# Patient Record
Sex: FEMALE | Race: WHITE | NOT HISPANIC OR LATINO | Employment: UNEMPLOYED | URBAN - METROPOLITAN AREA
[De-identification: names, ages, dates, MRNs, and addresses within clinical notes are randomized per-mention and may not be internally consistent; named-entity substitution may affect disease eponyms.]

---

## 2022-01-05 ENCOUNTER — HOSPITAL ENCOUNTER (EMERGENCY)
Facility: HOSPITAL | Age: 27
Discharge: HOME | End: 2022-01-05
Attending: EMERGENCY MEDICINE
Payer: MEDICAID

## 2022-01-05 ENCOUNTER — TELEPHONE (OUTPATIENT)
Dept: OBSTETRICS AND GYNECOLOGY | Facility: CLINIC | Age: 27
End: 2022-01-05
Payer: MEDICAID

## 2022-01-05 VITALS
TEMPERATURE: 99 F | OXYGEN SATURATION: 100 % | RESPIRATION RATE: 19 BRPM | HEART RATE: 111 BPM | SYSTOLIC BLOOD PRESSURE: 186 MMHG | DIASTOLIC BLOOD PRESSURE: 106 MMHG

## 2022-01-05 DIAGNOSIS — O20.0 THREATENED ABORTION: Primary | ICD-10-CM

## 2022-01-05 DIAGNOSIS — R10.9 ABDOMINAL CRAMPING: ICD-10-CM

## 2022-01-05 DIAGNOSIS — R03.0 ELEVATED BLOOD PRESSURE READING: ICD-10-CM

## 2022-01-05 LAB
ABO + RH BLD: NORMAL
ALBUMIN SERPL BCP-MCNC: 4.4 G/DL (ref 3.5–5.2)
ALP SERPL-CCNC: 101 U/L (ref 55–135)
ALT SERPL W/O P-5'-P-CCNC: 42 U/L (ref 10–44)
ANION GAP SERPL CALC-SCNC: 10 MMOL/L (ref 8–16)
AST SERPL-CCNC: 24 U/L (ref 10–40)
BACTERIA #/AREA URNS HPF: NORMAL /HPF
BASOPHILS # BLD AUTO: 0.04 K/UL (ref 0–0.2)
BASOPHILS NFR BLD: 0.4 % (ref 0–1.9)
BILIRUB SERPL-MCNC: 0.3 MG/DL (ref 0.1–1)
BILIRUB UR QL STRIP: NEGATIVE
BUN SERPL-MCNC: 10 MG/DL (ref 6–20)
CALCIUM SERPL-MCNC: 9.6 MG/DL (ref 8.7–10.5)
CHLORIDE SERPL-SCNC: 107 MMOL/L (ref 95–110)
CLARITY UR: CLEAR
CO2 SERPL-SCNC: 21 MMOL/L (ref 23–29)
COLOR UR: YELLOW
CREAT SERPL-MCNC: 0.7 MG/DL (ref 0.5–1.4)
DIFFERENTIAL METHOD: ABNORMAL
EOSINOPHIL # BLD AUTO: 0.1 K/UL (ref 0–0.5)
EOSINOPHIL NFR BLD: 0.6 % (ref 0–8)
ERYTHROCYTE [DISTWIDTH] IN BLOOD BY AUTOMATED COUNT: 13.7 % (ref 11.5–14.5)
EST. GFR  (AFRICAN AMERICAN): >60 ML/MIN/1.73 M^2
EST. GFR  (NON AFRICAN AMERICAN): >60 ML/MIN/1.73 M^2
GLUCOSE SERPL-MCNC: 93 MG/DL (ref 70–110)
GLUCOSE UR QL STRIP: NEGATIVE
HCG INTACT+B SERPL-ACNC: 3618 MIU/ML
HCT VFR BLD AUTO: 47.6 % (ref 37–48.5)
HCV AB SERPL QL IA: NEGATIVE
HEP C VIRUS HOLD SPECIMEN: NORMAL
HGB BLD-MCNC: 16.1 G/DL (ref 12–16)
HGB UR QL STRIP: ABNORMAL
HIV 1+2 AB+HIV1 P24 AG SERPL QL IA: NEGATIVE
IMM GRANULOCYTES # BLD AUTO: 0.06 K/UL (ref 0–0.04)
IMM GRANULOCYTES NFR BLD AUTO: 0.6 % (ref 0–0.5)
KETONES UR QL STRIP: ABNORMAL
LEUKOCYTE ESTERASE UR QL STRIP: ABNORMAL
LYMPHOCYTES # BLD AUTO: 2.2 K/UL (ref 1–4.8)
LYMPHOCYTES NFR BLD: 22 % (ref 18–48)
MCH RBC QN AUTO: 29.9 PG (ref 27–31)
MCHC RBC AUTO-ENTMCNC: 33.8 G/DL (ref 32–36)
MCV RBC AUTO: 89 FL (ref 82–98)
MICROSCOPIC COMMENT: NORMAL
MONOCYTES # BLD AUTO: 0.5 K/UL (ref 0.3–1)
MONOCYTES NFR BLD: 5.1 % (ref 4–15)
NEUTROPHILS # BLD AUTO: 7 K/UL (ref 1.8–7.7)
NEUTROPHILS NFR BLD: 71.3 % (ref 38–73)
NITRITE UR QL STRIP: NEGATIVE
NRBC BLD-RTO: 0 /100 WBC
PH UR STRIP: 6 [PH] (ref 5–8)
PLATELET # BLD AUTO: 325 K/UL (ref 150–450)
PMV BLD AUTO: 9.3 FL (ref 9.2–12.9)
POTASSIUM SERPL-SCNC: 3.7 MMOL/L (ref 3.5–5.1)
PROT SERPL-MCNC: 7.7 G/DL (ref 6–8.4)
PROT UR QL STRIP: NEGATIVE
RBC # BLD AUTO: 5.38 M/UL (ref 4–5.4)
RBC #/AREA URNS HPF: 2 /HPF (ref 0–4)
SODIUM SERPL-SCNC: 138 MMOL/L (ref 136–145)
SP GR UR STRIP: >=1.03 (ref 1–1.03)
SQUAMOUS #/AREA URNS HPF: 5 /HPF
URN SPEC COLLECT METH UR: ABNORMAL
UROBILINOGEN UR STRIP-ACNC: NEGATIVE EU/DL
WBC # BLD AUTO: 9.82 K/UL (ref 3.9–12.7)
WBC #/AREA URNS HPF: 5 /HPF (ref 0–5)

## 2022-01-05 PROCEDURE — 86901 BLOOD TYPING SEROLOGIC RH(D): CPT | Performed by: NURSE PRACTITIONER

## 2022-01-05 PROCEDURE — 81000 URINALYSIS NONAUTO W/SCOPE: CPT | Performed by: NURSE PRACTITIONER

## 2022-01-05 PROCEDURE — 86803 HEPATITIS C AB TEST: CPT | Performed by: EMERGENCY MEDICINE

## 2022-01-05 PROCEDURE — 99284 EMERGENCY DEPT VISIT MOD MDM: CPT | Mod: 25

## 2022-01-05 PROCEDURE — 84702 CHORIONIC GONADOTROPIN TEST: CPT | Performed by: NURSE PRACTITIONER

## 2022-01-05 PROCEDURE — 86900 BLOOD TYPING SEROLOGIC ABO: CPT | Performed by: NURSE PRACTITIONER

## 2022-01-05 PROCEDURE — 85025 COMPLETE CBC W/AUTO DIFF WBC: CPT | Performed by: NURSE PRACTITIONER

## 2022-01-05 PROCEDURE — 87389 HIV-1 AG W/HIV-1&-2 AB AG IA: CPT | Performed by: EMERGENCY MEDICINE

## 2022-01-05 PROCEDURE — 80053 COMPREHEN METABOLIC PANEL: CPT | Performed by: NURSE PRACTITIONER

## 2022-01-05 NOTE — FIRST PROVIDER EVALUATION
Medical screening exam completed.  I have conducted a focused provider triage encounter, findings are as follows:    Brief history of present illness:  5-6 weeks pregnant. . Reports vaginal bleeding. Hx of miscarriage     There were no vitals filed for this visit.      Preliminary workup initiated; this workup will be continued and followed by the physician or advanced practice provider that is assigned to the patient when roomed.

## 2022-01-05 NOTE — TELEPHONE ENCOUNTER
----- Message from Arelis Melchor sent at 1/5/2022 11:18 AM CST -----  .Type:  Needs Medical Advice    Who Called:  LAURA CHANEY [[id number]]  Symptoms (please be specific)  How long has patient had these symptoms:   Pharmacy name and phone #:   Would the patient rather a call back or a response via [facility]?  Best Call Back Number:  [phone]  Additional Information:   Pt is requesting a call from office regarding scheduling new preg appt. Please call.

## 2022-01-05 NOTE — TELEPHONE ENCOUNTER
Called patient and she is at the ED now with cramping and bleeding and stated she has history of miscarriages.  Patient will call back if appointment needed.

## 2022-01-06 ENCOUNTER — TELEPHONE (OUTPATIENT)
Dept: OBSTETRICS AND GYNECOLOGY | Facility: CLINIC | Age: 27
End: 2022-01-06
Payer: MEDICAID

## 2022-01-06 DIAGNOSIS — Z32.00 POSSIBLE PREGNANCY: Primary | ICD-10-CM

## 2022-01-06 NOTE — TELEPHONE ENCOUNTER
"Called patient and she went to ED yesterday stating "my anxiety got the best of me".  Scheduled pregnancy conf. Visit.  Patient stated ED told her she needs a follow up beta hcg tomorrow to make sure levels are still rising.  Will send message to [facility] to see if she would like to order lab.  "

## 2022-01-06 NOTE — ED PROVIDER NOTES
SCRIBE #1 NOTE: I, Mirella Mack, am scribing for, and in the presence of, Alejandro Light MD. I have scribed the entire note.       History     Chief Complaint   Patient presents with    Vaginal Bleeding     Pt states she is 5-6 wks pregnant and is having vaginal bleeding.     Review of patient's allergies indicates:  No Known Allergies      History of Present Illness     HPI    2022, 10:01 PM  History obtained from the patient      History of Present Illness: Kathy Mishra is a 26 y.o. female patient at [place] 5-6 weeks () who presents to the Emergency Department for evaluation of vaginal bleeding which onset a few hours pta. Pt states she noticed blood on toilet paper twice after going to the bathroom. She does have hx of miscarriage, so she was concerned and came to the ED. Symptoms are constant and moderate in severity. No mitigating or exacerbating factors reported. No associated sxs reported. Patient denies any abdominal pain, vaginal pain, vaginal discharge, dizziness, weakness, light headedness, n/v, and all other sxs at this time. No prior tx reported. LNMP was in November. No further complaints or concerns at this time.       Arrival mode: Personal vehicle    PCP: Primary Doctor No        Past Medical History:  No past medical history on file.    Past Surgical History:  No past surgical history on file.      Family History:  No family history on file.    Social History:  Social History     Tobacco Use    Smoking status: Not on file    Smokeless tobacco: Not on file   Substance and Sexual Activity    Alcohol use: Not on file    Drug use: Not on file    Sexual activity: Not on file        Review of Systems     Review of Systems   Constitutional: Negative for fever.   HENT: Negative for sore throat.    Respiratory: Negative for shortness of breath.    Cardiovascular: Negative for chest pain.   Gastrointestinal: Negative for abdominal pain, nausea and vomiting.   Genitourinary: Positive for  vaginal bleeding. Negative for dysuria, vaginal discharge and vaginal pain.   Musculoskeletal: Negative for back pain.   Skin: Negative for rash.   Neurological: Negative for dizziness, weakness and light-headedness.   Hematological: Does not bruise/bleed easily.   All other systems reviewed and are negative.     Physical Exam     Initial Vitals [01/05/22 1451]   BP Pulse Resp Temp SpO2   (!) 212/91 (!) 118 20 98.2 °F (36.8 °C) 100 %      MAP       --          Physical Exam  Nursing Notes and Vital Signs Reviewed.  Constitutional: Patient is in no acute distress. Well-developed and well-nourished.  Head: Atraumatic. Normocephalic.  Eyes: PERRL. EOM intact. Conjunctivae are not pale. No scleral icterus.  ENT: Mucous membranes are moist. Oropharynx is clear and symmetric.    Neck: Supple. Full ROM. No lymphadenopathy.  Cardiovascular: Regular rate. Regular rhythm. No murmurs, rubs, or gallops. Distal pulses are 2+ and symmetric.  Pulmonary/Chest: No respiratory distress. Clear to auscultation bilaterally. No wheezing or rales.  Abdominal: Soft and non-distended.  There is no tenderness.  No rebound, guarding, or rigidity. Good bowel sounds.  Genitourinary: No CVA tenderness  Musculoskeletal: Moves all extremities. No obvious deformities. No edema. No calf tenderness.  Skin: Warm and dry.  Neurological:  Alert, awake, and appropriate.  Normal speech.  No acute focal neurological deficits are appreciated.  Psychiatric: Normal affect. Good eye contact. Appropriate in content.     ED Course   Procedures  ED Vital Signs:  Vitals:    01/05/22 1451 01/05/22 2139   BP: (!) 212/91 (!) 186/106   Pulse: (!) 118 (!) 111   Resp: 20 19   Temp: 98.2 °F (36.8 °C) 98.5 °F (36.9 °C)   TempSrc: Oral Oral   SpO2: 100% 100%       Abnormal Lab Results:  Labs Reviewed   CBC W/ AUTO DIFFERENTIAL - Abnormal; Notable for the following components:       Result Value    Hemoglobin 16.1 (*)     Immature Granulocytes 0.6 (*)     Immature Grans  (Abs) 0.06 (*)     All other components within normal limits    Narrative:     Release to patient->Immediate   COMPREHENSIVE METABOLIC PANEL - Abnormal; Notable for the following components:    CO2 21 (*)     All other components within normal limits    Narrative:     Release to patient->Immediate   URINALYSIS, REFLEX TO URINE CULTURE - Abnormal; Notable for the following components:    Specific Gravity, UA >=1.030 (*)     Ketones, UA Trace (*)     Occult Blood UA 1+ (*)     Leukocytes, UA 1+ (*)     All other components within normal limits    Narrative:     Specimen Source->Urine   HCG, QUANTITATIVE    Narrative:     Release to patient->Immediate   HIV 1 / 2 ANTIBODY    Narrative:     Release to patient->Immediate   HEPATITIS C ANTIBODY    Narrative:     Release to patient->Immediate   HEP C VIRUS HOLD SPECIMEN    Narrative:     Release to patient->Immediate   URINALYSIS MICROSCOPIC    Narrative:     Specimen Source->Urine   GROUP & RH        All Lab Results:  Results for orders placed or performed during the hospital encounter of 01/05/22   CBC auto differential   Result Value Ref Range    WBC 9.82 3.90 - 12.70 K/uL    RBC 5.38 4.00 - 5.40 M/uL    Hemoglobin 16.1 (H) 12.0 - 16.0 g/dL    Hematocrit 47.6 37.0 - 48.5 %    MCV 89 82 - 98 fL    MCH 29.9 27.0 - 31.0 pg    MCHC 33.8 32.0 - 36.0 g/dL    RDW 13.7 11.5 - 14.5 %    Platelets 325 150 - 450 K/uL    MPV 9.3 9.2 - 12.9 fL    Immature Granulocytes 0.6 (H) 0.0 - 0.5 %    Gran # (ANC) 7.0 1.8 - 7.7 K/uL    Immature Grans (Abs) 0.06 (H) 0.00 - 0.04 K/uL    Lymph # 2.2 1.0 - 4.8 K/uL    Mono # 0.5 0.3 - 1.0 K/uL    Eos # 0.1 0.0 - 0.5 K/uL    Baso # 0.04 0.00 - 0.20 K/uL    nRBC 0 0 /100 WBC    Gran % 71.3 38.0 - 73.0 %    Lymph % 22.0 18.0 - 48.0 %    Mono % 5.1 4.0 - 15.0 %    Eosinophil % 0.6 0.0 - 8.0 %    Basophil % 0.4 0.0 - 1.9 %    Differential Method Automated    Comprehensive metabolic panel   Result Value Ref Range    Sodium 138 136 - 145 mmol/L     Potassium 3.7 3.5 - 5.1 mmol/L    Chloride 107 95 - 110 mmol/L    CO2 21 (L) 23 - 29 mmol/L    Glucose 93 70 - 110 mg/dL    BUN 10 6 - 20 mg/dL    Creatinine 0.7 0.5 - 1.4 mg/dL    Calcium 9.6 8.7 - 10.5 mg/dL    Total Protein 7.7 6.0 - 8.4 g/dL    Albumin 4.4 3.5 - 5.2 g/dL    Total Bilirubin 0.3 0.1 - 1.0 mg/dL    Alkaline Phosphatase 101 55 - 135 U/L    AST 24 10 - 40 U/L    ALT 42 10 - 44 U/L    Anion Gap 10 8 - 16 mmol/L    eGFR if African American >60 >60 mL/min/1.73 m^2    eGFR if non African American >60 >60 mL/min/1.73 m^2   Urinalysis, Reflex to Urine Culture Urine, Clean Catch    Specimen: Urine   Result Value Ref Range    Specimen UA Urine, Clean Catch     Color, UA Yellow Yellow, Straw, [place]    Appearance, UA Clear Clear    pH, UA 6.0 5.0 - 8.0    Specific Gravity, UA >=1.030 (A) 1.005 - 1.030    Protein, UA Negative Negative    Glucose, UA Negative Negative    Ketones, UA Trace (A) Negative    Bilirubin (UA) Negative Negative    Occult Blood UA 1+ (A) Negative    Nitrite, UA Negative Negative    Urobilinogen, UA Negative <2.0 EU/dL    Leukocytes, UA 1+ (A) Negative   hCG, quantitative, pregnancy   Result Value Ref Range    HCG Quant 3618 See Text mIU/mL   HIV 1/2 Ag/Ab (4th Gen)   Result Value Ref Range    HIV 1/2 Ag/Ab Negative Negative   Hepatitis C Antibody   Result Value Ref Range    Hepatitis C Ab Negative Negative   HCV Virus Hold Specimen   Result Value Ref Range    HEP C Virus Hold Specimen Hold for HCV sendout    Urinalysis Microscopic   Result Value Ref Range    RBC, UA 2 0 - 4 /hpf    WBC, UA 5 0 - 5 /hpf    Bacteria Occasional None-Occ /hpf    Squam Epithel, UA 5 /hpf    Microscopic Comment SEE COMMENT    ABO/Rh   Result Value Ref Range    Group & Rh A POS        Imaging Results:  Imaging Results          US OB <14 Wks TransAbd & TransVag, Single Gestation (XPD) (Final result)  Result time 01/05/22 18:26:43   Procedure changed from US OB <14 Wks, TransAbd, Single Gestation     Final result  by Justin Abbasi MD (01/05/22 18:26:43)                 Impression:      Intrauterine pregnancy with a yolk sac.  No fetal heart rate or fetal pole identified.  Follow-up recommended.  Today's sonographic findings consistent with early gestation.    Nonvisualization of the right ovary      Electronically signed by: Elroy Anderson  Date:    01/05/2022  Time:    18:26             Narrative:    EXAMINATION:  US OB <14 WEEKS, TRANSABDOM & TRANSVAG, SINGLE GESTATION (XPD)    CLINICAL HISTORY:  Abdominal cramping; Unspecified abdominal pain    TECHNIQUE:  Transabdominal sonography of the pelvis was performed, followed by transvaginal sonography to better evaluate the uterus and ovaries.    COMPARISON:  None.    FINDINGS:  There is a intrauterine gestational sac with a normal appearing yolk sac.  However there is no evidence for fetal pole or cardiac activity.  The right ovary was not identified.  No right adnexal masses.  The uterus measures 9.2 x 5.3 x 5 cm.  The left ovary measures 2.5 x 1.6 x 2.4 cm with vascular flow.  No subchorionic bleed.  The gestational sac measures 0.83 cm.  No free fluid.                                       The Emergency Provider reviewed the vital signs and test results, which are outlined above.     ED Discussion     10:06 PM: Reassessed pt at this time. Discussed with pt all pertinent ED information and results. Discussed pt dx and plan of tx. Gave pt all f/u and return to the ED instructions. All questions and concerns were addressed at this time. Pt expresses understanding of information and instructions, and is comfortable with plan to discharge. Pt is stable for discharge.    I discussed with patient and/or family/caretaker that evaluation in the ED does not suggest any emergent or life threatening medical conditions requiring immediate intervention beyond what was provided in the ED, and I believe patient is safe for discharge.  Regardless, an unremarkable evaluation in the ED does  not preclude the development or presence of a serious of life threatening condition. As such, patient was instructed to return immediately for any worsening or change in current symptoms.       Medical Decision Making:   Clinical Tests:   Lab Tests: Ordered and Reviewed  Radiological Study: Ordered and Reviewed           ED Medication(s):  Medications - No data to display    There are no discharge medications for this patient.       Follow-up Information     [facility] In 2 days.    Specialty: Obstetrics and Gynecology  Contact information:  [address] [facility] [address] [phone]           [facility] - Emergency Dept..    Specialty: Emergency Medicine  Why: As needed, If symptoms worsen  Contact information:  [address] [facility] [address] [phone]                           Scribe Attestation:   Scribe #1: I performed the above scribed service and the documentation accurately describes the services I performed. I attest to the accuracy of the note.     Attending:   Physician Attestation Statement for Scribe #1: I, Alejandro Light MD, personally performed the services described in this documentation, as scribed by Mirella Mack, in my presence, and it is both accurate and complete.           Clinical Impression       ICD-10-CM ICD-9-CM   1. Threatened   O20.0 640.00   2. Abdominal cramping  R10.9 789.00   3. Elevated blood pressure reading  R03.0 796.2       Disposition:   Disposition: Discharged  Condition: Stable         Alejandro Light MD  [phone]

## 2022-01-06 NOTE — TELEPHONE ENCOUNTER
----- Message from Joanne Franz sent at 1/6/2022  8:07 AM CST -----  She would like to schedule an OB appt w/ Ms ALVARO Haddad. She is about 5 weeks. Nothing coming up on the schedule. Please call pt [phone]. Thank you

## 2022-01-10 ENCOUNTER — LAB VISIT (OUTPATIENT)
Dept: LAB | Facility: HOSPITAL | Age: 27
End: 2022-01-10
Attending: ADVANCED PRACTICE MIDWIFE
Payer: MEDICAID

## 2022-01-10 DIAGNOSIS — Z32.00 POSSIBLE PREGNANCY: ICD-10-CM

## 2022-01-10 LAB — HCG INTACT+B SERPL-ACNC: 9889 MIU/ML

## 2022-01-10 PROCEDURE — 36415 COLL VENOUS BLD VENIPUNCTURE: CPT | Performed by: ADVANCED PRACTICE MIDWIFE

## 2022-01-10 PROCEDURE — 84702 CHORIONIC GONADOTROPIN TEST: CPT | Performed by: ADVANCED PRACTICE MIDWIFE

## 2022-01-11 ENCOUNTER — OFFICE VISIT (OUTPATIENT)
Dept: OBSTETRICS AND GYNECOLOGY | Facility: CLINIC | Age: 27
End: 2022-01-11
Payer: MEDICAID

## 2022-01-11 ENCOUNTER — LAB VISIT (OUTPATIENT)
Dept: LAB | Facility: HOSPITAL | Age: 27
End: 2022-01-11
Attending: ADVANCED PRACTICE MIDWIFE
Payer: MEDICAID

## 2022-01-11 ENCOUNTER — PROCEDURE VISIT (OUTPATIENT)
Dept: OBSTETRICS AND GYNECOLOGY | Facility: CLINIC | Age: 27
End: 2022-01-11
Payer: MEDICAID

## 2022-01-11 VITALS
DIASTOLIC BLOOD PRESSURE: 80 MMHG | WEIGHT: 252.44 LBS | SYSTOLIC BLOOD PRESSURE: 124 MMHG | HEIGHT: 63 IN | BODY MASS INDEX: 44.73 KG/M2

## 2022-01-11 DIAGNOSIS — O99.210 OBESITY AFFECTING PREGNANCY, ANTEPARTUM: ICD-10-CM

## 2022-01-11 DIAGNOSIS — N91.2 AMENORRHEA: ICD-10-CM

## 2022-01-11 DIAGNOSIS — Z86.39 HISTORY OF HYPOTHYROIDISM: ICD-10-CM

## 2022-01-11 DIAGNOSIS — Z86.79 HISTORY OF HYPERTENSION: ICD-10-CM

## 2022-01-11 DIAGNOSIS — O09.299 HISTORY OF MISCARRIAGE, CURRENTLY PREGNANT: ICD-10-CM

## 2022-01-11 DIAGNOSIS — N91.2 AMENORRHEA: Primary | ICD-10-CM

## 2022-01-11 LAB
ABO + RH BLD: NORMAL
ALBUMIN SERPL BCP-MCNC: 3.8 G/DL (ref 3.5–5.2)
ALP SERPL-CCNC: 88 U/L (ref 55–135)
ALT SERPL W/O P-5'-P-CCNC: 34 U/L (ref 10–44)
AMORPH CRY URNS QL MICRO: ABNORMAL
ANION GAP SERPL CALC-SCNC: 14 MMOL/L (ref 8–16)
AST SERPL-CCNC: 26 U/L (ref 10–40)
BACTERIA #/AREA URNS HPF: ABNORMAL /HPF
BASOPHILS # BLD AUTO: 0.01 K/UL (ref 0–0.2)
BASOPHILS NFR BLD: 0.2 % (ref 0–1.9)
BILIRUB SERPL-MCNC: 0.2 MG/DL (ref 0.1–1)
BILIRUB UR QL STRIP: NEGATIVE
BLD GP AB SCN CELLS X3 SERPL QL: NORMAL
BUN SERPL-MCNC: 9 MG/DL (ref 6–20)
CALCIUM SERPL-MCNC: 9.1 MG/DL (ref 8.7–10.5)
CHLORIDE SERPL-SCNC: 102 MMOL/L (ref 95–110)
CLARITY UR: ABNORMAL
CO2 SERPL-SCNC: 19 MMOL/L (ref 23–29)
COLOR UR: YELLOW
CREAT SERPL-MCNC: 0.6 MG/DL (ref 0.5–1.4)
DIFFERENTIAL METHOD: ABNORMAL
EOSINOPHIL # BLD AUTO: 0 K/UL (ref 0–0.5)
EOSINOPHIL NFR BLD: 0 % (ref 0–8)
ERYTHROCYTE [DISTWIDTH] IN BLOOD BY AUTOMATED COUNT: 13.8 % (ref 11.5–14.5)
EST. GFR  (AFRICAN AMERICAN): >60 ML/MIN/1.73 M^2
EST. GFR  (NON AFRICAN AMERICAN): >60 ML/MIN/1.73 M^2
ESTIMATED AVG GLUCOSE: 103 MG/DL (ref 68–131)
GLUCOSE SERPL-MCNC: 108 MG/DL (ref 70–110)
GLUCOSE UR QL STRIP: NEGATIVE
HBA1C MFR BLD: 5.2 % (ref 4–5.6)
HCT VFR BLD AUTO: 41.9 % (ref 37–48.5)
HGB BLD-MCNC: 13.9 G/DL (ref 12–16)
HGB UR QL STRIP: ABNORMAL
IMM GRANULOCYTES # BLD AUTO: 0.03 K/UL (ref 0–0.04)
IMM GRANULOCYTES NFR BLD AUTO: 0.7 % (ref 0–0.5)
KETONES UR QL STRIP: ABNORMAL
LEUKOCYTE ESTERASE UR QL STRIP: ABNORMAL
LYMPHOCYTES # BLD AUTO: 0.4 K/UL (ref 1–4.8)
LYMPHOCYTES NFR BLD: 7.9 % (ref 18–48)
MCH RBC QN AUTO: 29.6 PG (ref 27–31)
MCHC RBC AUTO-ENTMCNC: 33.2 G/DL (ref 32–36)
MCV RBC AUTO: 89 FL (ref 82–98)
MICROSCOPIC COMMENT: ABNORMAL
MONOCYTES # BLD AUTO: 0.5 K/UL (ref 0.3–1)
MONOCYTES NFR BLD: 10.4 % (ref 4–15)
NEUTROPHILS # BLD AUTO: 3.7 K/UL (ref 1.8–7.7)
NEUTROPHILS NFR BLD: 80.8 % (ref 38–73)
NITRITE UR QL STRIP: NEGATIVE
NRBC BLD-RTO: 0 /100 WBC
PH UR STRIP: 6 [PH] (ref 5–8)
PLATELET # BLD AUTO: 215 K/UL (ref 150–450)
PMV BLD AUTO: 10.6 FL (ref 9.2–12.9)
POTASSIUM SERPL-SCNC: 3.5 MMOL/L (ref 3.5–5.1)
PROT SERPL-MCNC: 6.9 G/DL (ref 6–8.4)
PROT UR QL STRIP: NEGATIVE
RBC # BLD AUTO: 4.69 M/UL (ref 4–5.4)
RBC #/AREA URNS HPF: 0 /HPF (ref 0–4)
SODIUM SERPL-SCNC: 135 MMOL/L (ref 136–145)
SP GR UR STRIP: >=1.03 (ref 1–1.03)
T3 SERPL-MCNC: 110 NG/DL (ref 60–180)
T4 FREE SERPL-MCNC: 0.7 NG/DL (ref 0.71–1.51)
TSH SERPL DL<=0.005 MIU/L-ACNC: 3.53 UIU/ML (ref 0.4–4)
URN SPEC COLLECT METH UR: ABNORMAL
WBC # BLD AUTO: 4.54 K/UL (ref 3.9–12.7)
WBC #/AREA URNS HPF: 20 /HPF (ref 0–5)

## 2022-01-11 PROCEDURE — 3074F SYST BP LT 130 MM HG: CPT | Mod: CPTII,,, | Performed by: ADVANCED PRACTICE MIDWIFE

## 2022-01-11 PROCEDURE — 87086 URINE CULTURE/COLONY COUNT: CPT | Performed by: ADVANCED PRACTICE MIDWIFE

## 2022-01-11 PROCEDURE — 76817 US OB/GYN PROCEDURE (VIEWPOINT): ICD-10-PCS | Mod: 26,S$PBB,, | Performed by: OBSTETRICS & GYNECOLOGY

## 2022-01-11 PROCEDURE — 76817 TRANSVAGINAL US OBSTETRIC: CPT | Mod: PBBFAC | Performed by: OBSTETRICS & GYNECOLOGY

## 2022-01-11 PROCEDURE — 86762 RUBELLA ANTIBODY: CPT | Performed by: ADVANCED PRACTICE MIDWIFE

## 2022-01-11 PROCEDURE — 84480 ASSAY TRIIODOTHYRONINE (T3): CPT | Performed by: ADVANCED PRACTICE MIDWIFE

## 2022-01-11 PROCEDURE — 81000 URINALYSIS NONAUTO W/SCOPE: CPT | Performed by: ADVANCED PRACTICE MIDWIFE

## 2022-01-11 PROCEDURE — 3008F PR BODY MASS INDEX (BMI) DOCUMENTED: ICD-10-PCS | Mod: CPTII,,, | Performed by: ADVANCED PRACTICE MIDWIFE

## 2022-01-11 PROCEDURE — 99999 PR PBB SHADOW E&M-EST. PATIENT-LVL III: ICD-10-PCS | Mod: PBBFAC,,, | Performed by: ADVANCED PRACTICE MIDWIFE

## 2022-01-11 PROCEDURE — 99999 PR PBB SHADOW E&M-EST. PATIENT-LVL III: CPT | Mod: PBBFAC,,, | Performed by: ADVANCED PRACTICE MIDWIFE

## 2022-01-11 PROCEDURE — 99214 OFFICE O/P EST MOD 30 MIN: CPT | Mod: TH,S$PBB,, | Performed by: ADVANCED PRACTICE MIDWIFE

## 2022-01-11 PROCEDURE — 1159F MED LIST DOCD IN RCRD: CPT | Mod: CPTII,,, | Performed by: ADVANCED PRACTICE MIDWIFE

## 2022-01-11 PROCEDURE — 87389 HIV-1 AG W/HIV-1&-2 AB AG IA: CPT | Performed by: ADVANCED PRACTICE MIDWIFE

## 2022-01-11 PROCEDURE — 80074 ACUTE HEPATITIS PANEL: CPT | Performed by: ADVANCED PRACTICE MIDWIFE

## 2022-01-11 PROCEDURE — 1159F PR MEDICATION LIST DOCUMENTED IN MEDICAL RECORD: ICD-10-PCS | Mod: CPTII,,, | Performed by: ADVANCED PRACTICE MIDWIFE

## 2022-01-11 PROCEDURE — 87591 N.GONORRHOEAE DNA AMP PROB: CPT | Performed by: ADVANCED PRACTICE MIDWIFE

## 2022-01-11 PROCEDURE — 3074F PR MOST RECENT SYSTOLIC BLOOD PRESSURE < 130 MM HG: ICD-10-PCS | Mod: CPTII,,, | Performed by: ADVANCED PRACTICE MIDWIFE

## 2022-01-11 PROCEDURE — 80053 COMPREHEN METABOLIC PANEL: CPT | Performed by: ADVANCED PRACTICE MIDWIFE

## 2022-01-11 PROCEDURE — 3079F PR MOST RECENT DIASTOLIC BLOOD PRESSURE 80-89 MM HG: ICD-10-PCS | Mod: CPTII,,, | Performed by: ADVANCED PRACTICE MIDWIFE

## 2022-01-11 PROCEDURE — 36415 COLL VENOUS BLD VENIPUNCTURE: CPT | Performed by: ADVANCED PRACTICE MIDWIFE

## 2022-01-11 PROCEDURE — 85025 COMPLETE CBC W/AUTO DIFF WBC: CPT | Performed by: ADVANCED PRACTICE MIDWIFE

## 2022-01-11 PROCEDURE — 3079F DIAST BP 80-89 MM HG: CPT | Mod: CPTII,,, | Performed by: ADVANCED PRACTICE MIDWIFE

## 2022-01-11 PROCEDURE — 99214 PR OFFICE/OUTPT VISIT, EST, LEVL IV, 30-39 MIN: ICD-10-PCS | Mod: TH,S$PBB,, | Performed by: ADVANCED PRACTICE MIDWIFE

## 2022-01-11 PROCEDURE — 84439 ASSAY OF FREE THYROXINE: CPT | Performed by: ADVANCED PRACTICE MIDWIFE

## 2022-01-11 PROCEDURE — 83036 HEMOGLOBIN GLYCOSYLATED A1C: CPT | Performed by: ADVANCED PRACTICE MIDWIFE

## 2022-01-11 PROCEDURE — 99213 OFFICE O/P EST LOW 20 MIN: CPT | Mod: PBBFAC,TH | Performed by: ADVANCED PRACTICE MIDWIFE

## 2022-01-11 PROCEDURE — 3008F BODY MASS INDEX DOCD: CPT | Mod: CPTII,,, | Performed by: ADVANCED PRACTICE MIDWIFE

## 2022-01-11 PROCEDURE — 86592 SYPHILIS TEST NON-TREP QUAL: CPT | Performed by: ADVANCED PRACTICE MIDWIFE

## 2022-01-11 PROCEDURE — 82570 ASSAY OF URINE CREATININE: CPT | Performed by: ADVANCED PRACTICE MIDWIFE

## 2022-01-11 PROCEDURE — 86901 BLOOD TYPING SEROLOGIC RH(D): CPT | Performed by: ADVANCED PRACTICE MIDWIFE

## 2022-01-11 PROCEDURE — 84443 ASSAY THYROID STIM HORMONE: CPT | Performed by: ADVANCED PRACTICE MIDWIFE

## 2022-01-11 PROCEDURE — 87491 CHLMYD TRACH DNA AMP PROBE: CPT | Performed by: ADVANCED PRACTICE MIDWIFE

## 2022-01-11 NOTE — PROGRESS NOTES
"CC: Absence of menses risk assessment Shelly Haddad 2022    Kathy Mishra is a 26 y.o. female  presents with complaint of absence of menstruation.  She denies nausea/vomIting/abdominal pain/bleeding.  UPT is positive.    Menstrual History  Menarche 13, length 4-5, cycle 28  LMP 2021, EDC 2022, therefore 6 weeks and 6 days    Past Medical History:   Diagnosis Date    Hypertension     Thyroid disease      Past Surgical History:   Procedure Laterality Date    adneoids      age of 5 years old    TONSILLECTOMY      age of 5 years old     Social History     Socioeconomic History    Marital status: Single   Tobacco Use    Smoking status: Current Some Day Smoker    Smokeless tobacco: Never Used   Substance and Sexual Activity    Alcohol use: Never    Drug use: Never    Sexual activity: Yes     Partners: Male     Birth control/protection: None     Family History   Problem Relation Age of Onset    Hypertension Paternal Grandmother     Diabetes Maternal Grandfather     Hypertension Father     Diabetes Mother      OB History    Para Term  AB Living   2       1     SAB IAB Ectopic Multiple Live Births   1              # Outcome Date GA Lbr Tera/2nd Weight Sex Delivery Anes PTL Lv   2 Current            1 SAB 16 12w0d              /80   Ht 5' 3" (1.6 m)   Wt 114.5 kg (252 lb 6.8 oz)   LMP 2021   BMI 44.72 kg/m²         ROS:  GENERAL: Denies weight gain or weight loss. Feeling well overall.   SKIN: Denies rash or lesions.   HEAD: Denies head injury or headache.   NODES: Denies enlarged lymph nodes.   CHEST: Denies chest pain or shortness of breath.   CARDIOVASCULAR: Denies palpitations or left sided chest pain.   ABDOMEN: No abdominal pain, constipation, diarrhea, nausea, vomiting or rectal bleeding.   URINARY: No frequency, dysuria, hematuria, or burning on urination.  REPRODUCTIVE: See HPI.   BREASTS: The patient performs breast self-examination and " denies pain, lumps, or nipple discharge.   HEMATOLOGIC: No easy bruisability or excessive bleeding.   MUSCULOSKELETAL: Denies joint pain or swelling.   NEUROLOGIC: Denies syncope or weakness.   PSYCHIATRIC: Denies depression, anxiety or mood swings.    PE:   APPEARANCE: Well nourished, well developed, in no acute distress.  AFFECT: WNL, alert and oriented x 3.  SKIN: No acne or hirsutism.  NECK: Neck symmetric without masses or thyromegaly.  NODES: No inguinal, cervical, axillary or femoral lymph node enlargement.  CHEST: Good respiratory effort.   ABDOMEN: Soft. No tenderness or masses. No hepatosplenomegaly. No hernias.  BREASTS: Symmetrical, no skin changes or visible lesions. No palpable masses, nipple discharge bilaterally.  PELVIC: Normal external female genitalia without lesions. Normal hair distribution. Adequate perineal body, normal urethral meatus. Vagina moist and well rugated without lesions or discharge. Cervix pink, without lesions, discharge or tenderness. No significant cystocele or rectocele. Bimanual exam shows uterus is 6 weeks, regular, mobile and nontender. Adnexa without masses or tenderness.  EXTREMITIES: No edema.          ASSESSMENT and PLAN:  26-year-old  2 para 0 with secondary amenorrhea and positive UPT.  LMP 2020 1-year-old EDC 2022 therefore 6 weeks and 6 days.  Review of history-history of hypertension, today's blood pressure 124/80,  add PIH labs to prenatal lab.  History of hypothyroidism-add thyroid panel to lab.  Obesity, possible PCOS.  Will add A1c.  GC chlamydia today.  Pap deferred secondary to recent spotting will perform later or postpartum.  Able to perform ultrasound today-single viable intrauterine pregnancy at 5 weeks and 6 days yielding EDC 2022.  Altered EDC is discussed with parents verbalized understanding.  Subchorionic hemorrhage is also noted and fetal heart tones 115 beats per minute.  Findings explained will follow-up in 2-3  weeks.  First trimester expectations-information provided.  Miscarriage precautions reviewed.  Return to office 2 weeks with viability ultrasound and new OB    ICD-10-CM ICD-9-CM    1. Amenorrhea  N91.2 626.0 POCT urine pregnancy      HIV 1/2 Ag/Ab (4th Gen)      RPR      Rubella Antibody, IgG      Type & Screen      CBC Auto Differential      C. trachomatis/N. gonorrhoeae by AMP DNA      Hepatitis Panel, Acute      Urinalysis, Reflex to Urine Culture Urine, Clean Catch      US OB/GYN Procedure ([facility])      Comprehensive Metabolic Panel      Protein/Creatinine Ratio, Urine      TSH      T4, Free      T3      Hemoglobin A1C   2. History of hypothyroidism  Z86.39 V12.29 TSH      T4, Free      T3      Hemoglobin A1C   3. History of hypertension  Z86.79 V12.59 Comprehensive Metabolic Panel      Protein/Creatinine Ratio, Urine   4. Obesity affecting pregnancy, antepartum  O99.210 649.13    5. History of miscarriage, currently pregnant  O09.299 V23.2          Patient was counseled today on proper weight gain based on the [employer]'s recommendations based on her pre-pregnancy weight. Discussed foods to avoid in pregnancy (i.e. sushi, fish that are high in mercury, deli meat, and unpasteurized cheeses). Discussed prenatal vitamin options (i.e. stool softener, DHA). Contingency screen offered - patient desires.    No follow-ups on file.

## 2022-01-12 LAB
CREAT UR-MCNC: 197 MG/DL (ref 15–325)
HAV IGM SERPL QL IA: NEGATIVE
HBV CORE IGM SERPL QL IA: NEGATIVE
HBV SURFACE AG SERPL QL IA: NEGATIVE
HCV AB SERPL QL IA: NEGATIVE
HIV 1+2 AB+HIV1 P24 AG SERPL QL IA: NEGATIVE
PROT UR-MCNC: 18 MG/DL (ref 0–15)
PROT/CREAT UR: 0.09 MG/G{CREAT} (ref 0–0.2)
RPR SER QL: NORMAL
RUBV IGG SER-ACNC: 20.4 IU/ML
RUBV IGG SER-IMP: REACTIVE

## 2022-01-13 LAB — BACTERIA UR CULT: NO GROWTH

## 2022-01-14 LAB
C TRACH DNA SPEC QL NAA+PROBE: NOT DETECTED
N GONORRHOEA DNA SPEC QL NAA+PROBE: NOT DETECTED

## 2022-02-01 ENCOUNTER — APPOINTMENT (OUTPATIENT)
Dept: OBSTETRICS AND GYNECOLOGY | Facility: CLINIC | Age: 27
End: 2022-02-01
Payer: MEDICAID

## 2022-02-01 ENCOUNTER — INITIAL PRENATAL (OUTPATIENT)
Dept: OBSTETRICS AND GYNECOLOGY | Facility: CLINIC | Age: 27
End: 2022-02-01
Payer: MEDICAID

## 2022-02-01 VITALS
BODY MASS INDEX: 44.93 KG/M2 | SYSTOLIC BLOOD PRESSURE: 122 MMHG | WEIGHT: 253.63 LBS | DIASTOLIC BLOOD PRESSURE: 82 MMHG

## 2022-02-01 DIAGNOSIS — O36.80X0 PREGNANCY WITH UNCERTAIN FETAL VIABILITY: Primary | ICD-10-CM

## 2022-02-01 DIAGNOSIS — O09.299 HISTORY OF MISCARRIAGE, CURRENTLY PREGNANT: ICD-10-CM

## 2022-02-01 DIAGNOSIS — Z86.39 HISTORY OF HYPOTHYROIDISM: ICD-10-CM

## 2022-02-01 DIAGNOSIS — Z86.79 HISTORY OF HYPERTENSION: ICD-10-CM

## 2022-02-01 PROCEDURE — 99213 PR OFFICE/OUTPT VISIT, EST, LEVL III, 20-29 MIN: ICD-10-PCS | Mod: TH,S$PBB,, | Performed by: ADVANCED PRACTICE MIDWIFE

## 2022-02-01 PROCEDURE — 99212 OFFICE O/P EST SF 10 MIN: CPT | Mod: PBBFAC,TH,PN,25 | Performed by: ADVANCED PRACTICE MIDWIFE

## 2022-02-01 PROCEDURE — 99213 OFFICE O/P EST LOW 20 MIN: CPT | Mod: TH,S$PBB,, | Performed by: ADVANCED PRACTICE MIDWIFE

## 2022-02-01 PROCEDURE — 76801 US OB/GYN PROCEDURE (VIEWPOINT): ICD-10-PCS | Mod: 26,S$PBB,, | Performed by: OBSTETRICS & GYNECOLOGY

## 2022-02-01 PROCEDURE — 76801 OB US < 14 WKS SINGLE FETUS: CPT | Mod: PBBFAC,PN | Performed by: OBSTETRICS & GYNECOLOGY

## 2022-02-01 PROCEDURE — 99999 PR PBB SHADOW E&M-EST. PATIENT-LVL II: CPT | Mod: PBBFAC,,, | Performed by: ADVANCED PRACTICE MIDWIFE

## 2022-02-01 PROCEDURE — 99999 PR PBB SHADOW E&M-EST. PATIENT-LVL II: ICD-10-PCS | Mod: PBBFAC,,, | Performed by: ADVANCED PRACTICE MIDWIFE

## 2022-02-01 RX ORDER — ALBUTEROL SULFATE 90 UG/1
AEROSOL, METERED RESPIRATORY (INHALATION)
COMMUNITY
Start: 2021-10-05 | End: 2024-01-25

## 2022-02-01 NOTE — PATIENT INSTRUCTIONS
Patient Education       Pregnancy - The Third Month   About this topic   It is important for you to learn how to take care of yourself to help you have a healthy baby and safe delivery. It is good to have health care throughout your pregnancy.  The third month of your pregnancy starts around week 10 and lasts through week 13. By knowing how far along you are, you can learn what is normal for this stage of your pregnancy and plan for what is next.  General   Your Body   During the third month of your pregnancy, here are some things you can expect.  You may:  · Have less morning sickness or upset stomach. This is because the placenta has taken over some of the hormone production for the baby.  · Start to gain weight. It is normal to gain about 1 to 3 pounds (.5 to 1.5 kg) in your first 3 months. Most moms gain about 25 to 35 pounds (11 to 16 kg) during their pregnancy. Talk to your doctor about how much weight you should gain.  · Have glowing skin because of extra blood flow and hormones  · Notice a dark line on your belly. This is normal. You may also be able to feel your uterus in your belly as it starts to grow.  · Have more trouble sleeping at night  · Have an increase in appetite  · Have trouble breathing or have an increase in congestion  · Have trouble with bowel movements  · Be at a higher risk for getting a yeast infection because of the change in pH of your vagina  · Have frequent mood swings.  Your babys growth and development:  · Your baby's organs are formed and are starting to work together. Eyelids are closed and will stay that way to protect their eyes as they grow.  · Their bones are growing. Your baby is able to open and close their mouth and starts to suck their thumb.  · Your baby's genitals and reproductive organs are developing, but it is too soon to tell if your baby is a boy or girl with an ultrasound.  · The heartbeat is easy to hear with a special tool at the doctors office.  · Your baby is  about 3 inches (8 cm) long and weighs about 1 ounce (30 gm). Your baby is about the size of a lemon.  Things to Think About   · Avoid alcohol, drugs, tobacco products, and second hand smoke  · Check with your doctor before taking any kind of drugs. Continue to take your vitamin with folic acid.  · Eat small meals and drink more water to help with heartburn. Also go for a walk after eating and avoid spicy, fried foods.  · You may need to switch to another size or style of clothes as your baby grows. Be comfortable and know that the baby is well cushioned inside of you.  · Stay healthy by eating good foods, exercising, and getting enough rest.  When do I need to call the doctor?   · Not gaining any weight or losing weight  · Belly pain or cramps that keeps you from eating or sleeping  · Continuing to have too much upset stomach and throwing up  · Period-like bleeding  Where can I learn more?   American Academy of Family Physicians  https://familydoctor.org/changes-in-your-body-during-pregnancy-first-trimester/   Better Health  https://www.betterhealth.jai.gov.au/health/HealthyLiving/pregnancy-stages-and-changes   Last Reviewed Date   2020-04-20  Consumer Information Use and Disclaimer   This information is not specific medical advice and does not replace information you receive from your health care provider. This is only a brief summary of general information. It does NOT include all information about conditions, illnesses, injuries, tests, procedures, treatments, therapies, discharge instructions or life-style choices that may apply to you. You must talk with your health care provider for complete information about your health and treatment options. This information should not be used to decide whether or not to accept your health care providers advice, instructions or recommendations. Only your health care provider has the knowledge and training to provide advice that is right for you.  Copyright   Copyright ©  2021 [employer] and its affiliates and/or licensors. All rights reserved.

## 2022-02-01 NOTE — PROGRESS NOTES
2022-new OB today.  Ob consent signed.  Prenatal labs reviewed unremarkable.  PCR 0.09.  Thyroid labs within normal limits.  A1c 5.3-reassuring.  Ultrasound today single viable intrauterine pregnancy 8 weeks 6 days yielding EDC 2022-reassuring.  Has recently had COVID.  CDC recommendations discussed-will consider.  First trimester expectations-information provided.  Miscarriage precautions remain active.  Return to office 4 weeks or p.r.n. problems        CC: Absence of menses risk assessment Shelly Haddad 2022    Kathy Mishra is a 26 y.o. female  presents with complaint of absence of menstruation.  She denies nausea/vomIting/abdominal pain/bleeding.  UPT is positive.    Menstrual History  Menarche 13, length 4-5, cycle 28  LMP 2021, EDC 2022, therefore 6 weeks and 6 days    Past Medical History:   Diagnosis Date    Hypertension     Thyroid disease      Past Surgical History:   Procedure Laterality Date    adneoids      age of 5 years old    TONSILLECTOMY      age of 5 years old     Social History     Socioeconomic History    Marital status: Single   Tobacco Use    Smoking status: Current Some Day Smoker    Smokeless tobacco: Never Used   Substance and Sexual Activity    Alcohol use: Never    Drug use: Never    Sexual activity: Yes     Partners: Male     Birth control/protection: None     Family History   Problem Relation Age of Onset    Hypertension Paternal Grandmother     Diabetes Maternal Grandfather     Hypertension Father     Diabetes Mother      OB History    Para Term  AB Living   2       1     SAB IAB Ectopic Multiple Live Births   1       0      # Outcome Date GA Lbr Tera/2nd Weight Sex Delivery Anes PTL Lv   2 Current            1 SAB 16 12w0d              /82   Wt 115.1 kg (253 lb 10.2 oz)   LMP 2021   BMI 44.93 kg/m²         ROS:  GENERAL: Denies weight gain or weight loss. Feeling well overall.   SKIN: Denies  rash or lesions.   HEAD: Denies head injury or headache.   NODES: Denies enlarged lymph nodes.   CHEST: Denies chest pain or shortness of breath.   CARDIOVASCULAR: Denies palpitations or left sided chest pain.   ABDOMEN: No abdominal pain, constipation, diarrhea, nausea, vomiting or rectal bleeding.   URINARY: No frequency, dysuria, hematuria, or burning on urination.  REPRODUCTIVE: See HPI.   BREASTS: The patient performs breast self-examination and denies pain, lumps, or nipple discharge.   HEMATOLOGIC: No easy bruisability or excessive bleeding.   MUSCULOSKELETAL: Denies joint pain or swelling.   NEUROLOGIC: Denies syncope or weakness.   PSYCHIATRIC: Denies depression, anxiety or mood swings.    PE:   APPEARANCE: Well nourished, well developed, in no acute distress.  AFFECT: WNL, alert and oriented x 3.  SKIN: No acne or hirsutism.  NECK: Neck symmetric without masses or thyromegaly.  NODES: No inguinal, cervical, axillary or femoral lymph node enlargement.  CHEST: Good respiratory effort.   ABDOMEN: Soft. No tenderness or masses. No hepatosplenomegaly. No hernias.  BREASTS: Symmetrical, no skin changes or visible lesions. No palpable masses, nipple discharge bilaterally.  PELVIC: Normal external female genitalia without lesions. Normal hair distribution. Adequate perineal body, normal urethral meatus. Vagina moist and well rugated without lesions or discharge. Cervix pink, without lesions, discharge or tenderness. No significant cystocele or rectocele. Bimanual exam shows uterus is 6 weeks, regular, mobile and nontender. Adnexa without masses or tenderness.  EXTREMITIES: No edema.          ASSESSMENT and PLAN:  26-year-old  2 para 0 with secondary amenorrhea and positive UPT.  LMP 2020 1-year-old EDC 2022 therefore 6 weeks and 6 days.  Review of history-history of hypertension, today's blood pressure 124/80,  add PIH labs to prenatal lab.  History of hypothyroidism-add thyroid panel to  lab.  Obesity, possible PCOS.  Will add A1c.  GC chlamydia today.  Pap deferred secondary to recent spotting will perform later or postpartum.  Able to perform ultrasound today-single viable intrauterine pregnancy at 5 weeks and 6 days yielding EDC 09/07/2022.  Altered EDC is discussed with parents verbalized understanding.  Subchorionic hemorrhage is also noted and fetal heart tones 115 beats per minute.  Findings explained will follow-up in 2-3 weeks.  First trimester expectations-information provided.  Miscarriage precautions reviewed.  Return to office 2 weeks with viability ultrasound and new OB    ICD-10-CM ICD-9-CM    1. History of miscarriage, currently pregnant  O09.299 V23.2    2. History of hypertension  Z86.79 V12.59    3. History of hypothyroidism  Z86.39 V12.29          Patient was counseled today on proper weight gain based on the [place] [employer]'s recommendations based on her pre-pregnancy weight. Discussed foods to avoid in pregnancy (i.e. sushi, fish that are high in mercury, deli meat, and unpasteurized cheeses). Discussed prenatal vitamin options (i.e. stool softener, DHA). Contingency screen offered - patient desires.    No follow-ups on file.

## 2022-02-23 ENCOUNTER — PATIENT MESSAGE (OUTPATIENT)
Dept: ADMINISTRATIVE | Facility: OTHER | Age: 27
End: 2022-02-23
Payer: MEDICAID

## 2022-03-01 ENCOUNTER — ROUTINE PRENATAL (OUTPATIENT)
Dept: OBSTETRICS AND GYNECOLOGY | Facility: CLINIC | Age: 27
End: 2022-03-01
Payer: MEDICAID

## 2022-03-01 ENCOUNTER — LAB VISIT (OUTPATIENT)
Dept: LAB | Facility: HOSPITAL | Age: 27
End: 2022-03-01
Attending: ADVANCED PRACTICE MIDWIFE
Payer: MEDICAID

## 2022-03-01 VITALS
SYSTOLIC BLOOD PRESSURE: 151 MMHG | HEART RATE: 112 BPM | DIASTOLIC BLOOD PRESSURE: 102 MMHG | WEIGHT: 259.25 LBS | BODY MASS INDEX: 45.93 KG/M2

## 2022-03-01 DIAGNOSIS — O09.299 HISTORY OF MISCARRIAGE, CURRENTLY PREGNANT: Primary | ICD-10-CM

## 2022-03-01 DIAGNOSIS — Z86.79 HISTORY OF HYPERTENSION: ICD-10-CM

## 2022-03-01 DIAGNOSIS — R03.0 ELEVATED BLOOD PRESSURE READING: ICD-10-CM

## 2022-03-01 LAB
ALBUMIN SERPL BCP-MCNC: 3.5 G/DL (ref 3.5–5.2)
ALP SERPL-CCNC: 81 U/L (ref 55–135)
ALT SERPL W/O P-5'-P-CCNC: 15 U/L (ref 10–44)
ANION GAP SERPL CALC-SCNC: 13 MMOL/L (ref 8–16)
AST SERPL-CCNC: 14 U/L (ref 10–40)
BASOPHILS # BLD AUTO: 0.02 K/UL (ref 0–0.2)
BASOPHILS NFR BLD: 0.2 % (ref 0–1.9)
BILIRUB SERPL-MCNC: 0.2 MG/DL (ref 0.1–1)
BUN SERPL-MCNC: 8 MG/DL (ref 6–20)
CALCIUM SERPL-MCNC: 10.2 MG/DL (ref 8.7–10.5)
CHLORIDE SERPL-SCNC: 103 MMOL/L (ref 95–110)
CO2 SERPL-SCNC: 23 MMOL/L (ref 23–29)
CREAT SERPL-MCNC: 0.6 MG/DL (ref 0.5–1.4)
CREAT UR-MCNC: 203 MG/DL (ref 15–325)
DIFFERENTIAL METHOD: ABNORMAL
EOSINOPHIL # BLD AUTO: 0.1 K/UL (ref 0–0.5)
EOSINOPHIL NFR BLD: 0.4 % (ref 0–8)
ERYTHROCYTE [DISTWIDTH] IN BLOOD BY AUTOMATED COUNT: 14.2 % (ref 11.5–14.5)
EST. GFR  (AFRICAN AMERICAN): >60 ML/MIN/1.73 M^2
EST. GFR  (NON AFRICAN AMERICAN): >60 ML/MIN/1.73 M^2
GLUCOSE SERPL-MCNC: 84 MG/DL (ref 70–110)
HCT VFR BLD AUTO: 43.7 % (ref 37–48.5)
HGB BLD-MCNC: 14.6 G/DL (ref 12–16)
IMM GRANULOCYTES # BLD AUTO: 0.04 K/UL (ref 0–0.04)
IMM GRANULOCYTES NFR BLD AUTO: 0.3 % (ref 0–0.5)
LYMPHOCYTES # BLD AUTO: 2 K/UL (ref 1–4.8)
LYMPHOCYTES NFR BLD: 17.5 % (ref 18–48)
MCH RBC QN AUTO: 29.6 PG (ref 27–31)
MCHC RBC AUTO-ENTMCNC: 33.4 G/DL (ref 32–36)
MCV RBC AUTO: 89 FL (ref 82–98)
MONOCYTES # BLD AUTO: 0.5 K/UL (ref 0.3–1)
MONOCYTES NFR BLD: 4.3 % (ref 4–15)
NEUTROPHILS # BLD AUTO: 9 K/UL (ref 1.8–7.7)
NEUTROPHILS NFR BLD: 77.3 % (ref 38–73)
NRBC BLD-RTO: 0 /100 WBC
PLATELET # BLD AUTO: 305 K/UL (ref 150–450)
PMV BLD AUTO: 10.4 FL (ref 9.2–12.9)
POTASSIUM SERPL-SCNC: 3.7 MMOL/L (ref 3.5–5.1)
PROT SERPL-MCNC: 6.8 G/DL (ref 6–8.4)
PROT UR-MCNC: 25 MG/DL (ref 0–15)
PROT/CREAT UR: 0.12 MG/G{CREAT} (ref 0–0.2)
RBC # BLD AUTO: 4.93 M/UL (ref 4–5.4)
SODIUM SERPL-SCNC: 139 MMOL/L (ref 136–145)
WBC # BLD AUTO: 11.67 K/UL (ref 3.9–12.7)

## 2022-03-01 PROCEDURE — 99213 OFFICE O/P EST LOW 20 MIN: CPT | Mod: TH,S$PBB,, | Performed by: ADVANCED PRACTICE MIDWIFE

## 2022-03-01 PROCEDURE — 99999 PR PBB SHADOW E&M-EST. PATIENT-LVL II: CPT | Mod: PBBFAC,,, | Performed by: ADVANCED PRACTICE MIDWIFE

## 2022-03-01 PROCEDURE — 80053 COMPREHEN METABOLIC PANEL: CPT | Performed by: ADVANCED PRACTICE MIDWIFE

## 2022-03-01 PROCEDURE — 36415 COLL VENOUS BLD VENIPUNCTURE: CPT | Performed by: ADVANCED PRACTICE MIDWIFE

## 2022-03-01 PROCEDURE — 99213 PR OFFICE/OUTPT VISIT, EST, LEVL III, 20-29 MIN: ICD-10-PCS | Mod: TH,S$PBB,, | Performed by: ADVANCED PRACTICE MIDWIFE

## 2022-03-01 PROCEDURE — 99212 OFFICE O/P EST SF 10 MIN: CPT | Mod: PBBFAC,TH | Performed by: ADVANCED PRACTICE MIDWIFE

## 2022-03-01 PROCEDURE — 99999 PR PBB SHADOW E&M-EST. PATIENT-LVL II: ICD-10-PCS | Mod: PBBFAC,,, | Performed by: ADVANCED PRACTICE MIDWIFE

## 2022-03-01 PROCEDURE — 85025 COMPLETE CBC W/AUTO DIFF WBC: CPT | Performed by: ADVANCED PRACTICE MIDWIFE

## 2022-03-01 PROCEDURE — 82570 ASSAY OF URINE CREATININE: CPT | Performed by: ADVANCED PRACTICE MIDWIFE

## 2022-03-01 RX ORDER — VITAMIN B COMPLEX
1 CAPSULE ORAL DAILY
COMMUNITY
End: 2022-05-25

## 2022-03-01 RX ORDER — ONDANSETRON 4 MG/1
4 TABLET, ORALLY DISINTEGRATING ORAL EVERY 8 HOURS PRN
Status: ON HOLD | COMMUNITY
Start: 2022-02-24 | End: 2022-08-06 | Stop reason: HOSPADM

## 2022-03-01 NOTE — PROGRESS NOTES
26 y.o. female  at 12w6d   denies VB or cramping    Doing well without concerns   Initial blood pressure 155/92, asymptomatic.  Will recheck blood pressure.  1+ protein.  History of blood pressure as a child on medication-PIH labs today and advised to check blood pressure at home.  Signed up for connected mom    Partner has child with epilepsy and autism.  They request screening and maternity 21 is ordered for tomorrow at 13 weeks  aware limitations    First trimester s/s improving:  Taking Zofran for nausea vomiting.  Has constipation, advised diet and Metamucil TW lbs   Reviewed prenatal labs-  Reviewed warning signs, pregnancy precautions and how/when to call.  RTC tomorrow, call or present sooner prn.     I spent a total of 20 minutes on the day of the visit.This includes face to face time and non-face to face time preparing to see the patient (eg, review of tests), Obtaining and/or reviewing separately obtained history, Documenting clinical information in the electronic or other health record, Independently interpreting resultsand communicating results to the patient/family/caregiver, or Care coordination.

## 2022-03-01 NOTE — PATIENT INSTRUCTIONS
Patient Education       Pregnancy - The Third Month   About this topic   It is important for you to learn how to take care of yourself to help you have a healthy baby and safe delivery. It is good to have health care throughout your pregnancy.  The third month of your pregnancy starts around week 10 and lasts through week 13. By knowing how far along you are, you can learn what is normal for this stage of your pregnancy and plan for what is next.  General   Your Body   During the third month of your pregnancy, here are some things you can expect.  You may:  Have less morning sickness or upset stomach. This is because the placenta has taken over some of the hormone production for the baby.  Start to gain weight. It is normal to gain about 1 to 3 pounds (.5 to 1.5 kg) in your first 3 months. Most moms gain about 25 to 35 pounds (11 to 16 kg) during their pregnancy. Talk to your doctor about how much weight you should gain.  Have glowing skin because of extra blood flow and hormones  Notice a dark line on your belly. This is normal. You may also be able to feel your uterus in your belly as it starts to grow.  Have more trouble sleeping at night  Have an increase in appetite  Have trouble breathing or have an increase in congestion  Have trouble with bowel movements  Be at a higher risk for getting a yeast infection because of the change in pH of your vagina  Have frequent mood swings.  Your babys growth and development:  Your baby's organs are formed and are starting to work together. Eyelids are closed and will stay that way to protect their eyes as they grow.  Their bones are growing. Your baby is able to open and close their mouth and starts to suck their thumb.  Your baby's genitals and reproductive organs are developing, but it is too soon to tell if your baby is a boy or girl with an ultrasound.  The heartbeat is easy to hear with a special tool at the doctors office.  Your baby is about 3 inches (8 cm) long  and weighs about 1 ounce (30 gm). Your baby is about the size of a lemon.  Things to Think About   Avoid alcohol, drugs, tobacco products, and second hand smoke  Check with your doctor before taking any kind of drugs. Continue to take your vitamin with folic acid.  Eat small meals and drink more water to help with heartburn. Also go for a walk after eating and avoid spicy, fried foods.  You may need to switch to another size or style of clothes as your baby grows. Be comfortable and know that the baby is well cushioned inside of you.  Stay healthy by eating good foods, exercising, and getting enough rest.  When do I need to call the doctor?   Not gaining any weight or losing weight  Belly pain or cramps that keeps you from eating or sleeping  Continuing to have too much upset stomach and throwing up  Period-like bleeding  Where can I learn more?   American Academy of Family Physicians  https://familydoctor.org/changes-in-your-body-during-pregnancy-first-trimester/   Better Health  https://www.betterhealth.jai.gov.au/health/HealthyLiving/pregnancy-stages-and-changes   Last Reviewed Date   2020-04-20  Consumer Information Use and Disclaimer   This information is not specific medical advice and does not replace information you receive from your health care provider. This is only a brief summary of general information. It does NOT include all information about conditions, illnesses, injuries, tests, procedures, treatments, therapies, discharge instructions or life-style choices that may apply to you. You must talk with your health care provider for complete information about your health and treatment options. This information should not be used to decide whether or not to accept your health care providers advice, instructions or recommendations. Only your health care provider has the knowledge and training to provide advice that is right for you.  Copyright   Copyright © 2021 UpToDate, Inc. and its affiliates and/or  licensors. All rights reserved.

## 2022-03-02 ENCOUNTER — LAB VISIT (OUTPATIENT)
Dept: LAB | Facility: HOSPITAL | Age: 27
End: 2022-03-02
Attending: ADVANCED PRACTICE MIDWIFE
Payer: MEDICAID

## 2022-03-02 ENCOUNTER — ROUTINE PRENATAL (OUTPATIENT)
Dept: OBSTETRICS AND GYNECOLOGY | Facility: CLINIC | Age: 27
End: 2022-03-02
Payer: MEDICAID

## 2022-03-02 ENCOUNTER — PATIENT MESSAGE (OUTPATIENT)
Dept: ADMINISTRATIVE | Facility: OTHER | Age: 27
End: 2022-03-02
Payer: MEDICAID

## 2022-03-02 VITALS — SYSTOLIC BLOOD PRESSURE: 144 MMHG | DIASTOLIC BLOOD PRESSURE: 78 MMHG | WEIGHT: 257.5 LBS | BODY MASS INDEX: 45.61 KG/M2

## 2022-03-02 DIAGNOSIS — O09.299 HISTORY OF MISCARRIAGE, CURRENTLY PREGNANT: ICD-10-CM

## 2022-03-02 DIAGNOSIS — Z86.79 HISTORY OF HYPERTENSION: Primary | ICD-10-CM

## 2022-03-02 PROCEDURE — 36415 COLL VENOUS BLD VENIPUNCTURE: CPT | Performed by: ADVANCED PRACTICE MIDWIFE

## 2022-03-02 PROCEDURE — 99999 PR PBB SHADOW E&M-EST. PATIENT-LVL II: CPT | Mod: PBBFAC,,, | Performed by: ADVANCED PRACTICE MIDWIFE

## 2022-03-02 PROCEDURE — 99212 OFFICE O/P EST SF 10 MIN: CPT | Mod: PBBFAC,TH,PN | Performed by: ADVANCED PRACTICE MIDWIFE

## 2022-03-02 PROCEDURE — 99213 PR OFFICE/OUTPT VISIT, EST, LEVL III, 20-29 MIN: ICD-10-PCS | Mod: TH,S$PBB,, | Performed by: ADVANCED PRACTICE MIDWIFE

## 2022-03-02 PROCEDURE — 99213 OFFICE O/P EST LOW 20 MIN: CPT | Mod: TH,S$PBB,, | Performed by: ADVANCED PRACTICE MIDWIFE

## 2022-03-02 PROCEDURE — 99999 PR PBB SHADOW E&M-EST. PATIENT-LVL II: ICD-10-PCS | Mod: PBBFAC,,, | Performed by: ADVANCED PRACTICE MIDWIFE

## 2022-03-02 NOTE — PROGRESS NOTES
26 y.o. female  at 13w0d presents to follow-up on elevated blood pressure reading yesterday and blood work performed  denies VB or cramping    Doing well without concerns     Blood pressure 144/78.  Baseline PIH labs are reviewed unremarkable with PCR noted to be 0.12.  Findings discussed and explained.  Has blood pressure cuff at home and is encouraged to check blood pressure-reassured    Genetic testing with OC 21 today-blood taken at the [facility]      Reviewed warning signs, pregnancy precautions and how/when to call.  RTC x 4 wks, call or present sooner prn.     I spent a total of 15 minutes on the day of the visit.This includes face to face time and non-face to face time preparing to see the patient (eg, review of tests), Obtaining and/or reviewing separately obtained history, Documenting clinical information in the electronic or other health record, Independently interpreting resultsand communicating results to the patient/family/caregiver, or Care coordination.

## 2022-03-09 ENCOUNTER — TELEPHONE (OUTPATIENT)
Dept: OBSTETRICS AND GYNECOLOGY | Facility: CLINIC | Age: 27
End: 2022-03-09
Payer: MEDICAID

## 2022-03-09 NOTE — TELEPHONE ENCOUNTER
Called patient and after verifying with 2 identifiers the MaterniT 21 results discussed.    Negative and female.  Patient verbalized understanding.

## 2022-03-09 NOTE — TELEPHONE ENCOUNTER
----- Message from Ronald Vo sent at 3/9/2022 10:10 AM CST -----  Contact: LAURA CHANEY [[id number]]  .Type:  Test Results    Who Called: LAURA CHANEY [[id number]]  Name of Test (Lab/Mammo/Etc):  labs  Date of Test:  03/02/2022  Ordering Provider:  ALVARO erickson  Where the test was performed:[facility]  Would the patient rather a call back or a response via [facility]?  call  Best Call Back Number:  [phone] (home)    Additional Information:

## 2022-03-29 ENCOUNTER — ROUTINE PRENATAL (OUTPATIENT)
Dept: OBSTETRICS AND GYNECOLOGY | Facility: CLINIC | Age: 27
End: 2022-03-29
Payer: MEDICAID

## 2022-03-29 VITALS
BODY MASS INDEX: 46.06 KG/M2 | DIASTOLIC BLOOD PRESSURE: 90 MMHG | WEIGHT: 260.06 LBS | SYSTOLIC BLOOD PRESSURE: 128 MMHG

## 2022-03-29 DIAGNOSIS — O09.299 HISTORY OF MISCARRIAGE, CURRENTLY PREGNANT: Primary | ICD-10-CM

## 2022-03-29 DIAGNOSIS — I10 HYPERTENSION, UNSPECIFIED TYPE: ICD-10-CM

## 2022-03-29 PROBLEM — O16.9 HYPERTENSION AFFECTING PREGNANCY, ANTEPARTUM: Status: ACTIVE | Noted: 2022-01-11

## 2022-03-29 PROCEDURE — 99999 PR PBB SHADOW E&M-EST. PATIENT-LVL III: ICD-10-PCS | Mod: PBBFAC,,, | Performed by: ADVANCED PRACTICE MIDWIFE

## 2022-03-29 PROCEDURE — 99999 PR PBB SHADOW E&M-EST. PATIENT-LVL III: CPT | Mod: PBBFAC,,, | Performed by: ADVANCED PRACTICE MIDWIFE

## 2022-03-29 PROCEDURE — 99213 OFFICE O/P EST LOW 20 MIN: CPT | Mod: PBBFAC,TH,PN | Performed by: ADVANCED PRACTICE MIDWIFE

## 2022-03-29 PROCEDURE — 99213 PR OFFICE/OUTPT VISIT, EST, LEVL III, 20-29 MIN: ICD-10-PCS | Mod: TH,S$PBB,, | Performed by: ADVANCED PRACTICE MIDWIFE

## 2022-03-29 PROCEDURE — 99213 OFFICE O/P EST LOW 20 MIN: CPT | Mod: TH,S$PBB,, | Performed by: ADVANCED PRACTICE MIDWIFE

## 2022-03-29 RX ORDER — NAPROXEN SODIUM 220 MG/1
81 TABLET, FILM COATED ORAL DAILY
Qty: 30 TABLET | Refills: 11 | Status: SHIPPED | OUTPATIENT
Start: 2022-03-29 | End: 2022-05-05 | Stop reason: SDUPTHER

## 2022-03-29 NOTE — PATIENT INSTRUCTIONS
Patient Education       Pregnancy - The Fourth Month   About this topic   It is important for you to learn how to take care of yourself to help you have a healthy baby and safe delivery. It is good to have health care throughout your pregnancy.  The fourth month of your pregnancy starts around week 14 and lasts through week 18. By knowing how far along you are, you can learn what is normal for this stage of your pregnancy and plan for what is next.  General   Growth and Development   During the fourth month of your pregnancy, here are some things you can expect.  You may:  Start to show that you are pregnant. It is normal to gain about 5 to 10 pounds (2.3 to 4.5 kg) total in your first 4 months.  Have heartburn  Feel like you have trouble paying attention to things  Have less nausea  Notice your breasts are growing and the veins are easier to see on them  Have swollen veins in your legs and feet, more nosebleeds, or bleeding when you brush your teeth. These are all because of the extra blood your body has while you are pregnant.  Notice more swelling in your hands and feet  Start to feel fluttering when you are lying or sitting quietly. This is your baby kicking.  Have pain in your sides with sudden movement. This is normal and happens because the ligaments in your belly are stretching.  Have a little more energy. Exercise is good for you, but check with your doctor before starting new exercises.  Most of the time it is safe for you to have sex while you are pregnant. It wont hurt the baby.  Your babys:  Skin is very thin and you can easily see blood vessels through it. Your baby is covered with lots of fine hair to protect their skin.  Bones are starting to harden. Your baby is able to frown, smile, stretch, and move.  Practicing breathing movements while inside of your womb  About 6 inches (16 cm) long and weighs about 7 ounces (200 gm). Your baby is about the size of an orange.  Things to Think About   Avoid  alcohol, drugs, tobacco products, and second hand smoke  Check with your doctor before taking any kind of drugs. Continue to take your vitamin with folic acid.  Avoid cleaning cat litter boxes. This can cause a disease that causes birth defects in your baby.  Amniocentesis and other prenatal screening tests may be done this month.  Try sleeping on your side. Use a pillow between your legs. Avoid sleeping on your back. This will help with the blood flow to your baby.  Change positions and get up slowly. Your heart has to work hard to cope with all of the extra blood volume.  Where will you take your baby for care after they are born? This is a good time to find a doctor for your baby.  Eat fresh fruits and foods with a lot of fiber to help with hard stools.  Drink at least 6 to 8 glasses of water each day.  When do I need to call the doctor?   Vaginal bleeding  Leaking of fluid from your vagina  Problems with constipation  Belly pain  Any illness or infection  Severe headaches or headaches that wont go away  Where can I learn more?   Better Health  https://www.betterhealth.jai.gov.au/health/HealthyLiving/pregnancy-stages-and-changes   Family Doctor  https://familydoctor.org/changes-in-your-body-during-pregnancy-first-trimester/   Last Reviewed Date   2020-04-20  Consumer Information Use and Disclaimer   This information is not specific medical advice and does not replace information you receive from your health care provider. This is only a brief summary of general information. It does NOT include all information about conditions, illnesses, injuries, tests, procedures, treatments, therapies, discharge instructions or life-style choices that may apply to you. You must talk with your health care provider for complete information about your health and treatment options. This information should not be used to decide whether or not to accept your health care providers advice, instructions or recommendations. Only your  health care provider has the knowledge and training to provide advice that is right for you.  Copyright   Copyright © 2021 UpToDate, Inc. and its affiliates and/or licensors. All rights reserved.  Patient Education       Pregnancy - The Fifth Month   About this topic   It is important for you to learn how to take care of yourself to help you have a healthy baby and safe delivery. It is good to have health care throughout your pregnancy.  The fifth month of your pregnancy starts around week 19 and lasts through week 23. By knowing how far along you are, you can learn what is normal for this stage of your pregnancy and plan for what is next.  General   Growth and Development   During the fifth month of your pregnancy, here are some things you can expect.  You may:  Start to gain a little more weight. It is normal to gain about 10 to 15 pounds (4.5 to 7 kg) total in your first 5 months.  Have leg cramps. Be sure to drink plenty of water.  Have loose teeth.  Have more trouble breathing or with heartburn as your baby gets bigger  Start having [facility] contractions. You may feel your belly squeezing or getting tight. Be sure to drink 6 to 8 glasses of water each day.  Notice you are able to express milk from your breasts  See stretch marks on your belly, breasts, or legs. Stay active to try and keep good muscle tone.  Be checked for gestational diabetes  Your baby's growth and development:  Your baby is covered with a thick whitish substance called vernix. This helps protect your babys skin.  Their genitals are able to be seen on an ultrasound. Your doctor will check your babys size, how much fluid there is around your baby, and all of your babys organs with the ultrasound.  Your baby is starting to be able to see, hear, taste, and feel touch.  The bones are starting to make blood cells.  Your baby is about 11 inches (28 cm) long and weighs about 1 pound (450 gm). Your baby is about the size of a  grapefruit.  Things to Think About   Avoid alcohol, drugs, tobacco products, and second hand smoke  Do not clean your cat litter box. You could get a disease that causes birth defects to your baby.  Check with your doctor before taking any kind of drugs. Continue to take your vitamin with folic acid.  Do you plan to breastfeed your baby?  Where will you deliver? Do you plan to take prenatal classes? If so, try to have them completed by your 8th month.  Start to think about your plans for pain control during labor.  You may want to learn about cord blood banking.  Rest and take breaks each day.  When do I need to call the doctor?   Contractions every 10 minutes or more often that do not go away with drinking water or position changes  Low, dull back pain that does not go away  Pressure in your pelvis that feels like your baby is pushing down  A gush or constant trickle of watery or bloody fluid leaking from your vagina  Cramps in your lower belly that come and go or are constant  Little to no movement felt by baby in 2 hours. Your baby should move at least 10 times every 2 hours.  Headache that does not go away, blurry vision, seeing spots or halos, increase in swelling in your hands, feet, or face, and pain under your ribs on the right side  Fever of 100.4°F (38°C) or higher  Bleeding or swelling of your gums  Urinating less, or having pain when you urinate  Vaginal bleeding with or without pain  After a car accident, fall, or any trauma to your belly  Having thoughts of harming yourself or others, or do not feel safe at home  Where can I learn more?   American Academy of Family Physicians  https://familydoctor.org/changes-in-your-body-during-pregnancy-second-trimester/   Better Health  https://www.betterhealth.jai.gov.au/health/HealthyLiving/pregnancy-stages-and-changes   Last Reviewed Date   2020-04-20  Consumer Information Use and Disclaimer   This information is not specific medical advice and does not replace  information you receive from your health care provider. This is only a brief summary of general information. It does NOT include all information about conditions, illnesses, injuries, tests, procedures, treatments, therapies, discharge instructions or life-style choices that may apply to you. You must talk with your health care provider for complete information about your health and treatment options. This information should not be used to decide whether or not to accept your health care providers advice, instructions or recommendations. Only your health care provider has the knowledge and training to provide advice that is right for you.  Copyright   Copyright © 2021 [employer] and its affiliates and/or licensors. All rights reserved.

## 2022-03-29 NOTE — PROGRESS NOTES
26 y.o. female  at 16w6d   Not feeling flutters, denies VB, LOF or cramping    Doing well without concerns   Blood pressure 128/90, she has been checking her blood pressure daily at home and is running 130-140 over 80 to 90s.  Will continue to monitor    TW lbs   Reviewed prenatal labs-unremarkable, baseline PCR 0.09  Genetic testing maternity 21-negative girl  Anatomy scan ordered           Reviewed warning signs, pregnancy precautions and how/when to call.  RTC x 4 wks, call or present sooner prn.     I spent a total of 20 minutes on the day of the visit.This includes face to face time and non-face to face time preparing to see the patient (eg, review of tests), Obtaining and/or reviewing separately obtained history, Documenting clinical information in the electronic or other health record, Independently interpreting resultsand communicating results to the patient/family/caregiver, or Care coordination.

## 2022-04-26 ENCOUNTER — ROUTINE PRENATAL (OUTPATIENT)
Dept: OBSTETRICS AND GYNECOLOGY | Facility: CLINIC | Age: 27
End: 2022-04-26
Payer: MEDICAID

## 2022-04-26 ENCOUNTER — PROCEDURE VISIT (OUTPATIENT)
Dept: OBSTETRICS AND GYNECOLOGY | Facility: CLINIC | Age: 27
End: 2022-04-26
Payer: MEDICAID

## 2022-04-26 VITALS — BODY MASS INDEX: 46.8 KG/M2 | SYSTOLIC BLOOD PRESSURE: 158 MMHG | DIASTOLIC BLOOD PRESSURE: 82 MMHG | WEIGHT: 264.25 LBS

## 2022-04-26 DIAGNOSIS — Z86.79 HISTORY OF HYPERTENSION: ICD-10-CM

## 2022-04-26 DIAGNOSIS — O09.299 HISTORY OF MISCARRIAGE, CURRENTLY PREGNANT: ICD-10-CM

## 2022-04-26 DIAGNOSIS — Z36.2 ENCOUNTER FOR FOLLOW-UP ULTRASOUND OF FETAL ANATOMY: Primary | ICD-10-CM

## 2022-04-26 PROCEDURE — 99213 OFFICE O/P EST LOW 20 MIN: CPT | Mod: TH,S$PBB,, | Performed by: ADVANCED PRACTICE MIDWIFE

## 2022-04-26 PROCEDURE — 99999 PR PBB SHADOW E&M-EST. PATIENT-LVL II: CPT | Mod: PBBFAC,,, | Performed by: ADVANCED PRACTICE MIDWIFE

## 2022-04-26 PROCEDURE — 99999 PR PBB SHADOW E&M-EST. PATIENT-LVL II: ICD-10-PCS | Mod: PBBFAC,,, | Performed by: ADVANCED PRACTICE MIDWIFE

## 2022-04-26 PROCEDURE — 99213 PR OFFICE/OUTPT VISIT, EST, LEVL III, 20-29 MIN: ICD-10-PCS | Mod: TH,S$PBB,, | Performed by: ADVANCED PRACTICE MIDWIFE

## 2022-04-26 PROCEDURE — 99212 OFFICE O/P EST SF 10 MIN: CPT | Mod: PBBFAC,TH,PN | Performed by: ADVANCED PRACTICE MIDWIFE

## 2022-04-26 NOTE — PATIENT INSTRUCTIONS
Patient Education       Pregnancy - The Fourth Month   About this topic   It is important for you to learn how to take care of yourself to help you have a healthy baby and safe delivery. It is good to have health care throughout your pregnancy.  The fourth month of your pregnancy starts around week 14 and lasts through week 18. By knowing how far along you are, you can learn what is normal for this stage of your pregnancy and plan for what is next.  General   Growth and Development   During the fourth month of your pregnancy, here are some things you can expect.  You may:  Start to show that you are pregnant. It is normal to gain about 5 to 10 pounds (2.3 to 4.5 kg) total in your first 4 months.  Have heartburn  Feel like you have trouble paying attention to things  Have less nausea  Notice your breasts are growing and the veins are easier to see on them  Have swollen veins in your legs and feet, more nosebleeds, or bleeding when you brush your teeth. These are all because of the extra blood your body has while you are pregnant.  Notice more swelling in your hands and feet  Start to feel fluttering when you are lying or sitting quietly. This is your baby kicking.  Have pain in your sides with sudden movement. This is normal and happens because the ligaments in your belly are stretching.  Have a little more energy. Exercise is good for you, but check with your doctor before starting new exercises.  Most of the time it is safe for you to have sex while you are pregnant. It wont hurt the baby.  Your babys:  Skin is very thin and you can easily see blood vessels through it. Your baby is covered with lots of fine hair to protect their skin.  Bones are starting to harden. Your baby is able to frown, smile, stretch, and move.  Practicing breathing movements while inside of your womb  About 6 inches (16 cm) long and weighs about 7 ounces (200 gm). Your baby is about the size of an orange.  Things to Think About   Avoid  alcohol, drugs, tobacco products, and second hand smoke  Check with your doctor before taking any kind of drugs. Continue to take your vitamin with folic acid.  Avoid cleaning cat litter boxes. This can cause a disease that causes birth defects in your baby.  Amniocentesis and other prenatal screening tests may be done this month.  Try sleeping on your side. Use a pillow between your legs. Avoid sleeping on your back. This will help with the blood flow to your baby.  Change positions and get up slowly. Your heart has to work hard to cope with all of the extra blood volume.  Where will you take your baby for care after they are born? This is a good time to find a doctor for your baby.  Eat fresh fruits and foods with a lot of fiber to help with hard stools.  Drink at least 6 to 8 glasses of water each day.  When do I need to call the doctor?   Vaginal bleeding  Leaking of fluid from your vagina  Problems with constipation  Belly pain  Any illness or infection  Severe headaches or headaches that wont go away  Where can I learn more?   Better Health  https://www.betterhealth.[place].gov.[place]/health/HealthyLiving/pregnancy-stages-and-changes   Family Doctor  https://familydoctor.org/changes-in-your-body-during-pregnancy-first-trimester/   Last Reviewed Date   2020-04-20  Consumer Information Use and Disclaimer   This information is not specific medical advice and does not replace information you receive from your health care provider. This is only a brief summary of general information. It does NOT include all information about conditions, illnesses, injuries, tests, procedures, treatments, therapies, discharge instructions or life-style choices that may apply to you. You must talk with your health care provider for complete information about your health and treatment options. This information should not be used to decide whether or not to accept your health care providers advice, instructions or recommendations. Only your  health care provider has the knowledge and training to provide advice that is right for you.  Copyright   Copyright © 2021 [employer] and its affiliates and/or licensors. All rights reserved.

## 2022-04-26 NOTE — PROGRESS NOTES
26 y.o. female  at 20w6d   Is feeling flutters/FM, denies VB, LOF or cramping  Doing well without concerns   History of hypertension blood pressure 158/82, feels that she was excited after her ultrasound but will recheck -142/76-reassuring will continue close observation  TW lbs   Reviewed anatomy US-vertex, anterior placenta, three-vessel cord, normal fluid, EFW 15 oz at 45 percentile, normal female anatomy with suboptimal views secondary to fetal position.  No obvious abnormality seen reassured and follow-up ultrasound scheduled  Genetic testing maternity 21 Neg girl  Desires to breastfeed and this was discussed thoroughly and questions answered    Reviewed warning signs, normal FM,  labor precautions and how/when to call.  RTC x 4 wks, with follow-up view ultrasound call or present sooner prn.     I spent a total of 20 minutes on the day of the visit.This includes face to face time and non-face to face time preparing to see the patient (eg, review of tests), Obtaining and/or reviewing separately obtained history, Documenting clinical information in the electronic or other health record, Independently interpreting resultsand communicating results to the patient/family/caregiver, or Care coordination.

## 2022-05-03 ENCOUNTER — TELEPHONE (OUTPATIENT)
Dept: OBSTETRICS AND GYNECOLOGY | Facility: CLINIC | Age: 27
End: 2022-05-03
Payer: MEDICAID

## 2022-05-03 NOTE — TELEPHONE ENCOUNTER
Called to reassure her that dilation of the eyes will have no detrimental effect to the pregnancy-reassured

## 2022-05-05 RX ORDER — NAPROXEN SODIUM 220 MG/1
81 TABLET, FILM COATED ORAL DAILY
Qty: 30 TABLET | Refills: 11 | Status: SHIPPED | OUTPATIENT
Start: 2022-05-05 | End: 2022-08-06

## 2022-05-25 ENCOUNTER — PROCEDURE VISIT (OUTPATIENT)
Dept: OBSTETRICS AND GYNECOLOGY | Facility: CLINIC | Age: 27
End: 2022-05-25
Payer: MEDICAID

## 2022-05-25 ENCOUNTER — PATIENT MESSAGE (OUTPATIENT)
Dept: ADMINISTRATIVE | Facility: OTHER | Age: 27
End: 2022-05-25
Payer: MEDICAID

## 2022-05-25 ENCOUNTER — ROUTINE PRENATAL (OUTPATIENT)
Dept: OBSTETRICS AND GYNECOLOGY | Facility: CLINIC | Age: 27
End: 2022-05-25
Payer: MEDICAID

## 2022-05-25 VITALS
BODY MASS INDEX: 46.86 KG/M2 | DIASTOLIC BLOOD PRESSURE: 80 MMHG | WEIGHT: 264.56 LBS | SYSTOLIC BLOOD PRESSURE: 144 MMHG

## 2022-05-25 DIAGNOSIS — O09.299 HISTORY OF MISCARRIAGE, CURRENTLY PREGNANT: ICD-10-CM

## 2022-05-25 DIAGNOSIS — O99.210 OBESITY AFFECTING PREGNANCY, ANTEPARTUM: ICD-10-CM

## 2022-05-25 DIAGNOSIS — Z86.79 HISTORY OF HYPERTENSION: ICD-10-CM

## 2022-05-25 DIAGNOSIS — Z36.2 ENCOUNTER FOR FOLLOW-UP ULTRASOUND OF FETAL ANATOMY: ICD-10-CM

## 2022-05-25 DIAGNOSIS — Z3A.28 28 WEEKS GESTATION OF PREGNANCY: Primary | ICD-10-CM

## 2022-05-25 PROCEDURE — 99213 OFFICE O/P EST LOW 20 MIN: CPT | Mod: TH,S$PBB,, | Performed by: ADVANCED PRACTICE MIDWIFE

## 2022-05-25 PROCEDURE — 76816 OB US FOLLOW-UP PER FETUS: CPT | Mod: PBBFAC,PN | Performed by: OBSTETRICS & GYNECOLOGY

## 2022-05-25 PROCEDURE — 99213 OFFICE O/P EST LOW 20 MIN: CPT | Mod: PBBFAC,TH,PN | Performed by: ADVANCED PRACTICE MIDWIFE

## 2022-05-25 PROCEDURE — 76816 US OB/GYN PROCEDURE (VIEWPOINT): ICD-10-PCS | Mod: 26,S$PBB,, | Performed by: OBSTETRICS & GYNECOLOGY

## 2022-05-25 PROCEDURE — 99213 PR OFFICE/OUTPT VISIT, EST, LEVL III, 20-29 MIN: ICD-10-PCS | Mod: TH,S$PBB,, | Performed by: ADVANCED PRACTICE MIDWIFE

## 2022-05-25 PROCEDURE — 99999 PR PBB SHADOW E&M-EST. PATIENT-LVL III: ICD-10-PCS | Mod: PBBFAC,,, | Performed by: ADVANCED PRACTICE MIDWIFE

## 2022-05-25 PROCEDURE — 99999 PR PBB SHADOW E&M-EST. PATIENT-LVL III: CPT | Mod: PBBFAC,,, | Performed by: ADVANCED PRACTICE MIDWIFE

## 2022-05-25 RX ORDER — ACETAMINOPHEN 500 MG
5000 TABLET ORAL DAILY
COMMUNITY
End: 2023-01-30

## 2022-05-25 NOTE — PROGRESS NOTES
26 y.o. female  at 25w0d   Reports + FM, denies VB, LOF, or cramping  Doing well without concerns     Ultrasound today to follow-up views-vertex, MVP 4 cm, EFW 1 lb 11 oz at 45 percentile,, thorax, abdomen and spine visualized-within normal limits and reassuring.  Choroid plexus and RVOT, LVOT not seen.  Reassured and we will follow-up these views at the weekly ultrasound scheduled at 32 weeks.  TW lbs     Reviewed upcoming 28wk labs, (A POS) and orders placed, tdap handout provided and explained  Reviewed warning signs, normal FM,  labor precautions and how/when to call.  RTC x 2 wks, call or present sooner prn.     I spent a total of 20 minutes on the day of the visit.This includes face to face time and non-face to face time preparing to see the patient (eg, review of tests), Obtaining and/or reviewing separately obtained history, Documenting clinical information in the electronic or other health record, Independently interpreting resultsand communicating results to the patient/family/caregiver, or Care coordination.

## 2022-05-25 NOTE — PATIENT INSTRUCTIONS
Patient Education       Pregnancy - The Fifth Month   About this topic   It is important for you to learn how to take care of yourself to help you have a healthy baby and safe delivery. It is good to have health care throughout your pregnancy.  The fifth month of your pregnancy starts around week 19 and lasts through week 23. By knowing how far along you are, you can learn what is normal for this stage of your pregnancy and plan for what is next.  General   Growth and Development   During the fifth month of your pregnancy, here are some things you can expect.  You may:  Start to gain a little more weight. It is normal to gain about 10 to 15 pounds (4.5 to 7 kg) total in your first 5 months.  Have leg cramps. Be sure to drink plenty of water.  Have loose teeth.  Have more trouble breathing or with heartburn as your baby gets bigger  Start having LoÃ­za Macario contractions. You may feel your belly squeezing or getting tight. Be sure to drink 6 to 8 glasses of water each day.  Notice you are able to express milk from your breasts  See stretch marks on your belly, breasts, or legs. Stay active to try and keep good muscle tone.  Be checked for gestational diabetes  Your baby's growth and development:  Your baby is covered with a thick whitish substance called vernix. This helps protect your babys skin.  Their genitals are able to be seen on an ultrasound. Your doctor will check your babys size, how much fluid there is around your baby, and all of your babys organs with the ultrasound.  Your baby is starting to be able to see, hear, taste, and feel touch.  The bones are starting to make blood cells.  Your baby is about 11 inches (28 cm) long and weighs about 1 pound (450 gm). Your baby is about the size of a grapefruit.  Things to Think About   Avoid alcohol, drugs, tobacco products, and second hand smoke  Do not clean your cat litter box. You could get a disease that causes birth defects to your baby.  Check with your  doctor before taking any kind of drugs. Continue to take your vitamin with folic acid.  Do you plan to breastfeed your baby?  Where will you deliver? Do you plan to take prenatal classes? If so, try to have them completed by your 8th month.  Start to think about your plans for pain control during labor.  You may want to learn about cord blood banking.  Rest and take breaks each day.  When do I need to call the doctor?   Contractions every 10 minutes or more often that do not go away with drinking water or position changes  Low, dull back pain that does not go away  Pressure in your pelvis that feels like your baby is pushing down  A gush or constant trickle of watery or bloody fluid leaking from your vagina  Cramps in your lower belly that come and go or are constant  Little to no movement felt by baby in 2 hours. Your baby should move at least 10 times every 2 hours.  Headache that does not go away, blurry vision, seeing spots or halos, increase in swelling in your hands, feet, or face, and pain under your ribs on the right side  Fever of 100.4°F (38°C) or higher  Bleeding or swelling of your gums  Urinating less, or having pain when you urinate  Vaginal bleeding with or without pain  After a car accident, fall, or any trauma to your belly  Having thoughts of harming yourself or others, or do not feel safe at home  Where can I learn more?   American Academy of Family Physicians  https://familydoctor.org/changes-in-your-body-during-pregnancy-second-trimester/   Better Health  https://www.betterhealth.[place].gov.[place]/health/HealthyLiving/pregnancy-stages-and-changes   Last Reviewed Date   2020-04-20  Consumer Information Use and Disclaimer   This information is not specific medical advice and does not replace information you receive from your health care provider. This is only a brief summary of general information. It does NOT include all information about conditions, illnesses, injuries, tests, procedures, treatments,  therapies, discharge instructions or life-style choices that may apply to you. You must talk with your health care provider for complete information about your health and treatment options. This information should not be used to decide whether or not to accept your health care providers advice, instructions or recommendations. Only your health care provider has the knowledge and training to provide advice that is right for you.  Copyright   Copyright © 2021 [employer] and its affiliates and/or licensors. All rights reserved.

## 2022-06-07 ENCOUNTER — ROUTINE PRENATAL (OUTPATIENT)
Dept: OBSTETRICS AND GYNECOLOGY | Facility: CLINIC | Age: 27
End: 2022-06-07
Payer: MEDICAID

## 2022-06-07 ENCOUNTER — LAB VISIT (OUTPATIENT)
Dept: LAB | Facility: HOSPITAL | Age: 27
End: 2022-06-07
Attending: ADVANCED PRACTICE MIDWIFE
Payer: MEDICAID

## 2022-06-07 VITALS
WEIGHT: 268.88 LBS | BODY MASS INDEX: 47.62 KG/M2 | DIASTOLIC BLOOD PRESSURE: 86 MMHG | SYSTOLIC BLOOD PRESSURE: 156 MMHG

## 2022-06-07 DIAGNOSIS — Z86.79 HISTORY OF HYPERTENSION: Primary | ICD-10-CM

## 2022-06-07 DIAGNOSIS — Z86.79 HISTORY OF HYPERTENSION: ICD-10-CM

## 2022-06-07 DIAGNOSIS — O99.210 OBESITY AFFECTING PREGNANCY, ANTEPARTUM: ICD-10-CM

## 2022-06-07 DIAGNOSIS — Z3A.28 28 WEEKS GESTATION OF PREGNANCY: ICD-10-CM

## 2022-06-07 DIAGNOSIS — O09.299 HISTORY OF MISCARRIAGE, CURRENTLY PREGNANT: ICD-10-CM

## 2022-06-07 LAB
CREAT UR-MCNC: 239.1 MG/DL (ref 15–325)
PROT UR-MCNC: 27 MG/DL (ref 0–15)
PROT/CREAT UR: 0.11 MG/G{CREAT} (ref 0–0.2)

## 2022-06-07 PROCEDURE — 80053 COMPREHEN METABOLIC PANEL: CPT | Performed by: ADVANCED PRACTICE MIDWIFE

## 2022-06-07 PROCEDURE — 36415 COLL VENOUS BLD VENIPUNCTURE: CPT | Mod: PN | Performed by: ADVANCED PRACTICE MIDWIFE

## 2022-06-07 PROCEDURE — 85025 COMPLETE CBC W/AUTO DIFF WBC: CPT | Performed by: ADVANCED PRACTICE MIDWIFE

## 2022-06-07 PROCEDURE — 99213 OFFICE O/P EST LOW 20 MIN: CPT | Mod: PBBFAC,TH,PN | Performed by: ADVANCED PRACTICE MIDWIFE

## 2022-06-07 PROCEDURE — 84156 ASSAY OF PROTEIN URINE: CPT | Performed by: ADVANCED PRACTICE MIDWIFE

## 2022-06-07 PROCEDURE — 99213 PR OFFICE/OUTPT VISIT, EST, LEVL III, 20-29 MIN: ICD-10-PCS | Mod: TH,S$PBB,, | Performed by: ADVANCED PRACTICE MIDWIFE

## 2022-06-07 PROCEDURE — 99999 PR PBB SHADOW E&M-EST. PATIENT-LVL III: ICD-10-PCS | Mod: PBBFAC,,, | Performed by: ADVANCED PRACTICE MIDWIFE

## 2022-06-07 PROCEDURE — 99213 OFFICE O/P EST LOW 20 MIN: CPT | Mod: TH,S$PBB,, | Performed by: ADVANCED PRACTICE MIDWIFE

## 2022-06-07 PROCEDURE — 82950 GLUCOSE TEST: CPT | Performed by: ADVANCED PRACTICE MIDWIFE

## 2022-06-07 PROCEDURE — 87389 HIV-1 AG W/HIV-1&-2 AB AG IA: CPT | Performed by: ADVANCED PRACTICE MIDWIFE

## 2022-06-07 PROCEDURE — 86592 SYPHILIS TEST NON-TREP QUAL: CPT | Performed by: ADVANCED PRACTICE MIDWIFE

## 2022-06-07 PROCEDURE — 99999 PR PBB SHADOW E&M-EST. PATIENT-LVL III: CPT | Mod: PBBFAC,,, | Performed by: ADVANCED PRACTICE MIDWIFE

## 2022-06-07 NOTE — PROGRESS NOTES
26 y.o. female  at 26w6d   Reports + FM, denies VB, LOF or CTX  Doing well without concerns     History of hypertension, evidently treated as a child per her mother who was with her today and blood pressure is a little low labile but asymptomatic.  Will add PIH labs with 28 week lab today for comparison/evaluation continue with daily baby aspirin    TWG: 15 lbs    28wk labs today (A POS)    Consider Tdap next visit       Reviewed warning signs, normal FKCs,  labor precautions and how/when to call.  RTC x 2 wks, call or present sooner prn.     I spent a total of 20 minutes on the day of the visit.This includes face to face time and non-face to face time preparing to see the patient (eg, review of tests), Obtaining and/or reviewing separately obtained history, Documenting clinical information in the electronic or other health record, Independently interpreting resultsand communicating results to the patient/family/caregiver, or Care coordination.

## 2022-06-07 NOTE — PATIENT INSTRUCTIONS
Patient Education       Pregnancy - The Sixth Month   About this topic   It is important for you to learn how to take care of yourself to help you have a healthy baby and safe delivery. It is good to have health care throughout your pregnancy.  The sixth month of your pregnancy starts around week 24 and lasts through week 28. By knowing how far along you are, you can learn what is normal for this stage of your pregnancy and plan for what is next.  General   Your body   During the sixth month of your pregnancy, here are some things you can expect.  You may:  Start to gain a little more weight. It is normal to gain about 10 to 15 pounds (4.5 to 7 kg) total in your first 6 months.  Have problems like back pain, dry eyes, or aching hands and wrists  Itching of palms, abdomen, or soles of your feet  Swelling of ankles, fingers, or face.  Need to urinate more frequently.  Have problems with hemorrhoids. Be sure to eat plenty of fresh fruits and vegetables to increase the fiber in your diet. Drink plenty of water to help keep your stools soft.  Continue having Geary Macario contractions. You may feel your belly squeezing or getting tight.  Have a glucose test to test for gestational diabetes.  Have more headaches. Talk to your doctor about how to help with them.  See stretch marks on your belly, breasts, or legs. Stay active to try and keep good muscle tone.  See an increase in vaginal discharge. Talk to your doctor about what to expect.  Your baby's growth and development:  Your baby looks like a very small  baby.  They are able to live outside of your womb but would need a lot of help breathing, eating, and staying warm in an intensive care unit.  Your baby has times of being awake and times of being asleep. The babys eyelids are able to open and close.  They move more and have hiccups.  Your baby is about 14 inches (36 cm) long and weighs about 2 pounds (900 gm). Your baby is about the size of an eggplant.  Baby  may be able to hear voices.  Things to Think About   Avoid alcohol, drugs, tobacco products, and second hand smoke  Eat small meals throughout the day instead of larger meals.  Avoid spicy, greasy, or fatty foods.  Avoid lying down or bending over for around 3 hours after eating  Warm baths are fine to help you relax. Avoid hot tubs while you are pregnant.  Avoid straining when having bowel movements.  Learning how to care for your baby. You may want to sign up for classes on how to take care of a .  Are you planning on going back to work after having your baby? Its not too early to think about .  Consider starting your birth plan if you have specific needs or wishes for delivery.  When do I need to call the doctor?   Contractions every 10 minutes or more often that do not go away with drinking water or position changes  Low, dull back pain that does not go away  Pressure in your pelvis that feels like your baby is pushing down  A gush or constant trickle of watery or bloody fluid leaking from your vagina  Cramps in your lower belly that come and go or are constant  Change in your baby's movement  Fever of 100.4°F (38°C) or higher  Little to no movement felt by baby in 2 hours. Your baby should be moving at least 10 times every 2 hours.  Headache that does not go away; blurry vision; seeing spots or halos; increase in swelling in your hands, feet, or face; and pain under your ribs on the right side  Vaginal bleeding with or without pain  After a car accident, fall, or any trauma to your belly  Having thoughts of harming yourself or others, or do not feel safe at home  Where can I learn more?   American Academy of Family Physicians  https://familydoctor.org/changes-in-your-body-during-pregnancy-second-trimester/   KidsHealth  http://kidshealth.org/en/parents/pregnancy-calendar-intro.html?WT.ac=p-antionette   Office of Womens  Health  https://www.womenshealth.gov/pregnancy/youre-pregnant-now-what/stages-pregnancy   Last Reviewed Date   2020-05-06  Consumer Information Use and Disclaimer   This information is not specific medical advice and does not replace information you receive from your health care provider. This is only a brief summary of general information. It does NOT include all information about conditions, illnesses, injuries, tests, procedures, treatments, therapies, discharge instructions or life-style choices that may apply to you. You must talk with your health care provider for complete information about your health and treatment options. This information should not be used to decide whether or not to accept your health care providers advice, instructions or recommendations. Only your health care provider has the knowledge and training to provide advice that is right for you.  Copyright   Copyright © 2021 [employer] and its affiliates and/or licensors. All rights reserved.

## 2022-06-08 LAB
ALBUMIN SERPL BCP-MCNC: 2.6 G/DL (ref 3.5–5.2)
ALP SERPL-CCNC: 97 U/L (ref 55–135)
ALT SERPL W/O P-5'-P-CCNC: 11 U/L (ref 10–44)
ANION GAP SERPL CALC-SCNC: 13 MMOL/L (ref 8–16)
AST SERPL-CCNC: 8 U/L (ref 10–40)
BASOPHILS # BLD AUTO: 0.01 K/UL (ref 0–0.2)
BASOPHILS NFR BLD: 0.1 % (ref 0–1.9)
BILIRUB SERPL-MCNC: 0.2 MG/DL (ref 0.1–1)
BUN SERPL-MCNC: 6 MG/DL (ref 6–20)
CALCIUM SERPL-MCNC: 8.6 MG/DL (ref 8.7–10.5)
CHLORIDE SERPL-SCNC: 102 MMOL/L (ref 95–110)
CO2 SERPL-SCNC: 19 MMOL/L (ref 23–29)
CREAT SERPL-MCNC: 0.8 MG/DL (ref 0.5–1.4)
DIFFERENTIAL METHOD: ABNORMAL
EOSINOPHIL # BLD AUTO: 0.1 K/UL (ref 0–0.5)
EOSINOPHIL NFR BLD: 0.9 % (ref 0–8)
ERYTHROCYTE [DISTWIDTH] IN BLOOD BY AUTOMATED COUNT: 14.6 % (ref 11.5–14.5)
EST. GFR  (AFRICAN AMERICAN): >60 ML/MIN/1.73 M^2
EST. GFR  (NON AFRICAN AMERICAN): >60 ML/MIN/1.73 M^2
GLUCOSE SERPL-MCNC: 270 MG/DL (ref 70–140)
GLUCOSE SERPL-MCNC: 272 MG/DL (ref 70–110)
HCT VFR BLD AUTO: 35.7 % (ref 37–48.5)
HGB BLD-MCNC: 11.3 G/DL (ref 12–16)
HIV 1+2 AB+HIV1 P24 AG SERPL QL IA: NEGATIVE
IMM GRANULOCYTES # BLD AUTO: 0.09 K/UL (ref 0–0.04)
IMM GRANULOCYTES NFR BLD AUTO: 0.9 % (ref 0–0.5)
LYMPHOCYTES # BLD AUTO: 1 K/UL (ref 1–4.8)
LYMPHOCYTES NFR BLD: 10.1 % (ref 18–48)
MCH RBC QN AUTO: 30.7 PG (ref 27–31)
MCHC RBC AUTO-ENTMCNC: 31.7 G/DL (ref 32–36)
MCV RBC AUTO: 97 FL (ref 82–98)
MONOCYTES # BLD AUTO: 0.4 K/UL (ref 0.3–1)
MONOCYTES NFR BLD: 4.1 % (ref 4–15)
NEUTROPHILS # BLD AUTO: 8 K/UL (ref 1.8–7.7)
NEUTROPHILS NFR BLD: 83.9 % (ref 38–73)
NRBC BLD-RTO: 0 /100 WBC
PLATELET # BLD AUTO: 142 K/UL (ref 150–450)
PMV BLD AUTO: 12.1 FL (ref 9.2–12.9)
POTASSIUM SERPL-SCNC: 3.2 MMOL/L (ref 3.5–5.1)
PROT SERPL-MCNC: 5.6 G/DL (ref 6–8.4)
RBC # BLD AUTO: 3.68 M/UL (ref 4–5.4)
RPR SER QL: NORMAL
SODIUM SERPL-SCNC: 134 MMOL/L (ref 136–145)
WBC # BLD AUTO: 9.58 K/UL (ref 3.9–12.7)

## 2022-06-09 ENCOUNTER — PATIENT MESSAGE (OUTPATIENT)
Dept: OBSTETRICS AND GYNECOLOGY | Facility: CLINIC | Age: 27
End: 2022-06-09
Payer: MEDICAID

## 2022-06-09 ENCOUNTER — TELEPHONE (OUTPATIENT)
Dept: OBSTETRICS AND GYNECOLOGY | Facility: CLINIC | Age: 27
End: 2022-06-09
Payer: MEDICAID

## 2022-06-09 DIAGNOSIS — O24.419 GESTATIONAL DIABETES MELLITUS (GDM) IN SECOND TRIMESTER, GESTATIONAL DIABETES METHOD OF CONTROL UNSPECIFIED: Primary | ICD-10-CM

## 2022-06-09 RX ORDER — LANCETS
EACH MISCELLANEOUS
Qty: 200 EACH | Refills: 4 | Status: ON HOLD | OUTPATIENT
Start: 2022-06-09 | End: 2022-08-06 | Stop reason: HOSPADM

## 2022-06-09 RX ORDER — INSULIN PUMP SYRINGE, 3 ML
EACH MISCELLANEOUS
Qty: 1 EACH | Refills: 0 | Status: SHIPPED | OUTPATIENT
Start: 2022-06-09 | End: 2022-08-06

## 2022-06-09 NOTE — TELEPHONE ENCOUNTER
Pt informed and appt already scheduled with Diabetes Management on 6/13/2022 at [facility].  Pt voiced understanding and stated she will  supplies from pharmacy today. (Glucose meter and testing supplies sent to pharmacy by ANDREW Haddad.)  FABRICE WHEELER

## 2022-06-09 NOTE — TELEPHONE ENCOUNTER
Called patient to discuss 28 week lab results which indicate diabetes but there was no answer or voicemail.  I have sent a message through my portal explaining results and management and I have sent a message diabetic clinic to initiate management

## 2022-06-09 NOTE — PROGRESS NOTES
Good morning, 1 hour glucose is 270 so diabetes is diagnosed and a referral to Diabetes Clinic has been made [facility] for Accu-Cheks and Pharmaceutical management as indicated.  I sent a message to Miguel Angel this morning to initiate diabetic management.  I call patient but there was no answer no voicemail.  Will send message through portal.  Would like to act on this as soon as possible.  Thank you

## 2022-06-09 NOTE — TELEPHONE ENCOUNTER
----- Message from Shelly Haddad CNM sent at 6/9/2022  8:01 AM CDT -----  Good morning, 1 hour glucose is 270 so diabetes is diagnosed and a referral to Diabetes Clinic has been made ASA for Accu-Cheks and Pharmaceutical management as indicated.  I sent a message to Miguel Angel this morning to initiate diabetic management.  I call patient but there was no answer no voicemail.  Will send message through portal.  Would like to act on this as soon as possible.  Thank you

## 2022-06-13 ENCOUNTER — OFFICE VISIT (OUTPATIENT)
Dept: DIABETES | Facility: CLINIC | Age: 27
End: 2022-06-13
Payer: MEDICAID

## 2022-06-13 VITALS
HEIGHT: 63 IN | HEART RATE: 114 BPM | WEIGHT: 269.19 LBS | BODY MASS INDEX: 47.7 KG/M2 | SYSTOLIC BLOOD PRESSURE: 147 MMHG | DIASTOLIC BLOOD PRESSURE: 75 MMHG

## 2022-06-13 DIAGNOSIS — O24.419 GESTATIONAL DIABETES MELLITUS (GDM) IN SECOND TRIMESTER, GESTATIONAL DIABETES METHOD OF CONTROL UNSPECIFIED: Primary | ICD-10-CM

## 2022-06-13 LAB — GLUCOSE SERPL-MCNC: 75 MG/DL (ref 70–110)

## 2022-06-13 PROCEDURE — 3078F DIAST BP <80 MM HG: CPT | Mod: CPTII,,, | Performed by: NURSE PRACTITIONER

## 2022-06-13 PROCEDURE — 3078F PR MOST RECENT DIASTOLIC BLOOD PRESSURE < 80 MM HG: ICD-10-PCS | Mod: CPTII,,, | Performed by: NURSE PRACTITIONER

## 2022-06-13 PROCEDURE — 99203 PR OFFICE/OUTPT VISIT, NEW, LEVL III, 30-44 MIN: ICD-10-PCS | Mod: S$PBB,,, | Performed by: NURSE PRACTITIONER

## 2022-06-13 PROCEDURE — 3008F BODY MASS INDEX DOCD: CPT | Mod: CPTII,,, | Performed by: NURSE PRACTITIONER

## 2022-06-13 PROCEDURE — 1159F MED LIST DOCD IN RCRD: CPT | Mod: CPTII,,, | Performed by: NURSE PRACTITIONER

## 2022-06-13 PROCEDURE — 99999 PR PBB SHADOW E&M-EST. PATIENT-LVL IV: CPT | Mod: PBBFAC,,, | Performed by: NURSE PRACTITIONER

## 2022-06-13 PROCEDURE — 3044F PR MOST RECENT HEMOGLOBIN A1C LEVEL <7.0%: ICD-10-PCS | Mod: CPTII,,, | Performed by: NURSE PRACTITIONER

## 2022-06-13 PROCEDURE — 1160F PR REVIEW ALL MEDS BY PRESCRIBER/CLIN PHARMACIST DOCUMENTED: ICD-10-PCS | Mod: CPTII,,, | Performed by: NURSE PRACTITIONER

## 2022-06-13 PROCEDURE — 99203 OFFICE O/P NEW LOW 30 MIN: CPT | Mod: S$PBB,,, | Performed by: NURSE PRACTITIONER

## 2022-06-13 PROCEDURE — 99999 PR PBB SHADOW E&M-EST. PATIENT-LVL IV: ICD-10-PCS | Mod: PBBFAC,,, | Performed by: NURSE PRACTITIONER

## 2022-06-13 PROCEDURE — 82962 GLUCOSE BLOOD TEST: CPT | Mod: PBBFAC,PO | Performed by: NURSE PRACTITIONER

## 2022-06-13 PROCEDURE — 1159F PR MEDICATION LIST DOCUMENTED IN MEDICAL RECORD: ICD-10-PCS | Mod: CPTII,,, | Performed by: NURSE PRACTITIONER

## 2022-06-13 PROCEDURE — 1160F RVW MEDS BY RX/DR IN RCRD: CPT | Mod: CPTII,,, | Performed by: NURSE PRACTITIONER

## 2022-06-13 PROCEDURE — 3044F HG A1C LEVEL LT 7.0%: CPT | Mod: CPTII,,, | Performed by: NURSE PRACTITIONER

## 2022-06-13 PROCEDURE — 3077F PR MOST RECENT SYSTOLIC BLOOD PRESSURE >= 140 MM HG: ICD-10-PCS | Mod: CPTII,,, | Performed by: NURSE PRACTITIONER

## 2022-06-13 PROCEDURE — 99214 OFFICE O/P EST MOD 30 MIN: CPT | Mod: PBBFAC,PO | Performed by: NURSE PRACTITIONER

## 2022-06-13 PROCEDURE — 3008F PR BODY MASS INDEX (BMI) DOCUMENTED: ICD-10-PCS | Mod: CPTII,,, | Performed by: NURSE PRACTITIONER

## 2022-06-13 PROCEDURE — 3077F SYST BP >= 140 MM HG: CPT | Mod: CPTII,,, | Performed by: NURSE PRACTITIONER

## 2022-06-13 RX ORDER — BLOOD-GLUCOSE METER
EACH MISCELLANEOUS
Status: ON HOLD | COMMUNITY
Start: 2022-06-09 | End: 2022-07-16 | Stop reason: HOSPADM

## 2022-06-13 RX ORDER — LANCETS 33 GAUGE
EACH MISCELLANEOUS 4 TIMES DAILY
Status: ON HOLD | COMMUNITY
Start: 2022-06-09 | End: 2022-07-16 | Stop reason: HOSPADM

## 2022-06-13 RX ORDER — METFORMIN HYDROCHLORIDE 500 MG/1
TABLET, EXTENDED RELEASE ORAL
Qty: 60 TABLET | Refills: 5 | Status: SHIPPED | OUTPATIENT
Start: 2022-06-13 | End: 2022-07-11 | Stop reason: SDUPTHER

## 2022-06-13 RX ORDER — LANCING DEVICE
EACH MISCELLANEOUS
COMMUNITY
Start: 2022-06-09 | End: 2022-08-06

## 2022-06-13 NOTE — PROGRESS NOTES
Subjective:         Patient ID: Kathy Mishra is a 26 y.o. female.  Patient's current PCP is [facility]     Chief Complaint: Diabetes Mellitus    HPI  Kathy Mishra is a 26 y.o. White female presenting for a new consult for diabetes. Patient has been diagnosed with diabetes Patient here for a new diagnosis of DM. Gestational Diabetes - 1 hr glucose tolerance test result 272 mg/dL  The patient was initially diagnosed with gestational diabetes with abnormal 1 hour glucose tolerance test.   Received diabetes education: no - Referral already placed by PCP    CURRENT DM MEDICATIONS:   None yet- discussed initiation of Metformin    Past failed treatment include: none    Blood glucose testing is performed regularly. Patient is testing 3 times per day, denies hypoglycemia. CGM - No  Meter: True Metrix  Preferred lab: [facility]    Her blood sugar in the clinic today was:   Lab Results   Component Value Date    POCGLU 75 06/13/2022     Kathy Mishra presents today to discuss gestational diabetes. She completed a 1 hour glucose tolerance test with result 272 mg/dL. Did not complete the 3 hour test. Positive family history: Mom with T2 diabetes.  She admits prior to diagnosis, she was drinking a lot of Dr. Pepper's and not following a healthy diet. She has a good understanding of carbs. She picked up her meter and has begun checking her BGs - keeping logs on her phone. Currently, fasting BGs > 100. 2 hour pp readings sometimes > 140, other times less. We discussed glycemic targets during pregnancy, when appropriate to check blood sugar, recommended carb goals during pregnancy/meal, complications related to uncontrolled gestational diabetes, and need to start Metformin if cannot get to glycemic targets following diet/lifestyle changes.     She has a referral in place to establish with DM education but has not scheduled yet.    Current diet:  Bfast-1 biscuit with egg,mortensen with  watermelon. Lunch is typically pan seared meat with lettuce with salad dressing. Loves pizza.   Activity Level: No structured exercise    Lab Results   Component Value Date    HGBA1C 5.2 01/11/2022    HGBA1C 5.6 08/04/2020     Any episodes of hypoglycemia? no   Hypoglycemia Unawareness? no   Severe hypoglycemia requiring 3rd party no  Complications related to diabetes: none    Labs reviewed and are noted below.    Lab Results   Component Value Date    WBC 9.58 06/07/2022    HGB 11.3 (L) 06/07/2022    HCT 35.7 (L) 06/07/2022     (L) 06/07/2022    ALT 11 06/07/2022    AST 8 (L) 06/07/2022     (L) 06/07/2022    K 3.2 (L) 06/07/2022     06/07/2022    ANIONGAP 13 06/07/2022    CREATININE 0.8 06/07/2022    ESTGFRAFRICA >60.0 06/07/2022    EGFRNONAA >60.0 06/07/2022    BUN 6 06/07/2022    CO2 19 (L) 06/07/2022    TSH 3.527 01/11/2022     (H) 06/07/2022    UTPCR 0.11 06/07/2022     Lab Results   Component Value Date    FREET4 0.70 (L) 01/11/2022    TSH 3.527 01/11/2022    CALCIUM 8.6 (L) 06/07/2022     No results found for: CPEPTIDE  No results found for: GLUTAMICACID  Glucose   Date Value Ref Range Status   06/07/2022 272 (H) 70 - 110 mg/dL Final     Anion Gap   Date Value Ref Range Status   06/07/2022 13 8 - 16 mmol/L Final     eGFR if    Date Value Ref Range Status   06/07/2022 >60.0 >60 mL/min/1.73 m^2 Final     eGFR if non    Date Value Ref Range Status   06/07/2022 >60.0 >60 mL/min/1.73 m^2 Final     Comment:     Calculation used to obtain the estimated glomerular filtration  rate (eGFR) is the CKD-EPI equation.          The following portions of the patient's history were reviewed and updated as appropriate: allergies, current medications, past family history, past medical history, past social history, past surgical history and problem list.    Review of patient's allergies indicates:  No Known Allergies  Social History     Socioeconomic History    Marital  status: Single   Tobacco Use    Smoking status: Current Some Day Smoker    Smokeless tobacco: Never Used   Substance and Sexual Activity    Alcohol use: Never    Drug use: Never    Sexual activity: Yes     Partners: Male     Birth control/protection: None     Past Medical History:   Diagnosis Date    History of hypertension 1/11/2022 1/11/22-add PIH baseline labs to workup PCR 0.09 advised daily baby aspirin at 16 weeks    Hypertension     Thyroid disease        REVIEW OF SYSTEMS:  Cardiovascular: History of HTN.   :No CKD.  ENDO: See HPI.        Objective:      Vitals:    06/13/22 1507   BP: (!) 147/75   Pulse: (!) 114     RESPIRATORY: No respiratory distress  NEUROLOGIC: Cranial nerves II-XII grossly intact.   PSYCHIATRIC: Alert & oriented x3. Normal mood and affect.  Assessment:       1. Gestational diabetes mellitus (GDM) in second trimester, gestational diabetes method of control unspecified        Plan:   Gestational diabetes mellitus (GDM) in second trimester, gestational diabetes method of control unspecified  -     Ambulatory referral/consult to Diabetes Education  -     POCT Glucose, Hand-Held Device  -     metFORMIN (GLUCOPHAGE XR) 500 MG ER 24hr tablet; Start 1 tablet by mouth once a day for 1 week and if tolerating well, increase to 2 tablets daily.  Dispense: 60 tablet; Refill: 5      -- Medications adjusted for today's visit include:    To help prevent gastric complications when starting Metformin:     Take Metformin 500 mg with a lowfat evening meal for 1 week or until no diarrhea.     Increase to Metformin 500 mg twice a day with meals for 1 week or until no diarrhea.    POINTERS:   Metformin works best with a low fat diet, so try to eat healthier meals with less fat.      If you eat fried foods, expect to have diarrhea.      Hold Metformin if you are ever in a dehydrated state.     -- Limit carbs to no more than 30 grams with breakfast, 45 grams with lunch and supper. Snacks  "should be no more than 15 grams + protein twice a day.     -- Start checking blood sugar 4 times daily: Fasting blood sugar and 2 hours after every meal.    -Blood sugar goals should be a fasting blood sugar <95 ideal; 2 hours following meals <120. Should be <140 if you checked 1 hour after a meal.     - Follow up: 1 month    I spent a total of 30 minutes on the day of the visit.This includes face to face time and non-face to face time preparing to see the patient (eg, review of tests), documenting clinical information in the electronic record, independently interpreting results and communicating results to the patient.    Portions of this note may have been created with voice recognition software. Occasional "wrong-word" or "sound-a-like" substitutions may have occurred due to the inherent limitations of voice recognition software. Please, read the note carefully and recognize, using context, where substitutions have occurred.           Sheri Ammons,FNP-C [facility]  "

## 2022-06-13 NOTE — PATIENT INSTRUCTIONS
-- Medications adjusted for today's visit include:    To help prevent gastric complications when starting Metformin:    Take Metformin 500 mg with a lowfat evening meal for 1 week or until no diarrhea.    Increase to Metformin 500 mg twice a day with meals for 1 week or until no diarrhea.    POINTERS:  Metformin works best with a low fat diet, so try to eat healthier meals with less fat.     If you eat fried foods, expect to have diarrhea.     Hold Metformin if you are ever in a dehydrated state.     -- Limit carbs to no more than 30 grams with breakfast, 45 grams with lunch and supper. Snacks should be no more than 15 grams + protein twice a day.     -- Start checking blood sugar 4 times daily: Fasting blood sugar and 2 hours after every meal.    -Blood sugar goals should be a fasting blood sugar <95 ideal; 2 hours following meals <120. Should be <140 if you checked 1 hour after a meal.     --Carry glucose tablets/soft peppermints/regular juice or Coke product with you at all times to treat a low blood sugar episode (less than 70). If your blood sugar is between 50-70, Chew 4 tablets or drink 1/2 cup of juice or regular Coke product. If your blood sugar is below 50, double the treatment. Re-check blood sugar in 15 minutes. If still low, repeat this. Always call the clinic to give an update for any low blood sugar episodes.    --Exercise as tolerated.    --Follow-up for your next visit in 4 weeks.    --Please either drop off, fax, or MyChart your readings to me weekly for now.

## 2022-06-20 ENCOUNTER — PATIENT MESSAGE (OUTPATIENT)
Dept: DIABETES | Facility: CLINIC | Age: 27
End: 2022-06-20
Payer: MEDICAID

## 2022-06-21 ENCOUNTER — ROUTINE PRENATAL (OUTPATIENT)
Dept: OBSTETRICS AND GYNECOLOGY | Facility: CLINIC | Age: 27
End: 2022-06-21
Payer: MEDICAID

## 2022-06-21 VITALS
BODY MASS INDEX: 47.72 KG/M2 | WEIGHT: 269.38 LBS | SYSTOLIC BLOOD PRESSURE: 148 MMHG | DIASTOLIC BLOOD PRESSURE: 78 MMHG

## 2022-06-21 DIAGNOSIS — Z86.79 HISTORY OF HYPERTENSION: ICD-10-CM

## 2022-06-21 DIAGNOSIS — O24.415 GESTATIONAL DIABETES MELLITUS (GDM) IN THIRD TRIMESTER CONTROLLED ON ORAL HYPOGLYCEMIC DRUG: ICD-10-CM

## 2022-06-21 DIAGNOSIS — O09.299 HISTORY OF MISCARRIAGE, CURRENTLY PREGNANT: Primary | ICD-10-CM

## 2022-06-21 PROCEDURE — 99999 PR PBB SHADOW E&M-EST. PATIENT-LVL III: CPT | Mod: PBBFAC,,, | Performed by: ADVANCED PRACTICE MIDWIFE

## 2022-06-21 PROCEDURE — 99213 PR OFFICE/OUTPT VISIT, EST, LEVL III, 20-29 MIN: ICD-10-PCS | Mod: TH,S$PBB,, | Performed by: ADVANCED PRACTICE MIDWIFE

## 2022-06-21 PROCEDURE — 99213 OFFICE O/P EST LOW 20 MIN: CPT | Mod: PBBFAC,TH,PN | Performed by: ADVANCED PRACTICE MIDWIFE

## 2022-06-21 PROCEDURE — 99999 PR PBB SHADOW E&M-EST. PATIENT-LVL III: ICD-10-PCS | Mod: PBBFAC,,, | Performed by: ADVANCED PRACTICE MIDWIFE

## 2022-06-21 PROCEDURE — 90715 TDAP VACCINE 7 YRS/> IM: CPT | Mod: PBBFAC,PN

## 2022-06-21 PROCEDURE — 99213 OFFICE O/P EST LOW 20 MIN: CPT | Mod: TH,S$PBB,, | Performed by: ADVANCED PRACTICE MIDWIFE

## 2022-06-21 NOTE — PROGRESS NOTES
26 y.o. female  at 29w0d   Reports + FM, denies VB, LOF or CTX  Doing well without concerns     History of hypertension as a child.  Blood pressure is satisfactory today PCR from last visit was 0.11 otherwise unremarkable will continue with observation, daily baby aspirin and weekly ultrasounds 32 weeks    Gestational diabetes diet and metformin.  Seen by diabetes clinic and metformin 500 mg daily initiated to be increased to twice a day in 2nd week.  -136 and 2 hour post prandials 50% more than 120.Logs be sent to diabetic clinic and continue with close observation      TW lbs   Reviewed 28wk lab results (A POS)  Give Tdap today    Reviewed warning signs, normal FKCs,  labor precautions and how/when to call.  RTC x 2 wks, call or present sooner prn.     I spent a total of 20 minutes on the day of the visit.This includes face to face time and non-face to face time preparing to see the patient (eg, review of tests), Obtaining and/or reviewing separately obtained history, Documenting clinical information in the electronic or other health record, Independently interpreting resultsand communicating results to the patient/family/caregiver, or Care coordination.

## 2022-06-22 PROBLEM — O24.415 GESTATIONAL DIABETES MELLITUS (GDM) IN THIRD TRIMESTER CONTROLLED ON ORAL HYPOGLYCEMIC DRUG: Status: ACTIVE | Noted: 2022-06-22

## 2022-06-28 ENCOUNTER — PROCEDURE VISIT (OUTPATIENT)
Dept: OBSTETRICS AND GYNECOLOGY | Facility: CLINIC | Age: 27
End: 2022-06-28
Payer: MEDICAID

## 2022-06-28 ENCOUNTER — ROUTINE PRENATAL (OUTPATIENT)
Dept: OBSTETRICS AND GYNECOLOGY | Facility: CLINIC | Age: 27
End: 2022-06-28
Payer: MEDICAID

## 2022-06-28 ENCOUNTER — TELEPHONE (OUTPATIENT)
Dept: OBSTETRICS AND GYNECOLOGY | Facility: CLINIC | Age: 27
End: 2022-06-28
Payer: MEDICAID

## 2022-06-28 VITALS
SYSTOLIC BLOOD PRESSURE: 124 MMHG | WEIGHT: 271.38 LBS | BODY MASS INDEX: 48.07 KG/M2 | DIASTOLIC BLOOD PRESSURE: 76 MMHG

## 2022-06-28 DIAGNOSIS — O36.8130 DECREASED FETAL MOVEMENTS IN THIRD TRIMESTER, SINGLE OR UNSPECIFIED FETUS: Primary | ICD-10-CM

## 2022-06-28 PROCEDURE — 99213 OFFICE O/P EST LOW 20 MIN: CPT | Mod: TH,S$PBB,, | Performed by: ADVANCED PRACTICE MIDWIFE

## 2022-06-28 PROCEDURE — 76816 OB US FOLLOW-UP PER FETUS: CPT | Mod: PBBFAC,PN | Performed by: OBSTETRICS & GYNECOLOGY

## 2022-06-28 PROCEDURE — 99213 OFFICE O/P EST LOW 20 MIN: CPT | Mod: PBBFAC,TH,PN | Performed by: ADVANCED PRACTICE MIDWIFE

## 2022-06-28 PROCEDURE — 99213 PR OFFICE/OUTPT VISIT, EST, LEVL III, 20-29 MIN: ICD-10-PCS | Mod: TH,S$PBB,, | Performed by: ADVANCED PRACTICE MIDWIFE

## 2022-06-28 PROCEDURE — 76819 US OB/GYN PROCEDURE (VIEWPOINT): ICD-10-PCS | Mod: 26,S$PBB,, | Performed by: OBSTETRICS & GYNECOLOGY

## 2022-06-28 PROCEDURE — 99999 PR PBB SHADOW E&M-EST. PATIENT-LVL III: ICD-10-PCS | Mod: PBBFAC,,, | Performed by: ADVANCED PRACTICE MIDWIFE

## 2022-06-28 PROCEDURE — 76816 US OB/GYN PROCEDURE (VIEWPOINT): ICD-10-PCS | Mod: 26,S$PBB,, | Performed by: OBSTETRICS & GYNECOLOGY

## 2022-06-28 PROCEDURE — 76819 FETAL BIOPHYS PROFIL W/O NST: CPT | Mod: 26,S$PBB,, | Performed by: OBSTETRICS & GYNECOLOGY

## 2022-06-28 PROCEDURE — 99999 PR PBB SHADOW E&M-EST. PATIENT-LVL III: CPT | Mod: PBBFAC,,, | Performed by: ADVANCED PRACTICE MIDWIFE

## 2022-06-28 NOTE — TELEPHONE ENCOUNTER
----- Message from Lashay Barnett sent at 6/28/2022  9:53 AM CDT -----  Contact: Kathy Yuen is requesting a call back in regards to concern of little fetal movement. Please call back at [phone].  Thanks

## 2022-06-28 NOTE — PROGRESS NOTES
26 y.o. female  at 29w6d   Presents today having called with concerns regarding decreased fetal movement.  Has purchased a Doppler so that she can hear the baby's heart tones as she does seem to experience difficulty feeling the baby move.   denies VB, LOF or CTX    States she is doing her fetal kick counts but has a very hard time achieving the 10 that is recommended.  Reassured that when baby gets bigger it might become use you to feel but in the meantime we have good fetal heart tones today and ultrasound is performed which is reassuring with vertex presenting, MVP 7.3 cm, LIA 16.1 cm, EFW 3 lb 14 oz at 58% on BPP is 8 at 8.  Mother is much reassured and very happy/grateful     Continue to perform daily kick counts and contact us with any concerns    Reviewed warning signs, normal FKCs,  labor precautions and how/when to call.  RTC x keep scheduled appointment wks, call or present sooner prn.     I spent a total of 20 minutes on the day of the visit.This includes face to face time and non-face to face time preparing to see the patient (eg, review of tests), Obtaining and/or reviewing separately obtained history, Documenting clinical information in the electronic or other health record, Independently interpreting resultsand communicating results to the patient/family/caregiver, or Care coordination.

## 2022-06-28 NOTE — PATIENT INSTRUCTIONS
Patient Education       Pregnancy - The Seventh Month   About this topic   It is important for you to learn how to take care of yourself to help you have a healthy baby and safe delivery. It is good to have health care throughout your pregnancy.  The seventh month of your pregnancy starts around week 29 and lasts through week 32. By knowing how far along you are, you can learn what is normal for this stage of your pregnancy and plan for what is next.  General   Your body   During the seventh month of your pregnancy, here are some things you can expect.  You may:  Have weight gain of about 15 to 20 pounds (6.8 to 9 kg) total in your first 7 months.  Have more trouble moving about and sleeping as the baby gets bigger  Have very vivid dreams  Notice more vaginal discharge  Notice a creamy, watery substance leaking from your nipples  Need a shot called Rh immune globulin if your blood type may be different from your baby's  Your baby's growth and development:  Your baby is gaining weight and adding layers of fat.  Your baby turns to be head down, into the position for delivery.  They are able to respond to loud noises and to dream.  Your baby moves at least 10 times in 2 hours. If your baby isn't moving this much, talk to your doctor right away.  Your baby is about 16 inches (42 cm) long and weighs about 4 pounds (1,800 gm). Your baby is about the size of squash.  Things to Think About   Avoid alcohol, drugs, tobacco products, and second hand smoke  Think about what you want your baby's birth to be like. Who do you want with you?  Find out about what lactation services are covered by your insurance.  Look into lactation consultants and breastfeeding classes.  Where do you want to deliver? Its time to make a plan for labor and delivery.  Plan on getting a car seat and other things for your baby.  Talk to your doctor if you plan to travel or get on a plane.  When do I need to call the doctor?   Contractions every 10  minutes or more often that do not go away with drinking water or position changes.  Low, dull back pain that does not go away  Feeling unusually dizzy or tired  Pressure in your pelvis that feels like your baby is pushing down  A gush or constant trickle of watery or bloody fluid leaking from your vagina  Cramps in your lower belly that come and go or are constant  Little to no movement felt by baby in 2 hours. Your baby should be moving at least 10 times every 2 hours.  Headache that does not go away; blurry vision; seeing spots or halos; increase in swelling in your hands, feet, or face; and pain under your ribs on the right side  Vaginal bleeding with or without pain  Fever of 100.4°F (38°C) or higher  After a car accident, fall, or any trauma to your belly  Having thoughts of harming yourself or others, or do not feel safe at home  Where can I learn more?   American Academy of Family Physicians  https://familydoctor.org/changes-in-your-body-during-pregnancy-second-trimester/   American Academy of Family Physicians  https://familydoctor.org/changes-in-your-body-during-pregnancy-third-trimester/   [employer]  http://kidshealth.org/en/parents/pregnancy-calendar-intro.html?WT.ac=p-antionette   Office on Womens Health  https://www.womenshealth.gov/pregnancy/youre-pregnant-now-what/stages-pregnancy   Last Reviewed Date   2020-05-06  Consumer Information Use and Disclaimer   This information is not specific medical advice and does not replace information you receive from your health care provider. This is only a brief summary of general information. It does NOT include all information about conditions, illnesses, injuries, tests, procedures, treatments, therapies, discharge instructions or life-style choices that may apply to you. You must talk with your health care provider for complete information about your health and treatment options. This information should not be used to decide whether or not to accept your health  care providers advice, instructions or recommendations. Only your health care provider has the knowledge and training to provide advice that is right for you.  Copyright   Copyright © 2021 UpToDate, Inc. and its affiliates and/or licensors. All rights reserved.  Patient Education       Fetal Movement   About this topic   Feeling your baby move for the first time is a good sign that your baby is doing well. You may begin to feel these movements between the 18th and 25th weeks of your pregnancy. If this is your first time being pregnant, it may be closer to 25 weeks. Your baby has been moving around before this, but the kicks have not been strong enough for you to feel. During the first weeks of feeling movement, you may start to see a pattern during the day when your baby is most active. You can track your baby's kicks each day at home. This is also known as kick counting. It is a good way to check on your baby's movements and well being.  Most often, fetal kick counting is used in high-risk pregnancies. It may be useful for all pregnancies. Counting and writing down your baby's kicks, jabs, twists, flutters, rolls, turns, flips, and swishes may help find a problem that needs more evaluation. The American College of Obstetricians and Gynecologists, or ACOG, suggests that you record how much time it takes you to feel 10 of these movements. Ideally, you should be able to feel 10 movements within 2 hours. Many people will track these movements in much less time.  General   How to Track Your Baby's Kick Counts   Most often your doctor will want you to wait until the 28th to 30th weeks of your pregnancy to start kick counting. Here are some tips to help you get started.  Find the time of day when your baby is most active. For some people, this is right after eating. Others find their baby moving a lot after they have been exercising or more active. Some babies are more active in the evenings when your blood sugar starts  "to lower.  Try to count kicks at about the same time each day.  Before you start counting, have something to eat or drink. Also take a short walk or do some light activity.  Choose a quiet place where you can focus on your baby's movements. Also get in a comfortable position. Try and lie on one side or the other. You may need to change positions until you find one that works best for you and your baby.  Keep a notebook to track your baby's kicks. Your doctor may give you a chart to use or you can make your own. Write down the date, time you started counting, and the time of each "kick" during a 2-hour period until you have felt 10 kicks.  Once you have recorded 10 kicks within 2 hours you can stop counting.  If you are not able to record 10 movements over 2 hours you should get up and move around or eat something and try again.  If you are not able to record 10 movements over 2 hours the second time, call your doctor. They may want you to go to the hospital to get your baby checked.  When do I need to call the doctor?   You have felt less than 10 movements over a period of 2 hours.  It takes longer each day to record 10 movements.  There is a big change in the pattern of movements you are writing down.  You feel no movement for 2 hours even after eating a snack, light activity, and position changes.  Teach Back: Helping You Understand   The Teach Back Method helps you understand the information we are giving you. After you talk with the staff, tell them in your own words what you learned. This helps to make sure the staff has described each thing clearly. It also helps to explain things that may have been confusing. Before going home, make sure you can do these:  I can tell you about feeling my baby move.  I can tell you how I will track my babys kicks.  I can tell you what I will do if I feel less than 10 movements in 2 hours, it takes longer to feel my baby move 10 times, or there is a big change in how my baby " is moving.  Last Reviewed Date   2021-10-08  Consumer Information Use and Disclaimer   This information is not specific medical advice and does not replace information you receive from your health care provider. This is only a brief summary of general information. It does NOT include all information about conditions, illnesses, injuries, tests, procedures, treatments, therapies, discharge instructions or life-style choices that may apply to you. You must talk with your health care provider for complete information about your health and treatment options. This information should not be used to decide whether or not to accept your health care providers advice, instructions or recommendations. Only your health care provider has the knowledge and training to provide advice that is right for you.  Copyright   Copyright © 2021 UpToDate, Inc. and its affiliates and/or licensors. All rights reserved.

## 2022-07-05 ENCOUNTER — ROUTINE PRENATAL (OUTPATIENT)
Dept: OBSTETRICS AND GYNECOLOGY | Facility: CLINIC | Age: 27
End: 2022-07-05
Payer: MEDICAID

## 2022-07-05 VITALS
BODY MASS INDEX: 48.68 KG/M2 | WEIGHT: 274.81 LBS | DIASTOLIC BLOOD PRESSURE: 74 MMHG | SYSTOLIC BLOOD PRESSURE: 122 MMHG

## 2022-07-05 DIAGNOSIS — O24.415 GESTATIONAL DIABETES MELLITUS (GDM) IN THIRD TRIMESTER CONTROLLED ON ORAL HYPOGLYCEMIC DRUG: ICD-10-CM

## 2022-07-05 DIAGNOSIS — O09.299 HISTORY OF MISCARRIAGE, CURRENTLY PREGNANT: Primary | ICD-10-CM

## 2022-07-05 PROCEDURE — 99999 PR PBB SHADOW E&M-EST. PATIENT-LVL III: ICD-10-PCS | Mod: PBBFAC,,, | Performed by: ADVANCED PRACTICE MIDWIFE

## 2022-07-05 PROCEDURE — 99999 PR PBB SHADOW E&M-EST. PATIENT-LVL III: CPT | Mod: PBBFAC,,, | Performed by: ADVANCED PRACTICE MIDWIFE

## 2022-07-05 PROCEDURE — 99213 OFFICE O/P EST LOW 20 MIN: CPT | Mod: TH,S$PBB,, | Performed by: ADVANCED PRACTICE MIDWIFE

## 2022-07-05 PROCEDURE — 99213 PR OFFICE/OUTPT VISIT, EST, LEVL III, 20-29 MIN: ICD-10-PCS | Mod: TH,S$PBB,, | Performed by: ADVANCED PRACTICE MIDWIFE

## 2022-07-05 PROCEDURE — 99213 OFFICE O/P EST LOW 20 MIN: CPT | Mod: PBBFAC,TH,PN | Performed by: ADVANCED PRACTICE MIDWIFE

## 2022-07-05 NOTE — PROGRESS NOTES
26 y.o. female  at 30w6d   Reports + FM, denies VB, LOF or CTX  Doing well    GDM/metformin 500 mg twice a day.  Log- this morning, 2 hour postprandial, and inadequate data 7/10 more than 120.  Advised to strictly follow diabetic diet and take metformin, importance of maintaining a complete log so that we can determine which is the best method of management  Has appointment with [facility] diabetic clinic on Friday.   will bring the log and discuss/evaluate findings/management.  Verbalizes understanding    History of hypertension-taking daily baby aspirin, normotensive today    TW lbs      Had Tdap    Reviewed warning signs, normal FKCs,  labor precautions and how/when to call.  RTC x 2 wks, call or present sooner prn.     I spent a total of 20 minutes on the day of the visit.This includes face to face time and non-face to face time preparing to see the patient (eg, review of tests), Obtaining and/or reviewing separately obtained history, Documenting clinical information in the electronic or other health record, Independently interpreting resultsand communicating results to the patient/family/caregiver, or Care coordination.

## 2022-07-08 ENCOUNTER — CLINICAL SUPPORT (OUTPATIENT)
Dept: DIABETES | Facility: CLINIC | Age: 27
End: 2022-07-08
Payer: MEDICAID

## 2022-07-08 VITALS — HEIGHT: 63 IN | WEIGHT: 275.13 LBS | BODY MASS INDEX: 48.75 KG/M2

## 2022-07-08 DIAGNOSIS — O24.415 GESTATIONAL DIABETES MELLITUS (GDM) IN THIRD TRIMESTER CONTROLLED ON ORAL HYPOGLYCEMIC DRUG: Primary | ICD-10-CM

## 2022-07-08 PROCEDURE — G0108 DIAB MANAGE TRN  PER INDIV: HCPCS | Mod: PBBFAC,PO | Performed by: DIETITIAN, REGISTERED

## 2022-07-08 PROCEDURE — 99999 PR PBB SHADOW E&M-EST. PATIENT-LVL II: CPT | Mod: PBBFAC,,, | Performed by: DIETITIAN, REGISTERED

## 2022-07-08 PROCEDURE — 99212 OFFICE O/P EST SF 10 MIN: CPT | Mod: PBBFAC,PO | Performed by: DIETITIAN, REGISTERED

## 2022-07-08 PROCEDURE — 99999 PR PBB SHADOW E&M-EST. PATIENT-LVL II: ICD-10-PCS | Mod: PBBFAC,,, | Performed by: DIETITIAN, REGISTERED

## 2022-07-08 NOTE — PROGRESS NOTES
"Diabetes Care Specialist Progress Note  Author: Mckayla Sandra RD  Date: 7/8/2022    Program Intake  Reason for Diabetes Program Visit:: Initial Diabetes Assessment  Current diabetes risk level:: low  In the last 12 months, have you:: none    Lab Results   Component Value Date    HGBA1C 5.2 01/11/2022       Clinical    REFERRING PROVIDER: Shelly Haddad CNM   Pt is also seeing Xochilt Arredondo NP    HISTORY OF PRESENT ILLNESS: 26 y.o. patient is in clinic today for gestational diabetes.   She is 31w2d  gestation and having a girl.   PORFIRIO is 9/7/22.   DM medications: metformin ER, 1000 mg at night       Weight: 124.8 kg (275 lb 2.2 oz)   Height: 5' 3" (160 cm)       Patient Health Rating  Compared to other people your age, how would you rate your health?: Good    Problem Review  Reviewed Problem List with Patient: yes  Active comorbidities affecting diabetes self-care.: yes  Comorbidities: Hypertension  Reviewed health maintenance: no (see comments)    Clinical Assessment  Current Diabetes Treatment: Oral Medication, Diet  Have you ever experienced hypoglycemia (low blood sugar)?: no  Have you ever experienced hyperglycemia (high blood sugar)?: no    Medication Information  How do you obtain your medications?: Patient drives  How many days a week do you miss your medications?: Never  Do you sometimes have difficulty refilling your medications?: No  Medication adherence impacting ability to self-manage diabetes?: No    Labs  Do you have regular lab work to monitor your medications?: Yes  Type of Regular Lab Work: CBC, BMP, A1c  Where do you get your labs drawn?: [facility]  Lab Compliance Barriers: No    Nutritional Status  Diet: Regular (avoiding white rice and white bread and stopped drinking  Pepper)  Meal Plan 24 Hour Recall: Breakfast, Lunch, Dinner, Snack  Meal Plan 24 Hour Recall - Breakfast: none - slept late  //  usually fruit such as watermelon or yogurt with fruit and granola;  occasionally eggs, etc  Meal " Plan 24 Hour Recall - Lunch: turkey sandwich, 4 pc watermelon; water  Meal Plan 24 Hour Recall - Dinner: sausage on 2 slices wheat bread; water  Meal Plan 24 Hour Recall - Snack: French fries; water  Change in appetite?: No  Dentation:: Intact  Recent Changes in Weight: No Recent Weight Change  Current nutritional status an area of need that is impacting patient's ability to self-manage diabetes?: No    Additional Social History    Support  Does anyone support you with your diabetes care?: yes  Who supports you?: significant other, self  Who takes you to your medical appointments?: self  Does the current support meet the patient's needs?: Yes  Is Support an area impacting ability to self-manage diabetes?: No    Access to Mass Media & Technology  Does the patient have access to any of the following devices or technologies?: Smart phone  Media or technology needs impacting ability to self-manage diabetes?: No    Cognitive/Behavioral Health  Alert and Oriented: Yes  Difficulty Thinking: No  Requires Prompting: No  Requires assistance for routine expression?: No  Cognitive or behavioral barriers impacting ability to self-manage diabetes?: No    Culture/Sabianist  Culture or Anabaptist beliefs that may impact ability to access healthcare: No    Communication  Language preference: English  Hearing Problems: No  Vision Problems: Yes  Vision problem type:: Decreased Vision  Vision Assistance: Glasses  Communication needs impacting ability to self-manage diabetes?: No    Health Literacy  Preferred Learning Method: Face to Face  How often do you need to have someone help you read instructions, pamphlets, or written material from your doctor or pharmacy?: Never  Health literacy needs impacting ability to self-manage diabetes?: No      Diabetes Self-Management Skills Assessment    Diabetes Disease Process/Treatment Options  Patient/caregiver able to state what happens when someone has diabetes.: somewhat  Patient/caregiver knows  what type of diabetes they have.: yes  Diabetes Type : Gestational  Diabetes Disease Process/Treatment Options: Skills Assessment Completed: Yes  Assessment indicates:: Knowledge deficit  Area of need?: Yes    Nutrition/Healthy Eating  Challenges to healthy eating:: portion control (pt was drinking a lot of Dr. Pepper)  Method of carbohydrate measurement:: no method  Patient can identify foods that impact blood sugar.: yes  Patient-identified foods:: starches (bread, pasta, rice, cereal), fruit/fruit juice, soda  Nutrition/Healthy Eating Skills Assessment Completed:: Yes  Assessment indicates:: Knowledge deficit, Instruction Needed  Area of need?: Yes    Physical Activity/Exercise  Patient's daily activity level:: moderately active (less active when her step kids are not home)  Patient formally exercises outside of work.: no  Reasons for not exercising:: other (see comments) (pt is active and doesn't want to do extra exercise)  Patient can identify reasons why exercise/physical activity is important in diabetes management.: no  Physical Activity/Exercise Skills Assessment Completed: : Yes  Assessment indicates:: Knowledge deficit  Area of need?: Yes    Medications  Patient is able to describe current diabetes management routine.: yes  Diabetes management routine:: diet, oral medications  Patient is able to identify current diabetes medications, dosages, and appropriate timing of medications.: yes  Patient understands the purpose of the medications taken for diabetes.: yes  Patient reports problems or concerns with current medication regimen.: yes  Medication regimen problems/concerns:: does not feel like regimen is working  Medication Skills Assessment Completed:: Yes  Assessment indicates:: Knowledge deficit  Area of need?: Yes    Home Blood Glucose Monitoring  Patient states that blood sugar is checked at home daily.: yes  Monitoring Method:: home glucometer  How often do you check your blood sugar?: Other  (comment) (this past week, pt has checked BG more than 4x/day)  When do you check your blood sugar?: Before breakfast, Before lunch, Before dinner, 2 hours after meal  When you check what is your typical blood sugar range? : fasting BG all above 95, up to 140 // some pp readings above 120  Blood glucose logs:: yes  Blood glucose logs reviewed today?: yes  Home Blood Glucose Monitoring Skills Assessment Completed: : Yes  Assessment indicates:: Instruction Needed  Area of need?: Yes    Acute Complications  Patient is able to identify types of acute complications: No  Acute Complications Skills Assessment Completed: : Yes  Assessment indicates:: Knowledge deficit, Instruction Needed  Area of need?: Yes    Chronic Complications  Chronic Complications Skills Assessment Completed: : No  Deferred due to:: Other (comment) (Discussed complications related to GDM)  Area of need?: Yes    Psychosocial/Coping  Patient can identify ways of coping with chronic disease.: yes  Patient-stated ways of coping with chronic disease:: support from loved ones  Psychosocial/Coping Skills Assessment Completed: : Yes  Assessment indicates:: Adequate understanding  Area of need?: No      Assessment Summary and Plan    Based on today's diabetes care assessment, the following areas of need were identified:      Social 7/8/2022   Support No   Access to Mass Media/Tech No   Cognitive/Behavioral Health No   Culture/Mu-ism No   Communication No   Health Literacy No        Clinical 7/8/2022   Medication Adherence No   Lab Compliance No   Nutritional Status No        Diabetes Self-Management Skills 7/8/2022   Diabetes Disease Process/Treatment Options Yes - Discussed pathology of Type 2 diabetes and GDM, risk factors and treatment options.      Nutrition/Healthy Eating Yes - care plan added      Physical Activity/Exercise Yes - Discussed physical activity as it relates to insulin resistance and weight loss or prevention of too much weight gain       Medication Yes - Discussed MOA, onset, side effects, dosage of metfromin.  Since it is likely that patient will start on insulin after seeing Xochilt Arredondo on Monday, discussed long-acting insulin.      Home Blood Glucose Monitoring Yes - care plan added      Acute Complications Yes - Reviewed blood glucose goals, prevention, detection, signs and symptoms, and treatment of hypoglycemia and hyperglycemia, and when to contact the clinic.     Chronic Complications Yes - Discussed complications related to continuous high BG during pregnancy.      Psychosocial/Coping No          Today's interventions were provided through individual discussion, instruction, and written materials were provided.      Patient verbalized understanding of instruction and written materials.  Pt was able to return back demonstration of instructions today. Patient understood key points, needs reinforcement and further instruction.     Diabetes Self-Management Care Plan:    Today's Diabetes Self-Management Care Plan was developed with Kathy's input. Kathy has agreed to work toward the following goal(s) to improve his/her overall diabetes control.      Care Plan: Diabetes Management   Updates made since 6/8/2022 12:00 AM      Problem: Blood Glucose Self-Monitoring       Goal: Patient agrees to check and record blood sugars 4 times per day.    Start Date: 7/8/2022   Expected End Date: 9/7/2022   Priority: High   Barriers: No Barriers Identified   Note:    Pt has been checking BG up to 6 times a day  Advised that she only needs to check fasting and 2 hr pp of each meal.      Task: Reviewed the importance of self-monitoring blood glucose and keeping logs. Completed 7/8/2022      Task: Provided patient with blood glucose logs, reviewed appropriate timing and frequency to SMBG, education on parameters on when to notify provider and advised patient to bring logs to all appts with PCP/Endocrinologist/Diabetes Care Specialist. Completed 7/8/2022       Problem: Healthy Eating       Goal: Eat 3 meals daily with 30-45g/2-3 servings of Carbohydrate per meal and 15-30 grams carb per snack.    Start Date: 7/8/2022   Expected End Date: 9/7/2022   Priority: Medium   Barriers: No Barriers Identified   Note:    Pt is doing fairly well with diet. She has been getting too many carbs for some meals and could add more carbs to other meals.   Discussed breakfast needs vs lunch and dinner needs.  Encouraged to cont to drink water or other SF beverages      Task: Reviewed the sources and role of Carbohydrate, Protein, and Fat and how each nutrient impacts blood sugar. Completed 7/8/2022      Task: Provided visual examples using dry measuring cups, food models, and other familiar objects such as computer mouse, deck or cards, tennis ball etc. to help with visualization of portions. Completed 7/8/2022      Task: Explained how to count carbohydrates using the food label and the use of dry measuring cups for accurate carb counting. Completed 7/8/2022      Task: Discussed strategies for choosing healthier menu options when dining out. Completed 7/8/2022      Task: Recommended replacing beverages containing high sugar content with noncaloric/sugar free options and/or water. Completed 7/8/2022      Task: Review the importance of balancing carbohydrates with each meal using portion control techniques to count servings of carbohydrate and label reading to identify serving size and amount of total carbs per serving. Completed 7/8/2022          Follow Up Plan     Follow up in about 2 weeks (around 7/22/2022).   Will evaluate goals, review diet and BG log.     Today's care plan and follow up schedule was discussed with patient.  Kathy verbalized understanding of the care plan, goals, and agrees to follow up plan.        The patient was encouraged to communicate with his/her health care provider/physician and care team regarding his/her condition(s) and treatment.  I provided the patient  with my contact information today and encouraged to contact me via phone or [facility] Portal as needed.     Length of Visit   Total Time: 60 Minutes

## 2022-07-08 NOTE — LETTER
July 8, 2022        Shelly Haddad, MIKE  [address]  [place] [address]             [facility]  [address] [place] [address]  Phone: [phone]  Fax: [phone]   Patient: Kathy Mishra   MR Number: [id number]   YOB: 1995   Date of Visit: 7/8/2022       Dear Dr. Haddad:    Thank you for referring Kathy Mishra to me for evaluation. Below are the relevant portions of my assessment and plan of care.    If you have questions, please do not hesitate to call me. I look forward to following Kathy along with you.    Sincerely,      Mckayla Sandra RD           CC  Xochilt Arredondo NP

## 2022-07-11 ENCOUNTER — OFFICE VISIT (OUTPATIENT)
Dept: DIABETES | Facility: CLINIC | Age: 27
End: 2022-07-11
Payer: MEDICAID

## 2022-07-11 VITALS — SYSTOLIC BLOOD PRESSURE: 165 MMHG | HEART RATE: 122 BPM | DIASTOLIC BLOOD PRESSURE: 82 MMHG

## 2022-07-11 DIAGNOSIS — O24.415 GESTATIONAL DIABETES MELLITUS (GDM) IN THIRD TRIMESTER CONTROLLED ON ORAL HYPOGLYCEMIC DRUG: Primary | ICD-10-CM

## 2022-07-11 DIAGNOSIS — O16.3 HYPERTENSION AFFECTING PREGNANCY IN THIRD TRIMESTER: ICD-10-CM

## 2022-07-11 LAB — GLUCOSE SERPL-MCNC: 100 MG/DL (ref 70–110)

## 2022-07-11 PROCEDURE — 3044F HG A1C LEVEL LT 7.0%: CPT | Mod: CPTII,,, | Performed by: NURSE PRACTITIONER

## 2022-07-11 PROCEDURE — 3079F PR MOST RECENT DIASTOLIC BLOOD PRESSURE 80-89 MM HG: ICD-10-PCS | Mod: CPTII,,, | Performed by: NURSE PRACTITIONER

## 2022-07-11 PROCEDURE — 99213 OFFICE O/P EST LOW 20 MIN: CPT | Mod: S$PBB,,, | Performed by: NURSE PRACTITIONER

## 2022-07-11 PROCEDURE — 82962 GLUCOSE BLOOD TEST: CPT | Mod: PBBFAC,PO | Performed by: NURSE PRACTITIONER

## 2022-07-11 PROCEDURE — 99213 PR OFFICE/OUTPT VISIT, EST, LEVL III, 20-29 MIN: ICD-10-PCS | Mod: S$PBB,,, | Performed by: NURSE PRACTITIONER

## 2022-07-11 PROCEDURE — 3077F SYST BP >= 140 MM HG: CPT | Mod: CPTII,,, | Performed by: NURSE PRACTITIONER

## 2022-07-11 PROCEDURE — 99999 PR PBB SHADOW E&M-EST. PATIENT-LVL IV: ICD-10-PCS | Mod: PBBFAC,,, | Performed by: NURSE PRACTITIONER

## 2022-07-11 PROCEDURE — 99999 PR PBB SHADOW E&M-EST. PATIENT-LVL IV: CPT | Mod: PBBFAC,,, | Performed by: NURSE PRACTITIONER

## 2022-07-11 PROCEDURE — 1159F MED LIST DOCD IN RCRD: CPT | Mod: CPTII,,, | Performed by: NURSE PRACTITIONER

## 2022-07-11 PROCEDURE — 99214 OFFICE O/P EST MOD 30 MIN: CPT | Mod: PBBFAC,PO | Performed by: NURSE PRACTITIONER

## 2022-07-11 PROCEDURE — 3044F PR MOST RECENT HEMOGLOBIN A1C LEVEL <7.0%: ICD-10-PCS | Mod: CPTII,,, | Performed by: NURSE PRACTITIONER

## 2022-07-11 PROCEDURE — 1159F PR MEDICATION LIST DOCUMENTED IN MEDICAL RECORD: ICD-10-PCS | Mod: CPTII,,, | Performed by: NURSE PRACTITIONER

## 2022-07-11 PROCEDURE — 3077F PR MOST RECENT SYSTOLIC BLOOD PRESSURE >= 140 MM HG: ICD-10-PCS | Mod: CPTII,,, | Performed by: NURSE PRACTITIONER

## 2022-07-11 PROCEDURE — 3079F DIAST BP 80-89 MM HG: CPT | Mod: CPTII,,, | Performed by: NURSE PRACTITIONER

## 2022-07-11 RX ORDER — METFORMIN HYDROCHLORIDE 500 MG/1
TABLET, EXTENDED RELEASE ORAL
Qty: 120 TABLET | Refills: 11 | Status: SHIPPED | OUTPATIENT
Start: 2022-07-11 | End: 2022-08-06

## 2022-07-11 NOTE — PROGRESS NOTES
Subjective:         Patient ID: Kathy Mishra is a 26 y.o. female.  Patient's current PCP is [facility]     Chief Complaint: Gestational Diabetes    HPI  Kathy Mishra is a 26 y.o. White female presenting for a follow up for diabetes. Patient has been diagnosed with gestational diabetes since 06/2022- 1 hour glucose tolerance test result 272 .  Received diabetes education: Yes- [facility], 07/2022    CURRENT DM MEDICATIONS:   Diabetes Medications             metFORMIN (GLUCOPHAGE XR) 500 MG ER 24hr tablet Start 1 tablet by mouth once a day for 1 week and if tolerating well, increase to 2 tablets daily.        Past failed treatment include:  None    Blood glucose testing is performed regularly. Patient is testing 3 times per day.  Meter:  True Metrix  Preferred lab: [facility]    Any episodes of hypoglycemia? no     Complications related to diabetes: none    Her blood sugar in the clinic today was:   Lab Results   Component Value Date    POCGLU 100 07/11/2022     Kathy Mishra presents today for follow up visit to discuss diabetes management. At last visit, we discussed the diabetic diet in detail and Metformin was initiated. She reports the following: feeling a little dizziness with position changes. No GI side effects. She is not drinking any more soft drinks but not adherent of the diabetic diet consistently. Review of labs - some fasting BGs <100, majority above. Daytime BGs typically < 140. We discussed maximizing Metformin and may need insulin start. Will review logs in 1 week.     Has appt to see OB-GYN in on Wednesday.    Current diet: Diabetic  Activity Level: No structured exercise    Lab Results   Component Value Date    HGBA1C 5.2 01/11/2022    HGBA1C 5.6 08/04/2020     Labs reviewed and are noted below.    Lab Results   Component Value Date    WBC 9.58 06/07/2022    HGB 11.3 (L) 06/07/2022    HCT 35.7 (L) 06/07/2022     (L) 06/07/2022    ALT 11 06/07/2022    AST 8  (L) 06/07/2022     (L) 06/07/2022    K 3.2 (L) 06/07/2022     06/07/2022    ANIONGAP 13 06/07/2022    CREATININE 0.8 06/07/2022    ESTGFRAFRICA >60.0 06/07/2022    EGFRNONAA >60.0 06/07/2022    BUN 6 06/07/2022    CO2 19 (L) 06/07/2022    TSH 3.527 01/11/2022     (H) 06/07/2022    UTPCR 0.11 06/07/2022     Lab Results   Component Value Date    FREET4 0.70 (L) 01/11/2022    TSH 3.527 01/11/2022    CALCIUM 8.6 (L) 06/07/2022     No results found for: CPEPTIDE  No results found for: GLUTAMICACID  Glucose   Date Value Ref Range Status   06/07/2022 272 (H) 70 - 110 mg/dL Final     Anion Gap   Date Value Ref Range Status   06/07/2022 13 8 - 16 mmol/L Final     eGFR if    Date Value Ref Range Status   06/07/2022 >60.0 >60 mL/min/1.73 m^2 Final     eGFR if non    Date Value Ref Range Status   06/07/2022 >60.0 >60 mL/min/1.73 m^2 Final     Comment:     Calculation used to obtain the estimated glomerular filtration  rate (eGFR) is the CKD-EPI equation.        The following portions of the patient's history were reviewed and updated as appropriate: allergies, current medications, past family history, past medical history, past social history, past surgical history and problem list.    Review of patient's allergies indicates:  No Known Allergies  Social History     Socioeconomic History    Marital status: Single   Tobacco Use    Smoking status: Current Some Day Smoker    Smokeless tobacco: Never Used   Substance and Sexual Activity    Alcohol use: Never    Drug use: Never    Sexual activity: Yes     Partners: Male     Birth control/protection: None     Past Medical History:   Diagnosis Date    Gestational diabetes mellitus (GDM) in third trimester controlled on oral hypoglycemic drug 6/22/2022    History of hypertension 1/11/2022 1/11/22-add PIH baseline labs to workup PCR 0.09 advised daily baby aspirin at 16 weeks    Hypertension     Thyroid disease      REVIEW  "OF SYSTEMS:  Cardiovascular: History of HTN.  : No CKD.  Neuro: No neuropathy.  ENDO: See HPI.        Objective:      Vitals:    07/11/22 0944   BP: (!) 165/82   Pulse: (!) 122     RESPIRATORY: No respiratory distress  NEUROLOGIC: Cranial nerves II-XII grossly intact.   PSYCHIATRIC: Alert & oriented x3. Normal mood and affect.  Assessment:       1. Gestational diabetes mellitus (GDM) in third trimester controlled on oral hypoglycemic drug    2. Hypertension affecting pregnancy in third trimester        Plan:   Gestational diabetes mellitus (GDM) in third trimester controlled on oral hypoglycemic drug  -     POCT Glucose, Hand-Held Device  -     Hemoglobin A1C; Future; Expected date: 07/11/2022  -     Basic Metabolic Panel; Future; Expected date: 07/11/2022  -     metFORMIN (GLUCOPHAGE XR) 500 MG ER 24hr tablet; Increase Metformin to 2 tablets twice a day.  Dispense: 120 tablet; Refill: 11    Nurse visit in 1 week for review of logs- will decide at that time if insulin is needed.    -- Medications adjusted for today's visit include:    To help prevent gastric complications when starting Metformin:     Increase to Metformin 500 mg 2 tablets PM, 1 tablet AM.    If doing well in another 2 days, increase to 2 tablets twice a day.     Hypertension affecting pregnancy in third trimester    Note routed to CNM to see if sooner appt needed vs adjustment of meds.    - Follow up: 1 week    Portions of this note may have been created with voice recognition software. Occasional "wrong-word" or "sound-a-like" substitutions may have occurred due to the inherent limitations of voice recognition software. Please, read the note carefully and recognize, using context, where substitutions have occurred.           Sheri Ammons,FNP-C [facility]    "

## 2022-07-11 NOTE — Clinical Note
Shelly, I saw her for GDM this morning and increased Metformin- She'll have a nurse visit in 1 week to determine if insulin is needed. But, her BP is up (says taking her meds faithfully) checked twice and c/o dizziness. Wanted to make you aware to see if you wanted to change anything or see her sooner.

## 2022-07-11 NOTE — PATIENT INSTRUCTIONS
-- Medications adjusted for today's visit include:    To help prevent gastric complications when starting Metformin:    Increase to Metformin 500 mg 2 tablets PM, 1 tablet AM.   If doing well in another 2 days, increase to 2 tablets twice a day.    POINTERS:  Metformin works best with a low fat diet, so try to eat healthier meals with less fat.     If you eat fried foods, expect to have diarrhea.     Hold Metformin if you are ever in a dehydrated state.     -- Limit carbs to no more than 30 grams with breakfast, 45 grams with lunch and supper. Snacks should be no more than 15 grams + protein twice a day.     -- Start checking blood sugar 4 times daily: Fasting blood sugar and 2 hours after every meal.    -Blood sugar goals should be a fasting blood sugar <95 ideal; 2 hours following meals <120. Should be <140 if you checked 1 hour after a meal.     --Carry glucose tablets/soft peppermints/regular juice or Coke product with you at all times to treat a low blood sugar episode (less than 70). If your blood sugar is between 50-70, Chew 4 tablets or drink 1/2 cup of juice or regular Coke product. If your blood sugar is below 50, double the treatment. Re-check blood sugar in 15 minutes. If still low, repeat this. Always call the clinic to give an update for any low blood sugar episodes.    --Exercise as tolerated.    --Follow-up for your next visit in 4 weeks.    --Please either drop off, fax, or MyChart your readings to me weekly for now.

## 2022-07-13 ENCOUNTER — HOSPITAL ENCOUNTER (INPATIENT)
Facility: HOSPITAL | Age: 27
LOS: 3 days | Discharge: HOME | DRG: 833 | End: 2022-07-16
Attending: OBSTETRICS & GYNECOLOGY | Admitting: OBSTETRICS & GYNECOLOGY
Payer: MEDICAID

## 2022-07-13 ENCOUNTER — PROCEDURE VISIT (OUTPATIENT)
Dept: OBSTETRICS AND GYNECOLOGY | Facility: CLINIC | Age: 27
End: 2022-07-13
Payer: MEDICAID

## 2022-07-13 ENCOUNTER — PATIENT MESSAGE (OUTPATIENT)
Dept: ADMINISTRATIVE | Facility: OTHER | Age: 27
End: 2022-07-13
Payer: MEDICAID

## 2022-07-13 ENCOUNTER — ROUTINE PRENATAL (OUTPATIENT)
Dept: OBSTETRICS AND GYNECOLOGY | Facility: CLINIC | Age: 27
End: 2022-07-13
Payer: MEDICAID

## 2022-07-13 VITALS
BODY MASS INDEX: 49.58 KG/M2 | SYSTOLIC BLOOD PRESSURE: 162 MMHG | DIASTOLIC BLOOD PRESSURE: 94 MMHG | WEIGHT: 279.88 LBS

## 2022-07-13 DIAGNOSIS — O09.299 HISTORY OF MISCARRIAGE, CURRENTLY PREGNANT: Primary | ICD-10-CM

## 2022-07-13 DIAGNOSIS — Z86.79 HISTORY OF HYPERTENSION: ICD-10-CM

## 2022-07-13 DIAGNOSIS — O09.299 HISTORY OF MISCARRIAGE, CURRENTLY PREGNANT: ICD-10-CM

## 2022-07-13 DIAGNOSIS — O99.210 OBESITY AFFECTING PREGNANCY, ANTEPARTUM: ICD-10-CM

## 2022-07-13 DIAGNOSIS — O24.415 GESTATIONAL DIABETES MELLITUS (GDM) IN THIRD TRIMESTER CONTROLLED ON ORAL HYPOGLYCEMIC DRUG: ICD-10-CM

## 2022-07-13 DIAGNOSIS — R03.0 ELEVATED BLOOD PRESSURE READING: ICD-10-CM

## 2022-07-13 PROBLEM — G43.019 INTRACTABLE MIGRAINE WITHOUT AURA AND WITHOUT STATUS MIGRAINOSUS: Status: ACTIVE | Noted: 2020-08-18

## 2022-07-13 PROBLEM — E03.9 HYPOTHYROIDISM: Status: ACTIVE | Noted: 2022-07-13

## 2022-07-13 PROBLEM — E66.813 CLASS 3 SEVERE OBESITY DUE TO EXCESS CALORIES WITH SERIOUS COMORBIDITY AND BODY MASS INDEX (BMI) OF 45.0 TO 49.9 IN ADULT: Status: ACTIVE | Noted: 2020-08-03

## 2022-07-13 PROBLEM — E55.9 VITAMIN D DEFICIENCY: Status: ACTIVE | Noted: 2020-08-04

## 2022-07-13 PROBLEM — E66.01 CLASS 3 SEVERE OBESITY DUE TO EXCESS CALORIES WITH SERIOUS COMORBIDITY AND BODY MASS INDEX (BMI) OF 45.0 TO 49.9 IN ADULT: Status: ACTIVE | Noted: 2020-08-03

## 2022-07-13 PROBLEM — O36.8130 DECREASED FETAL MOVEMENTS IN THIRD TRIMESTER: Status: RESOLVED | Noted: 2022-06-28 | Resolved: 2022-07-13

## 2022-07-13 PROBLEM — J45.909 ASTHMA: Status: ACTIVE | Noted: 2022-07-13

## 2022-07-13 PROBLEM — Z86.39 HISTORY OF THYROID DISORDER: Status: ACTIVE | Noted: 2020-08-04

## 2022-07-13 LAB
ALBUMIN SERPL BCP-MCNC: 2.4 G/DL (ref 3.5–5.2)
ALP SERPL-CCNC: 121 U/L (ref 55–135)
ALT SERPL W/O P-5'-P-CCNC: 15 U/L (ref 10–44)
ANION GAP SERPL CALC-SCNC: 11 MMOL/L (ref 8–16)
AST SERPL-CCNC: 11 U/L (ref 10–40)
BASOPHILS # BLD AUTO: 0.01 K/UL (ref 0–0.2)
BASOPHILS NFR BLD: 0.1 % (ref 0–1.9)
BILIRUB SERPL-MCNC: 0.2 MG/DL (ref 0.1–1)
BUN SERPL-MCNC: 8 MG/DL (ref 6–20)
CALCIUM SERPL-MCNC: 9.6 MG/DL (ref 8.7–10.5)
CHLORIDE SERPL-SCNC: 107 MMOL/L (ref 95–110)
CO2 SERPL-SCNC: 20 MMOL/L (ref 23–29)
CREAT SERPL-MCNC: 0.6 MG/DL (ref 0.5–1.4)
CREAT UR-MCNC: 68.7 MG/DL (ref 15–325)
DIFFERENTIAL METHOD: ABNORMAL
EOSINOPHIL # BLD AUTO: 0 K/UL (ref 0–0.5)
EOSINOPHIL NFR BLD: 0.4 % (ref 0–8)
ERYTHROCYTE [DISTWIDTH] IN BLOOD BY AUTOMATED COUNT: 14.6 % (ref 11.5–14.5)
EST. GFR  (AFRICAN AMERICAN): >60 ML/MIN/1.73 M^2
EST. GFR  (NON AFRICAN AMERICAN): >60 ML/MIN/1.73 M^2
GLUCOSE SERPL-MCNC: 72 MG/DL (ref 70–110)
HCT VFR BLD AUTO: 35.7 % (ref 37–48.5)
HGB BLD-MCNC: 11.9 G/DL (ref 12–16)
IMM GRANULOCYTES # BLD AUTO: 0.07 K/UL (ref 0–0.04)
IMM GRANULOCYTES NFR BLD AUTO: 0.7 % (ref 0–0.5)
LYMPHOCYTES # BLD AUTO: 1.4 K/UL (ref 1–4.8)
LYMPHOCYTES NFR BLD: 14 % (ref 18–48)
MCH RBC QN AUTO: 30.4 PG (ref 27–31)
MCHC RBC AUTO-ENTMCNC: 33.3 G/DL (ref 32–36)
MCV RBC AUTO: 91 FL (ref 82–98)
MONOCYTES # BLD AUTO: 0.6 K/UL (ref 0.3–1)
MONOCYTES NFR BLD: 5.4 % (ref 4–15)
NEUTROPHILS # BLD AUTO: 8.2 K/UL (ref 1.8–7.7)
NEUTROPHILS NFR BLD: 79.4 % (ref 38–73)
NRBC BLD-RTO: 0 /100 WBC
PLATELET # BLD AUTO: 292 K/UL (ref 150–450)
PMV BLD AUTO: 10 FL (ref 9.2–12.9)
POTASSIUM SERPL-SCNC: 3.9 MMOL/L (ref 3.5–5.1)
PROT SERPL-MCNC: 6.4 G/DL (ref 6–8.4)
PROT UR-MCNC: 18 MG/DL (ref 0–15)
PROT/CREAT UR: 0.26 MG/G{CREAT} (ref 0–0.2)
RBC # BLD AUTO: 3.92 M/UL (ref 4–5.4)
SARS-COV-2 RDRP RESP QL NAA+PROBE: NEGATIVE
SODIUM SERPL-SCNC: 138 MMOL/L (ref 136–145)
WBC # BLD AUTO: 10.32 K/UL (ref 3.9–12.7)

## 2022-07-13 PROCEDURE — 59025 FETAL NON-STRESS TEST: CPT

## 2022-07-13 PROCEDURE — U0002 COVID-19 LAB TEST NON-CDC: HCPCS

## 2022-07-13 PROCEDURE — 99999 PR PBB SHADOW E&M-EST. PATIENT-LVL III: ICD-10-PCS | Mod: PBBFAC,,, | Performed by: ADVANCED PRACTICE MIDWIFE

## 2022-07-13 PROCEDURE — 82570 ASSAY OF URINE CREATININE: CPT

## 2022-07-13 PROCEDURE — 25000003 PHARM REV CODE 250

## 2022-07-13 PROCEDURE — 99213 OFFICE O/P EST LOW 20 MIN: CPT | Mod: TH,S$PBB,, | Performed by: ADVANCED PRACTICE MIDWIFE

## 2022-07-13 PROCEDURE — 96372 THER/PROPH/DIAG INJ SC/IM: CPT

## 2022-07-13 PROCEDURE — 99211 OFF/OP EST MAY X REQ PHY/QHP: CPT | Mod: 25

## 2022-07-13 PROCEDURE — G0378 HOSPITAL OBSERVATION PER HR: HCPCS

## 2022-07-13 PROCEDURE — 63600175 PHARM REV CODE 636 W HCPCS

## 2022-07-13 PROCEDURE — 76819 US OB/GYN PROCEDURE (VIEWPOINT): ICD-10-PCS | Mod: 26,S$PBB,, | Performed by: OBSTETRICS & GYNECOLOGY

## 2022-07-13 PROCEDURE — 99213 PR OFFICE/OUTPT VISIT, EST, LEVL III, 20-29 MIN: ICD-10-PCS | Mod: TH,S$PBB,, | Performed by: ADVANCED PRACTICE MIDWIFE

## 2022-07-13 PROCEDURE — 99999 PR PBB SHADOW E&M-EST. PATIENT-LVL III: CPT | Mod: PBBFAC,,, | Performed by: ADVANCED PRACTICE MIDWIFE

## 2022-07-13 PROCEDURE — 76819 FETAL BIOPHYS PROFIL W/O NST: CPT | Mod: PBBFAC | Performed by: OBSTETRICS & GYNECOLOGY

## 2022-07-13 PROCEDURE — 80053 COMPREHEN METABOLIC PANEL: CPT

## 2022-07-13 PROCEDURE — 11000001 HC ACUTE MED/SURG PRIVATE ROOM

## 2022-07-13 PROCEDURE — 85025 COMPLETE CBC W/AUTO DIFF WBC: CPT

## 2022-07-13 PROCEDURE — 99213 OFFICE O/P EST LOW 20 MIN: CPT | Mod: PBBFAC,25,27,TH,PN | Performed by: ADVANCED PRACTICE MIDWIFE

## 2022-07-13 RX ORDER — SODIUM CHLORIDE, SODIUM LACTATE, POTASSIUM CHLORIDE, CALCIUM CHLORIDE 600; 310; 30; 20 MG/100ML; MG/100ML; MG/100ML; MG/100ML
INJECTION, SOLUTION INTRAVENOUS CONTINUOUS
Status: DISCONTINUED | OUTPATIENT
Start: 2022-07-13 | End: 2022-07-16 | Stop reason: HOSPADM

## 2022-07-13 RX ORDER — PROCHLORPERAZINE EDISYLATE 5 MG/ML
5 INJECTION INTRAMUSCULAR; INTRAVENOUS EVERY 6 HOURS PRN
Status: DISCONTINUED | OUTPATIENT
Start: 2022-07-13 | End: 2022-07-16 | Stop reason: HOSPADM

## 2022-07-13 RX ORDER — ONDANSETRON 8 MG/1
8 TABLET, ORALLY DISINTEGRATING ORAL EVERY 8 HOURS PRN
Status: DISCONTINUED | OUTPATIENT
Start: 2022-07-13 | End: 2022-07-16 | Stop reason: HOSPADM

## 2022-07-13 RX ORDER — BETAMETHASONE SODIUM PHOSPHATE AND BETAMETHASONE ACETATE 3; 3 MG/ML; MG/ML
12 INJECTION, SUSPENSION INTRA-ARTICULAR; INTRALESIONAL; INTRAMUSCULAR; SOFT TISSUE
Status: COMPLETED | OUTPATIENT
Start: 2022-07-13 | End: 2022-07-14

## 2022-07-13 RX ORDER — LABETALOL 200 MG/1
200 TABLET, FILM COATED ORAL EVERY 12 HOURS
Status: DISCONTINUED | OUTPATIENT
Start: 2022-07-13 | End: 2022-07-15

## 2022-07-13 RX ORDER — ACETAMINOPHEN 500 MG
500 TABLET ORAL EVERY 6 HOURS PRN
Status: DISCONTINUED | OUTPATIENT
Start: 2022-07-13 | End: 2022-07-16 | Stop reason: HOSPADM

## 2022-07-13 RX ORDER — LABETALOL HYDROCHLORIDE 5 MG/ML
20 INJECTION, SOLUTION INTRAVENOUS ONCE
Status: COMPLETED | OUTPATIENT
Start: 2022-07-13 | End: 2022-07-13

## 2022-07-13 RX ADMIN — BETAMETHASONE SODIUM PHOSPHATE AND BETAMETHASONE ACETATE 12 MG: 3; 3 INJECTION, SUSPENSION INTRA-ARTICULAR; INTRALESIONAL; INTRAMUSCULAR at 04:07

## 2022-07-13 RX ADMIN — LABETALOL HYDROCHLORIDE 200 MG: 200 TABLET, FILM COATED ORAL at 07:07

## 2022-07-13 RX ADMIN — LABETALOL HYDROCHLORIDE 20 MG: 5 INJECTION INTRAVENOUS at 03:07

## 2022-07-13 NOTE — PROGRESS NOTES
26 y.o. female  at 32w0d   Reports + FM, denies VB, LOF or CTX  Doing well without concerns     GDM/metformin 1000 mg twice a day, recently increased.  Is taking the 1000 p.m. and is just introducing the 1000 in a.m..  No log is provided today.  -130 and 2 hour post prandials are less than 120.  States the metformin is really upsetting her GI system so introducing increased dosages slowly.  Has follow-up appointment with diabetes clinic to evaluate     History of hypertension, taking baby aspirin.  Elevated blood pressure today at 162/94 with 1+ of protein.  Denies symptoms, slight edema and slightly brisk reflexes.  Last PCR was  at 0.11 and normal CMP  To labor and delivery for PIH workup labor and delivery informed    Ultrasound today-vertex, MVP 10.5, LIA 18.9, BPP 8 8.  Polyhydramnios by MVP but not by ILA  TW lbs     Had Tdap    Reviewed warning signs, normal FKCs,  labor precautions and how/when to call.  RTC x 1 wks, call or present sooner prn.     I spent a total of 20 minutes on the day of the visit.This includes face to face time and non-face to face time preparing to see the patient (eg, review of tests), Obtaining and/or reviewing separately obtained history, Documenting clinical information in the electronic or other health record, Independently interpreting resultsand communicating results to the patient/family/caregiver, or Care coordination.

## 2022-07-13 NOTE — SUBJECTIVE & OBJECTIVE
Obstetric HPI:  Patient reports None contractions, active fetal movement, No vaginal bleeding , No loss of fluid     This pregnancy has been complicated by history of childhood HTN (ASA), hypothyroid, obesity, GDM (metformin), asthma, migraines, vitamin D deficiency    OB History    Para Term  AB Living   2 0 0 0 1 0   SAB IAB Ectopic Multiple Live Births   1 0 0 0 0      # Outcome Date GA Lbr Tera/2nd Weight Sex Delivery Anes PTL Lv   2 Current            1 SAB 16 12w0d            Past Medical History:   Diagnosis Date    Asthma 2022    Gestational diabetes mellitus (GDM) in third trimester controlled on oral hypoglycemic drug 2022    History of hypertension 2022-add PIH baseline labs to workup PCR 0.09 advised daily baby aspirin at 16 weeks    Hypertension     Thyroid disease      Past Surgical History:   Procedure Laterality Date    adneoids      age of 5 years old    TONSILLECTOMY      age of 5 years old       PTA Medications   Medication Sig    albuterol (PROVENTIL/VENTOLIN HFA) 90 mcg/actuation inhaler     aspirin 81 MG Chew Take 1 tablet (81 mg total) by mouth once daily.    blood sugar diagnostic Strp To check BG 4 times daily, to use with insurance preferred meter    blood-glucose meter kit To check BG 4 times daily, to use with insurance preferred meter    cholecalciferol, vitamin D3, 125 mcg (5,000 unit) Tab Take 5,000 Units by mouth once daily.    lancets Misc To check BG 4 times daily, to use with insurance preferred meter    metFORMIN (GLUCOPHAGE XR) 500 MG ER 24hr tablet Increase Metformin to 2 tablets twice a day.    ondansetron (ZOFRAN-ODT) 4 MG TbDL Take 4 mg by mouth every 8 (eight) hours as needed.    prenatal 168/iron/folic/omega3 (ONE-A-DAY PRENATAL-1 ORAL) Take by mouth.    TRUE METRIX GLUCOSE METER Misc directed    TRUEDRAW LANCING DEVICE Misc directed    TRUEPLUS LANCETS 33 gauge Misc Apply topically 4 (four) times daily.       Review of patient's  allergies indicates:  No Known Allergies     Family History       Problem Relation (Age of Onset)    Diabetes Maternal Grandfather, Mother    Hypertension Paternal Grandmother, Father          Tobacco Use    Smoking status: Current Some Day Smoker    Smokeless tobacco: Never Used   Substance and Sexual Activity    Alcohol use: Never    Drug use: Never    Sexual activity: Yes     Partners: Male     Birth control/protection: None     Review of Systems   Eyes:  Negative for visual disturbance.   Gastrointestinal:  Negative for abdominal pain.   Genitourinary:  Positive for vaginal bleeding. Negative for vaginal discharge.   Neurological:  Negative for vertigo, syncope and headaches.   All other systems reviewed and are negative.   Objective:     Vital Signs (Most Recent):  Pulse: 104 (07/13/22 1730)  BP: (!) 160/105 (07/13/22 1727)  SpO2: 98 % (07/13/22 1730)   Vital Signs (24h Range):  Pulse:  [0-115] 104  SpO2:  [95 %-99 %] 98 %  BP: (136-189)/() 160/105        There is no height or weight on file to calculate BMI.    FHT: 145Cat 1 (reassuring)  TOCO:  none    Physical Exam:   Constitutional: She is oriented to person, place, and time. She appears well-developed and well-nourished.       Cardiovascular:  Normal rate.             Pulmonary/Chest: Effort normal. No respiratory distress.        Abdominal: Soft.     Genitourinary:    Vagina and uterus normal.             Musculoskeletal: Moves all extremeties. Edema present.      Comments: +1 BLE edema       Neurological: She is alert and oriented to person, place, and time. She has normal reflexes.    Skin: Skin is warm and dry.    Psychiatric: She has a normal mood and affect.     Cervix:deferred       Significant Labs:  Lab Results   Component Value Date    GROUPTRH A POS 01/11/2022    HEPBSAG Negative 01/11/2022       I have personallly reviewed all pertinent lab results from the last 24 hours.

## 2022-07-13 NOTE — HOSPITAL COURSE
7/13/22--2385gm, vtx, napoleon wnl; bpp 8/8  7/14/22-pt has mild headache, just received fioricet. Counseled on possibility of premature delivery due to severe range elevated BPs. BMZ completed 4am today.   07/15/2022-labetalol increased to 400mg tid  7/16/22-pt had episode of lightheadedness yesterday so labetalol decreased back to 300mg bid. BPs have improved since admit. Denies preE symptoms at this time.

## 2022-07-14 PROBLEM — E66.01 MATERNAL MORBID OBESITY, ANTEPARTUM: Status: ACTIVE | Noted: 2022-07-14

## 2022-07-14 PROBLEM — O99.210 MATERNAL MORBID OBESITY, ANTEPARTUM: Status: ACTIVE | Noted: 2022-07-14

## 2022-07-14 LAB
POCT GLUCOSE: 135 MG/DL (ref 70–110)
POCT GLUCOSE: 162 MG/DL (ref 70–110)

## 2022-07-14 PROCEDURE — 25000003 PHARM REV CODE 250

## 2022-07-14 PROCEDURE — 63600175 PHARM REV CODE 636 W HCPCS: Performed by: OBSTETRICS & GYNECOLOGY

## 2022-07-14 PROCEDURE — 63600175 PHARM REV CODE 636 W HCPCS

## 2022-07-14 PROCEDURE — 11000001 HC ACUTE MED/SURG PRIVATE ROOM

## 2022-07-14 PROCEDURE — 25000003 PHARM REV CODE 250: Performed by: OBSTETRICS & GYNECOLOGY

## 2022-07-14 PROCEDURE — 99223 PR INITIAL HOSPITAL CARE,LEVL III: ICD-10-PCS | Mod: ,,, | Performed by: OBSTETRICS & GYNECOLOGY

## 2022-07-14 PROCEDURE — 99223 1ST HOSP IP/OBS HIGH 75: CPT | Mod: ,,, | Performed by: OBSTETRICS & GYNECOLOGY

## 2022-07-14 PROCEDURE — 25000003 PHARM REV CODE 250: Performed by: MIDWIFE

## 2022-07-14 RX ORDER — LABETALOL HYDROCHLORIDE 5 MG/ML
20 INJECTION, SOLUTION INTRAVENOUS ONCE AS NEEDED
Status: COMPLETED | OUTPATIENT
Start: 2022-07-14 | End: 2022-07-14

## 2022-07-14 RX ORDER — CALCIUM CARBONATE 200(500)MG
500 TABLET,CHEWABLE ORAL 3 TIMES DAILY PRN
Status: DISCONTINUED | OUTPATIENT
Start: 2022-07-14 | End: 2022-07-16 | Stop reason: HOSPADM

## 2022-07-14 RX ORDER — MAG HYDROX/ALUMINUM HYD/SIMETH 200-200-20
30 SUSPENSION, ORAL (FINAL DOSE FORM) ORAL EVERY 6 HOURS PRN
Status: DISCONTINUED | OUTPATIENT
Start: 2022-07-14 | End: 2022-07-16 | Stop reason: HOSPADM

## 2022-07-14 RX ORDER — HYDRALAZINE HYDROCHLORIDE 20 MG/ML
10 INJECTION INTRAMUSCULAR; INTRAVENOUS ONCE AS NEEDED
Status: DISCONTINUED | OUTPATIENT
Start: 2022-07-14 | End: 2022-07-16 | Stop reason: HOSPADM

## 2022-07-14 RX ORDER — LABETALOL HYDROCHLORIDE 5 MG/ML
80 INJECTION, SOLUTION INTRAVENOUS ONCE AS NEEDED
Status: DISCONTINUED | OUTPATIENT
Start: 2022-07-14 | End: 2022-07-16 | Stop reason: HOSPADM

## 2022-07-14 RX ORDER — MAG HYDROX/ALUMINUM HYD/SIMETH 200-200-20
30 SUSPENSION, ORAL (FINAL DOSE FORM) ORAL
Status: DISCONTINUED | OUTPATIENT
Start: 2022-07-14 | End: 2022-07-14

## 2022-07-14 RX ORDER — LABETALOL HYDROCHLORIDE 5 MG/ML
40 INJECTION, SOLUTION INTRAVENOUS ONCE AS NEEDED
Status: COMPLETED | OUTPATIENT
Start: 2022-07-14 | End: 2022-07-14

## 2022-07-14 RX ORDER — HYDRALAZINE HYDROCHLORIDE 20 MG/ML
10 INJECTION INTRAMUSCULAR; INTRAVENOUS ONCE
Status: COMPLETED | OUTPATIENT
Start: 2022-07-14 | End: 2022-07-14

## 2022-07-14 RX ORDER — PANTOPRAZOLE SODIUM 40 MG/1
40 TABLET, DELAYED RELEASE ORAL ONCE
Status: COMPLETED | OUTPATIENT
Start: 2022-07-14 | End: 2022-07-14

## 2022-07-14 RX ORDER — BUTALBITAL, ACETAMINOPHEN AND CAFFEINE 50; 325; 40 MG/1; MG/1; MG/1
1 TABLET ORAL EVERY 4 HOURS PRN
Status: DISCONTINUED | OUTPATIENT
Start: 2022-07-14 | End: 2022-07-16 | Stop reason: HOSPADM

## 2022-07-14 RX ADMIN — ALUMINUM HYDROXIDE, MAGNESIUM HYDROXIDE, AND SIMETHICONE 30 ML: 200; 200; 20 SUSPENSION ORAL at 04:07

## 2022-07-14 RX ADMIN — CALCIUM CARBONATE (ANTACID) CHEW TAB 500 MG 500 MG: 500 CHEW TAB at 03:07

## 2022-07-14 RX ADMIN — LABETALOL HYDROCHLORIDE 40 MG: 5 INJECTION INTRAVENOUS at 07:07

## 2022-07-14 RX ADMIN — PANTOPRAZOLE SODIUM 40 MG: 40 TABLET, DELAYED RELEASE ORAL at 06:07

## 2022-07-14 RX ADMIN — BUTALBITAL, ACETAMINOPHEN AND CAFFEINE 1 TABLET: 50; 325; 40 TABLET ORAL at 08:07

## 2022-07-14 RX ADMIN — BETAMETHASONE SODIUM PHOSPHATE AND BETAMETHASONE ACETATE 12 MG: 3; 3 INJECTION, SUSPENSION INTRA-ARTICULAR; INTRALESIONAL; INTRAMUSCULAR at 04:07

## 2022-07-14 RX ADMIN — HYDRALAZINE HYDROCHLORIDE 10 MG: 20 INJECTION INTRAMUSCULAR; INTRAVENOUS at 09:07

## 2022-07-14 RX ADMIN — ALUMINUM HYDROXIDE, MAGNESIUM HYDROXIDE, AND SIMETHICONE 30 ML: 200; 200; 20 SUSPENSION ORAL at 08:07

## 2022-07-14 RX ADMIN — LABETALOL HYDROCHLORIDE 20 MG: 5 INJECTION INTRAVENOUS at 06:07

## 2022-07-14 RX ADMIN — LABETALOL HYDROCHLORIDE 200 MG: 200 TABLET, FILM COATED ORAL at 06:07

## 2022-07-14 RX ADMIN — LABETALOL HYDROCHLORIDE 200 MG: 200 TABLET, FILM COATED ORAL at 07:07

## 2022-07-14 NOTE — NURSING
Patient taken off continuous monitoring with [facility] permission. New orders to spot check FHTs at 0300 and place patient on continuous monitoring at 0600.

## 2022-07-14 NOTE — PROGRESS NOTES
07/13/22 2120   Tele[facility] Note - Strip   Strip Reviewed by [facility] Nurse? Yes   [facility] Note - Communication   [facility] Nurse Communicated with Bedside Nurse Regarding: Fetal Status   [facility] Note - Notification   Nurse Notified? Yes

## 2022-07-14 NOTE — H&P
[facility] - Labor & Delivery  Obstetrics  History & Physical    Patient Name: Kathy Mishra  MRN: [id number]  Admission Date: 2022  Primary Care Provider: Primary Doctor No    Subjective:     Principal Problem:Elevated blood pressure reading    History of Present Illness:  26 y.o.  32w0d sent from office for pre-E workup        Obstetric HPI:  Patient reports None contractions, active fetal movement, No vaginal bleeding , No loss of fluid     This pregnancy has been complicated by history of childhood HTN (ASA), hypothyroid, obesity, GDM (metformin), asthma, migraines, vitamin D deficiency    OB History    Para Term  AB Living   2 0 0 0 1 0   SAB IAB Ectopic Multiple Live Births   1 0 0 0 0      # Outcome Date GA Lbr Tera/2nd Weight Sex Delivery Anes PTL Lv   2 Current            1 SAB 16 12w0d            Past Medical History:   Diagnosis Date    Asthma 2022    Gestational diabetes mellitus (GDM) in third trimester controlled on oral hypoglycemic drug 2022    History of hypertension 2022-add PIH baseline labs to workup PCR 0.09 advised daily baby aspirin at 16 weeks    Hypertension     Thyroid disease      Past Surgical History:   Procedure Laterality Date    adneoids      age of 5 years old    TONSILLECTOMY      age of 5 years old       PTA Medications   Medication Sig    albuterol (PROVENTIL/VENTOLIN HFA) 90 mcg/actuation inhaler     aspirin 81 MG Chew Take 1 tablet (81 mg total) by mouth once daily.    blood sugar diagnostic Strp To check BG 4 times daily, to use with insurance preferred meter    blood-glucose meter kit To check BG 4 times daily, to use with insurance preferred meter    cholecalciferol, vitamin D3, 125 mcg (5,000 unit) Tab Take 5,000 Units by mouth once daily.    lancets Misc To check BG 4 times daily, to use with insurance preferred meter    metFORMIN (GLUCOPHAGE XR) 500 MG ER 24hr tablet Increase Metformin to 2 tablets  twice a day.    ondansetron (ZOFRAN-ODT) 4 MG TbDL Take 4 mg by mouth every 8 (eight) hours as needed.    prenatal 168/iron/folic/omega3 (ONE-A-DAY PRENATAL-1 ORAL) Take by mouth.    TRUE METRIX GLUCOSE METER Misc directed    TRUEDRAW LANCING DEVICE Misc directed    TRUEPLUS LANCETS 33 gauge Misc Apply topically 4 (four) times daily.       Review of patient's allergies indicates:  No Known Allergies     Family History       Problem Relation (Age of Onset)    Diabetes Maternal Grandfather, Mother    Hypertension Paternal Grandmother, Father          Tobacco Use    Smoking status: Current Some Day Smoker    Smokeless tobacco: Never Used   Substance and Sexual Activity    Alcohol use: Never    Drug use: Never    Sexual activity: Yes     Partners: Male     Birth control/protection: None     Review of Systems   Eyes:  Negative for visual disturbance.   Gastrointestinal:  Negative for abdominal pain.   Genitourinary:  Positive for vaginal bleeding. Negative for vaginal discharge.   Neurological:  Negative for vertigo, syncope and headaches.   All other systems reviewed and are negative.   Objective:     Vital Signs (Most Recent):  Pulse: 104 (07/13/22 1730)  BP: (!) 160/105 (07/13/22 1727)  SpO2: 98 % (07/13/22 1730)   Vital Signs (24h Range):  Pulse:  [0-115] 104  SpO2:  [95 %-99 %] 98 %  BP: (136-189)/() 160/105        There is no height or weight on file to calculate BMI.    FHT: 145Cat 1 (reassuring)  TOCO:  none    Physical Exam:   Constitutional: She is oriented to person, place, and time. She appears well-developed and well-nourished.       Cardiovascular:  Normal rate.             Pulmonary/Chest: Effort normal. No respiratory distress.        Abdominal: Soft.     Genitourinary:    Vagina and uterus normal.             Musculoskeletal: Moves all extremeties. Edema present.      Comments: +1 BLE edema       Neurological: She is alert and oriented to person, place, and time. She has normal reflexes.     Skin: Skin is warm and dry.    Psychiatric: She has a normal mood and affect.     Cervix:deferred       Significant Labs:  Lab Results   Component Value Date    GROUPTRH A POS 2022    HEPBSAG Negative 2022       I have personallly reviewed all pertinent lab results from the last 24 hours.    Assessment/Plan:     26 y.o. female  at 32w1d for:    * Elevated blood pressure reading  NST  Pre-E work up    Gestational diabetes mellitus (GDM) in third trimester controlled on oral hypoglycemic drug  Blood sugar on admit. Continue metformin        Nayeli Chisholm CNM  Obstetrics  [facility] - Labor & Delivery

## 2022-07-14 NOTE — PROGRESS NOTES
Discussed patient with Dr. Canales.   [facility] work up WNL (P/C ratio 0.26, AST 11, ALT 15), patient denies CNS s/s, DTR +2, pedal pulses +2  BMZ series started. Will receive next dose on 7/14/22  BP in severe range. Given 1 dose of 20mg labetalol IV. BP normotensive for a few BP and now back to severe range BP.    Dr. Canales advised to give labetalol 200mg BID over night to regulate BP but do not send home on BP medication.   Over night observation      Will continue to monitor patient BP over night.

## 2022-07-14 NOTE — SUBJECTIVE & OBJECTIVE
Obstetric HPI:  Patient reports None contractions, active fetal movement, absent vaginal bleeding , absent loss of fluid      Objective:     Vital Signs (Most Recent):  Temp: 98.6 °F (37 °C) (07/13/22 2311)  Pulse: 90 (07/14/22 1200)  Resp: 18 (07/14/22 1000)  BP: (!) 147/67 (07/14/22 1200)  SpO2: 98 % (07/14/22 1100)   Vital Signs (24h Range):  Temp:  [98.6 °F (37 °C)] 98.6 °F (37 °C)  Pulse:  [0-119] 90  Resp:  [18] 18  SpO2:  [80 %-100 %] 98 %  BP: (125-189)/() 147/67        There is no height or weight on file to calculate BMI.    FHT: 130 Cat 1 (reassuring)  TOCO:  Q 0 minutes    No intake or output data in the 24 hours ending 07/14/22 1222      Significant Labs:  Recent Lab Results         07/14/22  0859   07/13/22  1740   07/13/22  1354   07/13/22  1315        Albumin     2.4         Alkaline Phosphatase     121         ALT     15         Anion Gap     11         AST     11         Baso #     0.01         Basophil %     0.1         BILIRUBIN TOTAL     0.2  Comment: For infants and newborns, interpretation of results should be based  on gestational age, weight and in agreement with clinical  observations.    Premature Infant recommended reference ranges:  Up to 24 hours.............<8.0 mg/dL  Up to 48 hours............<12.0 mg/dL  3-5 days..................<15.0 mg/dL  6-29 days.................<15.0 mg/dL           BUN     8         Calcium     9.6         Chloride     107         CO2     20         Creatinine     0.6         Creatinine, Urine       68.7       Differential Method     Automated         eGFR if      >60         eGFR if non      >60  Comment: Calculation used to obtain the estimated glomerular filtration  rate (eGFR) is the CKD-EPI equation.            Eos #     0.0         Eosinophil %     0.4         Glucose     72         Gran # (ANC)     8.2         Gran %     79.4         Hematocrit     35.7         Hemoglobin     11.9         Immature Grans (Abs)      0.07  Comment: Mild elevation in immature granulocytes is non specific and   can be seen in a variety of conditions including stress response,   acute inflammation, trauma and pregnancy. Correlation with other   laboratory and clinical findings is essential.           Immature Granulocytes     0.7         Lymph #     1.4         Lymph %     14.0         MCH     30.4         MCHC     33.3         MCV     91         Mono #     0.6         Mono %     5.4         MPV     10.0         nRBC     0         Platelets     292         POCT Glucose 135             Potassium     3.9         Prot/Creat Ratio, Urine       0.26       PROTEIN TOTAL     6.4         Protein, Urine Random       18       RBC     3.92         RDW     14.6         SARS-CoV-2 RNA, Amplification, Qual   Negative  Comment: This test utilizes isothermal nucleic acid amplification   technology to detect the SARS-CoV-2 RdRp nucleic acid segment.   The analytical sensitivity (limit of detection) is 125 genome   equivalents/mL.     A POSITIVE result implies infection with the SARS-CoV-2 virus;  the patient is presumed to be contagious.    A NEGATIVE result means that SARS-CoV-2 nucleic acids are not  present above the limit of detection. A NEGATIVE result should be   treated as presumptive. It does not rule out the possibility of   COVID-19 and should not be the sole basis for treatment decisions.   If COVID-19 is strongly suspected based on clinical and exposure   history, re-testing using an alternate molecular assay should be   considered.       This test is only for use under the Food and Drug   Administration s Emergency Use Authorization (EUA).   Commercial kits are provided by [employer].   Performance characteristics of the EUA have been independently  verified by [facility] Department of  Pathology and Laboratory Medicine.   _________________________________________________________________  The ID NOW COVID-19 Letter of Authorization,  along with the   authorized Fact Sheet for Healthcare Providers, the authorized Fact  Sheet for Patients, and authorized labeling are available on the FDA   website:  www.fda.gov/MedicalDevices/Safety/EmergencySituations/hli223645.htm             Sodium     138         WBC     10.32                 Physical Exam:   Constitutional: She is oriented to person, place, and time. She appears well-developed and well-nourished.        Pulmonary/Chest: Effort normal.        Abdominal:   Gravid, NT             Musculoskeletal: Moves all extremeties. Edema present.      Comments: 2+ bilateral lower extremities       Neurological: She is alert and oriented to person, place, and time. She has normal reflexes.   No clonus    Skin: Skin is warm and dry.    Psychiatric: She has a normal mood and affect. Her behavior is normal.

## 2022-07-14 NOTE — PROGRESS NOTES
[facility] - Labor & Delivery  Obstetrics  Antepartum Progress Note    Patient Name: Kathy Mishra  MRN: [id number]  Admission Date: 2022  Hospital Length of Stay: 0 days  Attending Physician: Toby Canales MD  Primary Care Provider: Primary Doctor No    Subjective:     Principal Problem:Elevated blood pressure reading    HPI:  26 y.o.  32w0d sent from office for pre-E workup      Hospital Course:  22-pt has mild headache, just received fioricet. Counseled on possibility of premature delivery due to severe range elevated BPs. BMZ completed 4am today.         Obstetric HPI:  Patient reports None contractions, active fetal movement, absent vaginal bleeding , absent loss of fluid      Objective:     Vital Signs (Most Recent):  Temp: 98.6 °F (37 °C) (22 2311)  Pulse: 90 (22 1200)  Resp: 18 (22 1000)  BP: (!) 147/67 (22 1200)  SpO2: 98 % (22 1100)   Vital Signs (24h Range):  Temp:  [98.6 °F (37 °C)] 98.6 °F (37 °C)  Pulse:  [0-119] 90  Resp:  [18] 18  SpO2:  [80 %-100 %] 98 %  BP: (125-189)/() 147/67        There is no height or weight on file to calculate BMI.    FHT: 130 Cat 1 (reassuring)  TOCO:  Q 0 minutes    No intake or output data in the 24 hours ending 22 1222      Significant Labs:  Recent Lab Results         22  0859   22  1740   22  1354   22  1315        Albumin     2.4         Alkaline Phosphatase     121         ALT     15         Anion Gap     11         AST     11         Baso #     0.01         Basophil %     0.1         BILIRUBIN TOTAL     0.2  Comment: For infants and newborns, interpretation of results should be based  on gestational age, weight and in agreement with clinical  observations.    Premature Infant recommended reference ranges:  Up to 24 hours.............<8.0 mg/dL  Up to 48 hours............<12.0 mg/dL  3-5 days..................<15.0 mg/dL  6-29 days.................<15.0 mg/dL           BUN     8          Calcium     9.6         Chloride     107         CO2     20         Creatinine     0.6         Creatinine, Urine       68.7       Differential Method     Automated         eGFR if      >60         eGFR if non      >60  Comment: Calculation used to obtain the estimated glomerular filtration  rate (eGFR) is the CKD-EPI equation.            Eos #     0.0         Eosinophil %     0.4         Glucose     72         Gran # (ANC)     8.2         Gran %     79.4         Hematocrit     35.7         Hemoglobin     11.9         Immature Grans (Abs)     0.07  Comment: Mild elevation in immature granulocytes is non specific and   can be seen in a variety of conditions including stress response,   acute inflammation, trauma and pregnancy. Correlation with other   laboratory and clinical findings is essential.           Immature Granulocytes     0.7         Lymph #     1.4         Lymph %     14.0         MCH     30.4         MCHC     33.3         MCV     91         Mono #     0.6         Mono %     5.4         MPV     10.0         nRBC     0         Platelets     292         POCT Glucose 135             Potassium     3.9         Prot/Creat Ratio, Urine       0.26       PROTEIN TOTAL     6.4         Protein, Urine Random       18       RBC     3.92         RDW     14.6         SARS-CoV-2 RNA, Amplification, Qual   Negative  Comment: This test utilizes isothermal nucleic acid amplification   technology to detect the SARS-CoV-2 RdRp nucleic acid segment.   The analytical sensitivity (limit of detection) is 125 genome   equivalents/mL.     A POSITIVE result implies infection with the SARS-CoV-2 virus;  the patient is presumed to be contagious.    A NEGATIVE result means that SARS-CoV-2 nucleic acids are not  present above the limit of detection. A NEGATIVE result should be   treated as presumptive. It does not rule out the possibility of   COVID-19 and should not be the sole basis for treatment  decisions.   If COVID-19 is strongly suspected based on clinical and exposure   history, re-testing using an alternate molecular assay should be   considered.       This test is only for use under the Food and Drug   Administration s Emergency Use Authorization (EUA).   Commercial kits are provided by [employer].   Performance characteristics of the EUA have been independently  verified by [facility] Department of  Pathology and Laboratory Medicine.   _________________________________________________________________  The ID NOW COVID-19 Letter of Authorization, along with the   authorized Fact Sheet for Healthcare Providers, the authorized Fact  Sheet for Patients, and authorized labeling are available on the FDA   website:  www.fda.gov/MedicalDevices/Safety/EmergencySituations/xzt126681.htm             Sodium     138         WBC     10.32                 Physical Exam:   Constitutional: She is oriented to person, place, and time. She appears well-developed and well-nourished.        Pulmonary/Chest: Effort normal.        Abdominal:   Gravid, NT             Musculoskeletal: Moves all extremeties. Edema present.      Comments: 2+ bilateral lower extremities       Neurological: She is alert and oriented to person, place, and time. She has normal reflexes.   No clonus    Skin: Skin is warm and dry.    Psychiatric: She has a normal mood and affect. Her behavior is normal.     Assessment/Plan:     26 y.o. female  at 32w1d for:    * Elevated blood pressure reading  NST  Pre-E work up    Maternal morbid obesity, antepartum        Intractable migraine without aura and without status migrainosus           Hypertension  Chronic HTN  No meds as adult until pregnancy  Labile BPs    Gestational diabetes mellitus (GDM) in third trimester controlled on oral hypoglycemic drug  Blood sugar on admit. Continue metformin        Class 3 severe obesity due to excess calories with serious comorbidity and body  mass index (BMI) of 45.0 to 49.9 in adult          History of hypertension        BPP/EFW      Jhoana Alexander MD  Obstetrics  [facility] - [facility] Delivery

## 2022-07-15 PROBLEM — O10.913 PRE-EXISTING HYPERTENSION AFFECTING PREGNANCY IN THIRD TRIMESTER: Status: ACTIVE | Noted: 2022-01-11

## 2022-07-15 PROBLEM — O99.213 OBESITY COMPLICATING PREGNANCY IN THIRD TRIMESTER: Status: ACTIVE | Noted: 2022-07-14

## 2022-07-15 LAB
POCT GLUCOSE: 106 MG/DL (ref 70–110)
POCT GLUCOSE: 109 MG/DL (ref 70–110)
POCT GLUCOSE: 124 MG/DL (ref 70–110)
POCT GLUCOSE: 127 MG/DL (ref 70–110)

## 2022-07-15 PROCEDURE — 99213 PR OFFICE/OUTPT VISIT, EST, LEVL III, 20-29 MIN: ICD-10-PCS | Mod: ,,, | Performed by: OBSTETRICS & GYNECOLOGY

## 2022-07-15 PROCEDURE — 59025 FETAL NON-STRESS TEST: CPT

## 2022-07-15 PROCEDURE — 63600175 PHARM REV CODE 636 W HCPCS: Performed by: OBSTETRICS & GYNECOLOGY

## 2022-07-15 PROCEDURE — 25000003 PHARM REV CODE 250: Performed by: OBSTETRICS & GYNECOLOGY

## 2022-07-15 PROCEDURE — 25000003 PHARM REV CODE 250

## 2022-07-15 PROCEDURE — 99213 OFFICE O/P EST LOW 20 MIN: CPT | Mod: ,,, | Performed by: OBSTETRICS & GYNECOLOGY

## 2022-07-15 PROCEDURE — 11000001 HC ACUTE MED/SURG PRIVATE ROOM

## 2022-07-15 RX ORDER — LABETALOL 200 MG/1
400 TABLET, FILM COATED ORAL EVERY 8 HOURS
Status: DISCONTINUED | OUTPATIENT
Start: 2022-07-15 | End: 2022-07-15

## 2022-07-15 RX ORDER — LABETALOL 200 MG/1
200 TABLET, FILM COATED ORAL ONCE
Status: COMPLETED | OUTPATIENT
Start: 2022-07-15 | End: 2022-07-15

## 2022-07-15 RX ORDER — ENOXAPARIN SODIUM 100 MG/ML
40 INJECTION SUBCUTANEOUS EVERY 12 HOURS
Status: DISCONTINUED | OUTPATIENT
Start: 2022-07-15 | End: 2022-07-16 | Stop reason: HOSPADM

## 2022-07-15 RX ORDER — ENOXAPARIN SODIUM 100 MG/ML
40 INJECTION SUBCUTANEOUS EVERY 24 HOURS
Status: DISCONTINUED | OUTPATIENT
Start: 2022-07-15 | End: 2022-07-15 | Stop reason: DRUGHIGH

## 2022-07-15 RX ORDER — NAPROXEN SODIUM 220 MG/1
81 TABLET, FILM COATED ORAL DAILY
Status: DISCONTINUED | OUTPATIENT
Start: 2022-07-15 | End: 2022-07-16 | Stop reason: HOSPADM

## 2022-07-15 RX ADMIN — LABETALOL HYDROCHLORIDE 200 MG: 200 TABLET, FILM COATED ORAL at 09:07

## 2022-07-15 RX ADMIN — ENOXAPARIN SODIUM 40 MG: 100 INJECTION SUBCUTANEOUS at 05:07

## 2022-07-15 RX ADMIN — LABETALOL HYDROCHLORIDE 200 MG: 200 TABLET, FILM COATED ORAL at 11:07

## 2022-07-15 RX ADMIN — LABETALOL HYDROCHLORIDE 300 MG: 100 TABLET, FILM COATED ORAL at 08:07

## 2022-07-15 NOTE — SUBJECTIVE & OBJECTIVE
Obstetric HPI:  Patient reports None contractions, active fetal movement, absent vaginal bleeding , absent loss of fluid   Denies headache, blurry vision, seeing spots     Objective:     Vital Signs (Most Recent):  Temp: 98.6 °F (37 °C) (07/13/22 2311)  Pulse: 102 (07/15/22 1157)  Resp: 18 (07/14/22 1000)  BP: 112/61 (07/15/22 1157)  SpO2: 97 % (07/15/22 0550) Vital Signs (24h Range):  Pulse:  [] 102  SpO2:  [95 %-99 %] 97 %  BP: (112-180)/() 112/61        There is no height or weight on file to calculate BMI.    FHT: 130 Cat 1 (reassuring)  TOCO:  none    No intake or output data in the 24 hours ending 07/15/22 1337    Cervix Deferred      Significant Labs:  Recent Lab Results         07/15/22  1256   07/15/22  0907   07/15/22  0620   07/14/22  2129        POCT Glucose 106   109   124   162               Physical Exam:   Constitutional: She is oriented to person, place, and time. She appears well-developed and well-nourished.    HENT:   Head: Normocephalic.    Eyes: Pupils are equal, round, and reactive to light. Conjunctivae are normal.     Cardiovascular:  Normal rate and regular rhythm.             Pulmonary/Chest: Effort normal.        Abdominal: Soft.     Genitourinary:    Genitourinary Comments: Gravid, non tender             Musculoskeletal: Normal range of motion. No edema.       Neurological: She is alert and oriented to person, place, and time. She has normal reflexes.    Skin: Skin is warm and dry.    Psychiatric: She has a normal mood and affect. Her behavior is normal. Judgment and thought content normal.

## 2022-07-15 NOTE — ASSESSMENT & PLAN NOTE
07/15/2022  bp still slightly labile with labetalol 200 bid; labetalol increased to 400mg tid     Continue daily 81mg aspirin   Continue to follow closely

## 2022-07-15 NOTE — PROGRESS NOTES
[facility]  Obstetrics  Antepartum Progress Note    Patient Name: Kathy Mishra  MRN: [id number]  Admission Date: 2022  Hospital Length of Stay: 0 days  Attending Physician: Toby Canales MD  Primary Care Provider: Primary Doctor No    Subjective:     Principal Problem:Elevated blood pressure reading    HPI:  26 y.o.  32w0d sent from office for pre-E workup        Hospital Course:  22--2385gm, vtx, napoleon wnl; bpp 8/8  22-pt has mild headache, just received fioricet. Counseled on possibility of premature delivery due to severe range elevated BPs. BMZ completed 4am today.   07/15/2022-labetalol increased to 400mg tid            Obstetric HPI:  Patient reports None contractions, active fetal movement, absent vaginal bleeding , absent loss of fluid   Denies headache, blurry vision, seeing spots     Objective:     Vital Signs (Most Recent):  Temp: 98.6 °F (37 °C) (22 2311)  Pulse: 102 (07/15/22 1157)  Resp: 18 (22 1000)  BP: 112/61 (07/15/22 1157)  SpO2: 97 % (07/15/22 0550) Vital Signs (24h Range):  Pulse:  [] 102  SpO2:  [95 %-99 %] 97 %  BP: (112-180)/() 112/61        There is no height or weight on file to calculate BMI.    FHT: 130 Cat 1 (reassuring)  TOCO:  none    No intake or output data in the 24 hours ending 07/15/22 1337    Cervix Deferred      Significant Labs:  Recent Lab Results         07/15/22  1256   07/15/22  0907   07/15/22  0620   22  2129        POCT Glucose 106   109   124   162               Physical Exam:   Constitutional: She is oriented to person, place, and time. She appears well-developed and well-nourished.    HENT:   Head: Normocephalic.    Eyes: Pupils are equal, round, and reactive to light. Conjunctivae are normal.     Cardiovascular:  Normal rate and regular rhythm.             Pulmonary/Chest: Effort normal.        Abdominal: Soft.     Genitourinary:    Genitourinary Comments: Gravid, non tender              Musculoskeletal: Normal range of motion. No edema.       Neurological: She is alert and oriented to person, place, and time. She has normal reflexes.    Skin: Skin is warm and dry.    Psychiatric: She has a normal mood and affect. Her behavior is normal. Judgment and thought content normal.     Assessment/Plan:     26 y.o. female  at 32w2d for:    * Elevated blood pressure reading  NST-reassuring  Pre-E work up negative    Obesity complicating pregnancy in third trimester  lovenox while in hospital      Intractable migraine without aura and without status migrainosus           Hypertension  Chronic HTN  No meds as adult until pregnancy  Labile BPs    Gestational diabetes mellitus (GDM) in third trimester controlled on oral hypoglycemic drug  Blood sugar on admit. Continue metformin    (500mg breakfast, lunch; 1000mg at bedtime)  Continue accuck 4x/d    Class 3 severe obesity due to excess calories with serious comorbidity and body mass index (BMI) of 45.0 to 49.9 in adult          Pre-existing hypertension affecting pregnancy in third trimester  07/15/2022  bp still slightly labile with labetalol 200 bid; labetalol increased to 400mg tid     Continue daily 81mg aspirin   Continue to follow closely          Bhumi Garcia MD  Obstetrics  [facility]

## 2022-07-15 NOTE — PROGRESS NOTES
BPs increased again at this time. Per nurse, pt is going through some social situational drama with BRONWYN's ex wife. Review of chart shows pt is CHTN. Will administer labetalol 200mg now. Will increase if needed

## 2022-07-15 NOTE — PLAN OF CARE
[facility]  Discharge Assessment    Primary Care Provider: Primary Doctor No     OB Screen (most recent)       OB Screen - 07/15/22 1259          OB SCREEN    Assessment Type Discharge Planning Assessment (P)      Source of Information health record (P)      Received Prenatal Care Yes (P)      Any indications/suspicions for None (P)      Is this a teen pregnancy No (P)      Is the baby in NICU No (P)      Indication for adoption/[facility] No (P)      Indication for DME/post-acute needs No (P)      HIV (+) No (P)      Any congenital  disorders No (P)      Fetal demise/ death No (P)

## 2022-07-15 NOTE — ASSESSMENT & PLAN NOTE
Blood sugar on admit. Continue metformin    (500mg breakfast, lunch; 1000mg at bedtime)  Continue accuck 4x/d

## 2022-07-15 NOTE — PROGRESS NOTES
Pharmacist Renal Dose Adjustment Note    Kathy Mishra is a 26 y.o. female being treated with the medication lovenox    Patient Data:    Vital Signs (Most Recent):  Temp: 98.6 °F (37 °C) (07/13/22 2311)  Pulse: 101 (07/15/22 1357)  Resp: 18 (07/14/22 1000)  BP: 126/66 (07/15/22 1357)  SpO2: 97 % (07/15/22 0550) Vital Signs (72h Range):  Temp:  [98.6 °F (37 °C)]   Pulse:  [0-119]   Resp:  [18]   BP: (112-189)/()   SpO2:  [80 %-100 %]      Recent Labs   Lab 07/13/22  1354   CREATININE 0.6     Serum creatinine: 0.6 mg/dL 07/13/22 1354  Estimated creatinine clearance: 184.4 mL/min    Medication:lovenox 40mg every 24 hours will be changed to lovenox 40mg BID for BMI >40    Pharmacist's Name: Malathi Smith  Pharmacist's Extension: [phone]

## 2022-07-16 VITALS
RESPIRATION RATE: 18 BRPM | SYSTOLIC BLOOD PRESSURE: 149 MMHG | DIASTOLIC BLOOD PRESSURE: 73 MMHG | WEIGHT: 279.75 LBS | HEART RATE: 98 BPM | BODY MASS INDEX: 49.56 KG/M2 | TEMPERATURE: 98 F | OXYGEN SATURATION: 98 %

## 2022-07-16 PROCEDURE — 25000003 PHARM REV CODE 250

## 2022-07-16 PROCEDURE — 99232 PR SUBSEQUENT HOSPITAL CARE,LEVL II: ICD-10-PCS | Mod: ,,, | Performed by: OBSTETRICS & GYNECOLOGY

## 2022-07-16 PROCEDURE — 25000003 PHARM REV CODE 250: Performed by: OBSTETRICS & GYNECOLOGY

## 2022-07-16 PROCEDURE — 99232 SBSQ HOSP IP/OBS MODERATE 35: CPT | Mod: ,,, | Performed by: OBSTETRICS & GYNECOLOGY

## 2022-07-16 RX ORDER — BUTALBITAL, ACETAMINOPHEN AND CAFFEINE 50; 325; 40 MG/1; MG/1; MG/1
1 TABLET ORAL EVERY 6 HOURS PRN
Qty: 30 TABLET | Refills: 0 | Status: SHIPPED | OUTPATIENT
Start: 2022-07-16 | End: 2022-07-27

## 2022-07-16 RX ORDER — LABETALOL 300 MG/1
300 TABLET, FILM COATED ORAL EVERY 12 HOURS
Qty: 60 TABLET | Refills: 11 | Status: ON HOLD | OUTPATIENT
Start: 2022-07-16 | End: 2022-08-06 | Stop reason: HOSPADM

## 2022-07-16 RX ADMIN — ONDANSETRON 8 MG: 8 TABLET, ORALLY DISINTEGRATING ORAL at 03:07

## 2022-07-16 RX ADMIN — LABETALOL HYDROCHLORIDE 300 MG: 100 TABLET, FILM COATED ORAL at 08:07

## 2022-07-16 NOTE — PROGRESS NOTES
Discharge instructions reviewed with pt. Instructed pt to  meds from her pharmacy. Educated pt on calling clinic if BP is elevated at home or if she experiences S&S of pre-E, contractions or bleeding.     Pt verbalized understanding. Pt ambulated out of hospital to private vehicle at 0926.

## 2022-07-16 NOTE — ASSESSMENT & PLAN NOTE
07/16/2022   Stable for discharge on labetalol 300 mg BID  Pt instructed to get approp BP home monitor

## 2022-07-16 NOTE — PLAN OF CARE
VSS. Pt ambulates frequently. Pt reports fetal movement. Pt denies leaking of fluid/contractions. POC reviewed with patient.

## 2022-07-16 NOTE — NURSING
Pt states that she felt lightheaded while sitting up in bed. MD notified. Modified BP orders received.

## 2022-07-16 NOTE — PROGRESS NOTES
[facility] ([facility])  Obstetrics  Antepartum Progress Note    Patient Name: Kathy Mishra  MRN: [id number]  Admission Date: 2022  Hospital Length of Stay: 1 days  Attending Physician: Toby Canales MD  Primary Care Provider: Primary Doctor No    Subjective:     Principal Problem:Pre-existing hypertension affecting pregnancy in third trimester    HPI:  26 y.o.  32w0d sent from office for pre-E workup        Hospital Course:  22--2385gm, vtx, napoleon wnl; bpp 8/8  22-pt has mild headache, just received fioricet. Counseled on possibility of premature delivery due to severe range elevated BPs. BMZ completed 4am today.   07/15/2022-labetalol increased to 400mg tid  22-pt had episode of lightheadedness yesterday so labetalol decreased back to 300mg bid. BPs have improved since admit. Denies preE symptoms at this time.           Obstetric HPI:  Patient reports None contractions, active fetal movement, absent vaginal bleeding , absent loss of fluid      Objective:     Vital Signs (Most Recent):  Temp: 97.6 °F (36.4 °C) (22 0401)  Pulse: 99 (22 0401)  Resp: 18 (22 040)  BP: 136/71 (22 040)  SpO2: 98 % (07/15/22 2335) Vital Signs (24h Range):  Temp:  [97.6 °F (36.4 °C)-98.6 °F (37 °C)] 97.6 °F (36.4 °C)  Pulse:  [] 99  Resp:  [18-20] 18  SpO2:  [98 %] 98 %  BP: (111-156)/(48-98) 136/71     Weight: 126.9 kg (279 lb 12.2 oz)  Body mass index is 49.56 kg/m².    FHT/TOCO reassuring in L&D    No intake or output data in the 24 hours ending 22 0854      Significant Labs:  Recent Lab Results         07/15/22  2012   07/15/22  1256   07/15/22  0907        POCT Glucose 127   106   109               Physical Exam:   Constitutional: She is oriented to person, place, and time. She appears well-developed and well-nourished.        Pulmonary/Chest: Effort normal.        Abdominal: Soft.   Gravid, NT             Musculoskeletal: Moves all extremeties.        Neurological: She is alert and oriented to person, place, and time.    Skin: Skin is warm and dry.    Psychiatric: She has a normal mood and affect. Her behavior is normal.     Assessment/Plan:     26 y.o. female  at 32w3d for:    * Pre-existing hypertension affecting pregnancy in third trimester  2022   Stable for discharge on labetalol 300 mg BID  Pt instructed to get approp BP home monitor    Obesity complicating pregnancy in third trimester         Elevated blood pressure reading  NST-reassuring  Pre-E work up negative    Intractable migraine without aura and without status migrainosus               Hypertension  Chronic HTN  No meds as adult until pregnancy  Labile BPs    Asthma         Gestational diabetes mellitus (GDM) in third trimester controlled on oral hypoglycemic drug  Continue current metformin dosing & home accuchecks    Class 3 severe obesity due to excess calories with serious comorbidity and body mass index (BMI) of 45.0 to 49.9 in adult          History of thyroid disorder          stable for discharge      Jhoana Alexander MD  Obstetrics  [facility] ([facility])

## 2022-07-16 NOTE — SUBJECTIVE & OBJECTIVE
Obstetric HPI:  Patient reports None contractions, active fetal movement, absent vaginal bleeding , absent loss of fluid      Objective:     Vital Signs (Most Recent):  Temp: 97.6 °F (36.4 °C) (07/16/22 0401)  Pulse: 99 (07/16/22 0401)  Resp: 18 (07/16/22 0401)  BP: 136/71 (07/16/22 0401)  SpO2: 98 % (07/15/22 2335) Vital Signs (24h Range):  Temp:  [97.6 °F (36.4 °C)-98.6 °F (37 °C)] 97.6 °F (36.4 °C)  Pulse:  [] 99  Resp:  [18-20] 18  SpO2:  [98 %] 98 %  BP: (111-156)/(48-98) 136/71     Weight: 126.9 kg (279 lb 12.2 oz)  Body mass index is 49.56 kg/m².    FHT/TOCO reassuring in L&D    No intake or output data in the 24 hours ending 07/16/22 0854      Significant Labs:  Recent Lab Results         07/15/22  2012   07/15/22  1256   07/15/22  0907        POCT Glucose 127   106   109               Physical Exam:   Constitutional: She is oriented to person, place, and time. She appears well-developed and well-nourished.        Pulmonary/Chest: Effort normal.        Abdominal: Soft.   Gravid, NT             Musculoskeletal: Moves all extremeties.       Neurological: She is alert and oriented to person, place, and time.    Skin: Skin is warm and dry.    Psychiatric: She has a normal mood and affect. Her behavior is normal.

## 2022-07-16 NOTE — DISCHARGE SUMMARY
[facility])  Obstetrics  Discharge Summary      Patient Name: Kathy Mishra  MRN: [id number]  Admission Date: 2022  Hospital Length of Stay: 1 days  Discharge Date and Time: 22  Attending Physician: No att. providers found   Discharging Provider: Jhoana Alexander MD   Primary Care Provider: Primary Doctor No    HPI: 26 y.o.  32w0d sent from office for pre-E workup        FHT:  reassuring  TOCO: Q 0 minutes    * No surgery found *     Hospital Course:   22--2385gm, vtx, napoleon wnl; bpp 8/8  22-pt has mild headache, just received fioricet. Counseled on possibility of premature delivery due to severe range elevated BPs. BMZ completed 4am today.   07/15/2022-labetalol increased to 400mg tid  22-pt had episode of lightheadedness yesterday so labetalol decreased back to 300mg bid. BPs have improved since admit. Denies preE symptoms at this time.            Final Active Diagnoses:    Diagnosis Date Noted POA    PRINCIPAL PROBLEM:  Pre-existing hypertension affecting pregnancy in third trimester [O10.913] 2022 Yes    Obesity complicating pregnancy in third trimester [O99.213] 2022 Yes    Elevated blood pressure reading [R03.0] 2022 Yes    Gestational diabetes mellitus (GDM) in third trimester controlled on oral hypoglycemic drug [O24.415] 2022 Yes    Hypertension [I10] 2016 Yes      Problems Resolved During this Admission:        Significant Diagnostic Studies: Labs: All labs within the past 24 hours have been reviewed      Immunizations     None          This patient has no babies on file.  Pending Diagnostic Studies:     None          Discharged Condition: good    Disposition: Home or Self Care    Follow Up:   Follow-up Information     Shelly Haddad CNM Follow up.    Specialty: Obstetrics and Gynecology  Why: 22 as scheduled  Contact information:  [address]  [place] [address] [phone]                        Patient Instructions:   No discharge procedures on file.  Medications:  Discharge Medication List as of 7/16/2022  9:20 AM      START taking these medications    Details   butalbital-acetaminophen-caffeine -40 mg (FIORICET, ESGIC) -40 mg per tablet Take 1 tablet by mouth every 6 (six) hours as needed for Headaches (2nd choice)., Starting Sat 7/16/2022, Normal      labetaloL (NORMODYNE) 300 MG tablet Take 1 tablet (300 mg total) by mouth every 12 (twelve) hours., Starting Sat 7/16/2022, Until Sun 7/16/2023, Normal         CONTINUE these medications which have NOT CHANGED    Details   albuterol (PROVENTIL/VENTOLIN HFA) 90 mcg/actuation inhaler Starting Tue 10/5/2021, Historical Med      aspirin 81 MG Chew Take 1 tablet (81 mg total) by mouth once daily., Starting Thu 5/5/2022, Until Fri 5/5/2023, Normal      blood sugar diagnostic Strp To check BG 4 times daily, to use with insurance preferred meter, Normal      blood-glucose meter kit To check BG 4 times daily, to use with insurance preferred meter, Normal      cholecalciferol, vitamin D3, 125 mcg (5,000 unit) Tab Take 5,000 Units by mouth once daily., Historical Med      lancets Misc To check BG 4 times daily, to use with insurance preferred meter, Normal      metFORMIN (GLUCOPHAGE XR) 500 MG ER 24hr tablet Increase Metformin to 2 tablets twice a day., Normal      ondansetron (ZOFRAN-ODT) 4 MG TbDL Take 4 mg by mouth every 8 (eight) hours as needed., Starting u 2/24/2022, Historical Med      prenatal 168/iron/folic/omega3 (ONE-A-DAY PRENATAL-1 ORAL) Take by mouth., Historical Med      TRUEDRAW LANCING DEVICE Misc directed, Historical Med         STOP taking these medications       TRUE METRIX GLUCOSE METER Misc Comments:   Reason for Stopping:               Jhoana Alexander MD  Obstetrics  [facility]

## 2022-07-18 ENCOUNTER — TELEPHONE (OUTPATIENT)
Dept: OBSTETRICS AND GYNECOLOGY | Facility: CLINIC | Age: 27
End: 2022-07-18
Payer: MEDICAID

## 2022-07-18 ENCOUNTER — CLINICAL SUPPORT (OUTPATIENT)
Dept: DIABETES | Facility: CLINIC | Age: 27
End: 2022-07-18
Payer: MEDICAID

## 2022-07-18 ENCOUNTER — HOSPITAL ENCOUNTER (OUTPATIENT)
Facility: HOSPITAL | Age: 27
Discharge: HOME | End: 2022-07-18
Attending: OBSTETRICS & GYNECOLOGY | Admitting: OBSTETRICS & GYNECOLOGY
Payer: MEDICAID

## 2022-07-18 ENCOUNTER — TELEPHONE (OUTPATIENT)
Dept: DIABETES | Facility: CLINIC | Age: 27
End: 2022-07-18

## 2022-07-18 VITALS
OXYGEN SATURATION: 98 % | TEMPERATURE: 98 F | RESPIRATION RATE: 18 BRPM | SYSTOLIC BLOOD PRESSURE: 176 MMHG | DIASTOLIC BLOOD PRESSURE: 72 MMHG | HEART RATE: 100 BPM

## 2022-07-18 DIAGNOSIS — O24.415 GESTATIONAL DIABETES MELLITUS (GDM) IN THIRD TRIMESTER CONTROLLED ON ORAL HYPOGLYCEMIC DRUG: Primary | ICD-10-CM

## 2022-07-18 DIAGNOSIS — O16.3 ELEVATED BLOOD PRESSURE AFFECTING PREGNANCY IN THIRD TRIMESTER, ANTEPARTUM: ICD-10-CM

## 2022-07-18 LAB
ALBUMIN SERPL BCP-MCNC: 2.4 G/DL (ref 3.5–5.2)
ALP SERPL-CCNC: 102 U/L (ref 55–135)
ALT SERPL W/O P-5'-P-CCNC: 17 U/L (ref 10–44)
ANION GAP SERPL CALC-SCNC: 15 MMOL/L (ref 8–16)
AST SERPL-CCNC: 10 U/L (ref 10–40)
BASOPHILS # BLD AUTO: 0.01 K/UL (ref 0–0.2)
BASOPHILS NFR BLD: 0.1 % (ref 0–1.9)
BILIRUB SERPL-MCNC: 0.2 MG/DL (ref 0.1–1)
BUN SERPL-MCNC: 12 MG/DL (ref 6–20)
CALCIUM SERPL-MCNC: 9.7 MG/DL (ref 8.7–10.5)
CHLORIDE SERPL-SCNC: 105 MMOL/L (ref 95–110)
CO2 SERPL-SCNC: 20 MMOL/L (ref 23–29)
CREAT SERPL-MCNC: 0.7 MG/DL (ref 0.5–1.4)
CREAT UR-MCNC: 110.8 MG/DL (ref 15–325)
DIFFERENTIAL METHOD: ABNORMAL
EOSINOPHIL # BLD AUTO: 0.1 K/UL (ref 0–0.5)
EOSINOPHIL NFR BLD: 0.6 % (ref 0–8)
ERYTHROCYTE [DISTWIDTH] IN BLOOD BY AUTOMATED COUNT: 14.5 % (ref 11.5–14.5)
EST. GFR  (AFRICAN AMERICAN): >60 ML/MIN/1.73 M^2
EST. GFR  (NON AFRICAN AMERICAN): >60 ML/MIN/1.73 M^2
GLUCOSE SERPL-MCNC: 130 MG/DL (ref 70–110)
HCT VFR BLD AUTO: 32.9 % (ref 37–48.5)
HGB BLD-MCNC: 10.9 G/DL (ref 12–16)
IMM GRANULOCYTES # BLD AUTO: 0.08 K/UL (ref 0–0.04)
IMM GRANULOCYTES NFR BLD AUTO: 0.7 % (ref 0–0.5)
LYMPHOCYTES # BLD AUTO: 1.5 K/UL (ref 1–4.8)
LYMPHOCYTES NFR BLD: 13.8 % (ref 18–48)
MCH RBC QN AUTO: 30.3 PG (ref 27–31)
MCHC RBC AUTO-ENTMCNC: 33.1 G/DL (ref 32–36)
MCV RBC AUTO: 91 FL (ref 82–98)
MONOCYTES # BLD AUTO: 0.4 K/UL (ref 0.3–1)
MONOCYTES NFR BLD: 3.8 % (ref 4–15)
NEUTROPHILS # BLD AUTO: 8.7 K/UL (ref 1.8–7.7)
NEUTROPHILS NFR BLD: 81 % (ref 38–73)
NRBC BLD-RTO: 0 /100 WBC
PLATELET # BLD AUTO: 264 K/UL (ref 150–450)
PMV BLD AUTO: 10 FL (ref 9.2–12.9)
POTASSIUM SERPL-SCNC: 3.8 MMOL/L (ref 3.5–5.1)
PROT SERPL-MCNC: 6.1 G/DL (ref 6–8.4)
PROT UR-MCNC: 22 MG/DL (ref 0–15)
PROT/CREAT UR: 0.2 MG/G{CREAT} (ref 0–0.2)
RBC # BLD AUTO: 3.6 M/UL (ref 4–5.4)
SODIUM SERPL-SCNC: 140 MMOL/L (ref 136–145)
WBC # BLD AUTO: 10.76 K/UL (ref 3.9–12.7)

## 2022-07-18 PROCEDURE — 99213 PR OFFICE/OUTPT VISIT, EST, LEVL III, 20-29 MIN: ICD-10-PCS | Mod: 25,TH,, | Performed by: ADVANCED PRACTICE MIDWIFE

## 2022-07-18 PROCEDURE — 59025 OBTAIN FETAL NONSTRESS TEST (NST): ICD-10-PCS | Mod: 26,,, | Performed by: ADVANCED PRACTICE MIDWIFE

## 2022-07-18 PROCEDURE — 25000003 PHARM REV CODE 250: Performed by: ADVANCED PRACTICE MIDWIFE

## 2022-07-18 PROCEDURE — 99213 OFFICE O/P EST LOW 20 MIN: CPT | Mod: 25,TH,, | Performed by: ADVANCED PRACTICE MIDWIFE

## 2022-07-18 PROCEDURE — 59025 FETAL NON-STRESS TEST: CPT

## 2022-07-18 PROCEDURE — 80053 COMPREHEN METABOLIC PANEL: CPT | Performed by: ADVANCED PRACTICE MIDWIFE

## 2022-07-18 PROCEDURE — 99211 OFF/OP EST MAY X REQ PHY/QHP: CPT | Mod: 25

## 2022-07-18 PROCEDURE — 85025 COMPLETE CBC W/AUTO DIFF WBC: CPT | Performed by: ADVANCED PRACTICE MIDWIFE

## 2022-07-18 PROCEDURE — 59025 FETAL NON-STRESS TEST: CPT | Mod: 26,,, | Performed by: ADVANCED PRACTICE MIDWIFE

## 2022-07-18 PROCEDURE — 82570 ASSAY OF URINE CREATININE: CPT | Performed by: ADVANCED PRACTICE MIDWIFE

## 2022-07-18 RX ADMIN — LABETALOL HYDROCHLORIDE 300 MG: 200 TABLET, FILM COATED ORAL at 07:07

## 2022-07-18 NOTE — SUBJECTIVE & OBJECTIVE
Obstetric HPI:  Patient reports no regular contractions, active fetal movement, No vaginal bleeding , No loss of fluid     This pregnancy has been complicated by CHTN (labetalol BID), GDM (metformin BID), obesity, Vit D deficiency    OB History    Para Term  AB Living   2 0 0 0 1 0   SAB IAB Ectopic Multiple Live Births   1 0 0 0 0      # Outcome Date GA Lbr Tera/2nd Weight Sex Delivery Anes PTL Lv   2 Current            1 SAB 16 12w0d            Past Medical History:   Diagnosis Date    Asthma 2022    Gestational diabetes mellitus (GDM) in third trimester controlled on oral hypoglycemic drug 2022    History of hypertension 2022-add PIH baseline labs to workup PCR 0.09 advised daily baby aspirin at 16 weeks    Hypertension     Thyroid disease      Past Surgical History:   Procedure Laterality Date    adneoids      age of 5 years old    TONSILLECTOMY      age of 5 years old       PTA Medications   Medication Sig    albuterol (PROVENTIL/VENTOLIN HFA) 90 mcg/actuation inhaler     aspirin 81 MG Chew Take 1 tablet (81 mg total) by mouth once daily.    blood sugar diagnostic Strp To check BG 4 times daily, to use with insurance preferred meter    blood-glucose meter kit To check BG 4 times daily, to use with insurance preferred meter    butalbital-acetaminophen-caffeine -40 mg (FIORICET, ESGIC) -40 mg per tablet Take 1 tablet by mouth every 6 (six) hours as needed for Headaches (2nd choice).    cholecalciferol, vitamin D3, 125 mcg (5,000 unit) Tab Take 5,000 Units by mouth once daily.    labetaloL (NORMODYNE) 300 MG tablet Take 1 tablet (300 mg total) by mouth every 12 (twelve) hours.    lancets Misc To check BG 4 times daily, to use with insurance preferred meter    metFORMIN (GLUCOPHAGE XR) 500 MG ER 24hr tablet Increase Metformin to 2 tablets twice a day.    ondansetron (ZOFRAN-ODT) 4 MG TbDL Take 4 mg by mouth every 8 (eight) hours as needed.    prenatal  168/iron/folic/omega3 (ONE-A-DAY PRENATAL-1 ORAL) Take by mouth.    TRUEDRAW LANCING DEVICE Misc directed       Review of patient's allergies indicates:  No Known Allergies     Family History       Problem Relation (Age of Onset)    Diabetes Maternal Grandfather, Mother    Hypertension Paternal Grandmother, Father          Tobacco Use    Smoking status: Current Some Day Smoker    Smokeless tobacco: Never Used   Substance and Sexual Activity    Alcohol use: Never    Drug use: Never    Sexual activity: Yes     Partners: Male     Birth control/protection: None     Review of Systems   Constitutional:         No complaints at present and appears in NAD   All other systems reviewed and are negative.   Objective:     Vital Signs (Most Recent):  Temp: 98.4 °F (36.9 °C) (07/18/22 1645)  Pulse: 109 (07/18/22 1702)  Resp: 18 (07/18/22 1645)  BP: (!) 158/74 (07/18/22 1645)  SpO2: 96 % (07/18/22 1702)   Vital Signs (24h Range):  Temp:  [98.4 °F (36.9 °C)] 98.4 °F (36.9 °C)  Pulse:  [109] 109  Resp:  [18] 18  SpO2:  [96 %] 96 %  BP: (158)/(74) 158/74        There is no height or weight on file to calculate BMI.    FHT: applied  TOCO:  applied    Physical Exam:   Constitutional: She is oriented to person, place, and time. She appears well-developed and well-nourished.       Cardiovascular:  Normal rate.             Pulmonary/Chest: Effort normal.        Abdominal: Soft.     Genitourinary:    Uterus normal.             Musculoskeletal: Normal range of motion and moves all extremeties.       Neurological: She is alert and oriented to person, place, and time.    Skin: Skin is warm and dry.    Psychiatric: She has a normal mood and affect. Her behavior is normal. Judgment and thought content normal.     Cervix:  deferred     Significant Labs:  Lab Results   Component Value Date    GROUPTRH A POS 01/11/2022    HEPBSAG Negative 01/11/2022       I have personallly reviewed all pertinent lab results from the last 24 hours.

## 2022-07-18 NOTE — PROGRESS NOTES
Patient states she has been taking her B/P at home with a store bought cuff and B/P has been elevated. Patient denies any H/A, N/V, visual disturbances.

## 2022-07-18 NOTE — H&P
[facility]  Obstetrics  History & Physical    Patient Name: Kathy Mishra  MRN: [id number]  Admission Date: 2022  Primary Care Provider: Primary Doctor No    Subjective:     Principal Problem:Pre-existing hypertension affecting pregnancy in third trimester    History of Present Illness:  Sent from office for Pre-e work-up      Obstetric HPI:  Patient reports no regular contractions, active fetal movement, No vaginal bleeding , No loss of fluid     This pregnancy has been complicated by CHTN (labetalol BID), GDM (metformin BID), obesity, Vit D deficiency    OB History    Para Term  AB Living   2 0 0 0 1 0   SAB IAB Ectopic Multiple Live Births   1 0 0 0 0      # Outcome Date GA Lbr Tera/2nd Weight Sex Delivery Anes PTL Lv   2 Current            1 SAB 16 12w0d            Past Medical History:   Diagnosis Date    Asthma 2022    Gestational diabetes mellitus (GDM) in third trimester controlled on oral hypoglycemic drug 2022    History of hypertension 2022-add PIH baseline labs to workup PCR 0.09 advised daily baby aspirin at 16 weeks    Hypertension     Thyroid disease      Past Surgical History:   Procedure Laterality Date    adneoids      age of 5 years old    TONSILLECTOMY      age of 5 years old       PTA Medications   Medication Sig    albuterol (PROVENTIL/VENTOLIN HFA) 90 mcg/actuation inhaler     aspirin 81 MG Chew Take 1 tablet (81 mg total) by mouth once daily.    blood sugar diagnostic Strp To check BG 4 times daily, to use with insurance preferred meter    blood-glucose meter kit To check BG 4 times daily, to use with insurance preferred meter    butalbital-acetaminophen-caffeine -40 mg (FIORICET, ESGIC) -40 mg per tablet Take 1 tablet by mouth every 6 (six) hours as needed for Headaches (2nd choice).    cholecalciferol, vitamin D3, 125 mcg (5,000 unit) Tab Take 5,000 Units by mouth once daily.    labetaloL  (NORMODYNE) 300 MG tablet Take 1 tablet (300 mg total) by mouth every 12 (twelve) hours.    lancets Misc To check BG 4 times daily, to use with insurance preferred meter    metFORMIN (GLUCOPHAGE XR) 500 MG ER 24hr tablet Increase Metformin to 2 tablets twice a day.    ondansetron (ZOFRAN-ODT) 4 MG TbDL Take 4 mg by mouth every 8 (eight) hours as needed.    prenatal 168/iron/folic/omega3 (ONE-A-DAY PRENATAL-1 ORAL) Take by mouth.    TRUEDRAW LANCING DEVICE Misc directed       Review of patient's allergies indicates:  No Known Allergies     Family History       Problem Relation (Age of Onset)    Diabetes Maternal Grandfather, Mother    Hypertension Paternal Grandmother, Father          Tobacco Use    Smoking status: Current Some Day Smoker    Smokeless tobacco: Never Used   Substance and Sexual Activity    Alcohol use: Never    Drug use: Never    Sexual activity: Yes     Partners: Male     Birth control/protection: None     Review of Systems   Constitutional:         No complaints at present and appears in NAD   All other systems reviewed and are negative.   Objective:     Vital Signs (Most Recent):  Temp: 98.4 °F (36.9 °C) (07/18/22 1645)  Pulse: 109 (07/18/22 1702)  Resp: 18 (07/18/22 1645)  BP: (!) 158/74 (07/18/22 1645)  SpO2: 96 % (07/18/22 1702)   Vital Signs (24h Range):  Temp:  [98.4 °F (36.9 °C)] 98.4 °F (36.9 °C)  Pulse:  [109] 109  Resp:  [18] 18  SpO2:  [96 %] 96 %  BP: (158)/(74) 158/74        There is no height or weight on file to calculate BMI.    FHT: applied  TOCO:  applied    Physical Exam:   Constitutional: She is oriented to person, place, and time. She appears well-developed and well-nourished.       Cardiovascular:  Normal rate.             Pulmonary/Chest: Effort normal.        Abdominal: Soft.     Genitourinary:    Uterus normal.             Musculoskeletal: Normal range of motion and moves all extremeties.       Neurological: She is alert and oriented to person, place, and time.     Skin: Skin is warm and dry.    Psychiatric: She has a normal mood and affect. Her behavior is normal. Judgment and thought content normal.     Cervix:  deferred     Significant Labs:  Lab Results   Component Value Date    GROUPTRH A POS 2022    HEPBSAG Negative 2022       I have personallly reviewed all pertinent lab results from the last 24 hours.    Assessment/Plan:     26 y.o. female  at 32w5d for:    * Pre-existing hypertension affecting pregnancy in third trimester  Pre-e work-up  EFM / NST  Labetalol BID    Obesity complicating pregnancy in third trimester  Lovenox PP    Gestational diabetes mellitus (GDM) in third trimester controlled on oral hypoglycemic drug  Metformin BID  Admit BS pending          Kelly Maldonado CNM  Obstetrics  [facility]

## 2022-07-18 NOTE — HOSPITAL COURSE
EFM / NST reactive reassuring  Pre-e labs normal, PCR .20  BP's reviewed per Dr Garcia ok to discharge home, continue labetalol 300 BID.  Pre-e precautions reviewed.  Advised to keep next scheduled appt.  To continue with accu checks 4x's daily and metformin as directed

## 2022-07-18 NOTE — TELEPHONE ENCOUNTER
Called patient as I received a message from diabetic clinic that her blood pressure was 1 70s over 92.  I called the patient, she states that this was her blood pressure prior to taking her labetalol 300 mg this morning but she had not recheck her blood pressure after taking it.  Next dose is due this evening-300 mg b.i.d..  Her blood pressure she states is 155/105. i do think that she needs to be evaluated but I am leaving clinic in [place] at this moment and so she is advised to call Labor and delivery

## 2022-07-18 NOTE — PROGRESS NOTES
Reviewed glucose logs - Glucose range  mg /dL. I do not feel start of insulin is needed at this point- can you verify if she is taking max dose Metformin?    Also, noted BP remains elevated per home checks, today being 172/91- make sure she is updating Shelly Haddad CNM regarding this. I will also send a personal message.

## 2022-07-19 NOTE — NURSING
BP's reviewed by MIKE and MD. CNM met w/ pt and discussed POC in detail.   Gave patient AVS and educated on  discharge information. Educated on nutrition, hydration, home medications, fetal kick counts, activity, s/s of OB emergencies, and reasons to notify OB provider (including vaginal bleeding like a period, rupture of membranes, constant and strong abdominal pain or tenderness that does not go away, contractions every 3-5 min for 1-2 hours that are increasing in strength, changes in urination such as hematuria/oliguria/dysuria/urgency/frequency, temp greater than 100.4 F). Pre-eclampsia precautions. Patient verbalized understanding.

## 2022-07-19 NOTE — PROCEDURES
Kathy Mishra is a 26 y.o. female patient.    Temp: 98.4 °F (36.9 °C) (07/18/22 1645)  Pulse: 104 (07/18/22 1950)  Resp: 18 (07/18/22 1901)  BP: (!) 156/82 (07/18/22 1950)  SpO2: 98 % (07/18/22 1950)       Obtain Fetal nonstress test (NST)    Date/Time: 7/18/2022 8:19 PM  Performed by: Kelly Maldonado CNM  Authorized by: Kelly Maldonado CNM     Nonstress Test:     Variability:  6-25 BPM    Decelerations:  None    Accelerations:  15 bpm    Acoustic Stimulator: No      Uterine Irritability: No      Contractions:  Not present  Biophysical Profile:     Nonstress Test Interpretation: reactive      Overall Impression:  Reassuring  Post-procedure:     Patient tolerance:  Patient tolerated the procedure well with no immediate complications        7/18/2022

## 2022-07-19 NOTE — DISCHARGE SUMMARY
[facility] - Labor & Delivery  Obstetrics  Discharge Summary      Patient Name: Kathy Mishra  MRN: [id number]  Admission Date: 7/18/2022  Hospital Length of Stay: 0 days  Discharge Date and Time:  07/18/2022 8:18 PM  Attending Physician: Toby Canales MD   Discharging Provider: Kelly Maldonado CNM   Primary Care Provider: Primary Doctor No    HPI: Sent from office for Pre-e work-up      FHT: Cat 1 (reassuring)  TOCO: none      Hospital Course:   EFM / NST reactive reassuring  Pre e labs normal, PCR .20.  Blood pressures reviewed per Dr Garcia. Will up labetalol 300 to TID patient aware to take q 8 hours. Ok to discharge home.  Pre-e precautions reviewed with patient.  LND contact # provided.  Continue with accu checks 4 x's daily and metformin as directed.          Final Active Diagnoses:    Diagnosis Date Noted POA    PRINCIPAL PROBLEM:  Pre-existing hypertension affecting pregnancy in third trimester [O10.913] 01/11/2022 Yes    Obesity complicating pregnancy in third trimester [O99.213] 07/14/2022 Yes    Gestational diabetes mellitus (GDM) in third trimester controlled on oral hypoglycemic drug [O24.415] 06/22/2022 Yes      Problems Resolved During this Admission:          Immunizations     None          This patient has no babies on file.  Pending Diagnostic Studies:     None          Discharged Condition: good    Disposition: Home or Self Care    Follow Up: as scheduled    Patient Instructions:      Diet Adult Regular     Other restrictions (specify):   Order Comments: Pre-e precautions     Notify your health care provider if you experience any of the following:  temperature >100.4     Notify your health care provider if you experience any of the following:  persistent nausea and vomiting or diarrhea     Notify your health care provider if you experience any of the following:  severe uncontrolled pain     Notify your health care provider if you experience any of the following:  difficulty breathing or  increased cough     Notify your health care provider if you experience any of the following:  severe persistent headache     Notify your health care provider if you experience any of the following:  persistent dizziness, light-headedness, or visual disturbances     Activity as tolerated     Medications:  Current Discharge Medication List      CONTINUE these medications which have NOT CHANGED    Details   albuterol (PROVENTIL/VENTOLIN HFA) 90 mcg/actuation inhaler       aspirin 81 MG Chew Take 1 tablet (81 mg total) by mouth once daily.  Qty: 30 tablet, Refills: 11      blood sugar diagnostic Strp To check BG 4 times daily, to use with insurance preferred meter  Qty: 200 strip, Refills: 4      blood-glucose meter kit To check BG 4 times daily, to use with insurance preferred meter  Qty: 1 each, Refills: 0      butalbital-acetaminophen-caffeine -40 mg (FIORICET, ESGIC) -40 mg per tablet Take 1 tablet by mouth every 6 (six) hours as needed for Headaches (2nd choice).  Qty: 30 tablet, Refills: 0      cholecalciferol, vitamin D3, 125 mcg (5,000 unit) Tab Take 5,000 Units by mouth once daily.      labetaloL (NORMODYNE) 300 MG tablet Take 1 tablet (300 mg total) by mouth every 12 (twelve) hours.  Qty: 60 tablet, Refills: 11    Comments: .      lancets Misc To check BG 4 times daily, to use with insurance preferred meter  Qty: 200 each, Refills: 4      metFORMIN (GLUCOPHAGE XR) 500 MG ER 24hr tablet Increase Metformin to 2 tablets twice a day.  Qty: 120 tablet, Refills: 11    Associated Diagnoses: Gestational diabetes mellitus (GDM) in third trimester controlled on oral hypoglycemic drug      ondansetron (ZOFRAN-ODT) 4 MG TbDL Take 4 mg by mouth every 8 (eight) hours as needed.      prenatal 168/iron/folic/omega3 (ONE-A-DAY PRENATAL-1 ORAL) Take by mouth.      TRUEDRAW LANCING DEVICE Misc directed             Kelly Maldonado CNM  Obstetrics  [facility] - Labor & Delivery

## 2022-07-20 ENCOUNTER — ROUTINE PRENATAL (OUTPATIENT)
Dept: OBSTETRICS AND GYNECOLOGY | Facility: CLINIC | Age: 27
End: 2022-07-20
Payer: MEDICAID

## 2022-07-20 ENCOUNTER — PROCEDURE VISIT (OUTPATIENT)
Dept: OBSTETRICS AND GYNECOLOGY | Facility: CLINIC | Age: 27
End: 2022-07-20
Payer: MEDICAID

## 2022-07-20 VITALS
WEIGHT: 280.44 LBS | DIASTOLIC BLOOD PRESSURE: 90 MMHG | SYSTOLIC BLOOD PRESSURE: 150 MMHG | BODY MASS INDEX: 49.68 KG/M2

## 2022-07-20 DIAGNOSIS — O09.299 HISTORY OF MISCARRIAGE, CURRENTLY PREGNANT: ICD-10-CM

## 2022-07-20 DIAGNOSIS — E66.01 CLASS 3 SEVERE OBESITY DUE TO EXCESS CALORIES WITH SERIOUS COMORBIDITY AND BODY MASS INDEX (BMI) OF 45.0 TO 49.9 IN ADULT: ICD-10-CM

## 2022-07-20 DIAGNOSIS — Z86.79 HISTORY OF HYPERTENSION: ICD-10-CM

## 2022-07-20 DIAGNOSIS — O10.913 PRE-EXISTING HYPERTENSION AFFECTING PREGNANCY IN THIRD TRIMESTER: Primary | ICD-10-CM

## 2022-07-20 DIAGNOSIS — I10 HYPERTENSION, UNSPECIFIED TYPE: ICD-10-CM

## 2022-07-20 PROCEDURE — 1111F PR DISCHARGE MEDS RECONCILED W/ CURRENT OUTPATIENT MED LIST: ICD-10-PCS | Mod: CPTII,,, | Performed by: ADVANCED PRACTICE MIDWIFE

## 2022-07-20 PROCEDURE — 1111F DSCHRG MED/CURRENT MED MERGE: CPT | Mod: CPTII,,, | Performed by: ADVANCED PRACTICE MIDWIFE

## 2022-07-20 PROCEDURE — 76819 US OB/GYN PROCEDURE (VIEWPOINT): ICD-10-PCS | Mod: 26,S$PBB,, | Performed by: OBSTETRICS & GYNECOLOGY

## 2022-07-20 PROCEDURE — 76819 FETAL BIOPHYS PROFIL W/O NST: CPT | Mod: 26,S$PBB,, | Performed by: OBSTETRICS & GYNECOLOGY

## 2022-07-20 PROCEDURE — 99213 OFFICE O/P EST LOW 20 MIN: CPT | Mod: TH,S$PBB,, | Performed by: ADVANCED PRACTICE MIDWIFE

## 2022-07-20 PROCEDURE — 76816 US OB/GYN PROCEDURE (VIEWPOINT): ICD-10-PCS | Mod: 26,S$PBB,, | Performed by: OBSTETRICS & GYNECOLOGY

## 2022-07-20 PROCEDURE — 99213 OFFICE O/P EST LOW 20 MIN: CPT | Mod: PBBFAC,TH,PN | Performed by: ADVANCED PRACTICE MIDWIFE

## 2022-07-20 PROCEDURE — 99999 PR PBB SHADOW E&M-EST. PATIENT-LVL III: ICD-10-PCS | Mod: PBBFAC,,, | Performed by: ADVANCED PRACTICE MIDWIFE

## 2022-07-20 PROCEDURE — 99213 PR OFFICE/OUTPT VISIT, EST, LEVL III, 20-29 MIN: ICD-10-PCS | Mod: TH,S$PBB,, | Performed by: ADVANCED PRACTICE MIDWIFE

## 2022-07-20 PROCEDURE — 99999 PR PBB SHADOW E&M-EST. PATIENT-LVL III: CPT | Mod: PBBFAC,,, | Performed by: ADVANCED PRACTICE MIDWIFE

## 2022-07-20 PROCEDURE — 76816 OB US FOLLOW-UP PER FETUS: CPT | Mod: PBBFAC,PN | Performed by: OBSTETRICS & GYNECOLOGY

## 2022-07-20 NOTE — PROGRESS NOTES
26 y.o. female  at 33w0d   Reports + FM, denies VB, LOF or CTX  Doing well without concerns     Labor and delivery admissions.  2022 sent from clinic for PIH workup-PCR 0.26, labetalol 200 mg b.i.d..  Blood pressure is 136-189 over  with 1 dose of IV labetalol  Betamethasone series initiated  07/15/2022-labetalol 400 mg b.i.d.  2022-lightheadedness labetalol decreased to 300 mg b.i.d. discharge home with precautions  22 elevated blood pressure to Labor and delivery.  Labetalol increased to 300 mg t.i.d..  PCR was 0.20.  Blood pressure 139-150 over 69-90  Blood pressure 150/90 today but is not taking medications exactly as directed.  Asymptomatic Explained the importance of taking meds correctly to get in the therapeutic window and maintain it.  Advised to set alarm to ensure compliance and monitor blood pressure.  Verbalizes understanding PIH precautions are reviewed  Brought home blood pressure monitor and we checked today in clinic 147 / 85  Has appointment with Dr. Alexander on Friday      GDM-metformin 1000 mg a.m. and 1000 mg p.m.  fasting blood sugars I .  2 hour post prandials .  Has diabetic appointment tomorrow    Ultrasound-vertex, MVP 4.3 cm, EFW 5 lb 6 oz at 48 percentile, BPP 8 8 with AC at 97 percentile.  Reassuring    TW lbs   Reviewed 28wk lab results (A POS)  Needs Tdap no vaccine and clinic today arrange for next visit    Reviewed warning signs, normal FKCs,  labor precautions and how/when to call.  RTC x 1 wks, call or present sooner prn.     I spent a total of 20 minutes on the day of the visit.This includes face to face time and non-face to face time preparing to see the patient (eg, review of tests), Obtaining and/or reviewing separately obtained history, Documenting clinical information in the electronic or other health record, Independently interpreting resultsand communicating results to the patient/family/caregiver, or Care coordination.

## 2022-07-20 NOTE — PATIENT INSTRUCTIONS
Patient Education       Pregnancy - The Eighth Month   About this topic   It is important for you to learn how to take care of yourself to help you have a healthy baby and safe delivery. It is good to have health care throughout your pregnancy.  The eighth month of your pregnancy starts around week 33 and lasts through week 36. By knowing how far along you are, you can learn what is normal for this stage of your pregnancy and plan for what is next.  General   Your body   During the eighth month of your pregnancy, here are some things you can expect.  You may:  Be feeling more tired. Rest as much as you can and take small naps if possible. This also helps with swollen feet and ankles.  Feel hot all the time. Be sure to drink plenty of water.  Notice your baby drops more into your pelvis. This makes breathing a bit easier, but you may have to go to the bathroom more often. You may also notice more problems with hemorrhoids.  Have more back pain  Notice clear jelly-like substance flecked with blood when you use the restroom. This is your mucus plug.  Feel less steady on your feet. This is because your hips and joints are preparing for birth.  Be tested for Group Beta Strep (GBS) to see if you will need antibiotics during labor  Gain about 1/2 to 1 pound (.23 to .45 kg) a week for the rest of your pregnancy. It is normal to gain about 5 to 10 pounds (2.3 to 4.5 kg) total in your first 4 months.  Have a BPP, or Biophysical Profile. The doctors do an ultrasound to check your babys health if you are at high risk or having problems.  Have a NST, or Non Stress Test. The staff place special monitors around your belly to check the babys heart rate and look for contractions.  Your baby's growth and development:  Your baby is almost fully mature but will spend the rest of the time inside of you getting bigger.  Their lungs are the last organ to mature, so it is important that your baby stays in your womb until your due date if  possible.  Their bones are getting stronger each day. The bones in their skull stay a little softer to make delivery easier.  They are able to scratch themselves as their fingernails are developed.  Your baby is becoming protected from germs as they develop antibodies.  They are moving a little less because there is less room.  Your baby is about 19 inches (48 cm) long and weighs about 6 pounds (2,700 gm). Your baby is about the size of a papaya or pineapple.  Things to Think About   Avoid alcohol, drugs, tobacco products, and second hand smoke.  Be sure you know the signs of labor and when to call your doctor.  Are you planning a natural childbirth or thinking about an epidural? Know things you can do to help cope with labor pain.  You may want to learn about cord blood banking.  Do you have everything you need for after the baby is born? Be sure the car seat fits in the car.  When do I need to call the doctor?   Contractions every 10 minutes or more often that do not go away with drinking water or position changes  Headache that does not go away; blurry vision; seeing spots or halos; increase in swelling in your hands, feet, or face; and pain under your ribs on the right side  Low, dull back pain that does not go away  Pressure in your pelvis that feels like your baby is pushing down  A gush or constant trickle of watery or bloody fluid leaking from your vagina  Little to no movement felt by baby in 2 hours. Your baby should be moving at least 10 times every 2 hours.  Vaginal bleeding with or without pain  Fever of 100.4°F (38°C) or higher  After a car accident, fall, or any trauma to your belly  Having thoughts of harming yourself or others, or do not feel safe at home  Where can I learn more?   American Academy of Family Physicians  https://familydoctor.org/changes-in-your-body-during-pregnancy-third-trimester/   Kids Health  http://kidshealth.org/en/parents/pregnancy-calendar-intro.html?WT.ac=p-antionette   Office on  Womens Health  https://www.womenshealth.gov/pregnancy/youre-pregnant-now-what/stages-pregnancy   Last Reviewed Date   2020-05-06  Consumer Information Use and Disclaimer   This information is not specific medical advice and does not replace information you receive from your health care provider. This is only a brief summary of general information. It does NOT include all information about conditions, illnesses, injuries, tests, procedures, treatments, therapies, discharge instructions or life-style choices that may apply to you. You must talk with your health care provider for complete information about your health and treatment options. This information should not be used to decide whether or not to accept your health care providers advice, instructions or recommendations. Only your health care provider has the knowledge and training to provide advice that is right for you.  Copyright   Copyright © 2021 UpToDate, Inc. and its affiliates and/or licensors. All rights reserved.  Patient Education       Fetal Movement   About this topic   Feeling your baby move for the first time is a good sign that your baby is doing well. You may begin to feel these movements between the 18th and 25th weeks of your pregnancy. If this is your first time being pregnant, it may be closer to 25 weeks. Your baby has been moving around before this, but the kicks have not been strong enough for you to feel. During the first weeks of feeling movement, you may start to see a pattern during the day when your baby is most active. You can track your baby's kicks each day at home. This is also known as kick counting. It is a good way to check on your baby's movements and well being.  Most often, fetal kick counting is used in high-risk pregnancies. It may be useful for all pregnancies. Counting and writing down your baby's kicks, jabs, twists, flutters, rolls, turns, flips, and swishes may help find a problem that needs more evaluation. The  "American College of Obstetricians and Gynecologists, or ACOG, suggests that you record how much time it takes you to feel 10 of these movements. Ideally, you should be able to feel 10 movements within 2 hours. Many people will track these movements in much less time.  General   How to Track Your Baby's Kick Counts   Most often your doctor will want you to wait until the 28th to 30th weeks of your pregnancy to start kick counting. Here are some tips to help you get started.  Find the time of day when your baby is most active. For some people, this is right after eating. Others find their baby moving a lot after they have been exercising or more active. Some babies are more active in the evenings when your blood sugar starts to lower.  Try to count kicks at about the same time each day.  Before you start counting, have something to eat or drink. Also take a short walk or do some light activity.  Choose a quiet place where you can focus on your baby's movements. Also get in a comfortable position. Try and lie on one side or the other. You may need to change positions until you find one that works best for you and your baby.  Keep a notebook to track your baby's kicks. Your doctor may give you a chart to use or you can make your own. Write down the date, time you started counting, and the time of each "kick" during a 2-hour period until you have felt 10 kicks.  Once you have recorded 10 kicks within 2 hours you can stop counting.  If you are not able to record 10 movements over 2 hours you should get up and move around or eat something and try again.  If you are not able to record 10 movements over 2 hours the second time, call your doctor. They may want you to go to the hospital to get your baby checked.  When do I need to call the doctor?   You have felt less than 10 movements over a period of 2 hours.  It takes longer each day to record 10 movements.  There is a big change in the pattern of movements you are writing " down.  You feel no movement for 2 hours even after eating a snack, light activity, and position changes.  Teach Back: Helping You Understand   The Teach Back Method helps you understand the information we are giving you. After you talk with the staff, tell them in your own words what you learned. This helps to make sure the staff has described each thing clearly. It also helps to explain things that may have been confusing. Before going home, make sure you can do these:  I can tell you about feeling my baby move.  I can tell you how I will track my babys kicks.  I can tell you what I will do if I feel less than 10 movements in 2 hours, it takes longer to feel my baby move 10 times, or there is a big change in how my baby is moving.  Last Reviewed Date   2021-10-08  Consumer Information Use and Disclaimer   This information is not specific medical advice and does not replace information you receive from your health care provider. This is only a brief summary of general information. It does NOT include all information about conditions, illnesses, injuries, tests, procedures, treatments, therapies, discharge instructions or life-style choices that may apply to you. You must talk with your health care provider for complete information about your health and treatment options. This information should not be used to decide whether or not to accept your health care providers advice, instructions or recommendations. Only your health care provider has the knowledge and training to provide advice that is right for you.  Copyright   Copyright © 2021 UpToDate, Inc. and its affiliates and/or licensors. All rights reserved.

## 2022-07-21 ENCOUNTER — CLINICAL SUPPORT (OUTPATIENT)
Dept: DIABETES | Facility: CLINIC | Age: 27
End: 2022-07-21
Payer: MEDICAID

## 2022-07-21 VITALS — BODY MASS INDEX: 49.69 KG/M2 | HEIGHT: 63 IN | WEIGHT: 280.44 LBS

## 2022-07-21 DIAGNOSIS — O24.415 GESTATIONAL DIABETES MELLITUS (GDM) IN THIRD TRIMESTER CONTROLLED ON ORAL HYPOGLYCEMIC DRUG: Primary | ICD-10-CM

## 2022-07-21 PROCEDURE — 99999 PR PBB SHADOW E&M-EST. PATIENT-LVL I: CPT | Mod: PBBFAC,,, | Performed by: DIETITIAN, REGISTERED

## 2022-07-21 PROCEDURE — 99211 OFF/OP EST MAY X REQ PHY/QHP: CPT | Mod: PBBFAC | Performed by: DIETITIAN, REGISTERED

## 2022-07-21 PROCEDURE — G0108 DIAB MANAGE TRN  PER INDIV: HCPCS | Mod: PBBFAC | Performed by: DIETITIAN, REGISTERED

## 2022-07-21 PROCEDURE — 99999 PR PBB SHADOW E&M-EST. PATIENT-LVL I: ICD-10-PCS | Mod: PBBFAC,,, | Performed by: DIETITIAN, REGISTERED

## 2022-07-21 NOTE — PROGRESS NOTES
"Diabetes Care Specialist Progress Note  Author: Mckayla Sandra, BRADEN  Date: 7/21/2022    Program Intake  Type of Intervention:: Individual  Individual: Education  Education: Nutrition and Meal Planning    Lab Results   Component Value Date    HGBA1C 5.2 01/11/2022       Clinical    26 y.o. patient is in clinic today for gestational diabetes f/u.   She is 33w1d  gestation and having a girl.   PORFIRIO is 9/7/22.   DM medications: metformin ER, 1000mg at night      Weight: 127.2 kg (280 lb 6.8 oz)   Height: 5' 3" (160 cm)       Nutritional Status  Meal Plan 24 Hour Recall - Breakfast: eggs and spinach with 4 strawberries, 1/2 banana, Gogurt, sausage; water  Meal Plan 24 Hour Recall - Lunch: Special K cereal with 1/2 banana  Meal Plan 24 Hour Recall - Dinner: sweedish meatballs with egg noodles  Meal Plan 24 Hour Recall - Snack: peach     Physical activity/Exercise:   none    SMBG: checking 2-4 times daily   Pt was recently in hospital for high BP, so some BG checks were missing   Fasting,   Post prandial,  with outliers of 145, 149 and 165            Today's interventions were provided through individual discussion, instruction, and written materials were provided.      Patient verbalized understanding of instruction and written materials.  Pt was able to return back demonstration of instructions today. Patient understood key points, needs reinforcement and further instruction.     Diabetes Self-Management Care Plan:    Today's Diabetes Self-Management Care Plan was developed with Kathy's input. Kathy has agreed to work toward the following goal(s) to improve his/her overall diabetes control.      Care Plan: Diabetes Management   Updates made since 6/21/2022 12:00 AM      Problem: Blood Glucose Self-Monitoring       Goal: Patient agrees to check and record blood sugars 4 times per day.    Start Date: 7/8/2022   Expected End Date: 9/7/2022   This Visit's Progress: On track   Priority: High   Barriers: No " Barriers Identified   Note:    Pt missing BG readings for 3-4 days since she was in the hospital  She is now getting back to her routine  Fasting BG is consistently over 95        Problem: Healthy Eating       Goal: Eat 3 meals daily with 30-45g/2-3 servings of Carbohydrate per meal and 15-30 grams carb per snack.    Start Date: 7/8/2022   Expected End Date: 9/7/2022   This Visit's Progress: Not met   Priority: Medium   Barriers: No Barriers Identified   Note:    Reviewed diet today. She is decreasing portions at times, but hasn't really counted carb servings.   Encouraged to measure portions to get an idea of how much is ok to eat in one meal.     She is currently eating dinner at 9:30p and her fasting BG is consistently above 95.  Advised to eat earlier to see if this will help with her fasting numbers.               Follow Up Plan     Follow up if symptoms worsen or fail to improve. Send blood sugar log via My Chart in 3-5 days..    Today's care plan and follow up schedule was discussed with patient.  Kathy verbalized understanding of the care plan, goals, and agrees to follow up plan.        The patient was encouraged to communicate with his/her health care provider/physician and care team regarding his/her condition(s) and treatment.  I provided the patient with my contact information today and encouraged to contact me via phone or [facility] Patient Portal as needed.     Length of Visit   Total Time: 30 Minutes

## 2022-07-22 ENCOUNTER — PATIENT MESSAGE (OUTPATIENT)
Dept: OBSTETRICS AND GYNECOLOGY | Facility: CLINIC | Age: 27
End: 2022-07-22

## 2022-07-22 ENCOUNTER — ROUTINE PRENATAL (OUTPATIENT)
Dept: OBSTETRICS AND GYNECOLOGY | Facility: CLINIC | Age: 27
End: 2022-07-22
Payer: MEDICAID

## 2022-07-22 VITALS — BODY MASS INDEX: 49.6 KG/M2 | WEIGHT: 280 LBS | DIASTOLIC BLOOD PRESSURE: 88 MMHG | SYSTOLIC BLOOD PRESSURE: 148 MMHG

## 2022-07-22 DIAGNOSIS — O10.013 ESSENTIAL HYPERTENSION AFFECTING PREGNANCY IN THIRD TRIMESTER: Primary | ICD-10-CM

## 2022-07-22 DIAGNOSIS — O24.415 GESTATIONAL DIABETES MELLITUS (GDM) CONTROLLED ON ORAL HYPOGLYCEMIC DRUG, ANTEPARTUM: ICD-10-CM

## 2022-07-22 DIAGNOSIS — O10.013 ESSENTIAL HYPERTENSION AFFECTING PREGNANCY, ANTEPARTUM, THIRD TRIMESTER: ICD-10-CM

## 2022-07-22 DIAGNOSIS — E66.01 MATERNAL MORBID OBESITY, ANTEPARTUM: ICD-10-CM

## 2022-07-22 DIAGNOSIS — O99.210 MATERNAL MORBID OBESITY, ANTEPARTUM: ICD-10-CM

## 2022-07-22 DIAGNOSIS — Z3A.33 PREGNANCY WITH 33 COMPLETED WEEKS GESTATION: Primary | ICD-10-CM

## 2022-07-22 PROCEDURE — 1111F DSCHRG MED/CURRENT MED MERGE: CPT | Mod: CPTII,,, | Performed by: OBSTETRICS & GYNECOLOGY

## 2022-07-22 PROCEDURE — 99999 PR PBB SHADOW E&M-EST. PATIENT-LVL III: ICD-10-PCS | Mod: PBBFAC,,, | Performed by: OBSTETRICS & GYNECOLOGY

## 2022-07-22 PROCEDURE — 99213 PR OFFICE/OUTPT VISIT, EST, LEVL III, 20-29 MIN: ICD-10-PCS | Mod: TH,S$PBB,, | Performed by: OBSTETRICS & GYNECOLOGY

## 2022-07-22 PROCEDURE — 99999 PR PBB SHADOW E&M-EST. PATIENT-LVL III: CPT | Mod: PBBFAC,,, | Performed by: OBSTETRICS & GYNECOLOGY

## 2022-07-22 PROCEDURE — 99213 OFFICE O/P EST LOW 20 MIN: CPT | Mod: PBBFAC,TH,PN | Performed by: OBSTETRICS & GYNECOLOGY

## 2022-07-22 PROCEDURE — 1111F PR DISCHARGE MEDS RECONCILED W/ CURRENT OUTPATIENT MED LIST: ICD-10-PCS | Mod: CPTII,,, | Performed by: OBSTETRICS & GYNECOLOGY

## 2022-07-22 PROCEDURE — 99213 OFFICE O/P EST LOW 20 MIN: CPT | Mod: TH,S$PBB,, | Performed by: OBSTETRICS & GYNECOLOGY

## 2022-07-22 NOTE — PROGRESS NOTES
Pt presents for hosp f/u. BP much improved on lab 300mg TID. preE w/u negative. BS log reviewed (takes metformin 2000mg qhs instead of 100mg bid)-mildly elevated FBS but believes it may be due to her eating dinner late w/ . Is trying to walk more. Has also been counseled on portion control. rec labor induction at 37w-scheduled w L&D  I spent a total of 20 minutes on the day of the visit.  This includes face to face time and non-face to face time preparing to see the patient (eg, review of tests), obtaining and/or reviewing separately obtained history, documenting clinical information in the electronic or other health record, independently interpreting results and communicating results to the patient/family/caregiver, or care coordinator.

## 2022-07-27 ENCOUNTER — ROUTINE PRENATAL (OUTPATIENT)
Dept: OBSTETRICS AND GYNECOLOGY | Facility: CLINIC | Age: 27
End: 2022-07-27
Payer: MEDICAID

## 2022-07-27 ENCOUNTER — PROCEDURE VISIT (OUTPATIENT)
Dept: OBSTETRICS AND GYNECOLOGY | Facility: CLINIC | Age: 27
End: 2022-07-27
Payer: MEDICAID

## 2022-07-27 ENCOUNTER — LAB VISIT (OUTPATIENT)
Dept: LAB | Facility: HOSPITAL | Age: 27
End: 2022-07-27
Attending: ADVANCED PRACTICE MIDWIFE
Payer: MEDICAID

## 2022-07-27 VITALS
WEIGHT: 282.19 LBS | SYSTOLIC BLOOD PRESSURE: 136 MMHG | BODY MASS INDEX: 49.99 KG/M2 | DIASTOLIC BLOOD PRESSURE: 82 MMHG

## 2022-07-27 DIAGNOSIS — O24.415 GESTATIONAL DIABETES MELLITUS (GDM) IN THIRD TRIMESTER CONTROLLED ON ORAL HYPOGLYCEMIC DRUG: ICD-10-CM

## 2022-07-27 DIAGNOSIS — E66.01 CLASS 3 SEVERE OBESITY DUE TO EXCESS CALORIES WITH SERIOUS COMORBIDITY AND BODY MASS INDEX (BMI) OF 45.0 TO 49.9 IN ADULT: ICD-10-CM

## 2022-07-27 DIAGNOSIS — O10.913 PRE-EXISTING HYPERTENSION AFFECTING PREGNANCY IN THIRD TRIMESTER: ICD-10-CM

## 2022-07-27 DIAGNOSIS — Z86.79 HISTORY OF HYPERTENSION: ICD-10-CM

## 2022-07-27 DIAGNOSIS — O09.299 HISTORY OF MISCARRIAGE, CURRENTLY PREGNANT: Primary | ICD-10-CM

## 2022-07-27 DIAGNOSIS — O09.299 HISTORY OF MISCARRIAGE, CURRENTLY PREGNANT: ICD-10-CM

## 2022-07-27 LAB
ALBUMIN SERPL BCP-MCNC: 2.5 G/DL (ref 3.5–5.2)
ALP SERPL-CCNC: 133 U/L (ref 55–135)
ALT SERPL W/O P-5'-P-CCNC: 13 U/L (ref 10–44)
ANION GAP SERPL CALC-SCNC: 9 MMOL/L (ref 8–16)
AST SERPL-CCNC: 11 U/L (ref 10–40)
BASOPHILS # BLD AUTO: 0.01 K/UL (ref 0–0.2)
BASOPHILS NFR BLD: 0.1 % (ref 0–1.9)
BILIRUB SERPL-MCNC: 0.2 MG/DL (ref 0.1–1)
BUN SERPL-MCNC: 14 MG/DL (ref 6–20)
CALCIUM SERPL-MCNC: 9.4 MG/DL (ref 8.7–10.5)
CHLORIDE SERPL-SCNC: 107 MMOL/L (ref 95–110)
CO2 SERPL-SCNC: 22 MMOL/L (ref 23–29)
CREAT SERPL-MCNC: 0.7 MG/DL (ref 0.5–1.4)
CREAT UR-MCNC: 249.9 MG/DL (ref 15–325)
DIFFERENTIAL METHOD: ABNORMAL
EOSINOPHIL # BLD AUTO: 0.1 K/UL (ref 0–0.5)
EOSINOPHIL NFR BLD: 0.6 % (ref 0–8)
ERYTHROCYTE [DISTWIDTH] IN BLOOD BY AUTOMATED COUNT: 14.2 % (ref 11.5–14.5)
EST. GFR  (AFRICAN AMERICAN): >60 ML/MIN/1.73 M^2
EST. GFR  (NON AFRICAN AMERICAN): >60 ML/MIN/1.73 M^2
GLUCOSE SERPL-MCNC: 92 MG/DL (ref 70–110)
HCT VFR BLD AUTO: 36.7 % (ref 37–48.5)
HGB BLD-MCNC: 12 G/DL (ref 12–16)
IMM GRANULOCYTES # BLD AUTO: 0.04 K/UL (ref 0–0.04)
IMM GRANULOCYTES NFR BLD AUTO: 0.5 % (ref 0–0.5)
LYMPHOCYTES # BLD AUTO: 1.2 K/UL (ref 1–4.8)
LYMPHOCYTES NFR BLD: 14.5 % (ref 18–48)
MCH RBC QN AUTO: 30.4 PG (ref 27–31)
MCHC RBC AUTO-ENTMCNC: 32.7 G/DL (ref 32–36)
MCV RBC AUTO: 93 FL (ref 82–98)
MONOCYTES # BLD AUTO: 0.4 K/UL (ref 0.3–1)
MONOCYTES NFR BLD: 5 % (ref 4–15)
NEUTROPHILS # BLD AUTO: 6.5 K/UL (ref 1.8–7.7)
NEUTROPHILS NFR BLD: 79.3 % (ref 38–73)
NRBC BLD-RTO: 0 /100 WBC
PLATELET # BLD AUTO: 256 K/UL (ref 150–450)
PMV BLD AUTO: 11.1 FL (ref 9.2–12.9)
POTASSIUM SERPL-SCNC: 4.6 MMOL/L (ref 3.5–5.1)
PROT SERPL-MCNC: 5.6 G/DL (ref 6–8.4)
PROT UR-MCNC: 107 MG/DL (ref 0–15)
PROT/CREAT UR: 0.43 MG/G{CREAT} (ref 0–0.2)
RBC # BLD AUTO: 3.95 M/UL (ref 4–5.4)
SODIUM SERPL-SCNC: 138 MMOL/L (ref 136–145)
WBC # BLD AUTO: 8.15 K/UL (ref 3.9–12.7)

## 2022-07-27 PROCEDURE — 99213 OFFICE O/P EST LOW 20 MIN: CPT | Mod: PBBFAC,TH,PN,25 | Performed by: ADVANCED PRACTICE MIDWIFE

## 2022-07-27 PROCEDURE — 80053 COMPREHEN METABOLIC PANEL: CPT | Performed by: ADVANCED PRACTICE MIDWIFE

## 2022-07-27 PROCEDURE — 36415 COLL VENOUS BLD VENIPUNCTURE: CPT | Mod: PN | Performed by: ADVANCED PRACTICE MIDWIFE

## 2022-07-27 PROCEDURE — 99213 PR OFFICE/OUTPT VISIT, EST, LEVL III, 20-29 MIN: ICD-10-PCS | Mod: TH,S$PBB,, | Performed by: ADVANCED PRACTICE MIDWIFE

## 2022-07-27 PROCEDURE — 99999 PR PBB SHADOW E&M-EST. PATIENT-LVL III: ICD-10-PCS | Mod: PBBFAC,,, | Performed by: ADVANCED PRACTICE MIDWIFE

## 2022-07-27 PROCEDURE — 82570 ASSAY OF URINE CREATININE: CPT | Performed by: ADVANCED PRACTICE MIDWIFE

## 2022-07-27 PROCEDURE — 76819 US OB/GYN PROCEDURE (VIEWPOINT): ICD-10-PCS | Mod: 26,S$PBB,, | Performed by: OBSTETRICS & GYNECOLOGY

## 2022-07-27 PROCEDURE — 99213 OFFICE O/P EST LOW 20 MIN: CPT | Mod: TH,S$PBB,, | Performed by: ADVANCED PRACTICE MIDWIFE

## 2022-07-27 PROCEDURE — 85025 COMPLETE CBC W/AUTO DIFF WBC: CPT | Performed by: ADVANCED PRACTICE MIDWIFE

## 2022-07-27 PROCEDURE — 99999 PR PBB SHADOW E&M-EST. PATIENT-LVL III: CPT | Mod: PBBFAC,,, | Performed by: ADVANCED PRACTICE MIDWIFE

## 2022-07-27 PROCEDURE — 1111F PR DISCHARGE MEDS RECONCILED W/ CURRENT OUTPATIENT MED LIST: ICD-10-PCS | Mod: CPTII,,, | Performed by: ADVANCED PRACTICE MIDWIFE

## 2022-07-27 PROCEDURE — 1111F DSCHRG MED/CURRENT MED MERGE: CPT | Mod: CPTII,,, | Performed by: ADVANCED PRACTICE MIDWIFE

## 2022-07-27 PROCEDURE — 76819 FETAL BIOPHYS PROFIL W/O NST: CPT | Mod: PBBFAC,PN | Performed by: OBSTETRICS & GYNECOLOGY

## 2022-07-27 NOTE — PROGRESS NOTES
26 y.o. female  at 34w0d   Reports + FM, denies VB, LOF or regular CTX  Doing well without concerns     GDM-metformin 2000 mg QHS has follow-up diabetic clinic appointment on 2022.  FBS 50% less than 95.  2 hour post prandials- only half completed and 50% of those completed on more than 120  Try maintain a strict lock so that we can manage the gestational diabetes effectively, stick to diabetic diet and follow up with diabetic clinic, aware that insulin may be indicated    Hypertension-blood pressure is satisfactory today 3+ protein in urine and asymptomatic denies headaches visual disturbances.  2+ DTRs will send PIH labs for comparison.  Last PCR was 0.20  PIH precautions reviewed    Ultrasound-vertex, MVP 9.9 cm, ILA 21.7, BPP 8 8.  Polyhydramnios per MVP  TW lbs   GBS handout provided and reviewed  Reviewed warning signs, normal FKCs, labor precautions and how/when to call.  RTC x 1 wks, call or present sooner prn.     I spent a total of 20 minutes on the day of the visit.This includes face to face time and non-face to face time preparing to see the patient (eg, review of tests), Obtaining and/or reviewing separately obtained history, Documenting clinical information in the electronic or other health record, Independently interpreting resultsand communicating results to the patient/family/caregiver, or Care coordination.

## 2022-07-28 NOTE — PROGRESS NOTES
07/28/2022-[facility] labs CMP is unremarkable.  PCR a 0.43 an increased from 0.20.  Called patient, she states she has no symptoms, blood pressure is 149/70.  Discussed with Dr. Navarro continue with present plan of care.  [facility] precautions reviewed with patient

## 2022-08-02 ENCOUNTER — HOSPITAL ENCOUNTER (INPATIENT)
Facility: HOSPITAL | Age: 27
LOS: 4 days | Discharge: HOME | End: 2022-08-06
Attending: OBSTETRICS & GYNECOLOGY | Admitting: OBSTETRICS & GYNECOLOGY
Payer: MEDICAID

## 2022-08-02 ENCOUNTER — ANESTHESIA EVENT (OUTPATIENT)
Dept: OBSTETRICS AND GYNECOLOGY | Facility: HOSPITAL | Age: 27
End: 2022-08-02
Payer: MEDICAID

## 2022-08-02 ENCOUNTER — ANESTHESIA (OUTPATIENT)
Dept: OBSTETRICS AND GYNECOLOGY | Facility: HOSPITAL | Age: 27
End: 2022-08-02
Payer: MEDICAID

## 2022-08-02 DIAGNOSIS — O10.913 PRE-EXISTING HYPERTENSION AFFECTING PREGNANCY IN THIRD TRIMESTER: ICD-10-CM

## 2022-08-02 DIAGNOSIS — Z98.891 S/P C-SECTION: Primary | ICD-10-CM

## 2022-08-02 LAB
ABO + RH BLD: NORMAL
ALBUMIN SERPL BCP-MCNC: 2.2 G/DL (ref 3.5–5.2)
ALP SERPL-CCNC: 116 U/L (ref 55–135)
ALT SERPL W/O P-5'-P-CCNC: 14 U/L (ref 10–44)
ANION GAP SERPL CALC-SCNC: 10 MMOL/L (ref 8–16)
AST SERPL-CCNC: 16 U/L (ref 10–40)
BASOPHILS # BLD AUTO: 0.01 K/UL (ref 0–0.2)
BASOPHILS NFR BLD: 0.1 % (ref 0–1.9)
BILIRUB SERPL-MCNC: 0.1 MG/DL (ref 0.1–1)
BLD GP AB SCN CELLS X3 SERPL QL: NORMAL
BUN SERPL-MCNC: 14 MG/DL (ref 6–20)
CALCIUM SERPL-MCNC: 9.3 MG/DL (ref 8.7–10.5)
CHLORIDE SERPL-SCNC: 107 MMOL/L (ref 95–110)
CO2 SERPL-SCNC: 21 MMOL/L (ref 23–29)
CREAT SERPL-MCNC: 0.7 MG/DL (ref 0.5–1.4)
CREAT UR-MCNC: 323.6 MG/DL (ref 15–325)
DIFFERENTIAL METHOD: ABNORMAL
EOSINOPHIL # BLD AUTO: 0 K/UL (ref 0–0.5)
EOSINOPHIL NFR BLD: 0.3 % (ref 0–8)
ERYTHROCYTE [DISTWIDTH] IN BLOOD BY AUTOMATED COUNT: 14.2 % (ref 11.5–14.5)
EST. GFR  (NO RACE VARIABLE): >60 ML/MIN/1.73 M^2
GLUCOSE SERPL-MCNC: 125 MG/DL (ref 70–110)
HCT VFR BLD AUTO: 33.7 % (ref 37–48.5)
HGB BLD-MCNC: 11.3 G/DL (ref 12–16)
IMM GRANULOCYTES # BLD AUTO: 0.05 K/UL (ref 0–0.04)
IMM GRANULOCYTES NFR BLD AUTO: 0.5 % (ref 0–0.5)
LYMPHOCYTES # BLD AUTO: 1.1 K/UL (ref 1–4.8)
LYMPHOCYTES NFR BLD: 11.7 % (ref 18–48)
MCH RBC QN AUTO: 30.5 PG (ref 27–31)
MCHC RBC AUTO-ENTMCNC: 33.5 G/DL (ref 32–36)
MCV RBC AUTO: 91 FL (ref 82–98)
MONOCYTES # BLD AUTO: 0.4 K/UL (ref 0.3–1)
MONOCYTES NFR BLD: 4.3 % (ref 4–15)
NEUTROPHILS # BLD AUTO: 7.7 K/UL (ref 1.8–7.7)
NEUTROPHILS NFR BLD: 83.1 % (ref 38–73)
NRBC BLD-RTO: 0 /100 WBC
PLATELET # BLD AUTO: 215 K/UL (ref 150–450)
PMV BLD AUTO: 10.4 FL (ref 9.2–12.9)
POTASSIUM SERPL-SCNC: 4.4 MMOL/L (ref 3.5–5.1)
PROT SERPL-MCNC: 5.9 G/DL (ref 6–8.4)
PROT UR-MCNC: 452 MG/DL (ref 0–15)
PROT/CREAT UR: 1.4 MG/G{CREAT} (ref 0–0.2)
RBC # BLD AUTO: 3.7 M/UL (ref 4–5.4)
SARS-COV-2 RDRP RESP QL NAA+PROBE: NEGATIVE
SODIUM SERPL-SCNC: 138 MMOL/L (ref 136–145)
WBC # BLD AUTO: 9.32 K/UL (ref 3.9–12.7)

## 2022-08-02 PROCEDURE — 85025 COMPLETE CBC W/AUTO DIFF WBC: CPT | Performed by: ADVANCED PRACTICE MIDWIFE

## 2022-08-02 PROCEDURE — 99211 OFF/OP EST MAY X REQ PHY/QHP: CPT | Mod: 25

## 2022-08-02 PROCEDURE — 59025 FETAL NON-STRESS TEST: CPT

## 2022-08-02 PROCEDURE — 11000001 HC ACUTE MED/SURG PRIVATE ROOM

## 2022-08-02 PROCEDURE — U0002 COVID-19 LAB TEST NON-CDC: HCPCS | Performed by: ADVANCED PRACTICE MIDWIFE

## 2022-08-02 PROCEDURE — 63600175 PHARM REV CODE 636 W HCPCS: Performed by: ADVANCED PRACTICE MIDWIFE

## 2022-08-02 PROCEDURE — 84156 ASSAY OF PROTEIN URINE: CPT | Performed by: ADVANCED PRACTICE MIDWIFE

## 2022-08-02 PROCEDURE — 72100002 HC LABOR CARE, 1ST 8 HOURS

## 2022-08-02 PROCEDURE — 25000003 PHARM REV CODE 250: Performed by: ADVANCED PRACTICE MIDWIFE

## 2022-08-02 PROCEDURE — 87081 CULTURE SCREEN ONLY: CPT | Performed by: ADVANCED PRACTICE MIDWIFE

## 2022-08-02 PROCEDURE — 86901 BLOOD TYPING SEROLOGIC RH(D): CPT | Performed by: ADVANCED PRACTICE MIDWIFE

## 2022-08-02 PROCEDURE — 80053 COMPREHEN METABOLIC PANEL: CPT | Performed by: ADVANCED PRACTICE MIDWIFE

## 2022-08-02 PROCEDURE — 72100003 HC LABOR CARE, EA. ADDL. 8 HRS

## 2022-08-02 PROCEDURE — 25000003 PHARM REV CODE 250: Performed by: OBSTETRICS & GYNECOLOGY

## 2022-08-02 RX ORDER — MISOPROSTOL 200 UG/1
800 TABLET ORAL
Status: DISCONTINUED | OUTPATIENT
Start: 2022-08-02 | End: 2022-08-06 | Stop reason: HOSPADM

## 2022-08-02 RX ORDER — CALCIUM CARBONATE 200(500)MG
500 TABLET,CHEWABLE ORAL 3 TIMES DAILY PRN
Status: DISCONTINUED | OUTPATIENT
Start: 2022-08-02 | End: 2022-08-06 | Stop reason: HOSPADM

## 2022-08-02 RX ORDER — SODIUM CHLORIDE, SODIUM LACTATE, POTASSIUM CHLORIDE, CALCIUM CHLORIDE 600; 310; 30; 20 MG/100ML; MG/100ML; MG/100ML; MG/100ML
INJECTION, SOLUTION INTRAVENOUS CONTINUOUS
Status: DISCONTINUED | OUTPATIENT
Start: 2022-08-02 | End: 2022-08-04

## 2022-08-02 RX ORDER — MISOPROSTOL 100 MCG
25 TABLET ORAL
Status: DISCONTINUED | OUTPATIENT
Start: 2022-08-02 | End: 2022-08-03

## 2022-08-02 RX ORDER — LABETALOL HYDROCHLORIDE 5 MG/ML
20 INJECTION, SOLUTION INTRAVENOUS ONCE AS NEEDED
Status: COMPLETED | OUTPATIENT
Start: 2022-08-02 | End: 2022-08-03

## 2022-08-02 RX ORDER — LIDOCAINE HYDROCHLORIDE 10 MG/ML
10 INJECTION, SOLUTION EPIDURAL; INFILTRATION; INTRACAUDAL; PERINEURAL ONCE AS NEEDED
Status: DISCONTINUED | OUTPATIENT
Start: 2022-08-02 | End: 2022-08-06 | Stop reason: HOSPADM

## 2022-08-02 RX ORDER — METHYLERGONOVINE MALEATE 0.2 MG/ML
200 INJECTION INTRAVENOUS
Status: DISCONTINUED | OUTPATIENT
Start: 2022-08-02 | End: 2022-08-06 | Stop reason: HOSPADM

## 2022-08-02 RX ORDER — HYDRALAZINE HYDROCHLORIDE 20 MG/ML
10 INJECTION INTRAMUSCULAR; INTRAVENOUS ONCE AS NEEDED
Status: DISCONTINUED | OUTPATIENT
Start: 2022-08-02 | End: 2022-08-03

## 2022-08-02 RX ORDER — PROCHLORPERAZINE EDISYLATE 5 MG/ML
5 INJECTION INTRAMUSCULAR; INTRAVENOUS EVERY 6 HOURS PRN
Status: DISCONTINUED | OUTPATIENT
Start: 2022-08-02 | End: 2022-08-04 | Stop reason: SDUPTHER

## 2022-08-02 RX ORDER — TRANEXAMIC ACID 100 MG/ML
1000 INJECTION, SOLUTION INTRAVENOUS ONCE AS NEEDED
Status: DISCONTINUED | OUTPATIENT
Start: 2022-08-02 | End: 2022-08-06 | Stop reason: HOSPADM

## 2022-08-02 RX ORDER — CARBOPROST TROMETHAMINE 250 UG/ML
250 INJECTION, SOLUTION INTRAMUSCULAR
Status: DISCONTINUED | OUTPATIENT
Start: 2022-08-02 | End: 2022-08-06 | Stop reason: HOSPADM

## 2022-08-02 RX ORDER — LABETALOL HYDROCHLORIDE 5 MG/ML
80 INJECTION, SOLUTION INTRAVENOUS ONCE AS NEEDED
Status: DISCONTINUED | OUTPATIENT
Start: 2022-08-02 | End: 2022-08-03

## 2022-08-02 RX ORDER — OXYTOCIN/RINGER'S LACTATE 30/500 ML
334 PLASTIC BAG, INJECTION (ML) INTRAVENOUS ONCE
Status: DISCONTINUED | OUTPATIENT
Start: 2022-08-02 | End: 2022-08-04

## 2022-08-02 RX ORDER — OXYTOCIN/RINGER'S LACTATE 30/500 ML
95 PLASTIC BAG, INJECTION (ML) INTRAVENOUS ONCE
Status: DISCONTINUED | OUTPATIENT
Start: 2022-08-02 | End: 2022-08-04

## 2022-08-02 RX ORDER — ONDANSETRON 8 MG/1
8 TABLET, ORALLY DISINTEGRATING ORAL EVERY 8 HOURS PRN
Status: DISCONTINUED | OUTPATIENT
Start: 2022-08-02 | End: 2022-08-04 | Stop reason: SDUPTHER

## 2022-08-02 RX ORDER — SIMETHICONE 80 MG
1 TABLET,CHEWABLE ORAL 4 TIMES DAILY PRN
Status: DISCONTINUED | OUTPATIENT
Start: 2022-08-02 | End: 2022-08-06 | Stop reason: HOSPADM

## 2022-08-02 RX ORDER — ACETAMINOPHEN 500 MG
1000 TABLET ORAL EVERY 6 HOURS PRN
Status: DISCONTINUED | OUTPATIENT
Start: 2022-08-02 | End: 2022-08-06 | Stop reason: HOSPADM

## 2022-08-02 RX ORDER — DIPHENOXYLATE HYDROCHLORIDE AND ATROPINE SULFATE 2.5; .025 MG/1; MG/1
1 TABLET ORAL 4 TIMES DAILY PRN
Status: DISCONTINUED | OUTPATIENT
Start: 2022-08-02 | End: 2022-08-06 | Stop reason: HOSPADM

## 2022-08-02 RX ORDER — BUTORPHANOL TARTRATE 2 MG/ML
2 INJECTION INTRAMUSCULAR; INTRAVENOUS
Status: DISCONTINUED | OUTPATIENT
Start: 2022-08-02 | End: 2022-08-06 | Stop reason: HOSPADM

## 2022-08-02 RX ORDER — MAG HYDROX/ALUMINUM HYD/SIMETH 200-200-20
30 SUSPENSION, ORAL (FINAL DOSE FORM) ORAL EVERY 6 HOURS PRN
Status: DISCONTINUED | OUTPATIENT
Start: 2022-08-02 | End: 2022-08-06 | Stop reason: HOSPADM

## 2022-08-02 RX ORDER — LABETALOL HYDROCHLORIDE 5 MG/ML
40 INJECTION, SOLUTION INTRAVENOUS ONCE AS NEEDED
Status: COMPLETED | OUTPATIENT
Start: 2022-08-02 | End: 2022-08-03

## 2022-08-02 RX ADMIN — DEXTROSE 3 MILLION UNITS: 50 INJECTION, SOLUTION INTRAVENOUS at 09:08

## 2022-08-02 RX ADMIN — Medication 25 MCG: at 09:08

## 2022-08-02 RX ADMIN — ACETAMINOPHEN 1000 MG: 500 TABLET ORAL at 03:08

## 2022-08-02 RX ADMIN — ALUMINUM HYDROXIDE, MAGNESIUM HYDROXIDE, AND SIMETHICONE 30 ML: 200; 200; 20 SUSPENSION ORAL at 10:08

## 2022-08-02 NOTE — ASSESSMENT & PLAN NOTE
Admit Inpatient  Regular diet now, then NPO  IV fluids  Collect GBS and Covid swab  Treat GBS when in active labor  Induction method TBD when cervix examined.  Labetalol protocol, notify if needing to treat, May need magnesium sulfate, pt aware.  May have IV meds or TAMEKA when desires  Anticipate progress toward vaginal delivery.

## 2022-08-02 NOTE — H&P
[facility] - Labor & Delivery  Obstetrics  History & Physical    Patient Name: Kathy Mishra  MRN: [id number]  Admission Date: 2022  Primary Care Provider: Primary Doctor No    Subjective:     Principal Problem:<principal problem not specified>    History of Present Illness:  Presents to LD triage for PIH workup      Obstetric HPI:  Patient reports None contractions, active fetal movement, No vaginal bleeding , No loss of fluid     This pregnancy has been complicated by pre-existing HTN, GDM on Metformin, hypothyroid, obesity.    OB History    Para Term  AB Living   2 0 0 0 1 0   SAB IAB Ectopic Multiple Live Births   1 0 0 0 0      # Outcome Date GA Lbr Tera/2nd Weight Sex Delivery Anes PTL Lv   2 Current            1 SAB 16 12w0d            Past Medical History:   Diagnosis Date    Asthma 2022    Gestational diabetes mellitus (GDM) in third trimester controlled on oral hypoglycemic drug 2022    History of hypertension 2022-add PIH baseline labs to workup PCR 0.09 advised daily baby aspirin at 16 weeks    Hypertension     Thyroid disease      Past Surgical History:   Procedure Laterality Date    adneoids      age of 5 years old    TONSILLECTOMY      age of 5 years old       PTA Medications   Medication Sig    albuterol (PROVENTIL/VENTOLIN HFA) 90 mcg/actuation inhaler     aspirin 81 MG Chew Take 1 tablet (81 mg total) by mouth once daily.    blood sugar diagnostic Strp To check BG 4 times daily, to use with insurance preferred meter    blood-glucose meter kit To check BG 4 times daily, to use with insurance preferred meter    cholecalciferol, vitamin D3, 125 mcg (5,000 unit) Tab Take 5,000 Units by mouth once daily.    labetaloL (NORMODYNE) 300 MG tablet Take 1 tablet (300 mg total) by mouth every 12 (twelve) hours. (Patient taking differently: Take 300 mg by mouth 3 (three) times daily.)    lancets Misc To check BG 4 times daily, to use with insurance  preferred meter    metFORMIN (GLUCOPHAGE XR) 500 MG ER 24hr tablet Increase Metformin to 2 tablets twice a day.    ondansetron (ZOFRAN-ODT) 4 MG TbDL Take 4 mg by mouth every 8 (eight) hours as needed.    prenatal 168/iron/folic/omega3 (ONE-A-DAY PRENATAL-1 ORAL) Take by mouth.    TRUEDRAW LANCING DEVICE Misc directed       Review of patient's allergies indicates:  No Known Allergies     Family History       Problem Relation (Age of Onset)    Diabetes Maternal Grandfather, Mother    Hypertension Paternal Grandmother, Father          Tobacco Use    Smoking status: Current Some Day Smoker    Smokeless tobacco: Never Used   Substance and Sexual Activity    Alcohol use: Never    Drug use: Never    Sexual activity: Yes     Partners: Male     Birth control/protection: None     Review of Systems   Gastrointestinal:  Negative for abdominal pain, nausea and vomiting.   Neurological:  Positive for headaches (severe HA 8/10, resolved by tylenol). Negative for syncope.   All other systems reviewed and are negative.   Objective:     Vital Signs (Most Recent):    Vital Signs (24h Range):           There is no height or weight on file to calculate BMI.    FHT: 140 with moderate variability. Cat 1 (reassuring)  TOCO:  None    Physical Exam:   Constitutional: She is oriented to person, place, and time. She appears well-developed and well-nourished.    HENT:   Head: Normocephalic.      Cardiovascular:  Normal rate.             Pulmonary/Chest: Effort normal.        Abdominal: Soft. There is no abdominal tenderness.     Genitourinary:    Vagina and uterus normal.   No  no vaginal discharge in the vagina.           Musculoskeletal: Normal range of motion and moves all extremeties.       Neurological: She is alert and oriented to person, place, and time.    Skin: Skin is warm and dry.    Psychiatric: She has a normal mood and affect. Her behavior is normal. Judgment and thought content normal.     Cervix: Deferred.        Significant Labs:  Lab Results   Component Value Date    GROUPTRH A POS 2022    HEPBSAG Negative 2022       I have personallly reviewed all pertinent lab results from the last 24 hours.    Assessment/Plan:     26 y.o. female  at 34w6d for:    Obesity complicating pregnancy in third trimester  Lovenox PP     Hypothyroidism  No meds.    Gestational diabetes mellitus (GDM) in third trimester controlled on oral hypoglycemic drug  Monitor CBG prn    Pre-existing hypertension affecting pregnancy in third trimester  Admit Inpatient  Regular diet now, then NPO  IV fluids  Collect GBS and Covid swab  Treat GBS when in active labor  Induction method TBD when cervix examined.  Labetalol protocol, notify if needing to treat, May need magnesium sulfate, pt aware.  May have IV meds or TAMEKA when desires  Anticipate progress toward vaginal delivery.        Wild Payne CNM  Obstetrics  [facility] - [facility]    Brittney

## 2022-08-02 NOTE — MEDICAL/APP STUDENT
HISTORY AND PHYSICAL                                                OBSTETRICS          Subjective:       Kathy Mishra is a 26 y.o.  female with IUP at 34w6d weeks gestation who presents with high home blood pressure readings and headache. Patient reports home blood pressure readings as high as 214/135 mmHg with concurrent dizziness and headache rated 8/10. Experience with migraines in past and notes current headache feels different. She notes increased swelling in her ankles and feet. Was started on labetalol, reports taking 300mg three times daily. Taking baby aspirin daily. Otherwise she has mild nausea this morning that resolved and gastric reflux that is ongoing. Denies vision changes, loss of consciousness, RUQ abdominal pain.     Patient denies contractions, denies vaginal bleeding, denies LOF.   Fetal Movement: normal.    This IUP is complicated by chronic hypertension, gestational diabetes mellitus, thyroid disorder, asthma.    Review of Systems   Constitutional: Negative for chills, fatigue and fever.   Eyes: Negative for visual disturbance.   Respiratory: Negative for cough and shortness of breath.    Cardiovascular: Negative for chest pain.   Gastrointestinal: Positive for reflux. Negative for abdominal pain, diarrhea, nausea and vomiting.   Genitourinary: Negative for vaginal bleeding, vaginal discharge and vaginal pain.   Integumentary:  Negative.   Neurological: Positive for headaches. Negative for vertigo and syncope.       PMHx:   Past Medical History:   Diagnosis Date    Asthma 2022    Gestational diabetes mellitus (GDM) in third trimester controlled on oral hypoglycemic drug 2022    History of hypertension 2022-add PIH baseline labs to workup PCR 0.09 advised daily baby aspirin at 16 weeks    Hypertension     Thyroid disease        PSHx:   Past Surgical History:   Procedure Laterality Date    adneoids      age of 5 years old    TONSILLECTOMY       age of 5 years old       All: Review of patient's allergies indicates:  No Known Allergies    Meds:   Medications Prior to Admission   Medication Sig Dispense Refill Last Dose    albuterol (PROVENTIL/VENTOLIN HFA) 90 mcg/actuation inhaler        aspirin 81 MG Chew Take 1 tablet (81 mg total) by mouth once daily. 30 tablet 11     blood sugar diagnostic Strp To check BG 4 times daily, to use with insurance preferred meter 200 strip 4     blood-glucose meter kit To check BG 4 times daily, to use with insurance preferred meter 1 each 0     cholecalciferol, vitamin D3, 125 mcg (5,000 unit) Tab Take 5,000 Units by mouth once daily.       labetaloL (NORMODYNE) 300 MG tablet Take 1 tablet (300 mg total) by mouth every 12 (twelve) hours. (Patient taking differently: Take 300 mg by mouth 3 (three) times daily.) 60 tablet 11     lancets Misc To check BG 4 times daily, to use with insurance preferred meter 200 each 4     metFORMIN (GLUCOPHAGE XR) 500 MG ER 24hr tablet Increase Metformin to 2 tablets twice a day. 120 tablet 11     ondansetron (ZOFRAN-ODT) 4 MG TbDL Take 4 mg by mouth every 8 (eight) hours as needed.       prenatal 168/iron/folic/omega3 (ONE-A-DAY PRENATAL-1 ORAL) Take by mouth.       TRUEDRAW LANCING DEVICE Misc directed          SH:   Social History     Socioeconomic History    Marital status: Single   Tobacco Use    Smoking status: Current Some Day Smoker    Smokeless tobacco: Never Used   Substance and Sexual Activity    Alcohol use: Never    Drug use: Never    Sexual activity: Yes     Partners: Male     Birth control/protection: None       FH:   Family History   Problem Relation Age of Onset    Hypertension Paternal Grandmother     Diabetes Maternal Grandfather     Hypertension Father     Diabetes Mother        OBHx:   OB History    Para Term  AB Living   2 0 0 0 1 0   SAB IAB Ectopic Multiple Live Births   1 0 0 0 0      # Outcome Date GA Lbr Tera/2nd Weight Sex Delivery  Anes PTL Lv   2 Current            1 SAB 02/11/16 12w0d              Objective:       LMP 11/25/2021   Breastfeeding No     There were no vitals filed for this visit.    General:   alert, appears stated age and cooperative, no apparent distress   HENT:  normocephalic, atraumatic   Eyes:  extraocular movements and conjunctivae normal   Neck:  supple, range of motion normal, no thyromegaly   Lungs:   no respiratory distress   Heart:   regular rate   Abdomen  Soft, non-tender, non-distended but gravid, no rebound or guarding    Extremities:  Negative edema, negative erythema         FHT: 150, moderate BTBV, +accels, -decels;  Cat 1 (reassuring)                 TOCO: Infrequent, irregular   Presentations: cephalic by ultrasound   Cervix:     Dilation: deferred    Effacement: deferred    Station:  deferred    Consistency: deferred    Position: deferred   Sterile Speculum Exam: deferred    Recent Growth Scan:     Fetal Growth Overview   =================   Exam date        GA              BPD (mm)         HC (mm)        AC (mm)        FL (mm)         HL (mm)        EFW (g)   04/26/2022        20w 6d        50.1                  186.5             164.2             35.9                                   419    45%   05/25/2022        25w 0d        63.9                  233                210.7             42.6                                   757    45%   06/28/2022        29w 6d        81                    287.7              279.5             57.7                                   1772    58%       Lab Review  Blood Type A POS  GBBS: no result  Rubella: Immune  RPR: non-reactive  HIV: negative  HepB: negative       Assessment:       34w6d weeks gestation with hypertension vs. Pre-eclampsia.     Active Hospital Problems    Diagnosis  POA    Obesity complicating pregnancy in third trimester [O99.213]  Yes    Hypothyroidism [E03.9]  Yes    Gestational diabetes mellitus (GDM) in third trimester controlled on oral  hypoglycemic drug [O24.415]  Yes     1 hour was 270 referred to diabetic clinic Accu-Cheks and metformin with follow-up  Metformin 500mg BID      Pre-existing hypertension affecting pregnancy in third trimester [O10.913]  Yes     1/11/22-add PIH baseline labs to workup PCR 0.09 advised daily baby aspirin at 16 weeks  Patient states history of HTN during childhood but no issues as an adult        Resolved Hospital Problems   No resolved problems to display.          Plan:      Risks, benefits, alternatives and possible complications have been discussed in detail with the patient.   - Consents signed and to chart  - hydralazine 5mg IV, repeat if BP not improved after 30 minutes, repeat until 20mg total   - consider addition of nifedipine 30mg qdaily   - vitals [id number]   - Urine protein to creatinine ratio  - Draw CBC, T&S, CMP, LFTs  - Notify Staff  - Goal BP >130 systolic, >100 diastolic      Russ Alfonso, MS3 [employer]

## 2022-08-02 NOTE — SUBJECTIVE & OBJECTIVE
Obstetric HPI:  Patient reports None contractions, active fetal movement, No vaginal bleeding , No loss of fluid     This pregnancy has been complicated by pre-existing HTN, GDM on Metformin, hypothyroid, obesity.    OB History    Para Term  AB Living   2 0 0 0 1 0   SAB IAB Ectopic Multiple Live Births   1 0 0 0 0      # Outcome Date GA Lbr Tera/2nd Weight Sex Delivery Anes PTL Lv   2 Current            1 SAB 16 12w0d            Past Medical History:   Diagnosis Date    Asthma 2022    Gestational diabetes mellitus (GDM) in third trimester controlled on oral hypoglycemic drug 2022    History of hypertension 2022-add PIH baseline labs to workup PCR 0.09 advised daily baby aspirin at 16 weeks    Hypertension     Thyroid disease      Past Surgical History:   Procedure Laterality Date    adneoids      age of 5 years old    TONSILLECTOMY      age of 5 years old       PTA Medications   Medication Sig    albuterol (PROVENTIL/VENTOLIN HFA) 90 mcg/actuation inhaler     aspirin 81 MG Chew Take 1 tablet (81 mg total) by mouth once daily.    blood sugar diagnostic Strp To check BG 4 times daily, to use with insurance preferred meter    blood-glucose meter kit To check BG 4 times daily, to use with insurance preferred meter    cholecalciferol, vitamin D3, 125 mcg (5,000 unit) Tab Take 5,000 Units by mouth once daily.    labetaloL (NORMODYNE) 300 MG tablet Take 1 tablet (300 mg total) by mouth every 12 (twelve) hours. (Patient taking differently: Take 300 mg by mouth 3 (three) times daily.)    lancets Misc To check BG 4 times daily, to use with insurance preferred meter    metFORMIN (GLUCOPHAGE XR) 500 MG ER 24hr tablet Increase Metformin to 2 tablets twice a day.    ondansetron (ZOFRAN-ODT) 4 MG TbDL Take 4 mg by mouth every 8 (eight) hours as needed.    prenatal 168/iron/folic/omega3 (ONE-A-DAY PRENATAL-1 ORAL) Take by mouth.    TRUEDRAW LANCING DEVICE Misc directed       Review of  patient's allergies indicates:  No Known Allergies     Family History       Problem Relation (Age of Onset)    Diabetes Maternal Grandfather, Mother    Hypertension Paternal Grandmother, Father          Tobacco Use    Smoking status: Current Some Day Smoker    Smokeless tobacco: Never Used   Substance and Sexual Activity    Alcohol use: Never    Drug use: Never    Sexual activity: Yes     Partners: Male     Birth control/protection: None     Review of Systems   Gastrointestinal:  Negative for abdominal pain, nausea and vomiting.   Neurological:  Positive for headaches (severe HA 8/10, resolved by tylenol). Negative for syncope.   All other systems reviewed and are negative.   Objective:     Vital Signs (Most Recent):    Vital Signs (24h Range):           There is no height or weight on file to calculate BMI.    FHT: 140 with moderate variability. Cat 1 (reassuring)  TOCO:  None    Physical Exam:   Constitutional: She is oriented to person, place, and time. She appears well-developed and well-nourished.    HENT:   Head: Normocephalic.      Cardiovascular:  Normal rate.             Pulmonary/Chest: Effort normal.        Abdominal: Soft. There is no abdominal tenderness.     Genitourinary:    Vagina and uterus normal.   No  no vaginal discharge in the vagina.           Musculoskeletal: Normal range of motion and moves all extremeties.       Neurological: She is alert and oriented to person, place, and time.    Skin: Skin is warm and dry.    Psychiatric: She has a normal mood and affect. Her behavior is normal. Judgment and thought content normal.     Cervix: Deferred.       Significant Labs:  Lab Results   Component Value Date    GROUPTRH A POS 01/11/2022    HEPBSAG Negative 01/11/2022       I have personallly reviewed all pertinent lab results from the last 24 hours.

## 2022-08-02 NOTE — HOSPITAL COURSE
Admit Inpatient  Regular diet now, then NPO  IV fluids  Collect GBS and Covid swab  Treat GBS when in active labor  Induction method TBD when cervix examined.  Labetalol protocol, notify if needing to treat, May need magnesium sulfate, pt aware.  May have IV meds or TAMEKA when desires  Anticipate progress toward vaginal delivery.  8/3/22- 0725am- Cytotec IOL in progress for severe pre-eclampsia. MgSO4 infusing. Insulin drip orders placed for GDM on Metformin per MD. Will consider cervical balloon placement if able at next SVE.   08/04/22-Has had a hard time getting comfortable with TAMEKA. Has had multiple reinjections. Has made no cervical change since yesterday evening. Will discuss POC with MD. Underwent PCS due to arrest at 5cm.   8/5/22: POD 1 s/p PCS. On Mg, zhao in place. BP in the 140s/80s. Sore but overall doing well.

## 2022-08-03 PROBLEM — O11.9 CHRONIC HYPERTENSION WITH SUPERIMPOSED PRE-ECLAMPSIA: Status: ACTIVE | Noted: 2022-01-11

## 2022-08-03 LAB
POCT GLUCOSE: 109 MG/DL (ref 70–110)
POCT GLUCOSE: 78 MG/DL (ref 70–110)
POCT GLUCOSE: 79 MG/DL (ref 70–110)
POCT GLUCOSE: 84 MG/DL (ref 70–110)
POCT GLUCOSE: 85 MG/DL (ref 70–110)
POCT GLUCOSE: 87 MG/DL (ref 70–110)
POCT GLUCOSE: 90 MG/DL (ref 70–110)
POCT GLUCOSE: 97 MG/DL (ref 70–110)
POCT GLUCOSE: 97 MG/DL (ref 70–110)

## 2022-08-03 PROCEDURE — 62326 NJX INTERLAMINAR LMBR/SAC: CPT | Performed by: NURSE ANESTHETIST, CERTIFIED REGISTERED

## 2022-08-03 PROCEDURE — 25000003 PHARM REV CODE 250: Performed by: MIDWIFE

## 2022-08-03 PROCEDURE — 59200 INSERT CERVICAL DILATOR: CPT

## 2022-08-03 PROCEDURE — 25000003 PHARM REV CODE 250: Performed by: NURSE ANESTHETIST, CERTIFIED REGISTERED

## 2022-08-03 PROCEDURE — 25000003 PHARM REV CODE 250: Performed by: OBSTETRICS & GYNECOLOGY

## 2022-08-03 PROCEDURE — 51702 INSERT TEMP BLADDER CATH: CPT

## 2022-08-03 PROCEDURE — 63600175 PHARM REV CODE 636 W HCPCS: Performed by: ADVANCED PRACTICE MIDWIFE

## 2022-08-03 PROCEDURE — 27200710 HC EPIDURAL INFUSION PUMP SET: Performed by: ANESTHESIOLOGY

## 2022-08-03 PROCEDURE — 63600175 PHARM REV CODE 636 W HCPCS: Performed by: MIDWIFE

## 2022-08-03 PROCEDURE — 11000001 HC ACUTE MED/SURG PRIVATE ROOM

## 2022-08-03 PROCEDURE — 27800516 HC TRAY, EPIDURAL COMBO: Performed by: ANESTHESIOLOGY

## 2022-08-03 PROCEDURE — 25000003 PHARM REV CODE 250: Performed by: ADVANCED PRACTICE MIDWIFE

## 2022-08-03 PROCEDURE — 72100003 HC LABOR CARE, EA. ADDL. 8 HRS

## 2022-08-03 PROCEDURE — 63600175 PHARM REV CODE 636 W HCPCS: Performed by: OBSTETRICS & GYNECOLOGY

## 2022-08-03 PROCEDURE — 63600175 PHARM REV CODE 636 W HCPCS: Performed by: NURSE ANESTHETIST, CERTIFIED REGISTERED

## 2022-08-03 RX ORDER — LABETALOL HYDROCHLORIDE 5 MG/ML
20 INJECTION, SOLUTION INTRAVENOUS ONCE AS NEEDED
Status: COMPLETED | OUTPATIENT
Start: 2022-08-03 | End: 2022-08-03

## 2022-08-03 RX ORDER — LIDOCAINE HYDROCHLORIDE AND EPINEPHRINE 15; 5 MG/ML; UG/ML
INJECTION, SOLUTION EPIDURAL
Status: DISCONTINUED | OUTPATIENT
Start: 2022-08-03 | End: 2022-08-04

## 2022-08-03 RX ORDER — FAMOTIDINE 20 MG/1
20 TABLET, FILM COATED ORAL 2 TIMES DAILY PRN
Status: DISCONTINUED | OUTPATIENT
Start: 2022-08-03 | End: 2022-08-06 | Stop reason: HOSPADM

## 2022-08-03 RX ORDER — CALCIUM GLUCONATE 98 MG/ML
1 INJECTION, SOLUTION INTRAVENOUS
Status: DISCONTINUED | OUTPATIENT
Start: 2022-08-03 | End: 2022-08-06 | Stop reason: HOSPADM

## 2022-08-03 RX ORDER — LABETALOL HYDROCHLORIDE 5 MG/ML
40 INJECTION, SOLUTION INTRAVENOUS ONCE AS NEEDED
Status: DISCONTINUED | OUTPATIENT
Start: 2022-08-03 | End: 2022-08-06 | Stop reason: HOSPADM

## 2022-08-03 RX ORDER — ROPIVACAINE HYDROCHLORIDE 2 MG/ML
INJECTION, SOLUTION EPIDURAL; INFILTRATION
Status: DISCONTINUED | OUTPATIENT
Start: 2022-08-03 | End: 2022-08-04

## 2022-08-03 RX ORDER — MAGNESIUM SULFATE HEPTAHYDRATE 40 MG/ML
2 INJECTION, SOLUTION INTRAVENOUS CONTINUOUS
Status: DISCONTINUED | OUTPATIENT
Start: 2022-08-03 | End: 2022-08-06 | Stop reason: HOSPADM

## 2022-08-03 RX ORDER — MAGNESIUM SULFATE HEPTAHYDRATE 40 MG/ML
4 INJECTION, SOLUTION INTRAVENOUS ONCE
Status: COMPLETED | OUTPATIENT
Start: 2022-08-03 | End: 2022-08-03

## 2022-08-03 RX ORDER — OXYTOCIN/RINGER'S LACTATE 30/500 ML
0-30 PLASTIC BAG, INJECTION (ML) INTRAVENOUS CONTINUOUS
Status: DISCONTINUED | OUTPATIENT
Start: 2022-08-03 | End: 2022-08-04

## 2022-08-03 RX ORDER — BUPIVACAINE HYDROCHLORIDE 2.5 MG/ML
INJECTION, SOLUTION INFILTRATION; PERINEURAL
Status: DISCONTINUED | OUTPATIENT
Start: 2022-08-03 | End: 2022-08-04

## 2022-08-03 RX ORDER — LABETALOL HYDROCHLORIDE 5 MG/ML
80 INJECTION, SOLUTION INTRAVENOUS ONCE AS NEEDED
Status: DISCONTINUED | OUTPATIENT
Start: 2022-08-03 | End: 2022-08-06 | Stop reason: HOSPADM

## 2022-08-03 RX ORDER — HYDRALAZINE HYDROCHLORIDE 20 MG/ML
10 INJECTION INTRAMUSCULAR; INTRAVENOUS ONCE AS NEEDED
Status: DISCONTINUED | OUTPATIENT
Start: 2022-08-03 | End: 2022-08-06 | Stop reason: HOSPADM

## 2022-08-03 RX ORDER — DEXTROSE, SODIUM CHLORIDE, SODIUM LACTATE, POTASSIUM CHLORIDE, AND CALCIUM CHLORIDE 5; .6; .31; .03; .02 G/100ML; G/100ML; G/100ML; G/100ML; G/100ML
INJECTION, SOLUTION INTRAVENOUS CONTINUOUS
Status: DISCONTINUED | OUTPATIENT
Start: 2022-08-03 | End: 2022-08-04

## 2022-08-03 RX ORDER — SODIUM CHLORIDE, SODIUM LACTATE, POTASSIUM CHLORIDE, CALCIUM CHLORIDE 600; 310; 30; 20 MG/100ML; MG/100ML; MG/100ML; MG/100ML
INJECTION, SOLUTION INTRAVENOUS CONTINUOUS
Status: DISCONTINUED | OUTPATIENT
Start: 2022-08-03 | End: 2022-08-06 | Stop reason: HOSPADM

## 2022-08-03 RX ORDER — IBUPROFEN 200 MG
24 TABLET ORAL
Status: DISCONTINUED | OUTPATIENT
Start: 2022-08-03 | End: 2022-08-06 | Stop reason: HOSPADM

## 2022-08-03 RX ORDER — GLUCAGON 1 MG
1 KIT INJECTION CONTINUOUS PRN
Status: DISCONTINUED | OUTPATIENT
Start: 2022-08-03 | End: 2022-08-06 | Stop reason: HOSPADM

## 2022-08-03 RX ADMIN — BUTORPHANOL TARTRATE 2 MG: 2 INJECTION, SOLUTION INTRAMUSCULAR; INTRAVENOUS at 01:08

## 2022-08-03 RX ADMIN — MAGNESIUM SULFATE HEPTAHYDRATE 4 G: 40 INJECTION, SOLUTION INTRAVENOUS at 05:08

## 2022-08-03 RX ADMIN — LABETALOL HYDROCHLORIDE 20 MG: 5 INJECTION INTRAVENOUS at 01:08

## 2022-08-03 RX ADMIN — ROPIVACAINE HYDROCHLORIDE 14 ML/HR: 2 INJECTION, SOLUTION EPIDURAL; INFILTRATION at 09:08

## 2022-08-03 RX ADMIN — Medication 25 MCG: at 03:08

## 2022-08-03 RX ADMIN — SODIUM CHLORIDE, SODIUM LACTATE, POTASSIUM CHLORIDE, AND CALCIUM CHLORIDE 1000 ML: .6; .31; .03; .02 INJECTION, SOLUTION INTRAVENOUS at 09:08

## 2022-08-03 RX ADMIN — ONDANSETRON 8 MG: 8 TABLET, ORALLY DISINTEGRATING ORAL at 07:08

## 2022-08-03 RX ADMIN — DEXTROSE 3 MILLION UNITS: 50 INJECTION, SOLUTION INTRAVENOUS at 01:08

## 2022-08-03 RX ADMIN — ONDANSETRON 8 MG: 8 TABLET, ORALLY DISINTEGRATING ORAL at 11:08

## 2022-08-03 RX ADMIN — DEXTROSE 3 MILLION UNITS: 50 INJECTION, SOLUTION INTRAVENOUS at 09:08

## 2022-08-03 RX ADMIN — Medication 2 MILLI-UNITS/MIN: at 10:08

## 2022-08-03 RX ADMIN — ONDANSETRON 8 MG: 8 TABLET, ORALLY DISINTEGRATING ORAL at 04:08

## 2022-08-03 RX ADMIN — ROPIVACAINE HYDROCHLORIDE 5 ML: 2 INJECTION, SOLUTION EPIDURAL; INFILTRATION at 09:08

## 2022-08-03 RX ADMIN — BUTORPHANOL TARTRATE 2 MG: 2 INJECTION, SOLUTION INTRAMUSCULAR; INTRAVENOUS at 08:08

## 2022-08-03 RX ADMIN — MAGNESIUM SULFATE IN WATER 2 G/HR: 40 INJECTION, SOLUTION INTRAVENOUS at 05:08

## 2022-08-03 RX ADMIN — CALCIUM CARBONATE (ANTACID) CHEW TAB 500 MG 500 MG: 500 CHEW TAB at 03:08

## 2022-08-03 RX ADMIN — ROPIVACAINE HYDROCHLORIDE 14 ML/HR: 2 INJECTION, SOLUTION EPIDURAL; INFILTRATION at 04:08

## 2022-08-03 RX ADMIN — DEXTROSE, SODIUM CHLORIDE, SODIUM LACTATE, POTASSIUM CHLORIDE, AND CALCIUM CHLORIDE 30 ML/HR: 5; .6; .31; .03; .02 INJECTION, SOLUTION INTRAVENOUS at 07:08

## 2022-08-03 RX ADMIN — DEXTROSE 5 MILLION UNITS: 50 INJECTION, SOLUTION INTRAVENOUS at 10:08

## 2022-08-03 RX ADMIN — SODIUM CHLORIDE, SODIUM LACTATE, POTASSIUM CHLORIDE, AND CALCIUM CHLORIDE 1000 ML: .6; .31; .03; .02 INJECTION, SOLUTION INTRAVENOUS at 07:08

## 2022-08-03 RX ADMIN — LIDOCAINE HYDROCHLORIDE,EPINEPHRINE BITARTRATE 3 ML: 15; .005 INJECTION, SOLUTION EPIDURAL; INFILTRATION; INTRACAUDAL; PERINEURAL at 09:08

## 2022-08-03 RX ADMIN — DEXTROSE 3 MILLION UNITS: 50 INJECTION, SOLUTION INTRAVENOUS at 06:08

## 2022-08-03 RX ADMIN — LABETALOL HYDROCHLORIDE 20 MG: 5 INJECTION INTRAVENOUS at 08:08

## 2022-08-03 RX ADMIN — FAMOTIDINE 20 MG: 20 TABLET ORAL at 04:08

## 2022-08-03 RX ADMIN — BUPIVACAINE HYDROCHLORIDE 10 ML: 2.5 INJECTION, SOLUTION INFILTRATION; PERINEURAL at 09:08

## 2022-08-03 RX ADMIN — LABETALOL HYDROCHLORIDE 40 MG: 5 INJECTION INTRAVENOUS at 04:08

## 2022-08-03 RX ADMIN — SODIUM CHLORIDE, SODIUM LACTATE, POTASSIUM CHLORIDE, AND CALCIUM CHLORIDE 1000 ML: .6; .31; .03; .02 INJECTION, SOLUTION INTRAVENOUS at 05:08

## 2022-08-03 RX ADMIN — MAGNESIUM SULFATE IN WATER 2 G/HR: 40 INJECTION, SOLUTION INTRAVENOUS at 09:08

## 2022-08-03 RX ADMIN — DEXTROSE 3 MILLION UNITS: 50 INJECTION, SOLUTION INTRAVENOUS at 02:08

## 2022-08-03 RX ADMIN — BUPIVACAINE HYDROCHLORIDE 10 ML: 2.5 INJECTION, SOLUTION INFILTRATION; PERINEURAL at 11:08

## 2022-08-03 RX ADMIN — SODIUM CHLORIDE 1.8 UNITS/HR: 9 INJECTION, SOLUTION INTRAVENOUS at 11:08

## 2022-08-03 NOTE — ASSESSMENT & PLAN NOTE
Admit Inpatient  Regular diet now, then NPO  IV fluids  Collect GBS and Covid swab  Treat GBS when in active labor  Induction method TBD when cervix examined.  Labetalol protocol, notify if needing to treat, May need magnesium sulfate, pt aware.  May have IV meds or TAMEKA when desires   Anticipate progress toward vaginal delivery.  8/3/22- 0725am- Cytotec IOL in progress for severe pre-eclampsia. PCR 1.4. BMZ series complete. MgSO4 infusing. Insulin drip orders placed for GDM on Metformin per MD. Will consider cervical balloon placement if able at next SVE.

## 2022-08-03 NOTE — ANESTHESIA PROCEDURE NOTES
Epidural    Patient location during procedure: OB   Reason for block: primary anesthetic   Reason for block: labor analgesia requested by patient and obstetrician  Diagnosis: IUP   Start time: 8/3/2022 9:21 AM  Timeout: 8/3/2022 9:20 AM  End time: 8/3/2022 9:50 AM  Surgery related to: Planned vaginal delivery    Staffing  Performing Provider: Magan Nunez Jr., CRNA  Authorizing Provider: Musa Leroy MD        Preanesthetic Checklist  Completed: patient identified, IV checked, site marked, risks and benefits discussed, surgical consent, monitors and equipment checked, pre-op evaluation, timeout performed, anesthesia consent given, hand hygiene performed and patient being monitored  Preparation  Patient position: sitting  Prep: Betadine  Patient monitoring: Pulse Ox and Blood Pressure  Reason for block: primary anesthetic   Epidural  Skin Anesthetic: lidocaine 1%  Skin Wheal: 3 mL  Administration type: continuous  Approach: midline  Interspace: L3-4    Injection technique: RAMIRO air  Needle and Epidural Catheter  Needle type: Tuohy   Needle gauge: 17  Needle length: 3.5 inches  Needle insertion depth: 8 cm  Catheter type: springwound and multi-orifice  Catheter size: 18 G  Catheter at skin depth: 15 cm  Insertion Attempts: 1  Test dose: 3 mL of lidocaine 1.5% with Epi 1-to-200,000  Additional Documentation: incremental injection, negative aspiration for heme and CSF, no paresthesia on injection, no signs/symptoms of IV or SA injection, no significant pain on injection and no significant complaints from patient  Needle localization: anatomical landmarks  Medications:  Volume per aspiration: 0 mL  Time between aspirations: 2 minutes   Assessment  Upper dermatomal levels - Left: T10  Right: T10   Dermatomal levels determined by pinch or prick  Ease of block: easy  Patient's tolerance of the procedure: comfortable throughout block and no complaints No inadvertent dural puncture with Tuohy.  Dural puncture not  performed with spinal needle

## 2022-08-03 NOTE — LACTATION NOTE

## 2022-08-03 NOTE — ANESTHESIA PREPROCEDURE EVALUATION
08/02/2022  Kathy Mishra is a 26 y.o., female.      Pre-op Assessment    I have reviewed the Patient Summary Reports.     I have reviewed the Nursing Notes. I have reviewed the NPO Status.   I have reviewed the Medications.     Review of Systems  Anesthesia Hx:  History of prior surgery of interest to airway management or planning: Denies Family Hx of Anesthesia complications.   Denies Personal Hx of Anesthesia complications.   Social:  Smoker, No Alcohol Use    Hematology/Oncology:  Hematology Normal   Oncology Normal     EENT/Dental:EENT/Dental Normal   Cardiovascular:   Hypertension    Pulmonary:   Asthma    Renal/:  Renal/ Normal     Hepatic/GI:  Hepatic/GI Normal    Musculoskeletal:  Musculoskeletal Normal    Neurological:   Headaches    Endocrine:   Diabetes, gestational Hypothyroidism  Morbid Obesity / BMI > 40  Dermatological:  Skin Normal    Psych:  Psychiatric Normal           Physical Exam  General: Well nourished, Cooperative, Alert and Oriented    Airway:  Mallampati: II   Mouth Opening: Normal  TM Distance: Normal  Tongue: Normal  Neck ROM: Normal ROM    Dental:  Intact        Anesthesia Plan  Type of Anesthesia, risks & benefits discussed:    Anesthesia Type: Epidural  Informed Consent: Informed consent signed with the Patient and all parties understand the risks and agree with anesthesia plan.  All questions answered. Patient consented to blood products? Yes  ASA Score: 2    Ready For Surgery From Anesthesia Perspective.     .

## 2022-08-03 NOTE — PROGRESS NOTES
S: Doing well, ready to start induction.  O:  VS reviewed, afebrile   Vitals:    08/02/22 1500 08/02/22 1600 08/02/22 1700 08/02/22 1800   BP: (!) 156/89 (!) 168/82 (!) 146/88 (!) 137/103   Pulse: 82 81 93 87   SpO2: 95% (!) 94% 98% 98%       FHTs: 150 with moderate variability and accels, Cat 1, reassuring.  UC none  SVE: 1/40/-3, ballotable.    A: IUP @ 34w6d ; IOL for severe Pre-E    Patient Active Problem List   Diagnosis    History of miscarriage, currently pregnant    Pre-existing hypertension affecting pregnancy in third trimester    History of thyroid disorder    Class 3 severe obesity due to excess calories with serious comorbidity and body mass index (BMI) of 45.0 to 49.9 in adult    Gestational diabetes mellitus (GDM) in third trimester controlled on oral hypoglycemic drug    Asthma    Hypertension    Hypothyroidism    Intractable migraine without aura and without status migrainosus    Vitamin D deficiency    Elevated blood pressure reading    Obesity complicating pregnancy in third trimester       P:   Continue close monitoring of maternal/fetal status  Start Cytotec PV/PO  Anticipate progress toward vaginal delivery.    Brittney

## 2022-08-03 NOTE — SUBJECTIVE & OBJECTIVE
Interval History:  Kathy is a 26 y.o.  at 35w0d. She is doing well.     Objective:     Vital Signs (Most Recent):  Pulse: 78 (22)  BP: (!) 162/96 (22)  SpO2: 98 % (22)   Vital Signs (24h Range):  Pulse:  [0-109] 78  SpO2:  [70 %-100 %] 98 %  BP: (137-181)/() 162/96        There is no height or weight on file to calculate BMI.    FHT: 120 Cat 1 (reassuring)  TOCO:  Q 2.5-5 minutes    Cervical Exam: per Rns last exam with cytotec placement  Dilation:  1  Effacement:  40  Station: -4  Presentation: Vertex     Significant Labs:  Lab Results   Component Value Date    GROUPTRH A POS 2022    HEPBSAG Negative 2022       I have personallly reviewed all pertinent lab results from the last 24 hours.    Physical Exam:   Constitutional: She is oriented to person, place, and time. She appears well-developed and well-nourished.    HENT:   Head: Normocephalic.    Eyes: Conjunctivae are normal.      Pulmonary/Chest: Effort normal.        Abdominal: Soft.     Genitourinary:    Vagina normal.             Musculoskeletal: Normal range of motion. Edema present.       Neurological: She is alert and oriented to person, place, and time.    Skin: Skin is warm and dry.    Psychiatric: She has a normal mood and affect. Her behavior is normal. Judgment and thought content normal.

## 2022-08-03 NOTE — PROGRESS NOTES
[facility] - Labor & Delivery  Obstetrics  Labor Progress Note    Patient Name: Kathy Mishra  MRN: [id number]  Admission Date: 2022  Hospital Length of Stay: 1 days  Attending Physician: Bhumi Garcia MD  Primary Care Provider: Primary Doctor No    Subjective:     Principal Problem:Chronic hypertension with superimposed pre-eclampsia    Hospital Course:  Admit Inpatient  Regular diet now, then NPO  IV fluids  Collect GBS and Covid swab  Treat GBS when in active labor  Induction method TBD when cervix examined.  Labetalol protocol, notify if needing to treat, May need magnesium sulfate, pt aware.  May have IV meds or TAMEKA when desires  Anticipate progress toward vaginal delivery.  8/3/22- 725am- Cytotec IOL in progress for severe pre-eclampsia. MgSO4 infusing. Insulin drip orders placed for GDM on Metformin per MD. Will consider cervical balloon placement if able at next SVE.       Interval History:  Kathy is a 26 y.o.  at 35w0d. She is doing well.     Objective:     Vital Signs (Most Recent):  Pulse: 78 (22)  BP: (!) 162/96 (22)  SpO2: 98 % (22)   Vital Signs (24h Range):  Pulse:  [0-109] 78  SpO2:  [70 %-100 %] 98 %  BP: (137-181)/() 162/96        There is no height or weight on file to calculate BMI.    FHT: 120 Cat 1 (reassuring)  TOCO:  Q 2.5-5 minutes    Cervical Exam: per Rns last exam with cytotec placement  Dilation:  1  Effacement:  40  Station: -4  Presentation: Vertex     Significant Labs:  Lab Results   Component Value Date    GROUPTRH A POS 2022    HEPBSAG Negative 2022       I have personallly reviewed all pertinent lab results from the last 24 hours.    Physical Exam:   Constitutional: She is oriented to person, place, and time. She appears well-developed and well-nourished.    HENT:   Head: Normocephalic.    Eyes: Conjunctivae are normal.      Pulmonary/Chest: Effort normal.        Abdominal: Soft.     Genitourinary:    Vagina normal.              Musculoskeletal: Normal range of motion. Edema present.       Neurological: She is alert and oriented to person, place, and time.    Skin: Skin is warm and dry.    Psychiatric: She has a normal mood and affect. Her behavior is normal. Judgment and thought content normal.     Assessment/Plan:     26 y.o. female  at 35w0d for:    * Chronic hypertension with superimposed pre-eclampsia  Admit Inpatient  Regular diet now, then NPO  IV fluids  Collect GBS and Covid swab  Treat GBS when in active labor  Induction method TBD when cervix examined.  Labetalol protocol, notify if needing to treat, May need magnesium sulfate, pt aware.  May have IV meds or TAMEKA when desires   Anticipate progress toward vaginal delivery.  8/3/22- 0725am- Cytotec IOL in progress for severe pre-eclampsia. PCR 1.4. BMZ series complete. MgSO4 infusing. Insulin drip orders placed for GDM on Metformin per MD. Will consider cervical balloon placement if able at next SVE.     Obesity complicating pregnancy in third trimester  Lovenox PP      Hypothyroidism  No meds.     Gestational diabetes mellitus (GDM) in third trimester controlled on oral hypoglycemic drug  Insulin drip pathway for BG monitoring           Fang Brown CNM  Obstetrics  [facility] - Labor & Delivery

## 2022-08-03 NOTE — PLAN OF CARE
[facility] - Labor & Delivery  Discharge Assessment    Primary Care Provider: Primary Doctor No     OB Screen (most recent)       OB Screen - 22 1508          OB SCREEN    Assessment Type Discharge Planning Assessment (P)      Source of Information health record (P)      Received Prenatal Care Yes (P)      Any indications/suspicions for None (P)      Is this a teen pregnancy No (P)      Is the baby in NICU No (P)      Indication for adoption/[facility] No (P)      Indication for DME/post-acute needs No (P)      HIV (+) No (P)      Any congenital  disorders No (P)      Fetal demise/ death No (P)

## 2022-08-04 LAB
POCT GLUCOSE: 104 MG/DL (ref 70–110)
POCT GLUCOSE: 108 MG/DL (ref 70–110)
POCT GLUCOSE: 111 MG/DL (ref 70–110)
POCT GLUCOSE: 113 MG/DL (ref 70–110)
POCT GLUCOSE: 121 MG/DL (ref 70–110)
POCT GLUCOSE: 79 MG/DL (ref 70–110)
POCT GLUCOSE: 85 MG/DL (ref 70–110)
POCT GLUCOSE: 93 MG/DL (ref 70–110)
POCT GLUCOSE: 93 MG/DL (ref 70–110)
POCT GLUCOSE: 95 MG/DL (ref 70–110)
POCT GLUCOSE: 96 MG/DL (ref 70–110)
POCT GLUCOSE: 98 MG/DL (ref 70–110)

## 2022-08-04 PROCEDURE — 62322 NJX INTERLAMINAR LMBR/SAC: CPT | Performed by: NURSE ANESTHETIST, CERTIFIED REGISTERED

## 2022-08-04 PROCEDURE — 25000003 PHARM REV CODE 250: Performed by: OBSTETRICS & GYNECOLOGY

## 2022-08-04 PROCEDURE — 71000039 HC RECOVERY, EACH ADD'L HOUR: Performed by: OBSTETRICS & GYNECOLOGY

## 2022-08-04 PROCEDURE — 63600175 PHARM REV CODE 636 W HCPCS: Performed by: MIDWIFE

## 2022-08-04 PROCEDURE — 36004724 HC OB OR TIME LEV III - 1ST 15 MIN: Performed by: OBSTETRICS & GYNECOLOGY

## 2022-08-04 PROCEDURE — 59514 PR CESAREAN DELIVERY ONLY: ICD-10-PCS | Mod: AT,,, | Performed by: OBSTETRICS & GYNECOLOGY

## 2022-08-04 PROCEDURE — 63600175 PHARM REV CODE 636 W HCPCS: Performed by: NURSE ANESTHETIST, CERTIFIED REGISTERED

## 2022-08-04 PROCEDURE — 63600175 PHARM REV CODE 636 W HCPCS: Performed by: ADVANCED PRACTICE MIDWIFE

## 2022-08-04 PROCEDURE — 25000003 PHARM REV CODE 250: Performed by: ADVANCED PRACTICE MIDWIFE

## 2022-08-04 PROCEDURE — 63600175 PHARM REV CODE 636 W HCPCS: Performed by: OBSTETRICS & GYNECOLOGY

## 2022-08-04 PROCEDURE — 11000001 HC ACUTE MED/SURG PRIVATE ROOM

## 2022-08-04 PROCEDURE — 59514 CESAREAN DELIVERY ONLY: CPT | Mod: AS,AT,, | Performed by: PHYSICIAN ASSISTANT

## 2022-08-04 PROCEDURE — 64488 TAP BLOCK BI INJECTION: CPT | Performed by: NURSE ANESTHETIST, CERTIFIED REGISTERED

## 2022-08-04 PROCEDURE — 37000009 HC ANESTHESIA EA ADD 15 MINS: Performed by: OBSTETRICS & GYNECOLOGY

## 2022-08-04 PROCEDURE — 59514 PR CESAREAN DELIVERY ONLY: ICD-10-PCS | Mod: AS,AT,, | Performed by: PHYSICIAN ASSISTANT

## 2022-08-04 PROCEDURE — 25000003 PHARM REV CODE 250: Performed by: NURSE ANESTHETIST, CERTIFIED REGISTERED

## 2022-08-04 PROCEDURE — 71000033 HC RECOVERY, INTIAL HOUR: Performed by: OBSTETRICS & GYNECOLOGY

## 2022-08-04 PROCEDURE — 72100003 HC LABOR CARE, EA. ADDL. 8 HRS

## 2022-08-04 PROCEDURE — 36004725 HC OB OR TIME LEV III - EA ADD 15 MIN: Performed by: OBSTETRICS & GYNECOLOGY

## 2022-08-04 PROCEDURE — 59514 CESAREAN DELIVERY ONLY: CPT | Mod: AT,,, | Performed by: OBSTETRICS & GYNECOLOGY

## 2022-08-04 PROCEDURE — 37000008 HC ANESTHESIA 1ST 15 MINUTES: Performed by: OBSTETRICS & GYNECOLOGY

## 2022-08-04 RX ORDER — ONDANSETRON 8 MG/1
8 TABLET, ORALLY DISINTEGRATING ORAL EVERY 8 HOURS PRN
Status: DISCONTINUED | OUTPATIENT
Start: 2022-08-04 | End: 2022-08-06 | Stop reason: HOSPADM

## 2022-08-04 RX ORDER — IBUPROFEN 400 MG/1
800 TABLET ORAL EVERY 8 HOURS
Status: DISCONTINUED | OUTPATIENT
Start: 2022-08-05 | End: 2022-08-05

## 2022-08-04 RX ORDER — BUPIVACAINE HYDROCHLORIDE 2.5 MG/ML
INJECTION, SOLUTION EPIDURAL; INFILTRATION; INTRACAUDAL
Status: DISCONTINUED | OUTPATIENT
Start: 2022-08-04 | End: 2022-08-04

## 2022-08-04 RX ORDER — DIPHENHYDRAMINE HYDROCHLORIDE 50 MG/ML
25 INJECTION INTRAMUSCULAR; INTRAVENOUS EVERY 4 HOURS PRN
Status: DISCONTINUED | OUTPATIENT
Start: 2022-08-04 | End: 2022-08-06 | Stop reason: HOSPADM

## 2022-08-04 RX ORDER — FAMOTIDINE 10 MG/ML
20 INJECTION INTRAVENOUS
Status: DISCONTINUED | OUTPATIENT
Start: 2022-08-04 | End: 2022-08-06 | Stop reason: HOSPADM

## 2022-08-04 RX ORDER — HYDROCODONE BITARTRATE AND ACETAMINOPHEN 5; 325 MG/1; MG/1
1 TABLET ORAL EVERY 4 HOURS PRN
Status: DISCONTINUED | OUTPATIENT
Start: 2022-08-04 | End: 2022-08-05

## 2022-08-04 RX ORDER — PHENYLEPHRINE HYDROCHLORIDE 10 MG/ML
INJECTION INTRAVENOUS
Status: DISCONTINUED | OUTPATIENT
Start: 2022-08-04 | End: 2022-08-04

## 2022-08-04 RX ORDER — DIPHENHYDRAMINE HCL 25 MG
25 CAPSULE ORAL EVERY 4 HOURS PRN
Status: DISCONTINUED | OUTPATIENT
Start: 2022-08-04 | End: 2022-08-06 | Stop reason: HOSPADM

## 2022-08-04 RX ORDER — OXYTOCIN/RINGER'S LACTATE 30/500 ML
95 PLASTIC BAG, INJECTION (ML) INTRAVENOUS ONCE
Status: DISCONTINUED | OUTPATIENT
Start: 2022-08-04 | End: 2022-08-04

## 2022-08-04 RX ORDER — SODIUM CHLORIDE 0.9 % (FLUSH) 0.9 %
10 SYRINGE (ML) INJECTION
Status: DISCONTINUED | OUTPATIENT
Start: 2022-08-04 | End: 2022-08-06 | Stop reason: HOSPADM

## 2022-08-04 RX ORDER — NALBUPHINE HYDROCHLORIDE 10 MG/ML
2.5 INJECTION, SOLUTION INTRAMUSCULAR; INTRAVENOUS; SUBCUTANEOUS ONCE
Status: DISCONTINUED | OUTPATIENT
Start: 2022-08-04 | End: 2022-08-06 | Stop reason: HOSPADM

## 2022-08-04 RX ORDER — MISOPROSTOL 200 UG/1
800 TABLET ORAL
Status: DISCONTINUED | OUTPATIENT
Start: 2022-08-04 | End: 2022-08-06 | Stop reason: HOSPADM

## 2022-08-04 RX ORDER — METHYLERGONOVINE MALEATE 0.2 MG/ML
200 INJECTION INTRAVENOUS
Status: DISCONTINUED | OUTPATIENT
Start: 2022-08-04 | End: 2022-08-06 | Stop reason: HOSPADM

## 2022-08-04 RX ORDER — ONDANSETRON 2 MG/ML
INJECTION INTRAMUSCULAR; INTRAVENOUS
Status: DISCONTINUED | OUTPATIENT
Start: 2022-08-04 | End: 2022-08-04

## 2022-08-04 RX ORDER — PROCHLORPERAZINE EDISYLATE 5 MG/ML
5 INJECTION INTRAMUSCULAR; INTRAVENOUS EVERY 6 HOURS PRN
Status: DISCONTINUED | OUTPATIENT
Start: 2022-08-04 | End: 2022-08-06 | Stop reason: HOSPADM

## 2022-08-04 RX ORDER — ENOXAPARIN SODIUM 100 MG/ML
40 INJECTION SUBCUTANEOUS EVERY 12 HOURS
Status: DISCONTINUED | OUTPATIENT
Start: 2022-08-05 | End: 2022-08-06 | Stop reason: HOSPADM

## 2022-08-04 RX ORDER — ENOXAPARIN SODIUM 100 MG/ML
40 INJECTION SUBCUTANEOUS EVERY 24 HOURS
Status: DISCONTINUED | OUTPATIENT
Start: 2022-08-05 | End: 2022-08-04 | Stop reason: DRUGHIGH

## 2022-08-04 RX ORDER — OXYTOCIN/RINGER'S LACTATE 30/500 ML
334 PLASTIC BAG, INJECTION (ML) INTRAVENOUS ONCE
Status: DISCONTINUED | OUTPATIENT
Start: 2022-08-04 | End: 2022-08-04

## 2022-08-04 RX ORDER — BUPIVACAINE HYDROCHLORIDE 7.5 MG/ML
INJECTION, SOLUTION EPIDURAL; RETROBULBAR
Status: DISCONTINUED | OUTPATIENT
Start: 2022-08-04 | End: 2022-08-04

## 2022-08-04 RX ORDER — CHLORHEXIDINE GLUCONATE ORAL RINSE 1.2 MG/ML
10 SOLUTION DENTAL 2 TIMES DAILY
Status: DISCONTINUED | OUTPATIENT
Start: 2022-08-04 | End: 2022-08-06 | Stop reason: HOSPADM

## 2022-08-04 RX ORDER — SODIUM CHLORIDE, SODIUM LACTATE, POTASSIUM CHLORIDE, CALCIUM CHLORIDE 600; 310; 30; 20 MG/100ML; MG/100ML; MG/100ML; MG/100ML
INJECTION, SOLUTION INTRAVENOUS CONTINUOUS PRN
Status: DISCONTINUED | OUTPATIENT
Start: 2022-08-04 | End: 2022-08-04

## 2022-08-04 RX ORDER — CARBOPROST TROMETHAMINE 250 UG/ML
250 INJECTION, SOLUTION INTRAMUSCULAR
Status: DISCONTINUED | OUTPATIENT
Start: 2022-08-04 | End: 2022-08-06 | Stop reason: HOSPADM

## 2022-08-04 RX ORDER — KETOROLAC TROMETHAMINE 30 MG/ML
30 INJECTION, SOLUTION INTRAMUSCULAR; INTRAVENOUS EVERY 8 HOURS
Status: DISCONTINUED | OUTPATIENT
Start: 2022-08-04 | End: 2022-08-05

## 2022-08-04 RX ORDER — MAGNESIUM SULFATE HEPTAHYDRATE 40 MG/ML
2 INJECTION, SOLUTION INTRAVENOUS
Status: DISCONTINUED | OUTPATIENT
Start: 2022-08-05 | End: 2022-08-04 | Stop reason: SDUPTHER

## 2022-08-04 RX ORDER — OXYTOCIN/RINGER'S LACTATE 30/500 ML
PLASTIC BAG, INJECTION (ML) INTRAVENOUS CONTINUOUS PRN
Status: DISCONTINUED | OUTPATIENT
Start: 2022-08-04 | End: 2022-08-04

## 2022-08-04 RX ORDER — LABETALOL 200 MG/1
400 TABLET, FILM COATED ORAL EVERY 12 HOURS
Status: DISCONTINUED | OUTPATIENT
Start: 2022-08-04 | End: 2022-08-06 | Stop reason: HOSPADM

## 2022-08-04 RX ORDER — NALOXONE HCL 0.4 MG/ML
0.04 VIAL (ML) INJECTION EVERY 5 MIN PRN
Status: DISCONTINUED | OUTPATIENT
Start: 2022-08-04 | End: 2022-08-06 | Stop reason: HOSPADM

## 2022-08-04 RX ORDER — ONDANSETRON 2 MG/ML
4 INJECTION INTRAMUSCULAR; INTRAVENOUS EVERY 6 HOURS PRN
Status: DISCONTINUED | OUTPATIENT
Start: 2022-08-04 | End: 2022-08-06 | Stop reason: HOSPADM

## 2022-08-04 RX ORDER — SODIUM CITRATE AND CITRIC ACID MONOHYDRATE 334; 500 MG/5ML; MG/5ML
30 SOLUTION ORAL
Status: DISCONTINUED | OUTPATIENT
Start: 2022-08-04 | End: 2022-08-06 | Stop reason: HOSPADM

## 2022-08-04 RX ORDER — HYDROCODONE BITARTRATE AND ACETAMINOPHEN 10; 325 MG/1; MG/1
1 TABLET ORAL EVERY 4 HOURS PRN
Status: DISCONTINUED | OUTPATIENT
Start: 2022-08-04 | End: 2022-08-05

## 2022-08-04 RX ORDER — METFORMIN HYDROCHLORIDE 500 MG/1
500 TABLET ORAL 2 TIMES DAILY WITH MEALS
Status: DISCONTINUED | OUTPATIENT
Start: 2022-08-04 | End: 2022-08-06 | Stop reason: HOSPADM

## 2022-08-04 RX ORDER — AMOXICILLIN 250 MG
1 CAPSULE ORAL NIGHTLY PRN
Status: DISCONTINUED | OUTPATIENT
Start: 2022-08-04 | End: 2022-08-06 | Stop reason: HOSPADM

## 2022-08-04 RX ORDER — OXYTOCIN/RINGER'S LACTATE 30/500 ML
95 PLASTIC BAG, INJECTION (ML) INTRAVENOUS ONCE
Status: DISCONTINUED | OUTPATIENT
Start: 2022-08-04 | End: 2022-08-06 | Stop reason: HOSPADM

## 2022-08-04 RX ORDER — DEXAMETHASONE SODIUM PHOSPHATE 4 MG/ML
INJECTION, SOLUTION INTRA-ARTICULAR; INTRALESIONAL; INTRAMUSCULAR; INTRAVENOUS; SOFT TISSUE
Status: DISCONTINUED | OUTPATIENT
Start: 2022-08-04 | End: 2022-08-04

## 2022-08-04 RX ORDER — SODIUM CHLORIDE, SODIUM LACTATE, POTASSIUM CHLORIDE, CALCIUM CHLORIDE 600; 310; 30; 20 MG/100ML; MG/100ML; MG/100ML; MG/100ML
INJECTION, SOLUTION INTRAVENOUS CONTINUOUS
Status: DISCONTINUED | OUTPATIENT
Start: 2022-08-04 | End: 2022-08-04

## 2022-08-04 RX ORDER — SIMETHICONE 80 MG
1 TABLET,CHEWABLE ORAL EVERY 6 HOURS PRN
Status: DISCONTINUED | OUTPATIENT
Start: 2022-08-04 | End: 2022-08-06 | Stop reason: HOSPADM

## 2022-08-04 RX ORDER — FENTANYL CITRATE 50 UG/ML
INJECTION, SOLUTION INTRAMUSCULAR; INTRAVENOUS
Status: DISCONTINUED | OUTPATIENT
Start: 2022-08-04 | End: 2022-08-04

## 2022-08-04 RX ORDER — KETOROLAC TROMETHAMINE 30 MG/ML
INJECTION, SOLUTION INTRAMUSCULAR; INTRAVENOUS
Status: DISCONTINUED | OUTPATIENT
Start: 2022-08-04 | End: 2022-08-04

## 2022-08-04 RX ORDER — EPHEDRINE SULFATE 50 MG/ML
INJECTION, SOLUTION INTRAVENOUS
Status: DISCONTINUED | OUTPATIENT
Start: 2022-08-04 | End: 2022-08-04

## 2022-08-04 RX ORDER — ACETAMINOPHEN 325 MG/1
650 TABLET ORAL EVERY 6 HOURS PRN
Status: DISCONTINUED | OUTPATIENT
Start: 2022-08-04 | End: 2022-08-06 | Stop reason: HOSPADM

## 2022-08-04 RX ORDER — BISACODYL 10 MG
10 SUPPOSITORY, RECTAL RECTAL ONCE AS NEEDED
Status: DISCONTINUED | OUTPATIENT
Start: 2022-08-04 | End: 2022-08-06 | Stop reason: HOSPADM

## 2022-08-04 RX ORDER — MORPHINE SULFATE 1 MG/ML
INJECTION, SOLUTION EPIDURAL; INTRATHECAL; INTRAVENOUS
Status: DISCONTINUED | OUTPATIENT
Start: 2022-08-04 | End: 2022-08-04

## 2022-08-04 RX ORDER — DIPHENHYDRAMINE HYDROCHLORIDE 50 MG/ML
12.5 INJECTION INTRAMUSCULAR; INTRAVENOUS
Status: DISCONTINUED | OUTPATIENT
Start: 2022-08-04 | End: 2022-08-06 | Stop reason: HOSPADM

## 2022-08-04 RX ORDER — ADHESIVE BANDAGE
30 BANDAGE TOPICAL EVERY 6 HOURS PRN
Status: DISCONTINUED | OUTPATIENT
Start: 2022-08-05 | End: 2022-08-06 | Stop reason: HOSPADM

## 2022-08-04 RX ADMIN — BUPIVACAINE HYDROCHLORIDE 5 ML: 2.5 INJECTION, SOLUTION INFILTRATION; PERINEURAL at 12:08

## 2022-08-04 RX ADMIN — AZITHROMYCIN MONOHYDRATE 500 MG: 500 INJECTION, POWDER, LYOPHILIZED, FOR SOLUTION INTRAVENOUS at 12:08

## 2022-08-04 RX ADMIN — BUTORPHANOL TARTRATE 2 MG: 2 INJECTION, SOLUTION INTRAMUSCULAR; INTRAVENOUS at 04:08

## 2022-08-04 RX ADMIN — BUPIVACAINE HYDROCHLORIDE 20 ML: 2.5 INJECTION, SOLUTION INFILTRATION; PERINEURAL at 06:08

## 2022-08-04 RX ADMIN — ONDANSETRON 8 MG: 8 TABLET, ORALLY DISINTEGRATING ORAL at 10:08

## 2022-08-04 RX ADMIN — DEXTROSE 3 MILLION UNITS: 50 INJECTION, SOLUTION INTRAVENOUS at 04:08

## 2022-08-04 RX ADMIN — DEXTROSE 3 MILLION UNITS: 50 INJECTION, SOLUTION INTRAVENOUS at 01:08

## 2022-08-04 RX ADMIN — LABETALOL HYDROCHLORIDE 400 MG: 200 TABLET, FILM COATED ORAL at 06:08

## 2022-08-04 RX ADMIN — PHENYLEPHRINE HYDROCHLORIDE 200 MCG: 10 INJECTION INTRAVENOUS at 12:08

## 2022-08-04 RX ADMIN — MAGNESIUM SULFATE IN WATER 2 G/HR: 40 INJECTION, SOLUTION INTRAVENOUS at 08:08

## 2022-08-04 RX ADMIN — EPHEDRINE SULFATE 50 MG: 50 INJECTION INTRAVENOUS at 01:08

## 2022-08-04 RX ADMIN — KETOROLAC TROMETHAMINE 30 MG: 30 INJECTION, SOLUTION INTRAMUSCULAR; INTRAVENOUS at 10:08

## 2022-08-04 RX ADMIN — PHENYLEPHRINE HYDROCHLORIDE 200 MCG: 10 INJECTION INTRAVENOUS at 01:08

## 2022-08-04 RX ADMIN — CEFAZOLIN 3 G: 1 INJECTION, POWDER, FOR SOLUTION INTRAMUSCULAR; INTRAVENOUS at 12:08

## 2022-08-04 RX ADMIN — CHLORHEXIDINE GLUCONATE 0.12% ORAL RINSE 10 ML: 1.2 LIQUID ORAL at 10:08

## 2022-08-04 RX ADMIN — DEXAMETHASONE SODIUM PHOSPHATE 8 MG: 4 INJECTION, SOLUTION INTRA-ARTICULAR; INTRALESIONAL; INTRAMUSCULAR; INTRAVENOUS; SOFT TISSUE at 02:08

## 2022-08-04 RX ADMIN — Medication 334 ML/HR: at 01:08

## 2022-08-04 RX ADMIN — SODIUM CHLORIDE, SODIUM LACTATE, POTASSIUM CHLORIDE, AND CALCIUM CHLORIDE: 600; 310; 30; 20 INJECTION, SOLUTION INTRAVENOUS at 12:08

## 2022-08-04 RX ADMIN — METFORMIN HYDROCHLORIDE 500 MG: 500 TABLET ORAL at 07:08

## 2022-08-04 RX ADMIN — KETOROLAC TROMETHAMINE 30 MG: 30 INJECTION, SOLUTION INTRAMUSCULAR at 02:08

## 2022-08-04 RX ADMIN — FENTANYL CITRATE 100 MCG: 50 INJECTION, SOLUTION INTRAMUSCULAR; INTRAVENOUS at 06:08

## 2022-08-04 RX ADMIN — Medication 2 MILLI-UNITS/MIN: at 08:08

## 2022-08-04 RX ADMIN — MORPHINE SULFATE 0.1 MG: 1 INJECTION, SOLUTION EPIDURAL; INTRATHECAL; INTRAVENOUS at 12:08

## 2022-08-04 RX ADMIN — ONDANSETRON 4 MG: 2 INJECTION, SOLUTION INTRAMUSCULAR; INTRAVENOUS at 12:08

## 2022-08-04 RX ADMIN — DEXTROSE 3 MILLION UNITS: 50 INJECTION, SOLUTION INTRAVENOUS at 10:08

## 2022-08-04 RX ADMIN — BUPIVACAINE HYDROCHLORIDE 1.7 ML: 7.5 INJECTION, SOLUTION EPIDURAL; RETROBULBAR at 12:08

## 2022-08-04 RX ADMIN — ALUMINUM HYDROXIDE, MAGNESIUM HYDROXIDE, AND SIMETHICONE 30 ML: 200; 200; 20 SUSPENSION ORAL at 07:08

## 2022-08-04 RX ADMIN — BUPIVACAINE HYDROCHLORIDE 40 ML: 2.5 INJECTION, SOLUTION EPIDURAL; INFILTRATION; INTRACAUDAL; PERINEURAL at 02:08

## 2022-08-04 NOTE — ANESTHESIA PROCEDURE NOTES
Spinal    Diagnosis: iup, failure to progress  Patient location during procedure: OB  Start time: 8/4/2022 12:40 PM  Timeout: 8/4/2022 12:38 PM  End time: 8/4/2022 12:45 PM    Staffing  Authorizing Provider: Musa Leroy MD  Performing Provider: Magan Nunez Jr., CRNA    Preanesthetic Checklist  Completed: patient identified, IV checked, site marked, risks and benefits discussed, surgical consent, monitors and equipment checked, pre-op evaluation and timeout performed  Spinal Block  Patient position: sitting  Prep: Betadine  Patient monitoring: heart rate, cardiac monitor and continuous pulse ox  Approach: midline  Location: L3-4  Injection technique: single shot  CSF Fluid: clear free-flowing CSF  Needle  Needle type: Annette   Needle gauge: 25 G  Needle length: 3.5 in  Additional Documentation: no paresthesia on injection and negative aspiration for heme  Needle localization: anatomical landmarks  Assessment  Sensory level: T5   Dermatomal levels determined by pinch or prick  Ease of block: easy  Patient's tolerance of the procedure: comfortable throughout block and no complaints  Additional Notes  CSE performed

## 2022-08-04 NOTE — SUBJECTIVE & OBJECTIVE
Interval History:  Kathy is a 26 y.o.  at 35w1d. She is doing well. Family at bedside.     Objective:     Vital Signs (Most Recent):  Temp: 98.7 °F (37.1 °C) (22 0900)  Pulse: 97 (22 0901)  Resp: 18 (22 09)  BP: (!) 149/94 (22 09)  SpO2: (!) 93 % (22 08)   Vital Signs (24h Range):  Temp:  [97.7 °F (36.5 °C)-98.7 °F (37.1 °C)] 98.7 °F (37.1 °C)  Pulse:  [] 97  Resp:  [18-20] 18  SpO2:  [70 %-100 %] 93 %  BP: ()/() 149/94        There is no height or weight on file to calculate BMI.    FHT: 125 Cat 1 (reassuring)  TOCO:  Q 5-8 minutes    Cervical Exam: per RN   Dilation:  5-6  Effacement:  70  Station: -2  Presentation: Vertex     Significant Labs:  Lab Results   Component Value Date    GROUPTRH A POS 2022    HEPBSAG Negative 2022    STREPBCULT Normal cervicovaginal theresa present 2022       I have personallly reviewed all pertinent lab results from the last 24 hours.    Physical Exam:   Constitutional: She is oriented to person, place, and time. She appears well-developed and well-nourished.    HENT:   Head: Normocephalic.      Cardiovascular:  Normal rate and regular rhythm.             Pulmonary/Chest: Effort normal.        Abdominal: Soft.     Genitourinary:    Vagina and uterus normal.             Musculoskeletal: Normal range of motion.       Neurological: She is alert and oriented to person, place, and time.    Skin: Skin is warm and dry.

## 2022-08-04 NOTE — ANESTHESIA PROCEDURE NOTES
Peripheral Block    Patient location during procedure: OB   Block not for primary anesthetic.  Reason for block: at surgeon's request and post-op pain management   Post-op Pain Location: bilat abdomen   Start time: 8/4/2022 2:01 PM  Timeout: 8/4/2022 2:00 PM   End time: 8/4/2022 2:13 PM    Staffing  Authorizing Provider: Musa Leroy MD  Performing Provider: Magan Nunez Jr., CRNA    Preanesthetic Checklist  Completed: patient identified, IV checked, site marked, risks and benefits discussed, surgical consent, monitors and equipment checked, pre-op evaluation and timeout performed  Peripheral Block  Patient position: supine  Prep: ChloraPrep  Patient monitoring: heart rate, cardiac monitor, continuous pulse ox and frequent blood pressure checks  Block type: TAP  Laterality: bilateral  Injection technique: single shot  Needle  Needle type: Stimuplex   Needle gauge: 22 G  Needle length: 2 in  Needle localization: anatomical landmarks  Needle insertion depth: 2 cm  Catheter at skin depth: 2 cm     Assessment  Injection assessment: negative aspiration and negative parasthesia  Heart rate change: no  Slow fractionated injection: yes        Additional Notes  NO APPARENT ANESTHESIA COMPLICATIONS

## 2022-08-04 NOTE — PROGRESS NOTES
S:c/o urge to urinate, has epidural in place. Reports discomfort is tolerable   O:  VS reviewed, afebrile   Vitals:    08/03/22 2230 08/03/22 2245 08/03/22 2300 08/03/22 2315   BP: (!) 141/70  (!) 146/73 (!) 145/76   Pulse: 75 83 82 86   Resp:   18    Temp:   98.1 °F (36.7 °C)    TempSrc:   Oral    SpO2: (!) 87% 100% 97% 99%       FHTs 120 Cat 1 reassuring   UC q 2-4 minutes, IUPC inserted with AROM   SVE 6/80/-1, AROM copious amount of clear fluid     A: IUP @ 35w0d ;     Patient Active Problem List   Diagnosis    History of miscarriage, currently pregnant    Chronic hypertension with superimposed pre-eclampsia    History of thyroid disorder    Class 3 severe obesity due to excess calories with serious comorbidity and body mass index (BMI) of 45.0 to 49.9 in adult    Gestational diabetes mellitus (GDM) in third trimester controlled on oral hypoglycemic drug    Asthma    Hypertension    Hypothyroidism    Intractable migraine without aura and without status migrainosus    Vitamin D deficiency    Elevated blood pressure reading    Obesity complicating pregnancy in third trimester       P: Continue Pitocin   Magnesium infusing for severe pre-eclampsia  Insulin pathway in progress for GDM  PCN prophylaxis   Continue close monitoring of maternal/fetal status

## 2022-08-04 NOTE — PROGRESS NOTES
S: not doing well, still with vaginal pain after second epidural placement. Will notify CRNA   O:  VS reviewed, afebrile   Vitals:    08/04/22 0515 08/04/22 0520 08/04/22 0530 08/04/22 0535   BP: (!) 151/98  (!) 143/65    Pulse: 91 90 91 97   Resp:       Temp:       TempSrc:       SpO2: (!) 91% (!) 84% 100% (!) 91%       FHTs 130 cat 1 reassuring   IUPC q 2-4 minutes MVUs 170  SVE per RNs last exam 5.5/80/-2 @ 0430am     A: IUP @ 35w1d ;     Patient Active Problem List   Diagnosis    History of miscarriage, currently pregnant    Chronic hypertension with superimposed pre-eclampsia    History of thyroid disorder    Class 3 severe obesity due to excess calories with serious comorbidity and body mass index (BMI) of 45.0 to 49.9 in adult    Gestational diabetes mellitus (GDM) in third trimester controlled on oral hypoglycemic drug    Asthma    Hypertension    Hypothyroidism    Intractable migraine without aura and without status migrainosus    Vitamin D deficiency    Elevated blood pressure reading    Obesity complicating pregnancy in third trimester       P: Continue IOL   VSS   MgSO4 and insulin infusing per protocol  Continue close monitoring of maternal/fetal status

## 2022-08-04 NOTE — ASSESSMENT & PLAN NOTE
Admit Inpatient  Regular diet now, then NPO  IV fluids  Collect GBS and Covid swab  Treat GBS when in active labor  Induction method TBD when cervix examined.  Labetalol protocol, notify if needing to treat, May need magnesium sulfate, pt aware.  May have IV meds or TAMEKA when desires   Anticipate progress toward vaginal delivery.  8/3/22- 0725am- Cytotec IOL in progress for severe pre-eclampsia. PCR 1.4. BMZ series complete. MgSO4 infusing. Insulin drip orders placed for GDM on Metformin per MD. Will consider cervical balloon placement if able at next SVE.   8/4/22- Continue with pitocin IOL. Pitocin break for 1 hour then restarted at 2mU. Continue MgSO4 infusion, BP stable. Continue insulin drip.

## 2022-08-04 NOTE — PROGRESS NOTES
08/04/22 0443   Tele[facility] Jackelyn Note - Strip   Strip Reviewed by Jackelyn Nurse? Yes   [facility] Jackelyn Note - Communication   Midnight Nurse Communicated with Bedside Nurse Regarding: Fetal Status   Tele[facility] Jackelyn Note - Notification   Nurse Notified? Yes

## 2022-08-04 NOTE — L&D DELIVERY NOTE
Section Procedure Note 2022    Pre-operative Diagnosis:    1. IUP 35w1d  2. Failed labor induction with arrest at 5cm  3. Morbid obesity  4. Pre-eclampsia  5. GDMA2    Post-operative Diagnosis: same    PROCEDURE: primary low transverse  section    Surgeon: Jhoana Alexander MD    Assistants: ERVIN Mckinney (presence necessary for performance of case)    Anesthesia: Spinal anesthesia (after multiple epidural re-dosing)    IV Fluids: 300mL    Estimated Blood Loss:  400mL    UOP: 200mL           Specimens: none           Complications:  None     Operative findings: viable female infant, vertex, Apgar 8/9, 5lbs 15oz; normal bilateral fallopian tubes/ovaries    Procedure Details   The patient was seen in the Holding Room. The risks, benefits, complications, treatment options, and expected outcomes were discussed with the patient.  The patient concurred with the proposed plan, giving informed consent.  The patient was taken to Operating Room, identified as Kathy Mishra and the procedure verified as  Delivery. A Time Out was held and the above information confirmed.    After induction of regional anesthesia, the patient was draped and prepped in the usual sterile manner. A Pfannenstiel incision was made and carried down through the subcutaneous tissue to the fascia. Fascial incision was made and extended transversely. The fascia was  from the underlying rectus tissue superiorly and inferiorly. The peritoneum was identified and entered bluntly then extended superiorly and inferiorly with good visualization of the underlying bladder. The utero-vesical peritoneal reflection was adequately retracted from the lower uterine segment. A low transverse uterine incision was made. There was clear amniotic fluid noted. The fetal vertex was delivered atraumatically, bulb suctioned on the field, then fully delivered. Delayed cord clamping performed. The placenta was simply expressed and the  uterine cavity was cleared of clots and debris. The uterine incision was reapproximated with running locked sutures of #1 chromic.   Lavage was performed and hemostasis noted. The rectus muscles were re-approximated with 3-0 chromic. The fascia was then reapproximated with running sutures of #0 loop PDS. Interrupted plain gut used to re-approximate subcutaneous layer. The skin was reapproximated with 4-0 monocryl in a subcuticular fashion. Instrument, sponge, and needle counts were correct prior the abdominal closure and at the conclusion of the case.            Disposition: to recovery PP room           Condition: stable

## 2022-08-04 NOTE — PROGRESS NOTES
Pharmacist Renal Dose Adjustment Note    Kathy Mishra is a 26 y.o. female being treated with the medication lovenox    Patient Data:    Vital Signs (Most Recent):  Temp: 97.7 °F (36.5 °C) (08/04/22 1421)  Pulse: 89 (08/04/22 1531)  Resp: 18 (08/04/22 0900)  BP: (!) 118/98 (08/04/22 1531)  SpO2: 96 % (08/04/22 1100)   Vital Signs (72h Range):  Temp:  [97.7 °F (36.5 °C)-98.7 °F (37.1 °C)]   Pulse:  [0-131]   Resp:  [16-20]   BP: ()/()   SpO2:  [70 %-100 %]      Recent Labs   Lab 08/02/22  1442   CREATININE 0.7     Serum creatinine: 0.7 mg/dL 08/02/22 1442  Estimated creatinine clearance: 158.8 mL/min    Medication: lovenox 40mg daily will be changed to lovenox 40mg BID for BMI >40     Pharmacist's Name: Malathi Smith  Pharmacist's Extension: [phone]

## 2022-08-04 NOTE — PROGRESS NOTES
[facility] - Labor & Delivery  Obstetrics  Labor Progress Note    Patient Name: Kathy Mishra  MRN: [id number]  Admission Date: 2022  Hospital Length of Stay: 2 days  Attending Physician: Bhumi Garcia MD  Primary Care Provider: Primary Doctor No    Subjective:     Principal Problem:Chronic hypertension with superimposed pre-eclampsia    Hospital Course:  Admit Inpatient  Regular diet now, then NPO  IV fluids  Collect GBS and Covid swab  Treat GBS when in active labor  Induction method TBD when cervix examined.  Labetalol protocol, notify if needing to treat, May need magnesium sulfate, pt aware.  May have IV meds or TAMEKA when desires  Anticipate progress toward vaginal delivery.  8/3/22- 0725am- Cytotec IOL in progress for severe pre-eclampsia. MgSO4 infusing. Insulin drip orders placed for GDM on Metformin per MD. Will consider cervical balloon placement if able at next SVE.   22-Has had a hard time getting comfortable with TAMEKA. Has had multiple reinjections. Has made no cervical change since yesterday evening. Will discuss POC with MD.       Interval History:  Kathy is a 26 y.o.  at 35w1d. She is doing well. Family at bedside.     Objective:     Vital Signs (Most Recent):  Temp: 98.7 °F (37.1 °C) (22 0900)  Pulse: 97 (22 0901)  Resp: 18 (22 0900)  BP: (!) 149/94 (22 0901)  SpO2: (!) 93 % (22 0830)   Vital Signs (24h Range):  Temp:  [97.7 °F (36.5 °C)-98.7 °F (37.1 °C)] 98.7 °F (37.1 °C)  Pulse:  [] 97  Resp:  [18-20] 18  SpO2:  [70 %-100 %] 93 %  BP: ()/() 149/94        There is no height or weight on file to calculate BMI.    FHT: 125 Cat 1 (reassuring)  TOCO:  Q 5-8 minutes    Cervical Exam: per RN   Dilation:  5-6  Effacement:  70  Station: -2  Presentation: Vertex     Significant Labs:  Lab Results   Component Value Date    GROUPTRH A POS 2022    HEPBSAG Negative 2022    STREPBCULT Normal cervicovaginal theresa present 2022        I have personallly reviewed all pertinent lab results from the last 24 hours.    Physical Exam:   Constitutional: She is oriented to person, place, and time. She appears well-developed and well-nourished.    HENT:   Head: Normocephalic.      Cardiovascular:  Normal rate and regular rhythm.             Pulmonary/Chest: Effort normal.        Abdominal: Soft.     Genitourinary:    Vagina and uterus normal.             Musculoskeletal: Normal range of motion.       Neurological: She is alert and oriented to person, place, and time.    Skin: Skin is warm and dry.      Assessment/Plan:     26 y.o. female  at 35w1d for:    * Chronic hypertension with superimposed pre-eclampsia  Admit Inpatient  Regular diet now, then NPO  IV fluids  Collect GBS and Covid swab  Treat GBS when in active labor  Induction method TBD when cervix examined.  Labetalol protocol, notify if needing to treat, May need magnesium sulfate, pt aware.  May have IV meds or TAMEKA when desires   Anticipate progress toward vaginal delivery.  8/3/22- 0725am- Cytotec IOL in progress for severe pre-eclampsia. PCR 1.4. BMZ series complete. MgSO4 infusing. Insulin drip orders placed for GDM on Metformin per MD. Will consider cervical balloon placement if able at next SVE.   22- Continue with pitocin IOL. Pitocin break for 1 hour then restarted at 2mU. Continue MgSO4 infusion, BP stable. Continue insulin drip.     Obesity complicating pregnancy in third trimester  Lovenox PP      Hypothyroidism  No meds.     Gestational diabetes mellitus (GDM) in third trimester controlled on oral hypoglycemic drug  Insulin drip pathway for BG monitoring           Keena Pepe CNM  Obstetrics  [facility] - [facility]

## 2022-08-04 NOTE — LACTATION NOTE
Lactation Rounds:    Lactation packet reviewed for days 1-2.  Discussed early feeding cues and encouraged mother to feed baby in response to those cues. Encouraged on demand feedings and skin to skin.  Reviewed normal feeding expectations of 8 or more feedings per 24 hour period, cues that babies use to signal hunger and satiety and cluster feeding. Discussed the adequacy of colostrum and baby belly size for the first 3 days of life along with expected output.     Discussed risks of introducing a pacifier or artificial nipple and discussed the AAP recommendation to avoid the use of pacifiers until 1 month of age for breastfeeding infants.     Mother states understanding and verbalized appropriate recall. Encouraged mother to call for assistance when desired or when infant is showing signs of hunger, contact number provided, mother verbalizes understanding.    Attempted to assist with first feeding. Infant laying skin to skin with mother. Assisted to right breast in cross cradle hold. Infant began crying and made no attempts to latch. Infant placed back skin to skin.    Primary nurse updated, she will do hand expression due to infants age and blood sugars.

## 2022-08-04 NOTE — ANESTHESIA POSTPROCEDURE EVALUATION
Anesthesia Post Evaluation    Patient: Kathy Mishra    Procedure(s) Performed: Procedure(s) (LRB):   SECTION (N/A)    Final Anesthesia Type: CSE      Patient location during evaluation: labor & delivery  Patient participation: Yes- Able to Participate  Level of consciousness: awake and alert  Post-procedure vital signs: reviewed and stable  Pain management: adequate  Airway patency: patent    PONV status at discharge: No PONV  Anesthetic complications: no      Cardiovascular status: blood pressure returned to baseline  Respiratory status: unassisted and spontaneous ventilation  Hydration status: euvolemic  Follow-up not needed.          Vitals Value Taken Time   /95 22 1216   Temp 37.1 °C (98.7 °F) 22 0900   Pulse 91 22 1228   Resp 18 22 0900   SpO2 98 % 22 1228   Vitals shown include unvalidated device data.      No case tracking events are documented in the log.      Pain/Stephane Score: Pain Rating Prior to Med Admin: 10 (2022  4:52 AM)

## 2022-08-05 PROBLEM — Z98.891 S/P C-SECTION: Status: ACTIVE | Noted: 2022-08-05

## 2022-08-05 LAB
BACTERIA SPEC AEROBE CULT: NORMAL
POCT GLUCOSE: 114 MG/DL (ref 70–110)
POCT GLUCOSE: 116 MG/DL (ref 70–110)

## 2022-08-05 PROCEDURE — 99232 SBSQ HOSP IP/OBS MODERATE 35: CPT | Mod: ,,, | Performed by: OBSTETRICS & GYNECOLOGY

## 2022-08-05 PROCEDURE — 63600175 PHARM REV CODE 636 W HCPCS: Performed by: OBSTETRICS & GYNECOLOGY

## 2022-08-05 PROCEDURE — 25000003 PHARM REV CODE 250: Performed by: OBSTETRICS & GYNECOLOGY

## 2022-08-05 PROCEDURE — 25000003 PHARM REV CODE 250: Performed by: PHYSICIAN ASSISTANT

## 2022-08-05 PROCEDURE — 99232 PR SUBSEQUENT HOSPITAL CARE,LEVL II: ICD-10-PCS | Mod: ,,, | Performed by: OBSTETRICS & GYNECOLOGY

## 2022-08-05 PROCEDURE — 11000001 HC ACUTE MED/SURG PRIVATE ROOM

## 2022-08-05 RX ORDER — IBUPROFEN 800 MG/1
800 TABLET ORAL EVERY 8 HOURS
Status: DISCONTINUED | OUTPATIENT
Start: 2022-08-05 | End: 2022-08-06 | Stop reason: HOSPADM

## 2022-08-05 RX ORDER — OXYCODONE AND ACETAMINOPHEN 5; 325 MG/1; MG/1
1 TABLET ORAL EVERY 4 HOURS PRN
Status: DISCONTINUED | OUTPATIENT
Start: 2022-08-05 | End: 2022-08-06 | Stop reason: HOSPADM

## 2022-08-05 RX ORDER — HYDRALAZINE HYDROCHLORIDE 20 MG/ML
10 INJECTION INTRAMUSCULAR; INTRAVENOUS ONCE
Status: COMPLETED | OUTPATIENT
Start: 2022-08-05 | End: 2022-08-05

## 2022-08-05 RX ORDER — OXYCODONE AND ACETAMINOPHEN 10; 325 MG/1; MG/1
1 TABLET ORAL EVERY 4 HOURS PRN
Status: DISCONTINUED | OUTPATIENT
Start: 2022-08-05 | End: 2022-08-06 | Stop reason: HOSPADM

## 2022-08-05 RX ADMIN — ENOXAPARIN SODIUM 40 MG: 100 INJECTION SUBCUTANEOUS at 06:08

## 2022-08-05 RX ADMIN — HYDRALAZINE HYDROCHLORIDE 10 MG: 20 INJECTION, SOLUTION INTRAMUSCULAR; INTRAVENOUS at 05:08

## 2022-08-05 RX ADMIN — CHLORHEXIDINE GLUCONATE 0.12% ORAL RINSE 10 ML: 1.2 LIQUID ORAL at 09:08

## 2022-08-05 RX ADMIN — METFORMIN HYDROCHLORIDE 500 MG: 500 TABLET ORAL at 06:08

## 2022-08-05 RX ADMIN — ALUMINUM HYDROXIDE, MAGNESIUM HYDROXIDE, AND SIMETHICONE 30 ML: 200; 200; 20 SUSPENSION ORAL at 02:08

## 2022-08-05 RX ADMIN — METFORMIN HYDROCHLORIDE 500 MG: 500 TABLET ORAL at 09:08

## 2022-08-05 RX ADMIN — DIPHENHYDRAMINE HYDROCHLORIDE 25 MG: 25 CAPSULE ORAL at 02:08

## 2022-08-05 RX ADMIN — LABETALOL HYDROCHLORIDE 400 MG: 200 TABLET, FILM COATED ORAL at 06:08

## 2022-08-05 RX ADMIN — LABETALOL HYDROCHLORIDE 400 MG: 200 TABLET, FILM COATED ORAL at 05:08

## 2022-08-05 RX ADMIN — DIPHENHYDRAMINE HYDROCHLORIDE 25 MG: 25 CAPSULE ORAL at 05:08

## 2022-08-05 RX ADMIN — HYDROCODONE BITARTRATE AND ACETAMINOPHEN 1 TABLET: 10; 325 TABLET ORAL at 09:08

## 2022-08-05 RX ADMIN — HYDROCODONE BITARTRATE AND ACETAMINOPHEN 1 TABLET: 10; 325 TABLET ORAL at 06:08

## 2022-08-05 RX ADMIN — KETOROLAC TROMETHAMINE 30 MG: 30 INJECTION, SOLUTION INTRAMUSCULAR; INTRAVENOUS at 05:08

## 2022-08-05 RX ADMIN — OXYCODONE AND ACETAMINOPHEN 1 TABLET: 10; 325 TABLET ORAL at 10:08

## 2022-08-05 RX ADMIN — IBUPROFEN 800 MG: 800 TABLET, FILM COATED ORAL at 02:08

## 2022-08-05 RX ADMIN — IBUPROFEN 800 MG: 800 TABLET, FILM COATED ORAL at 09:08

## 2022-08-05 NOTE — ASSESSMENT & PLAN NOTE
Underwent PCS on 8/4/22 for arrested labor at 5cm.  - POD 1  - On Mg for BP  - Pugh in place, not ambulating yet  - Tolerating PO  - Continue post-op care

## 2022-08-05 NOTE — LACTATION NOTE
Due to baby's admission to NICU, discussed feeding choice with mother. Reviewed the risks of formula feeding and the benefits of breastfeeding specifically due to baby's special needs at this time. Offered mother the opportunity to provide breastmilk for her baby. Mother states her intention is to pump and provide breast milk to infant for now, and eventually want to latch when infant is able to.     [employer] breast pump set up at bedside. Instructed on proper usage and to adjust suction according to comfort level. Verified appropriate flange fit- 24 mm, bilaterally. Reviewed frequency (at least 8 times/24 hrs) and duration of pumping in order to promote and maintain full milk supply. Hands-on pumping technique reviewed. Encouraged hand expression after. Instructed on proper cleaning of breast pump parts. Reviewed proper milk handling, collection, storage, and transportation. Mother collected 4 mls EBM, I took it to NICU for infant and gave it to infant's nurse.     Support and encouragement provided. Lactation availability provided. Mother to call for assistance as needed, and for questions and concerns.

## 2022-08-05 NOTE — SUBJECTIVE & OBJECTIVE
Interval History: POD 1 s/p PCS. On Mg. BP stable at this time.     She is doing well this morning. She is tolerating a regular diet without nausea or vomiting. She is not voiding spontaneously. Pugh in place.  She is not ambulating. She has passed flatus, and has not a BM. Vaginal bleeding is moderate. She denies fever or chills. Abdominal pain is moderate and controlled with oral medications. She Is breastfeeding via pumping for her baby in the NICU.     Objective:     Vital Signs (Most Recent):  Temp: 98 °F (36.7 °C) (08/04/22 1915)  Pulse: 98 (08/05/22 0645)  Resp: 18 (08/04/22 0900)  BP: (!) 147/83 (08/05/22 0645)  SpO2: 95 % (08/05/22 0645)   Vital Signs (24h Range):  Temp:  [97.7 °F (36.5 °C)-98.7 °F (37.1 °C)] 98 °F (36.7 °C)  Pulse:  [72-98] 98  Resp:  [18] 18  SpO2:  [89 %-100 %] 95 %  BP: (113-181)/() 147/83     Weight: 128 kg (282 lb 3 oz)  Body mass index is 49.99 kg/m².      Intake/Output Summary (Last 24 hours) at 8/5/2022 0721  Last data filed at 8/5/2022 0600  Gross per 24 hour   Intake 1899.91 ml   Output 3338 ml   Net -1438.09 ml         Significant Labs:  Lab Results   Component Value Date    GROUPTRH A POS 08/02/2022    HEPBSAG Negative 01/11/2022    STREPBCULT Normal cervicovaginal theresa present 08/02/2022     No results for input(s): HGB, HCT in the last 48 hours.    I have personallly reviewed all pertinent lab results from the last 24 hours.  Recent Lab Results  (Last 5 results in the past 24 hours)        08/05/22  0610   08/05/22  0103   08/04/22  1210   08/04/22  1104   08/04/22  1011        POCT Glucose 114   116   121   111   113                              Physical Exam:   Constitutional: She is oriented to person, place, and time. She appears well-developed. No distress.    HENT:   Head: Normocephalic and atraumatic.    Eyes: Conjunctivae are normal. Right eye exhibits no discharge. Left eye exhibits no discharge. No scleral icterus.     Cardiovascular:  Normal rate.              Pulmonary/Chest: Effort normal.        Abdominal: Soft. She exhibits abdominal incision (Pfannenstiel incision with acquacel dressing in place. Overed with pressure dressing at this time. CDI. Appropriately TTP.). She exhibits no distension.   Fundus firm and below the level of the umbilicus     Genitourinary:    Genitourinary Comments: Pugh in place.             Musculoskeletal: Normal range of motion. Edema (+2 BLE) present.       Neurological: She is alert and oriented to person, place, and time.    Skin: Skin is warm and dry. She is not diaphoretic.

## 2022-08-05 NOTE — LACTATION NOTE
Lactation Rounds:    Reviewed proper usage and to adjust suction according to comfort level. Mother not pressing initiate button, reviewed with her. Reviewed with mother frequency and duration of pumping in order to promote and maintain full milk supply. Hands on pumping technique reviewed. . Instructed mother on cleaning of breast pump parts. Reviewed proper milk handling, collection, storage, and transportation. Voices understanding.

## 2022-08-05 NOTE — PROGRESS NOTES
[facility]  Obstetrics  Postpartum Progress Note    Patient Name: Kathy Mishra  MRN: [id number]  Admission Date: 2022  Hospital Length of Stay: 3 days  Attending Physician: Bhuim Garcia MD  Primary Care Provider: Primary Doctor No    Subjective:     Principal Problem:S/P     Hospital Course:  Admit Inpatient  Regular diet now, then NPO  IV fluids  Collect GBS and Covid swab  Treat GBS when in active labor  Induction method TBD when cervix examined.  Labetalol protocol, notify if needing to treat, May need magnesium sulfate, pt aware.  May have IV meds or TAMEKA when desires  Anticipate progress toward vaginal delivery.  8/3/22- 0725am- Cytotec IOL in progress for severe pre-eclampsia. MgSO4 infusing. Insulin drip orders placed for GDM on Metformin per MD. Will consider cervical balloon placement if able at next SVE.   22-Has had a hard time getting comfortable with TAMEKA. Has had multiple reinjections. Has made no cervical change since yesterday evening. Will discuss POC with MD. Underwent PCS due to arrest at 5cm.   22: POD 1 s/p PCS. On Mg, zhao in place. BP in the 140s/80s. Sore but overall doing well.       Interval History: POD 1 s/p PCS. On Mg. BP stable at this time.     She is doing well this morning. She is tolerating a regular diet without nausea or vomiting. She is not voiding spontaneously. Zhao in place.  She is not ambulating. She has passed flatus, and has not a BM. Vaginal bleeding is moderate. She denies fever or chills. Abdominal pain is moderate and controlled with oral medications. She Is breastfeeding via pumping for her baby in the NICU.     Objective:     Vital Signs (Most Recent):  Temp: 98 °F (36.7 °C) (22 1915)  Pulse: 98 (22 0645)  Resp: 18 (22 0900)  BP: (!) 147/83 (22 0645)  SpO2: 95 % (2245)   Vital Signs (24h Range):  Temp:  [97.7 °F (36.5 °C)-98.7 °F (37.1 °C)] 98 °F (36.7 °C)  Pulse:  [72-98] 98  Resp:  [18]  18  SpO2:  [89 %-100 %] 95 %  BP: (113-181)/() 147/83     Weight: 128 kg (282 lb 3 oz)  Body mass index is 49.99 kg/m².      Intake/Output Summary (Last 24 hours) at 2022 0721  Last data filed at 2022 0600  Gross per 24 hour   Intake 1899.91 ml   Output 3338 ml   Net -1438.09 ml         Significant Labs:  Lab Results   Component Value Date    GROUPTRH A POS 2022    HEPBSAG Negative 2022    STREPBCULT Normal cervicovaginal theresa present 2022     No results for input(s): HGB, HCT in the last 48 hours.    I have personallly reviewed all pertinent lab results from the last 24 hours.  Recent Lab Results  (Last 5 results in the past 24 hours)        22  0610   22  0103   22  1210   22  1104   22  1011        POCT Glucose 114   116   121   111   113                              Physical Exam:   Constitutional: She is oriented to person, place, and time. She appears well-developed. No distress.    HENT:   Head: Normocephalic and atraumatic.    Eyes: Conjunctivae are normal. Right eye exhibits no discharge. Left eye exhibits no discharge. No scleral icterus.     Cardiovascular:  Normal rate.             Pulmonary/Chest: Effort normal.        Abdominal: Soft. She exhibits abdominal incision (Pfannenstiel incision with acquacel dressing in place. Overed with pressure dressing at this time. CDI. Appropriately TTP.). She exhibits no distension.   Fundus firm and below the level of the umbilicus     Genitourinary:    Genitourinary Comments: Pugh in place.             Musculoskeletal: Normal range of motion. Edema (+2 BLE) present.       Neurological: She is alert and oriented to person, place, and time.    Skin: Skin is warm and dry. She is not diaphoretic.      Assessment/Plan:     26 y.o. female  for:    * S/P   Underwent PCS on 22 for arrested labor at 5cm.  - POD 1  - On Mg for BP  - Pugh in place, not ambulating yet  - Tolerating PO  -  Continue post-op care    Obesity complicating pregnancy in third trimester  - Lovenox PP      Hypothyroidism  - Patient stable at this time, not on any meds at home    Gestational diabetes mellitus (GDM) in third trimester controlled on oral hypoglycemic drug  - Metformin 500mg BID  - POCT BG checks    Chronic hypertension with superimposed pre-eclampsia  - Continue MgSO4 infusion, BP stable.         Disposition: As patient meets milestones, will plan to discharge accordingly.      АНДРЕЙ Byers, PA-C  OBGYN - Surgery  [facility]

## 2022-08-06 VITALS
BODY MASS INDEX: 49.99 KG/M2 | OXYGEN SATURATION: 98 % | TEMPERATURE: 98 F | HEART RATE: 91 BPM | SYSTOLIC BLOOD PRESSURE: 128 MMHG | RESPIRATION RATE: 20 BRPM | WEIGHT: 282.19 LBS | DIASTOLIC BLOOD PRESSURE: 58 MMHG

## 2022-08-06 PROBLEM — O09.299 HISTORY OF MISCARRIAGE, CURRENTLY PREGNANT: Status: RESOLVED | Noted: 2022-01-11 | Resolved: 2022-08-06

## 2022-08-06 PROBLEM — R03.0 ELEVATED BLOOD PRESSURE READING: Status: RESOLVED | Noted: 2022-07-13 | Resolved: 2022-08-06

## 2022-08-06 PROCEDURE — 99238 PR HOSPITAL DISCHARGE DAY,<30 MIN: ICD-10-PCS | Mod: ,,, | Performed by: OBSTETRICS & GYNECOLOGY

## 2022-08-06 PROCEDURE — 99238 HOSP IP/OBS DSCHRG MGMT 30/<: CPT | Mod: ,,, | Performed by: OBSTETRICS & GYNECOLOGY

## 2022-08-06 PROCEDURE — 25000003 PHARM REV CODE 250: Performed by: PHYSICIAN ASSISTANT

## 2022-08-06 PROCEDURE — 99024 POSTOP FOLLOW-UP VISIT: CPT | Mod: ,,, | Performed by: OBSTETRICS & GYNECOLOGY

## 2022-08-06 PROCEDURE — 99024 PR POST-OP FOLLOW-UP VISIT: ICD-10-PCS | Mod: ,,, | Performed by: OBSTETRICS & GYNECOLOGY

## 2022-08-06 PROCEDURE — 25000003 PHARM REV CODE 250: Performed by: OBSTETRICS & GYNECOLOGY

## 2022-08-06 PROCEDURE — 63600175 PHARM REV CODE 636 W HCPCS: Performed by: OBSTETRICS & GYNECOLOGY

## 2022-08-06 RX ORDER — LABETALOL 200 MG/1
400 TABLET, FILM COATED ORAL EVERY 12 HOURS
Qty: 120 TABLET | Refills: 11 | Status: SHIPPED | OUTPATIENT
Start: 2022-08-06 | End: 2022-08-12 | Stop reason: DRUGHIGH

## 2022-08-06 RX ORDER — OXYCODONE AND ACETAMINOPHEN 5; 325 MG/1; MG/1
1 TABLET ORAL EVERY 4 HOURS PRN
Qty: 20 TABLET | Refills: 0 | Status: SHIPPED | OUTPATIENT
Start: 2022-08-06 | End: 2023-01-30

## 2022-08-06 RX ORDER — IBUPROFEN 800 MG/1
800 TABLET ORAL EVERY 8 HOURS
Qty: 30 TABLET | Refills: 0 | Status: SHIPPED | OUTPATIENT
Start: 2022-08-06 | End: 2022-08-16

## 2022-08-06 RX ADMIN — OXYCODONE AND ACETAMINOPHEN 1 TABLET: 10; 325 TABLET ORAL at 02:08

## 2022-08-06 RX ADMIN — IBUPROFEN 800 MG: 800 TABLET, FILM COATED ORAL at 06:08

## 2022-08-06 RX ADMIN — LABETALOL HYDROCHLORIDE 400 MG: 200 TABLET, FILM COATED ORAL at 06:08

## 2022-08-06 RX ADMIN — OXYCODONE AND ACETAMINOPHEN 1 TABLET: 10; 325 TABLET ORAL at 08:08

## 2022-08-06 RX ADMIN — CHLORHEXIDINE GLUCONATE 0.12% ORAL RINSE 10 ML: 1.2 LIQUID ORAL at 08:08

## 2022-08-06 RX ADMIN — ENOXAPARIN SODIUM 40 MG: 100 INJECTION SUBCUTANEOUS at 08:08

## 2022-08-06 RX ADMIN — METFORMIN HYDROCHLORIDE 500 MG: 500 TABLET ORAL at 08:08

## 2022-08-06 NOTE — ASSESSMENT & PLAN NOTE
- Metformin 500mg BID  - POCT BG checks  08/06/2022  Resume regular diet; continue to watch calories  Needs 1 hr glucola at wk exam

## 2022-08-06 NOTE — LACTATION NOTE
Mother has no concerns with pumping at this time. Breastfeeding discharge education performed. Informed mother of the World Health Organization's recommendation for exclusive breastfeeding for the first 6 months of baby's life and continued breastfeeding after the introduction of solid foods for 2 years and beyond.     Reviewed proper usage and to adjust suction according to comfort level. Reviewed with mother frequency and duration of pumping in order to promote and maintain full milk supply. Hands on pumping technique reviewed. . Instructed mother on cleaning of breast pump parts. Advised mother to communicate with [facility] NICU to learn their recommendations for proper milk handling, collection, storage, and transportation Voices understanding.     Written instructions have been provided and were reviewed at this time. Hand expression reviewed, mother able to return demonstrate. Lactation discharge booklet reviewed.  Mother is aware of warm line, outpatient consultations, community resources and monthly support groups. Encouraged mother to contact lactation with any questions, concerns, or problems. Contact numbers provided, and mother verbalizes understanding.     Instruct the mother to:   Sit upright and lean forward if possible.   Apply warm, wet baby blanket/towel over breasts for a few minutes followed by gentle breast massage.   Form a C with her hand and place it about 1 inch back from the areola with the nipple centered between her thumb and index finger.   Press, compress, relax :  apply pressure in an inward direction toward the breast without stretching the tissue and then compress the breast tissue between her  fingers for a few minutes.   Rotate placement of fingers on the breasts to facilitate emptying.   Collect expressed colostrum/ human milk and place it directly into a sterile storage container for later use.   If stored for later use, place the babys breastmilk label (with  the date and time of collection and the names of meds she is taking) on  the container.  Place the container  immediately  into the breastmilk refrigerator or freezer for later use.    Mother verbalizes understanding of all education and counseling. Mother denies any further lactation needs or concerns at this time. Discussed lactation availability. Encouraged mother to call for assistance when needs arise.

## 2022-08-06 NOTE — SUBJECTIVE & OBJECTIVE
Interval History:   Pod #2    She is doing well this morning. She is tolerating a regular diet without nausea or vomiting. She is voiding spontaneously. She is ambulating. She has not passed flatus, and has not a BM. Vaginal bleeding is mild. She denies fever or chills. Abdominal pain is mild and controlled with oral medications. She Is breastfeeding.   Objective:     Vital Signs (Most Recent):  Temp: 97.5 °F (36.4 °C) (08/06/22 0357)  Pulse: 89 (08/06/22 0357)  Resp: 18 (08/06/22 0357)  BP: (!) 145/66 (08/06/22 0603)  SpO2: 98 % (08/05/22 2044)   Vital Signs (24h Range):  Temp:  [97.5 °F (36.4 °C)-98.2 °F (36.8 °C)] 97.5 °F (36.4 °C)  Pulse:  [80-90] 89  Resp:  [14-20] 18  SpO2:  [96 %-99 %] 98 %  BP: (124-157)/(51-87) 145/66     Weight: 128 kg (282 lb 3 oz)  Body mass index is 49.99 kg/m².      Intake/Output Summary (Last 24 hours) at 8/6/2022 0802  Last data filed at 8/6/2022 0200  Gross per 24 hour   Intake --   Output 1440 ml   Net -1440 ml         Significant Labs:  Lab Results   Component Value Date    GROUPTRH A POS 08/02/2022    HEPBSAG Negative 01/11/2022    STREPBCULT No Group B Streptococcus isolated 08/02/2022     No results for input(s): HGB, HCT in the last 48 hours.    I have personallly reviewed all pertinent lab results from the last 24 hours.  Recent Lab Results       None            Physical Exam:   Constitutional: She is oriented to person, place, and time. She appears well-developed.    HENT:   Head: Normocephalic.    Eyes: Pupils are equal, round, and reactive to light.     Cardiovascular:  Normal rate and regular rhythm.             Pulmonary/Chest: Effort normal and breath sounds normal.        Abdominal: Soft. Bowel sounds are normal. She exhibits no abdominal incision (aquasel intact).     Genitourinary:    Genitourinary Comments: Fundus--firm, non tender             Musculoskeletal: Normal range of motion. Edema (2+) present.       Neurological: She is alert and oriented to person, place,  and time. She has normal reflexes.    Skin: Skin is warm and dry.    Psychiatric: She has a normal mood and affect. Her behavior is normal. Judgment and thought content normal.

## 2022-08-06 NOTE — PROGRESS NOTES
[facility] ([facility])  Obstetrics  Postpartum Progress Note    Patient Name: Kathy Mishra  MRN: [id number]  Admission Date: 2022  Hospital Length of Stay: 4 days  Attending Physician: Bhumi Garcia MD  Primary Care Provider: Primary Doctor No    Subjective:     Principal Problem:S/P     Hospital Course:  Admit Inpatient  Regular diet now, then NPO  IV fluids  Collect GBS and Covid swab  Treat GBS when in active labor  Induction method TBD when cervix examined.  Labetalol protocol, notify if needing to treat, May need magnesium sulfate, pt aware.  May have IV meds or TAMEKA when desires  Anticipate progress toward vaginal delivery.  8/3/22- 0725am- Cytotec IOL in progress for severe pre-eclampsia. MgSO4 infusing. Insulin drip orders placed for GDM on Metformin per MD. Will consider cervical balloon placement if able at next SVE.   22-Has had a hard time getting comfortable with TAMEKA. Has had multiple reinjections. Has made no cervical change since yesterday evening. Will discuss POC with MD. Underwent PCS due to arrest at 5cm.   22: POD 1 s/p PCS. On Mg, zhao in place. BP in the 140s/80s. Sore but overall doing well.       Interval History:   Pod #2    She is doing well this morning. She is tolerating a regular diet without nausea or vomiting. She is voiding spontaneously. She is ambulating. She has not passed flatus, and has not a BM. Vaginal bleeding is mild. She denies fever or chills. Abdominal pain is mild and controlled with oral medications. She Is breastfeeding.   Objective:     Vital Signs (Most Recent):  Temp: 97.5 °F (36.4 °C) (22)  Pulse: 89 (22)  Resp: 18 (22)  BP: (!) 145/66 (22)  SpO2: 98 % (22)   Vital Signs (24h Range):  Temp:  [97.5 °F (36.4 °C)-98.2 °F (36.8 °C)] 97.5 °F (36.4 °C)  Pulse:  [80-90] 89  Resp:  [14-20] 18  SpO2:  [96 %-99 %] 98 %  BP: (124-157)/(51-87) 145/66     Weight: 128 kg (282 lb 3  oz)  Body mass index is 49.99 kg/m².      Intake/Output Summary (Last 24 hours) at 2022 0802  Last data filed at 2022 0200  Gross per 24 hour   Intake --   Output 1440 ml   Net -1440 ml         Significant Labs:  Lab Results   Component Value Date    GROUPTRH A POS 2022    HEPBSAG Negative 2022    STREPBCULT No Group B Streptococcus isolated 2022     No results for input(s): HGB, HCT in the last 48 hours.    I have personallly reviewed all pertinent lab results from the last 24 hours.  Recent Lab Results       None            Physical Exam:   Constitutional: She is oriented to person, place, and time. She appears well-developed.    HENT:   Head: Normocephalic.    Eyes: Pupils are equal, round, and reactive to light.     Cardiovascular:  Normal rate and regular rhythm.             Pulmonary/Chest: Effort normal and breath sounds normal.        Abdominal: Soft. Bowel sounds are normal. She exhibits no abdominal incision (aquasel intact).     Genitourinary:    Genitourinary Comments: Fundus--firm, non tender             Musculoskeletal: Normal range of motion. Edema (2+) present.       Neurological: She is alert and oriented to person, place, and time. She has normal reflexes.    Skin: Skin is warm and dry.    Psychiatric: She has a normal mood and affect. Her behavior is normal. Judgment and thought content normal.     Assessment/Plan:     26 y.o. female  for:    * S/P   Underwent PCS on 22 for arrested labor at 5cm.  - POD 1  - On Mg for BP  - Pugh in place, not ambulating yet  - Tolerating PO  - Continue post-op care  2022  Pod #2 s/p primary c/section for ftp  Afebrile  Tolerating diet, +ambulation, +void  Requesting discharge (infant at [facility]--womans)      Obesity complicating pregnancy in third trimester  - Lovenox PP      Hypothyroidism  - Patient stable at this time, not on any meds at home    Gestational diabetes mellitus (GDM) in third trimester controlled  on oral hypoglycemic drug  - Metformin 500mg BID  - POCT BG checks  08/06/2022  Resume regular diet; continue to watch calories  Needs 1 hr glucola at wk exam    Chronic hypertension with superimposed pre-eclampsia  - Continue MgSO4 infusion, BP stable.   08/06/2022  S/p magnesium sulfate x 24hr  bp remains normal without antihypertensives          Disposition: stable for discharge  Bhumi Garcia MD  Obstetrics  [facility] ([facility])

## 2022-08-06 NOTE — DISCHARGE SUMMARY
[facility] ([facility])  Obstetrics  Discharge Summary      Patient Name: Kathy Mishra  MRN: [id number]  Admission Date: 2022  Hospital Length of Stay: 4 days  Discharge Date and Time:  2022 8:11 AM  Attending Physician: Bhumi Garcia MD   Discharging Provider: Bhumi Garcia MD   Primary Care Provider: Primary Doctor No    HPI: Presents to LD triage for PIH workup          Procedure(s) (LRB):   SECTION (N/A)     Hospital Course:   Admit Inpatient  Regular diet now, then NPO  IV fluids  Collect GBS and Covid swab  Treat GBS when in active labor  Induction method TBD when cervix examined.  Labetalol protocol, notify if needing to treat, May need magnesium sulfate, pt aware.  May have IV meds or TAMEKA when desires  Anticipate progress toward vaginal delivery.  8/3/22- 0725am- Cytotec IOL in progress for severe pre-eclampsia. MgSO4 infusing. Insulin drip orders placed for GDM on Metformin per MD. Will consider cervical balloon placement if able at next SVE.   22-Has had a hard time getting comfortable with TAMEKA. Has had multiple reinjections. Has made no cervical change since yesterday evening. Will discuss POC with MD. Underwent PCS due to arrest at 5cm.   22: POD 1 s/p PCS. On Mg, zhao in place. BP in the 140s/80s. Sore but overall doing well.            Final Active Diagnoses:    Diagnosis Date Noted POA    PRINCIPAL PROBLEM:  S/P  [Z98.891] 2022 Not Applicable    Obesity complicating pregnancy in third trimester [O99.213] 2022 Yes    Hypothyroidism [E03.9] 2022 Yes    Gestational diabetes mellitus (GDM) in third trimester controlled on oral hypoglycemic drug [O24.415] 2022 Yes    Chronic hypertension with superimposed pre-eclampsia [O11.9] 2022 Yes      Problems Resolved During this Admission:        Significant Diagnostic Studies: Labs: All labs within the past 24 hours have been reviewed      Feeding Method:  breast    Immunizations     Date Immunization Status Dose Route/Site Given by    22 1612 MMR Incomplete 0.5 mL Subcutaneous/     22 161 Tdap Incomplete 0.5 mL Intramuscular/           Delivery:    Episiotomy: None   Lacerations: None   Repair suture:     Repair # of packets:     Blood loss (ml):       Birth information:  YOB: 2022   Time of birth: 1:09 PM   Sex: female   Delivery type: , Low Transverse   Gestational Age: 35w1d    Delivery Clinician:      Other providers:       Additional  information:  Forceps:    Vacuum:    Breech:    Observed anomalies      Living?:           APGARS  One minute Five minutes Ten minutes   Skin color:         Heart rate:         Grimace:         Muscle tone:         Breathing:         Totals: 8  9        Placenta: Delivered:       appearance    Pending Diagnostic Studies:     None          Discharged Condition: good    Disposition: Home or Self Care    Follow Up:   Follow-up Information     Jhoana Alexander MD Follow up on 2022.    Specialties: Obstetrics, Obstetrics and Gynecology  Why: 7:45a [facility]  Contact information:  [address]  [place] [address] [phone]             [facility] Follow up in 1 week(s).    Why: jean. bandage removal                     Patient Instructions:      Diet diabetic     Pelvic Rest   Order Comments: X 6 wks--no tampons, intercourse, douching     Leave dressing on - Keep it clean, dry, and intact until clinic visit     Medications:  Current Discharge Medication List      START taking these medications    Details   ibuprofen (ADVIL,MOTRIN) 800 MG tablet Take 1 tablet (800 mg total) by mouth every 8 (eight) hours. for 10 days  Qty: 30 tablet, Refills: 0      oxyCODONE-acetaminophen (PERCOCET) 5-325 mg per tablet Take 1 tablet by mouth every 4 (four) hours as needed for Pain.  Qty: 20 tablet, Refills: 0    Comments: Quantity prescribed more than 7 day supply? No         CONTINUE these  medications which have CHANGED    Details   labetaloL (NORMODYNE) 200 MG tablet Take 2 tablets (400 mg total) by mouth every 12 (twelve) hours.  Qty: 120 tablet, Refills: 11    Comments: .         CONTINUE these medications which have NOT CHANGED    Details   albuterol (PROVENTIL/VENTOLIN HFA) 90 mcg/actuation inhaler       cholecalciferol, vitamin D3, 125 mcg (5,000 unit) Tab Take 5,000 Units by mouth once daily.      prenatal 168/iron/folic/omega3 (ONE-A-DAY PRENATAL-1 ORAL) Take by mouth.         STOP taking these medications       aspirin 81 MG Chew Comments:   Reason for Stopping:         blood sugar diagnostic Strp Comments:   Reason for Stopping:         blood-glucose meter kit Comments:   Reason for Stopping:         lancets Misc Comments:   Reason for Stopping:         metFORMIN (GLUCOPHAGE XR) 500 MG ER 24hr tablet Comments:   Reason for Stopping:         ondansetron (ZOFRAN-ODT) 4 MG TbDL Comments:   Reason for Stopping:         TRUEDRAW LANCING DEVICE Misc Comments:   Reason for Stopping:               Bhumi Garcia MD  Obstetrics  [facility] ([facility])

## 2022-08-06 NOTE — NURSING
Verbalized understanding of discharge info and discharge medications and side effects. Discharged to home with personal belongings.

## 2022-08-06 NOTE — DISCHARGE INSTRUCTIONS
"Mother Self Care:    Activity: Avoid strenuous exercise and get adequate rest.  No driving until the physician consent given.  Emotional Changes: Most women find birth to be a time of great emotional upheaval.  Sense of loss, mood swings, fatigue, anxiety, and feeling "let down" are common.  If feelings worsen or last more than a week, call your physician.  Breast Care/Breastfeeding: Wear a bra for comfort.  Keep nipples dry and apply your own breast milk or lanolin cream as needed for soreness.  Engorgement can be relieved with warm, moist heat before feedings.  You may also take Ibuprofen.  Breast Care/Bottle Feeding: Wear support bra 24 hours a day for one week.  Avoid stimulation to breasts.  You may use ice packs for discomfort.  Jose De Jesus-Care/Vaginal Bleeding: Remember to use your jose de jesus-bottle after urinating.  Your flow will change from red, to pink, to yellow/white color over a period of 2 weeks.  Menstruation will return in 3-8 weeks, or longer if breastfeeding.   Section: Keep incision clean and dry. You may shower, but avoid baths.  Sexual Activity/Pelvic Rest: No sexual activity, tampons, or douching until your physician gives you consent.  Diet: Continue to eat from the five basic food groups, including plenty of protein, fruits, vegetables, and whole grains.  Limit empty calories and high fat foods.  Drink enough fluids to satisfy thirst and add an extra 500 calories for breastfeeding.  Constipation/Hemorrhoids: Drink plenty of water.  You may take a stool softener or natural laxative (Metamucil). You may use tucks or hemorrhoid ointment and soak in a warm tub.    CALL YOUR OB DOCTOR IF ANY OF THE FOLLOWING OCCURS:  *Heavy bleeding - saturating a pad an hour or passing any large (2-3 inches in size) blood clots.  *Any pain, redness, or tenderness in lower leg.  *You cannot care for yourself or your baby.  *Any signs of infection-      - Temperature greater than 100.5 degrees F      - Foul smelling " vaginal discharge and/or incisional drainage      - Increased episiotomy or incisional pain      - Hot, hard, red or sore area on breast      - Flu-like symptoms      - Any urgency, frequency or burning with urination    Return To the Hospital for further Evaluation:  Headache not relieved by tylenol or ibuprofen  Blurry vision, double vision, seeing spots, or flashing lights  Feeling faint or passing out  Right epigastric pain  Difficulty breathing  Swelling in hands, face, or feet  Any of these symptoms accompanied by nausea/vomiting  Gaining more than 5 pounds in one week  Seizures  These symptoms could be an indication of elevated blood pressure.       If you have any questions that need to be answered immediately please call the Labor & Delivery Unit at [phone] and ask to speak to a nurse.

## 2022-08-06 NOTE — ASSESSMENT & PLAN NOTE
- Continue MgSO4 infusion, BP stable.   08/06/2022  S/p magnesium sulfate x 24hr  bp remains normal on labetalol 400mg bid

## 2022-08-06 NOTE — ASSESSMENT & PLAN NOTE
Underwent PCS on 8/4/22 for arrested labor at 5cm.  - POD 1  - On Mg for BP  - Pugh in place, not ambulating yet  - Tolerating PO  - Continue post-op care  08/06/2022  Pod #2 s/p primary c/section for ftp  Afebrile  Tolerating diet, +ambulation, +void  Requesting discharge (infant at [facility]--[facility])

## 2022-08-06 NOTE — ASSESSMENT & PLAN NOTE
- Continue MgSO4 infusion, BP stable.   08/06/2022  S/p magnesium sulfate x 24hr  bp remains normal without antihypertensives

## 2022-08-12 ENCOUNTER — POSTPARTUM VISIT (OUTPATIENT)
Dept: OBSTETRICS AND GYNECOLOGY | Facility: CLINIC | Age: 27
End: 2022-08-12
Payer: MEDICAID

## 2022-08-12 ENCOUNTER — HOSPITAL ENCOUNTER (EMERGENCY)
Facility: HOSPITAL | Age: 27
Discharge: HOME | End: 2022-08-12
Attending: EMERGENCY MEDICINE
Payer: MEDICAID

## 2022-08-12 ENCOUNTER — TELEPHONE (OUTPATIENT)
Dept: OBSTETRICS AND GYNECOLOGY | Facility: HOSPITAL | Age: 27
End: 2022-08-12
Payer: MEDICAID

## 2022-08-12 VITALS
WEIGHT: 265.63 LBS | DIASTOLIC BLOOD PRESSURE: 78 MMHG | HEIGHT: 63 IN | BODY MASS INDEX: 47.07 KG/M2 | SYSTOLIC BLOOD PRESSURE: 139 MMHG | OXYGEN SATURATION: 99 % | TEMPERATURE: 99 F | RESPIRATION RATE: 18 BRPM | HEART RATE: 81 BPM

## 2022-08-12 VITALS
HEIGHT: 63 IN | SYSTOLIC BLOOD PRESSURE: 140 MMHG | DIASTOLIC BLOOD PRESSURE: 79 MMHG | BODY MASS INDEX: 47.42 KG/M2 | WEIGHT: 267.63 LBS

## 2022-08-12 DIAGNOSIS — E66.01 MORBID OBESITY: ICD-10-CM

## 2022-08-12 DIAGNOSIS — L03.311 ABDOMINAL WALL CELLULITIS: Primary | ICD-10-CM

## 2022-08-12 DIAGNOSIS — Z48.89 ENCOUNTER FOR POST SURGICAL WOUND CHECK: ICD-10-CM

## 2022-08-12 DIAGNOSIS — Z98.891 S/P C-SECTION: Primary | ICD-10-CM

## 2022-08-12 DIAGNOSIS — T81.89XA PROBLEM INVOLVING SURGICAL INCISION: ICD-10-CM

## 2022-08-12 LAB
ALBUMIN SERPL BCP-MCNC: 2.3 G/DL (ref 3.5–5.2)
ALP SERPL-CCNC: 104 U/L (ref 55–135)
ALT SERPL W/O P-5'-P-CCNC: 18 U/L (ref 10–44)
ANION GAP SERPL CALC-SCNC: 11 MMOL/L (ref 8–16)
AST SERPL-CCNC: 13 U/L (ref 10–40)
BASOPHILS # BLD AUTO: 0.01 K/UL (ref 0–0.2)
BASOPHILS NFR BLD: 0.1 % (ref 0–1.9)
BILIRUB SERPL-MCNC: 0.1 MG/DL (ref 0.1–1)
BUN SERPL-MCNC: 19 MG/DL (ref 6–20)
CALCIUM SERPL-MCNC: 9.2 MG/DL (ref 8.7–10.5)
CHLORIDE SERPL-SCNC: 107 MMOL/L (ref 95–110)
CO2 SERPL-SCNC: 21 MMOL/L (ref 23–29)
CREAT SERPL-MCNC: 0.9 MG/DL (ref 0.5–1.4)
DIFFERENTIAL METHOD: ABNORMAL
EOSINOPHIL # BLD AUTO: 0.1 K/UL (ref 0–0.5)
EOSINOPHIL NFR BLD: 1.7 % (ref 0–8)
ERYTHROCYTE [DISTWIDTH] IN BLOOD BY AUTOMATED COUNT: 14.4 % (ref 11.5–14.5)
EST. GFR  (NO RACE VARIABLE): >60 ML/MIN/1.73 M^2
GLUCOSE SERPL-MCNC: 90 MG/DL (ref 70–110)
HCT VFR BLD AUTO: 30.9 % (ref 37–48.5)
HGB BLD-MCNC: 10 G/DL (ref 12–16)
IMM GRANULOCYTES # BLD AUTO: 0.09 K/UL (ref 0–0.04)
IMM GRANULOCYTES NFR BLD AUTO: 1.1 % (ref 0–0.5)
LACTATE SERPL-SCNC: 1.1 MMOL/L (ref 0.5–2.2)
LYMPHOCYTES # BLD AUTO: 1.4 K/UL (ref 1–4.8)
LYMPHOCYTES NFR BLD: 16.1 % (ref 18–48)
MCH RBC QN AUTO: 29.9 PG (ref 27–31)
MCHC RBC AUTO-ENTMCNC: 32.4 G/DL (ref 32–36)
MCV RBC AUTO: 92 FL (ref 82–98)
MONOCYTES # BLD AUTO: 0.6 K/UL (ref 0.3–1)
MONOCYTES NFR BLD: 6.5 % (ref 4–15)
NEUTROPHILS # BLD AUTO: 6.3 K/UL (ref 1.8–7.7)
NEUTROPHILS NFR BLD: 74.5 % (ref 38–73)
NRBC BLD-RTO: 0 /100 WBC
PLATELET # BLD AUTO: 385 K/UL (ref 150–450)
PMV BLD AUTO: 9 FL (ref 9.2–12.9)
POTASSIUM SERPL-SCNC: 4.1 MMOL/L (ref 3.5–5.1)
PROCALCITONIN SERPL IA-MCNC: 0.03 NG/ML
PROT SERPL-MCNC: 6.1 G/DL (ref 6–8.4)
RBC # BLD AUTO: 3.35 M/UL (ref 4–5.4)
SODIUM SERPL-SCNC: 139 MMOL/L (ref 136–145)
WBC # BLD AUTO: 8.4 K/UL (ref 3.9–12.7)

## 2022-08-12 PROCEDURE — 84145 PROCALCITONIN (PCT): CPT | Performed by: EMERGENCY MEDICINE

## 2022-08-12 PROCEDURE — 96365 THER/PROPH/DIAG IV INF INIT: CPT

## 2022-08-12 PROCEDURE — 85025 COMPLETE CBC W/AUTO DIFF WBC: CPT | Performed by: NURSE PRACTITIONER

## 2022-08-12 PROCEDURE — 99285 EMERGENCY DEPT VISIT HI MDM: CPT | Mod: 25,27

## 2022-08-12 PROCEDURE — 99999 PR PBB SHADOW E&M-EST. PATIENT-LVL III: CPT | Mod: PBBFAC,,, | Performed by: PHYSICIAN ASSISTANT

## 2022-08-12 PROCEDURE — 83605 ASSAY OF LACTIC ACID: CPT | Performed by: EMERGENCY MEDICINE

## 2022-08-12 PROCEDURE — 0503F PR POSTPARTUM CARE VISIT: ICD-10-PCS | Mod: TH,,, | Performed by: PHYSICIAN ASSISTANT

## 2022-08-12 PROCEDURE — 87040 BLOOD CULTURE FOR BACTERIA: CPT | Mod: 59 | Performed by: EMERGENCY MEDICINE

## 2022-08-12 PROCEDURE — 99213 OFFICE O/P EST LOW 20 MIN: CPT | Mod: PBBFAC,25 | Performed by: PHYSICIAN ASSISTANT

## 2022-08-12 PROCEDURE — 0503F POSTPARTUM CARE VISIT: CPT | Mod: TH,,, | Performed by: PHYSICIAN ASSISTANT

## 2022-08-12 PROCEDURE — 99999 PR PBB SHADOW E&M-EST. PATIENT-LVL III: ICD-10-PCS | Mod: PBBFAC,,, | Performed by: PHYSICIAN ASSISTANT

## 2022-08-12 PROCEDURE — 25000003 PHARM REV CODE 250: Performed by: EMERGENCY MEDICINE

## 2022-08-12 PROCEDURE — 80053 COMPREHEN METABOLIC PANEL: CPT | Performed by: NURSE PRACTITIONER

## 2022-08-12 RX ORDER — CLINDAMYCIN HYDROCHLORIDE 300 MG/1
300 CAPSULE ORAL EVERY 6 HOURS
Qty: 28 CAPSULE | Refills: 0 | Status: SHIPPED | OUTPATIENT
Start: 2022-08-12 | End: 2022-08-19 | Stop reason: SDUPTHER

## 2022-08-12 RX ORDER — CLINDAMYCIN PHOSPHATE 900 MG/50ML
900 INJECTION, SOLUTION INTRAVENOUS
Status: COMPLETED | OUTPATIENT
Start: 2022-08-12 | End: 2022-08-12

## 2022-08-12 RX ORDER — LABETALOL 300 MG/1
300 TABLET, FILM COATED ORAL 2 TIMES DAILY
COMMUNITY
Start: 2022-08-11 | End: 2023-01-30

## 2022-08-12 RX ADMIN — CLINDAMYCIN IN 5 PERCENT DEXTROSE 900 MG: 18 INJECTION, SOLUTION INTRAVENOUS at 04:08

## 2022-08-12 NOTE — ED PROVIDER NOTES
SCRIBE #1 NOTE: I, Mao Smith, am scribing for, and in the presence of, Sachi Bertrand MD. I have scribed the entire note.      History      Chief Complaint   Patient presents with    Postpartum Complications     Pt sent over by OB for possible cellulitis to  site; pt complaining of pain to site;  2022       Review of patient's allergies indicates:  No Known Allergies     HPI   HPI    2022, 3:19 PM   History obtained from the patient      History of Present Illness: Kathy Mishra is a 26 y.o. female patient who presents to the Emergency Department for postpartum complications. Pt had a  on 22, and reports noticing blood/pus drainage from the incision site while visiting her child in the NICU earlier today. Pt was evaluated at the assessment center at [facility], and had a copious amount of serosanguinous fluid drained from her abdomen. Pt was subsequently referred to the ED by her midwife for possible cellulitis. Symptoms are constant and moderate in severity. No mitigating or exacerbating factors reported. Associated sxs include lower abdominal pain. Patient denies any fever, chills, n/v/d, SOB, CP, weakness, numbness, dizziness, headache, and all other sxs at this time. No further complaints or concerns at this time.     Arrival mode: Personal vehicle    PCP: Primary Doctor No       Past Medical History:  Past Medical History:   Diagnosis Date    Asthma 2022    Gestational diabetes mellitus (GDM) in third trimester controlled on oral hypoglycemic drug 2022    History of hypertension 2022-add [facility] baseline labs to workup PCR 0.09 advised daily baby aspirin at 16 weeks    Hypertension     Thyroid disease        Past Surgical History:  Past Surgical History:   Procedure Laterality Date    adneoids      age of 5 years old     SECTION N/A 2022    Procedure:  SECTION;  Surgeon: Jhoana Alexander MD;  Location: [facility]  L&D;  Service: OB/GYN;  Laterality: N/A;    TONSILLECTOMY      age of 5 years old         Family History:  Family History   Problem Relation Age of Onset    Hypertension Paternal Grandmother     Diabetes Maternal Grandfather     Hypertension Father     Diabetes Mother        Social History:  Social History     Tobacco Use    Smoking status: Current Some Day Smoker    Smokeless tobacco: Never Used   Substance and Sexual Activity    Alcohol use: Never    Drug use: Never    Sexual activity: Yes     Partners: Male     Birth control/protection: None       ROS   Review of Systems   Constitutional: Negative for chills and fever.   HENT: Negative for sore throat.    Respiratory: Negative for shortness of breath.    Cardiovascular: Negative for chest pain.   Gastrointestinal: Positive for abdominal pain (lower). Negative for diarrhea, nausea and vomiting.   Genitourinary: Negative for dysuria.   Musculoskeletal: Negative for back pain.   Skin: Positive for wound (drainage from  incision site). Negative for rash.   Neurological: Negative for dizziness, weakness, light-headedness, numbness and headaches.   Hematological: Does not bruise/bleed easily.   All other systems reviewed and are negative.    Physical Exam      Initial Vitals [22 1427]   BP Pulse Resp Temp SpO2   135/87 88 18 99.2 °F (37.3 °C) 99 %      MAP       --          Physical Exam  Nursing Notes and Vital Signs Reviewed.  Constitutional: Patient is in no acute distress. Morbidly obese.  Head: Atraumatic. Normocephalic.  Eyes: PERRL. EOM intact. Conjunctivae are not pale. No scleral icterus.  ENT: Mucous membranes are moist. Oropharynx is clear and symmetric.    Neck: Supple. Full ROM.   Cardiovascular: Regular rate. Regular rhythm. No murmurs, rubs, or gallops. Distal pulses are 2+ and symmetric.  Pulmonary/Chest: No respiratory distress. Clear to auscultation bilaterally. No wheezing or rales.  Abdominal: Soft and non-distended. Large  "area of cellulitis to the pannus region and  incision site.  site is clean, dry, and intact. No wound dehiscence or drainage when abdomen is pushed.  Musculoskeletal: Moves all extremities. No obvious deformities. No edema.  Skin: Warm and dry.  Neurological:  Alert, awake, and appropriate.  Normal speech.  No acute focal neurological deficits are appreciated.  Psychiatric: Normal affect. Good eye contact. Appropriate in content.                ED Course    Procedures  ED Vital Signs:  Vitals:    22 1427 22 1630   BP: 135/87    Pulse: 88 86   Resp: 18 17   Temp: 99.2 °F (37.3 °C)    TempSrc: Oral    SpO2: 99% 98%   Weight: 120.5 kg (265 lb 10.5 oz)    Height: 5' 3" (1.6 m)        Abnormal Lab Results:  Labs Reviewed   CBC W/ AUTO DIFFERENTIAL - Abnormal; Notable for the following components:       Result Value    RBC 3.35 (*)     Hemoglobin 10.0 (*)     Hematocrit 30.9 (*)     MPV 9.0 (*)     Immature Granulocytes 1.1 (*)     Immature Grans (Abs) 0.09 (*)     Gran % 74.5 (*)     Lymph % 16.1 (*)     All other components within normal limits   COMPREHENSIVE METABOLIC PANEL - Abnormal; Notable for the following components:    CO2 21 (*)     Albumin 2.3 (*)     All other components within normal limits   CULTURE, BLOOD   CULTURE, BLOOD   PROCALCITONIN   LACTIC ACID, PLASMA        All Lab Results:  Results for orders placed or performed during the hospital encounter of 22   CBC auto differential   Result Value Ref Range    WBC 8.40 3.90 - 12.70 K/uL    RBC 3.35 (L) 4.00 - 5.40 M/uL    Hemoglobin 10.0 (L) 12.0 - 16.0 g/dL    Hematocrit 30.9 (L) 37.0 - 48.5 %    MCV 92 82 - 98 fL    MCH 29.9 27.0 - 31.0 pg    MCHC 32.4 32.0 - 36.0 g/dL    RDW 14.4 11.5 - 14.5 %    Platelets 385 150 - 450 K/uL    MPV 9.0 (L) 9.2 - 12.9 fL    Immature Granulocytes 1.1 (H) 0.0 - 0.5 %    Gran # (ANC) 6.3 1.8 - 7.7 K/uL    Immature Grans (Abs) 0.09 (H) 0.00 - 0.04 K/uL    Lymph # 1.4 1.0 - 4.8 K/uL    Mono " # 0.6 0.3 - 1.0 K/uL    Eos # 0.1 0.0 - 0.5 K/uL    Baso # 0.01 0.00 - 0.20 K/uL    nRBC 0 0 /100 WBC    Gran % 74.5 (H) 38.0 - 73.0 %    Lymph % 16.1 (L) 18.0 - 48.0 %    Mono % 6.5 4.0 - 15.0 %    Eosinophil % 1.7 0.0 - 8.0 %    Basophil % 0.1 0.0 - 1.9 %    Differential Method Automated    Comprehensive metabolic panel   Result Value Ref Range    Sodium 139 136 - 145 mmol/L    Potassium 4.1 3.5 - 5.1 mmol/L    Chloride 107 95 - 110 mmol/L    CO2 21 (L) 23 - 29 mmol/L    Glucose 90 70 - 110 mg/dL    BUN 19 6 - 20 mg/dL    Creatinine 0.9 0.5 - 1.4 mg/dL    Calcium 9.2 8.7 - 10.5 mg/dL    Total Protein 6.1 6.0 - 8.4 g/dL    Albumin 2.3 (L) 3.5 - 5.2 g/dL    Total Bilirubin 0.1 0.1 - 1.0 mg/dL    Alkaline Phosphatase 104 55 - 135 U/L    AST 13 10 - 40 U/L    ALT 18 10 - 44 U/L    Anion Gap 11 8 - 16 mmol/L    eGFR >60 >60 mL/min/1.73 m^2   Procalcitonin   Result Value Ref Range    Procalcitonin 0.03 <0.25 ng/mL   Lactic acid, plasma   Result Value Ref Range    Lactate (Lactic Acid) 1.1 0.5 - 2.2 mmol/L     Imaging Results:  Imaging Results           CT Abdomen Pelvis  Without Contrast (Final result)  Result time 08/12/22 16:46:47    Final result by Angel Zarate MD (08/12/22 16:46:47)                 Impression:      Soft tissue thickening along the anterior abdominal wall concerning for infection without definite abscess associated.  Other etiologies could be considered in the appropriate clinical circumstance.    Multi fibroid uterus.    Mild hepatosplenomegaly.    This report was flagged in Epic as abnormal.    All CT scans at this facility are performed  using dose modulation techniques as appropriate to performed exam including the following:  automated exposure control; adjustment of mA and/or kV according to the patients size (this includes techniques or standardized protocols for targeted exams where dose is matched to indication/reason for exam: i.e. extremities or head);  iterative reconstruction  technique.      Electronically signed by: Angel Tessa  Date:    08/12/2022  Time:    16:46             Narrative:    EXAMINATION:  CT ABDOMEN PELVIS WITHOUT CONTRAST    CLINICAL HISTORY:  cellulitis;    TECHNIQUE:  Low dose axial images, sagittal and coronal reformations were obtained from the lung bases to the pubic symphysis.  Contrast was not administered.    COMPARISON:  None    FINDINGS:  Heart: Normal in size. No pericardial effusion.    Lung Bases: Well aerated, without consolidation or pleural fluid.    Liver: Mild hepatomegaly.  No focal lesions.    Gallbladder: No calcified gallstones.    Bile Ducts: No evidence of dilated ducts.    Pancreas: No mass or peripancreatic fat stranding.    Spleen: Mild splenomegaly.  No focal lesions.    Adrenals: Unremarkable.    Kidneys/ Ureters: Unremarkable.    Bladder: Bladder is nondistended.    Reproductive organs: Unremarkable.    GI Tract/Mesentery: No evidence of bowel obstruction or inflammation.    Peritoneal Space: No ascites. No free air.    Retroperitoneum: No significant adenopathy.    Abdominal wall: Anterior abdominal wall soft tissue thickening and fat stranding which should raise concern for infection.  No definite associated abscess on this noncontrast exam.  The left lateral aspect of the pannus and lateral body wall are not included in the field of view.    Vasculature: No significant atherosclerosis or aneurysm.    Bones: No acute fracture.                                        The Emergency Provider reviewed the vital signs and test results, which are outlined above.    ED Discussion     5:19 PM: Discussed pt's case with Dr. Garcia (OB/GYN), who agrees with plan to discharge the pt on clindamycin and outpatient OB/GYN follow up; Dr. Garcia will have her office contact the pt to schedule an appointment for next week.    5:20 PM: Reassessed pt at this time. Discussed with pt all pertinent ED information and results. Discussed pt dx and plan of tx. Gave  pt all f/u and return to the ED instructions. All questions and concerns were addressed at this time. Pt expresses understanding of information and instructions, and is comfortable with plan to discharge. Pt is stable for discharge.    I discussed with patient and/or family/caretaker that evaluation in the ED does not suggest any emergent or life threatening medical conditions requiring immediate intervention beyond what was provided in the ED, and I believe patient is safe for discharge.  Regardless, an unremarkable evaluation in the ED does not preclude the development or presence of a serious of life threatening condition. As such, patient was instructed to return immediately for any worsening or change in current symptoms.         ED Medication(s):  Medications   clindamycin in D5W 900 mg/50 mL IVPB 900 mg (0 mg Intravenous Stopped 8/12/22 6213)        Follow-up Information     [facility]. Schedule an appointment as soon as possible for a visit in 3 days.    Specialty: Obstetrics and Gynecology  Why: Return to the Emergency Room, If symptoms worsen  Contact information:  [id number] [facility] [id number] [phone]  Additional information:  Please park on the [address] side and use the Clinic entrance. Check in on the 2nd floor, to the left.                      New Prescriptions    CLINDAMYCIN (CLEOCIN) 300 MG CAPSULE    Take 1 capsule (300 mg total) by mouth every 6 (six) hours. for 7 days         Medical Decision Making    Medical Decision Making:   Clinical Tests:   Lab Tests: Ordered and Reviewed  Radiological Study: Ordered and Reviewed           Scribe Attestation:   Scribe #1: I performed the above scribed service and the documentation accurately describes the services I performed. I attest to the accuracy of the note.    Attending:   Physician Attestation Statement for Scribe #1: I, Sachi Bertrand MD, personally performed the services described in this  documentation, as scribed by Mao Smith, in my presence, and it is both accurate and complete.          Clinical Impression       ICD-10-CM ICD-9-CM   1. Abdominal wall cellulitis  L03.311 682.2   2. Encounter for post surgical wound check  Z48.89 V58.49   3. Postpartum abnormal glucose tolerance of mother  O99.815 648.84   4. Morbid obesity  E66.01 278.01       Disposition:   Disposition: Discharged  Condition: Stable         Sachi Bertrand MD  08/12/22 1738

## 2022-08-12 NOTE — TELEPHONE ENCOUNTER
Labs and ct were normal in ER  (sent from clinic for wound eval)    Needs f/u next wed or thrusday in pa clinic--please call with appt

## 2022-08-12 NOTE — FIRST PROVIDER EVALUATION
"Medical screening exam completed.  I have conducted a focused provider triage encounter, findings are as follows:    Brief history of present illness:  Pt is 1 week postpartum and started having a "large amount" of blood from her surgical incision today.  Told to come to the ER by Dr. valero.    There were no vitals filed for this visit.    Pertinent physical exam:      Brief workup plan:      Preliminary workup initiated; this workup will be continued and followed by the physician or advanced practice provider that is assigned to the patient when roomed.  "

## 2022-08-12 NOTE — PROGRESS NOTES
OBGYN Post-op Clinic  History and Physical    Patient Name: Kathy Mishra  YOB: 1995 (26 y.o.)  MRN: [id number]  Today's Date: 2022    Referring Md:   No referring provider defined for this encounter.    SUBJECTIVE:     Chief Complaint: Post-op  Dressing Removal    History of Present Illness:  Kathy Mishra is a 26 y.o. female  who presents to the clinic today for acquacel dressing removal and BP check. Patient's baby is in the NICU at [facility]. Patient was visiting her baby this morning when she noticed some fluid leaking from her dressing. She was seen at the assessment center at [facility], where she was told that she has a seroma. She states that they pushed copious amounts of serosanguinous fluid from her abdomen. She was then instructed to keep her appointment here today for further evaluation. Patient reports that she has been having some pain but has not run any fevers or noticed any other drainage. Patient also states that she has been compliant with all post-op instructions. She denies heavy vaginal bleeding or vaginal discharge. BP a bit elevated at 140/89 today in clinic, but patient reports being in a good bit of pain. Denies headaches, vision changes, and increased leg swelling.       Review of patient's allergies indicates:  No Known Allergies    Past Medical History:   Diagnosis Date    Asthma 2022    Gestational diabetes mellitus (GDM) in third trimester controlled on oral hypoglycemic drug 2022    History of hypertension 2022-add [facility] baseline labs to workup PCR 0.09 advised daily baby aspirin at 16 weeks    Hypertension     Thyroid disease      Past Surgical History:   Procedure Laterality Date    adneoids      age of 5 years old     SECTION N/A 2022    Procedure:  SECTION;  Surgeon: Jhoana Alexander MD;  [facility];  Service: OB/GYN;  Laterality: N/A;    TONSILLECTOMY      age of 5  "years old     Family History   Problem Relation Age of Onset    Hypertension Paternal Grandmother     Diabetes Maternal Grandfather     Hypertension Father     Diabetes Mother      Social History     Tobacco Use    Smoking status: Current Some Day Smoker    Smokeless tobacco: Never Used   Substance Use Topics    Alcohol use: Never    Drug use: Never        Review of Systems:  Review of Systems   Constitutional: Negative for chills and fever.   HENT: Negative for congestion and sore throat.    Respiratory: Negative for cough and shortness of breath.    Cardiovascular: Negative for chest pain.   Gastrointestinal: Negative for nausea and vomiting.   Genitourinary: Negative for dysuria.   Musculoskeletal: Negative for myalgias.   Neurological: Negative for weakness and headaches.       OBJECTIVE:     Vital Signs (Most Recent)  BP (!) 140/79   Ht 5' 3" (1.6 m)   Wt 121.4 kg (267 lb 10.2 oz)   LMP 11/25/2021   BMI 47.41 kg/m²     Physical Exam  Vitals and nursing note reviewed.   Constitutional:       General: She is not in acute distress.     Appearance: Normal appearance.   HENT:      Head: Normocephalic and atraumatic.   Eyes:      General: No scleral icterus.        Right eye: No discharge.         Left eye: No discharge.      Conjunctiva/sclera: Conjunctivae normal.   Cardiovascular:      Rate and Rhythm: Normal rate.   Pulmonary:      Effort: Pulmonary effort is normal. No respiratory distress.   Abdominal:      Palpations: Abdomen is soft.      Tenderness: There is abdominal tenderness (Lower abdomoen and incision extremely TTP).      Comments: Pfannenstiel incision present on abdomen. Incision is still intact, but demonstrates some erythema. No purulent discharge or any other significant drainage appreciated on exam. Incision probed with q-tip and no large opening was found. Abdomen superior to incision is warm and erythematous. See photographs.    Musculoskeletal:         General: Normal range of " motion.      Cervical back: Normal range of motion.   Skin:     General: Skin is warm and dry.   Neurological:      General: No focal deficit present.      Mental Status: She is alert and oriented to person, place, and time.   Psychiatric:         Mood and Affect: Mood normal.         Behavior: Behavior normal.         Thought Content: Thought content normal.         Judgment: Judgment normal.       Media Information                              ASSESSMENT/PLAN:     Kathy Mishra is a 26 y.o. female was seen today for dressing change and blood pressure check. Patient's abdomen concerning for seroma vs cellulitis vs wound infection. Case discussed with Dr. Garcia, who recommended patient report to the emergency department for further evaluation and treatment. Discussed the treatment plan with patient and family member. They both verbalized understanding and stated that they would head over to the emergency room at [facility] [address].     Kathy was seen today for wound check.    Diagnoses and all orders for this visit:    S/P   - Continue to shower only, no baths or soaking in water  - No heavy lifting for 6 weeks  - Pelvic rest for 6 weeks  - Postpartum appointment with the surgeon scheduled in a few weeks     Problem involving surgical incision  - Report to emergency department at [facility] for further workup   - Patient verbalized understanding and agreed to report to the ED      АНДРЕЙ Byers, ANJELICA  OBGYN - Surgery  [facility]

## 2022-08-18 LAB
BACTERIA BLD CULT: NORMAL
BACTERIA BLD CULT: NORMAL

## 2022-08-19 ENCOUNTER — POSTPARTUM VISIT (OUTPATIENT)
Dept: OBSTETRICS AND GYNECOLOGY | Facility: CLINIC | Age: 27
End: 2022-08-19
Payer: MEDICAID

## 2022-08-19 VITALS
DIASTOLIC BLOOD PRESSURE: 90 MMHG | BODY MASS INDEX: 45.04 KG/M2 | HEIGHT: 63 IN | WEIGHT: 254.19 LBS | SYSTOLIC BLOOD PRESSURE: 136 MMHG

## 2022-08-19 DIAGNOSIS — G47.9 SLEEP DISTURBANCE: ICD-10-CM

## 2022-08-19 DIAGNOSIS — L03.311 ABDOMINAL WALL CELLULITIS: Primary | ICD-10-CM

## 2022-08-19 PROCEDURE — 99999 PR PBB SHADOW E&M-EST. PATIENT-LVL III: CPT | Mod: PBBFAC,,,

## 2022-08-19 PROCEDURE — 99213 OFFICE O/P EST LOW 20 MIN: CPT | Mod: PBBFAC,TH

## 2022-08-19 PROCEDURE — 59430 PR CARE AFTER DELIVERY ONLY: ICD-10-PCS | Mod: TH,,, | Performed by: OBSTETRICS & GYNECOLOGY

## 2022-08-19 PROCEDURE — 99999 PR PBB SHADOW E&M-EST. PATIENT-LVL III: ICD-10-PCS | Mod: PBBFAC,,,

## 2022-08-19 RX ORDER — FUROSEMIDE 40 MG/1
40 TABLET ORAL DAILY PRN
Qty: 7 TABLET | Refills: 0 | Status: SHIPPED | OUTPATIENT
Start: 2022-08-19 | End: 2023-01-30

## 2022-08-19 RX ORDER — CLINDAMYCIN HYDROCHLORIDE 300 MG/1
300 CAPSULE ORAL EVERY 6 HOURS
Qty: 28 CAPSULE | Refills: 0 | Status: SHIPPED | OUTPATIENT
Start: 2022-08-19 | End: 2022-08-19

## 2022-08-19 RX ORDER — CLINDAMYCIN HYDROCHLORIDE 300 MG/1
300 CAPSULE ORAL EVERY 6 HOURS
Qty: 28 CAPSULE | Refills: 0 | Status: SHIPPED | OUTPATIENT
Start: 2022-08-19 | End: 2022-08-26

## 2022-08-19 NOTE — PROGRESS NOTES
Subjective:       Patient ID: Kathy Mishra is a 26 y.o. female.    Chief Complaint:  Wound Check      History of Present Illness  HPI  Postoperative Follow-up  Patient presents to the clinic 2 weeks status post  for wound check. Patient was evaluated 1 week ago for spontaneous drainage from the incision and also found to have pannus cellulitis. She was evaluated in the ED with CT which did not reveal abscess and started on clindamycin. Last drainage was about 4 days ago. She believes the redness is starting to improve. No fever/chills.  Occasionally having dizziness when walking around (eg [employer]) or standing up quickly.      GYN & OB History  Patient's last menstrual period was 2021.   Date of Last Pap: No result found    OB History    Para Term  AB Living   2 1   1 1 1   SAB IAB Ectopic Multiple Live Births   1     0 1      # Outcome Date GA Lbr Tera/2nd Weight Sex Delivery Anes PTL Lv   2  22 35w1d  2.69 kg (5 lb 14.9 oz) F CS-LTranv Spinal, EPI N GUILLERMINA      Complications: Failure to Progress in First Stage   1 SAB 16 12w0d              Review of Systems  Review of Systems   Constitutional: Negative for activity change, chills, fatigue and fever.   Respiratory: Negative for cough.    Cardiovascular: Negative for chest pain and leg swelling.   Gastrointestinal: Negative for abdominal pain, constipation, nausea and vomiting.   Genitourinary: Negative for dysuria, frequency, hematuria, pelvic pain, urgency, vaginal bleeding and vaginal discharge.   Integumentary:  Negative for rash.           Objective:    Physical Exam:   Constitutional: She is oriented to person, place, and time. She appears well-developed and well-nourished. No distress.             Abdominal: Soft. She exhibits abdominal incision (intact, no erythema/induration; pannus with edema and hazy erythema). She exhibits no distension. There is no abdominal tenderness.             Musculoskeletal:  No edema.       Neurological: She is alert and oriented to person, place, and time.    Skin: Skin is warm and dry. She is not diaphoretic. No erythema.           Problem List Items Addressed This Visit    None     Visit Diagnoses     Abdominal wall cellulitis    -  Primary    Relevant Medications    furosemide (LASIX) 40 MG tablet    clindamycin (CLEOCIN) 300 MG capsule     section wound seroma, postpartum        Sleep disturbance        Relevant Orders    Ambulatory referral/consult to Sleep Disorders      BP borderline elevated here today however suspect is running low at times. Patient to monitor her BP and will message me if numbers are running low or continues to be symptomatic  Reports frequently wakes at night due to breath holding and snoring (family member took video of this). Requesting referral for sleep study, will place.  Wound healing well, seroma appears resolved. No evidence for incisional cellulitis. Cellulitis of pannus is improving, still with edema. Continue clindamycin and add lasix

## 2022-08-22 ENCOUNTER — PATIENT MESSAGE (OUTPATIENT)
Dept: PULMONOLOGY | Facility: CLINIC | Age: 27
End: 2022-08-22
Payer: MEDICAID

## 2022-09-04 NOTE — PROGRESS NOTES
"CC: Post-partum follow-up    Kathy Mishra is a 27 y.o. female  who presents for post-partum visit.  She is S/P primary LTCS at IUP 35w1d for:  1. Failed labor induction with arrest at 5cm  2. Morbid obesity  3. Pre-eclampsia  4. GDMA2.      She and the baby are doing well.  No pain.  No fever.   No bowel / bladder complaints. PP course complicated by seroma, cellulitis; treated w/ clindamycin    Delivery Date: 2022  Delivery provider: Jhoana Alexander  Gender: female  Birth Weight: 5 pounds 15 ounces  Breast Feeding: NO  Depression: NO  Contraception: no method    Pregnancy was complicated by:  See above    Ht 5' 3" (1.6 m)   Wt 113.6 kg (250 lb 5.3 oz)   LMP 2021   Breastfeeding No   BMI 44.34 kg/m²     ROS:  GENERAL: No fever, chills, fatigability.  VULVAR: No pain, no lesions and no itching.  VAGINAL: No relaxation, no itching, no discharge, no abnormal bleeding and no lesions.  ABDOMEN: No abdominal pain. Denies nausea. Denies vomiting. No diarrhea. No constipation  BREAST: Denies pain. No lumps. No discharge.  URINARY: No incontinence, no nocturia, no frequency and no dysuria.  CARDIOVASCULAR: No chest pain. No shortness of breath. No leg cramps.  NEUROLOGICAL: No headaches. No vision changes.    PHYSICAL EXAM:  GENERAL: NAD  NECK: no thyromegaly  BREASTS: no abnormal masses/nontender  ABDOMEN:  Soft, non-tender, non-distended incision well healed. Edema in dependent pannus. Erythema noted likely due to friction of pannus  VULVA:  Normal, no lesions  VAGINA: Normal vaginal mucosa/cervix. No abnormal discharge  CERVIX:  Without lesions, polyps or tenderness.  UTERUS:  Normal size, shape, consistency, no mass or tenderness.  ADNEXA:  Normal in size without mass or tenderness    IMP:  Doing well S/P , due to failure to progress  Instructions / precautions reviewed  Contraceptive counseling      PLAN:  May resume normal activities  Return: [facility]; pap done. Aquaphor to " affected area

## 2022-09-09 ENCOUNTER — POSTPARTUM VISIT (OUTPATIENT)
Dept: OBSTETRICS AND GYNECOLOGY | Facility: CLINIC | Age: 27
End: 2022-09-09
Payer: MEDICAID

## 2022-09-09 ENCOUNTER — PATIENT MESSAGE (OUTPATIENT)
Dept: OBSTETRICS AND GYNECOLOGY | Facility: CLINIC | Age: 27
End: 2022-09-09

## 2022-09-09 VITALS — BODY MASS INDEX: 44.35 KG/M2 | WEIGHT: 250.31 LBS | HEIGHT: 63 IN

## 2022-09-09 DIAGNOSIS — Z01.419 ENCOUNTER FOR CERVICAL PAP SMEAR WITH PELVIC EXAM: ICD-10-CM

## 2022-09-09 PROCEDURE — 87624 HPV HI-RISK TYP POOLED RSLT: CPT | Performed by: OBSTETRICS & GYNECOLOGY

## 2022-09-09 PROCEDURE — 99213 OFFICE O/P EST LOW 20 MIN: CPT | Mod: PBBFAC,PN | Performed by: OBSTETRICS & GYNECOLOGY

## 2022-09-09 PROCEDURE — 88141 PR  CYTOPATH CERV/VAG INTERPRET: ICD-10-PCS | Mod: ,,, | Performed by: PATHOLOGY

## 2022-09-09 PROCEDURE — 88175 CYTOPATH C/V AUTO FLUID REDO: CPT | Performed by: PATHOLOGY

## 2022-09-09 PROCEDURE — 99999 PR PBB SHADOW E&M-EST. PATIENT-LVL III: ICD-10-PCS | Mod: PBBFAC,,, | Performed by: OBSTETRICS & GYNECOLOGY

## 2022-09-09 PROCEDURE — 99999 PR PBB SHADOW E&M-EST. PATIENT-LVL III: CPT | Mod: PBBFAC,,, | Performed by: OBSTETRICS & GYNECOLOGY

## 2022-09-09 PROCEDURE — 88141 CYTOPATH C/V INTERPRET: CPT | Mod: ,,, | Performed by: PATHOLOGY

## 2022-09-09 PROCEDURE — 59430 PR CARE AFTER DELIVERY ONLY: ICD-10-PCS | Mod: ,,, | Performed by: OBSTETRICS & GYNECOLOGY

## 2022-09-14 LAB
FINAL PATHOLOGIC DIAGNOSIS: ABNORMAL
Lab: ABNORMAL

## 2022-09-20 LAB
HPV HR 12 DNA SPEC QL NAA+PROBE: NEGATIVE
HPV16 AG SPEC QL: NEGATIVE
HPV18 DNA SPEC QL NAA+PROBE: NEGATIVE

## 2022-09-23 ENCOUNTER — PATIENT MESSAGE (OUTPATIENT)
Dept: OBSTETRICS AND GYNECOLOGY | Facility: HOSPITAL | Age: 27
End: 2022-09-23
Payer: MEDICAID

## 2022-10-19 ENCOUNTER — PATIENT MESSAGE (OUTPATIENT)
Dept: OBSTETRICS AND GYNECOLOGY | Facility: CLINIC | Age: 27
End: 2022-10-19
Payer: MEDICAID

## 2022-11-03 ENCOUNTER — OFFICE VISIT (OUTPATIENT)
Dept: PULMONOLOGY | Facility: CLINIC | Age: 27
End: 2022-11-03
Payer: MEDICAID

## 2022-11-03 ENCOUNTER — TELEPHONE (OUTPATIENT)
Dept: SLEEP MEDICINE | Facility: CLINIC | Age: 27
End: 2022-11-03
Payer: MEDICAID

## 2022-11-03 VITALS
OXYGEN SATURATION: 99 % | HEIGHT: 63 IN | DIASTOLIC BLOOD PRESSURE: 88 MMHG | HEART RATE: 95 BPM | BODY MASS INDEX: 45.93 KG/M2 | WEIGHT: 259.25 LBS | RESPIRATION RATE: 16 BRPM | SYSTOLIC BLOOD PRESSURE: 126 MMHG

## 2022-11-03 DIAGNOSIS — G47.10 HYPERSOMNIA: ICD-10-CM

## 2022-11-03 DIAGNOSIS — G47.33 OSA (OBSTRUCTIVE SLEEP APNEA): Primary | ICD-10-CM

## 2022-11-03 DIAGNOSIS — E66.01 CLASS 3 SEVERE OBESITY DUE TO EXCESS CALORIES WITH SERIOUS COMORBIDITY AND BODY MASS INDEX (BMI) OF 45.0 TO 49.9 IN ADULT: ICD-10-CM

## 2022-11-03 PROCEDURE — 99213 OFFICE O/P EST LOW 20 MIN: CPT | Mod: PBBFAC,PO | Performed by: NURSE PRACTITIONER

## 2022-11-03 PROCEDURE — 3079F PR MOST RECENT DIASTOLIC BLOOD PRESSURE 80-89 MM HG: ICD-10-PCS | Mod: CPTII,,, | Performed by: NURSE PRACTITIONER

## 2022-11-03 PROCEDURE — 3008F PR BODY MASS INDEX (BMI) DOCUMENTED: ICD-10-PCS | Mod: CPTII,,, | Performed by: NURSE PRACTITIONER

## 2022-11-03 PROCEDURE — 1160F PR REVIEW ALL MEDS BY PRESCRIBER/CLIN PHARMACIST DOCUMENTED: ICD-10-PCS | Mod: CPTII,,, | Performed by: NURSE PRACTITIONER

## 2022-11-03 PROCEDURE — 99999 PR PBB SHADOW E&M-EST. PATIENT-LVL III: ICD-10-PCS | Mod: PBBFAC,,, | Performed by: NURSE PRACTITIONER

## 2022-11-03 PROCEDURE — 1159F MED LIST DOCD IN RCRD: CPT | Mod: CPTII,,, | Performed by: NURSE PRACTITIONER

## 2022-11-03 PROCEDURE — 3044F HG A1C LEVEL LT 7.0%: CPT | Mod: CPTII,,, | Performed by: NURSE PRACTITIONER

## 2022-11-03 PROCEDURE — 3074F SYST BP LT 130 MM HG: CPT | Mod: CPTII,,, | Performed by: NURSE PRACTITIONER

## 2022-11-03 PROCEDURE — 3074F PR MOST RECENT SYSTOLIC BLOOD PRESSURE < 130 MM HG: ICD-10-PCS | Mod: CPTII,,, | Performed by: NURSE PRACTITIONER

## 2022-11-03 PROCEDURE — 1159F PR MEDICATION LIST DOCUMENTED IN MEDICAL RECORD: ICD-10-PCS | Mod: CPTII,,, | Performed by: NURSE PRACTITIONER

## 2022-11-03 PROCEDURE — 99999 PR PBB SHADOW E&M-EST. PATIENT-LVL III: CPT | Mod: PBBFAC,,, | Performed by: NURSE PRACTITIONER

## 2022-11-03 PROCEDURE — 99204 PR OFFICE/OUTPT VISIT, NEW, LEVL IV, 45-59 MIN: ICD-10-PCS | Mod: S$PBB,,, | Performed by: NURSE PRACTITIONER

## 2022-11-03 PROCEDURE — 99204 OFFICE O/P NEW MOD 45 MIN: CPT | Mod: S$PBB,,, | Performed by: NURSE PRACTITIONER

## 2022-11-03 PROCEDURE — 3044F PR MOST RECENT HEMOGLOBIN A1C LEVEL <7.0%: ICD-10-PCS | Mod: CPTII,,, | Performed by: NURSE PRACTITIONER

## 2022-11-03 PROCEDURE — 3008F BODY MASS INDEX DOCD: CPT | Mod: CPTII,,, | Performed by: NURSE PRACTITIONER

## 2022-11-03 PROCEDURE — 1160F RVW MEDS BY RX/DR IN RCRD: CPT | Mod: CPTII,,, | Performed by: NURSE PRACTITIONER

## 2022-11-03 PROCEDURE — 3079F DIAST BP 80-89 MM HG: CPT | Mod: CPTII,,, | Performed by: NURSE PRACTITIONER

## 2022-11-03 NOTE — PROGRESS NOTES
Subjective:      Patient ID: Kathy Mishra is a 27 y.o. female.    Chief Complaint: Apnea    HPI    Patient presents to the office today for evaluation of sleep apnea.  Patient with snoring and witnessed apneas. Patient not having problems falling asleep, but wakes up frequently throughout the night.  Patient does not wake up feeling refreshed in the morning.  Patient with daytime hypersomnolence.  [place] Sleepiness Scale score 18.  Patient has had symptoms for a few years. Comorbidities include BMI 45. Waking up with headaches.   Bedtime: 9-11PM  Wake time: 10AM    STOP - BANG Questionnaire:     1. Snoring : Do you snore loudly ?    Yes    2. Tired : Do you often feel tired, fatigued, or sleepy during daytime?   Yes    3. Observed: Has anyone observed you stop breathing during your sleep?   Yes    4. Blood pressure : Do you have or are you being treated for high blood pressure?   No    5. BMI :BMI more than 35 kg/m2?   Yes    6. Age : Age over 50 yr old?   No    7. Neck circumference: Neck circumference greater than 40 cm?   No    8. Gender: Gender male?   No    High risk of BERNARD: Yes 5 - 8  Intermediate risk of BERNARD: Yes 3 - 4  Low risk of BERNARD: Yes 0 - 2      References:   STOP Questionnaire   A Tool to Screen Patients for Obstructive Sleep Apnea: RAJEEV SwansonR.C.P.C., Nomi James M.B.B.S., Agustin Malone M.D.,Cha Sexton, Ph.D., Dylan Lr M.B.B.S.,_ Sangita Kim.,_ Dustin Ruiz M.D., Chris Messer F.R.C.P.C.; Anesthesiology 2008; 108:812-21 Copyright © 2008, the American Society of Anesthesiologists, Inc. [employer]    Patient Active Problem List   Diagnosis    Chronic hypertension with superimposed pre-eclampsia    History of thyroid disorder    Class 3 severe obesity due to excess calories with serious comorbidity and body mass index (BMI) of 45.0 to 49.9 in adult    Gestational diabetes mellitus (GDM) in third trimester controlled on oral  "hypoglycemic drug    Asthma    Hypothyroidism    Intractable migraine without aura and without status migrainosus    Vitamin D deficiency    Obesity complicating pregnancy in third trimester    S/P          /88   Pulse 95   Resp 16   Ht 5' 3" (1.6 m)   Wt 117.6 kg (259 lb 4.2 oz)   SpO2 99%   Breastfeeding No   BMI 45.93 kg/m²   Body mass index is 45.93 kg/m².    Review of Systems   Constitutional: Negative.    HENT: Negative.     Respiratory:  Positive for snoring and somnolence.    Cardiovascular: Negative.    Musculoskeletal: Negative.    Gastrointestinal: Negative.    Neurological:  Positive for headaches.   Psychiatric/Behavioral:  Positive for sleep disturbance.        Objective:      Physical Exam  Constitutional:       Appearance: She is well-developed. She is obese.   HENT:      Head: Normocephalic and atraumatic.      Nose: Nose normal.      Mouth/Throat:      Pharynx: Oropharynx is clear.      Comments: Mallampati Score: II    Cardiovascular:      Rate and Rhythm: Normal rate and regular rhythm.      Heart sounds: No murmur heard.    No gallop.   Pulmonary:      Effort: Pulmonary effort is normal.      Breath sounds: Normal breath sounds.   Abdominal:      Palpations: Abdomen is soft. There is no mass.   Musculoskeletal:         General: Normal range of motion.      Cervical back: Normal range of motion and neck supple.   Skin:     General: Skin is warm and dry.   Neurological:      Mental Status: She is alert and oriented to person, place, and time.   Psychiatric:         Mood and Affect: Mood normal.         Behavior: Behavior normal.       Assessment:     1. BERNARD (obstructive sleep apnea)    2. Class 3 severe obesity due to excess calories with serious comorbidity and body mass index (BMI) of 45.0 to 49.9 in adult    3. Hypersomnia       Outpatient Encounter Medications as of 11/3/2022   Medication Sig Dispense Refill    albuterol (PROVENTIL/VENTOLIN HFA) 90 mcg/actuation inhaler   "     cholecalciferol, vitamin D3, 125 mcg (5,000 unit) Tab Take 5,000 Units by mouth once daily.      labetaloL (NORMODYNE) 300 MG tablet Take 300 mg by mouth 2 (two) times daily.      prenatal 168/iron/folic/omega3 (ONE-A-DAY PRENATAL-1 ORAL) Take by mouth.      furosemide (LASIX) 40 MG tablet Take 1 tablet (40 mg total) by mouth daily as needed (swelling). 7 tablet 0    oxyCODONE-acetaminophen (PERCOCET) 5-325 mg per tablet Take 1 tablet by mouth every 4 (four) hours as needed for Pain. (Patient not taking: Reported on 9/9/2022) 20 tablet 0    [DISCONTINUED] aspirin 81 MG Chew Take 1 tablet (81 mg total) by mouth once daily. 30 tablet 11    [DISCONTINUED] blood-glucose meter kit To check BG 4 times daily, to use with insurance preferred meter 1 each 0    [DISCONTINUED] metFORMIN (GLUCOPHAGE XR) 500 MG ER 24hr tablet Increase Metformin to 2 tablets twice a day. 120 tablet 11    [DISCONTINUED] TRUEDRAW LANCING DEVICE Misc directed       No facility-administered encounter medications on file as of 11/3/2022.     Orders Placed This Encounter   Procedures    Home Sleep Study     Standing Status:   Future     Standing Expiration Date:   11/3/2023     Scheduling Instructions:      2 night protocol     Plan:   Sleep study and review when available.  Problem List Items Addressed This Visit          Endocrine    Class 3 severe obesity due to excess calories with serious comorbidity and body mass index (BMI) of 45.0 to 49.9 in adult    Overview     01/11/2022-BMI 44.72.  Advised 10-15 lb total weight gain.  Consider 2000 calorie diabetic diet.  Daily baby aspirin at 16 weeks    Formatting of this note might be different from the original.  -Discussed lifestyle modifications including increasing physical activity healthy diet changes to achieve weight loss goals and improve overall health          Other Visit Diagnoses       BERNARD (obstructive sleep apnea)    -  Primary    Relevant Orders    Home Sleep Study    Hypersomnia

## 2022-11-17 ENCOUNTER — HOSPITAL ENCOUNTER (OUTPATIENT)
Dept: SLEEP MEDICINE | Facility: HOSPITAL | Age: 27
Discharge: HOME | End: 2022-11-17
Attending: INTERNAL MEDICINE
Payer: MEDICAID

## 2022-11-17 DIAGNOSIS — G47.33 OSA (OBSTRUCTIVE SLEEP APNEA): ICD-10-CM

## 2022-11-17 PROCEDURE — 95800 SLP STDY UNATTENDED: CPT

## 2022-11-17 NOTE — Clinical Note
2 night study SEVERE OBSTRUCTIVE SLEEP APNEA with overall AHI 90.4/hr ( 531 events): night #1 Oxygen desaturation: 70%. SpO2 between 70% to 79% for 1 hr 8 min. Oxygen saturation was below 90% for 77% of study duration Patient snored 100% time above 50 . Heart rate range: 67 bpm - 107 bpm REC's: Consider inlab CPAP titration Therapy with APAP at 10-20 cm WP using mask of choice with heated humidification is an option. Weight loss/management. with regular exercise per direction of physician. Avoid drowsy driving. Follow up in sleep clinic to maximize adherence and ensure resolution of symptoms.

## 2022-11-19 PROCEDURE — 95806 SLEEP STUDY UNATT&RESP EFFT: CPT | Mod: 26,,, | Performed by: INTERNAL MEDICINE

## 2022-11-19 PROCEDURE — 95806 PR SLEEP STUDY, UNATTENDED, SIMUL RECORD HR/O2 SAT/RESP FLOW/RESP EFFT: ICD-10-PCS | Mod: 26,,, | Performed by: INTERNAL MEDICINE

## 2022-11-21 ENCOUNTER — TELEPHONE (OUTPATIENT)
Dept: PULMONOLOGY | Facility: CLINIC | Age: 27
End: 2022-11-21
Payer: MEDICAID

## 2022-11-21 ENCOUNTER — PATIENT MESSAGE (OUTPATIENT)
Dept: PULMONOLOGY | Facility: CLINIC | Age: 27
End: 2022-11-21
Payer: MEDICAID

## 2022-11-21 DIAGNOSIS — G47.33 OSA (OBSTRUCTIVE SLEEP APNEA): Primary | ICD-10-CM

## 2022-12-05 ENCOUNTER — PATIENT MESSAGE (OUTPATIENT)
Dept: PULMONOLOGY | Facility: CLINIC | Age: 27
End: 2022-12-05
Payer: MEDICAID

## 2023-01-30 ENCOUNTER — OFFICE VISIT (OUTPATIENT)
Dept: PULMONOLOGY | Facility: CLINIC | Age: 28
End: 2023-01-30
Payer: MEDICAID

## 2023-01-30 VITALS
OXYGEN SATURATION: 99 % | SYSTOLIC BLOOD PRESSURE: 121 MMHG | HEIGHT: 63 IN | RESPIRATION RATE: 18 BRPM | BODY MASS INDEX: 47.46 KG/M2 | DIASTOLIC BLOOD PRESSURE: 70 MMHG | WEIGHT: 267.88 LBS | HEART RATE: 105 BPM

## 2023-01-30 DIAGNOSIS — E66.01 CLASS 3 SEVERE OBESITY DUE TO EXCESS CALORIES WITH SERIOUS COMORBIDITY AND BODY MASS INDEX (BMI) OF 45.0 TO 49.9 IN ADULT: ICD-10-CM

## 2023-01-30 DIAGNOSIS — G47.10 HYPERSOMNIA: ICD-10-CM

## 2023-01-30 DIAGNOSIS — G47.33 OSA (OBSTRUCTIVE SLEEP APNEA): Primary | ICD-10-CM

## 2023-01-30 PROCEDURE — 1159F PR MEDICATION LIST DOCUMENTED IN MEDICAL RECORD: ICD-10-PCS | Mod: CPTII,,, | Performed by: NURSE PRACTITIONER

## 2023-01-30 PROCEDURE — 99214 OFFICE O/P EST MOD 30 MIN: CPT | Mod: S$PBB,,, | Performed by: NURSE PRACTITIONER

## 2023-01-30 PROCEDURE — 1160F RVW MEDS BY RX/DR IN RCRD: CPT | Mod: CPTII,,, | Performed by: NURSE PRACTITIONER

## 2023-01-30 PROCEDURE — 3074F SYST BP LT 130 MM HG: CPT | Mod: CPTII,,, | Performed by: NURSE PRACTITIONER

## 2023-01-30 PROCEDURE — 99999 PR PBB SHADOW E&M-EST. PATIENT-LVL III: ICD-10-PCS | Mod: PBBFAC,,, | Performed by: NURSE PRACTITIONER

## 2023-01-30 PROCEDURE — 99999 PR PBB SHADOW E&M-EST. PATIENT-LVL III: CPT | Mod: PBBFAC,,, | Performed by: NURSE PRACTITIONER

## 2023-01-30 PROCEDURE — 1159F MED LIST DOCD IN RCRD: CPT | Mod: CPTII,,, | Performed by: NURSE PRACTITIONER

## 2023-01-30 PROCEDURE — 3078F DIAST BP <80 MM HG: CPT | Mod: CPTII,,, | Performed by: NURSE PRACTITIONER

## 2023-01-30 PROCEDURE — 3078F PR MOST RECENT DIASTOLIC BLOOD PRESSURE < 80 MM HG: ICD-10-PCS | Mod: CPTII,,, | Performed by: NURSE PRACTITIONER

## 2023-01-30 PROCEDURE — 3008F PR BODY MASS INDEX (BMI) DOCUMENTED: ICD-10-PCS | Mod: CPTII,,, | Performed by: NURSE PRACTITIONER

## 2023-01-30 PROCEDURE — 3074F PR MOST RECENT SYSTOLIC BLOOD PRESSURE < 130 MM HG: ICD-10-PCS | Mod: CPTII,,, | Performed by: NURSE PRACTITIONER

## 2023-01-30 PROCEDURE — 1160F PR REVIEW ALL MEDS BY PRESCRIBER/CLIN PHARMACIST DOCUMENTED: ICD-10-PCS | Mod: CPTII,,, | Performed by: NURSE PRACTITIONER

## 2023-01-30 PROCEDURE — 99213 OFFICE O/P EST LOW 20 MIN: CPT | Mod: PBBFAC | Performed by: NURSE PRACTITIONER

## 2023-01-30 PROCEDURE — 99214 PR OFFICE/OUTPT VISIT, EST, LEVL IV, 30-39 MIN: ICD-10-PCS | Mod: S$PBB,,, | Performed by: NURSE PRACTITIONER

## 2023-01-30 PROCEDURE — 3008F BODY MASS INDEX DOCD: CPT | Mod: CPTII,,, | Performed by: NURSE PRACTITIONER

## 2023-01-30 NOTE — PROGRESS NOTES
"Subjective:      Patient ID: Kathy Mishra is a 27 y.o. female.    Chief Complaint: Sleep Apnea    HPI  Presents to office for review of AutoPAP therapy. Patient states improved symptoms with use of AutoPAP. Sleeping more soundly. Waking up feeling more refreshed. Improved daytime sleepiness. Patient states she is greatly benefiting from use of the AutoPAP. [place] Sleepiness scale score: was 18, now 0.   BP improved.      Patient Active Problem List   Diagnosis    Chronic hypertension with superimposed pre-eclampsia    History of thyroid disorder    Class 3 severe obesity due to excess calories with serious comorbidity and body mass index (BMI) of 45.0 to 49.9 in adult    Gestational diabetes mellitus (GDM) in third trimester controlled on oral hypoglycemic drug    Asthma    Hypothyroidism    Intractable migraine without aura and without status migrainosus    Vitamin D deficiency    Obesity complicating pregnancy in third trimester    S/P     BERNARD (obstructive sleep apnea)       /70   Pulse 105   Resp 18   Ht 5' 3" (1.6 m)   Wt 121.5 kg (267 lb 13.7 oz)   SpO2 99%   BMI 47.45 kg/m²   Body mass index is 47.45 kg/m².    Review of Systems   Constitutional: Negative.    HENT: Negative.     Respiratory: Negative.     Cardiovascular: Negative.    Musculoskeletal: Negative.    Gastrointestinal: Negative.    Neurological: Negative.    Psychiatric/Behavioral: Negative.     Objective:      Physical Exam  Constitutional:       Appearance: She is well-developed. She is obese.   HENT:      Head: Normocephalic and atraumatic.      Nose: Nose normal.      Mouth/Throat:      Pharynx: Oropharynx is clear.   Cardiovascular:      Rate and Rhythm: Normal rate and regular rhythm.      Heart sounds: No murmur heard.    No gallop.   Pulmonary:      Effort: Pulmonary effort is normal.      Breath sounds: Normal breath sounds.   Abdominal:      Palpations: Abdomen is soft. There is no mass.      Tenderness: There " is no abdominal tenderness.   Musculoskeletal:         General: Normal range of motion.      Cervical back: Normal range of motion and neck supple.   Skin:     General: Skin is warm and dry.   Neurological:      Mental Status: She is alert and oriented to person, place, and time.   Psychiatric:         Mood and Affect: Mood normal.         Behavior: Behavior normal.     Personal Diagnostic Review    Compliance  Payor Standard  Usage 12/25/2022 - 01/23/2023  Usage days 29/30 days (97%)  >= 4 hours 27 days (90%)  < 4 hours 2 days (7%)  Usage hours 197 hours 42 minutes  Average usage (total days) 6 hours 35 minutes  Average usage (days used) 6 hours 49 minutes  Median usage (days used) 7 hours 8 minutes  Total used hours (value since last reset - 01/23/2023) 314 hours  AirSense 11 AutoSet  Serial number [id number]  Mode AutoSet  Min Pressure 6 cmH2O  Max Pressure 15 cmH2O  EPR Fulltime  EPR level 3  Response Standard  Therapy  Pressure - cmH2O Median: 12.6 95th percentile: 14.7 Maximum: 14.9  Leaks - L/min Median: 1.5 95th percentile: 26.2 Maximum: 51.1  Events per hour AI: 3.4 HI: 2.7 AHI: 6.1  Apnea Index Central: 0.1 Obstructive: 2.4 Unknown: 0.8  RERA Index 1.7  Cheyne-Rogers respiration (average duration per night) 0 minutes      Assessment:       1. BERNARD (obstructive sleep apnea)    2. Class 3 severe obesity due to excess calories with serious comorbidity and body mass index (BMI) of 45.0 to 49.9 in adult    3. Hypersomnia        Outpatient Encounter Medications as of 1/30/2023   Medication Sig Dispense Refill    albuterol (PROVENTIL/VENTOLIN HFA) 90 mcg/actuation inhaler       [DISCONTINUED] aspirin 81 MG Chew Take 1 tablet (81 mg total) by mouth once daily. 30 tablet 11    [DISCONTINUED] blood-glucose meter kit To check BG 4 times daily, to use with insurance preferred meter 1 each 0    [DISCONTINUED] cholecalciferol, vitamin D3, 125 mcg (5,000 unit) Tab Take 5,000 Units by mouth once daily.      [DISCONTINUED]  furosemide (LASIX) 40 MG tablet Take 1 tablet (40 mg total) by mouth daily as needed (swelling). 7 tablet 0    [DISCONTINUED] labetaloL (NORMODYNE) 300 MG tablet Take 300 mg by mouth 2 (two) times daily.      [DISCONTINUED] metFORMIN (GLUCOPHAGE XR) 500 MG ER 24hr tablet Increase Metformin to 2 tablets twice a day. 120 tablet 11    [DISCONTINUED] oxyCODONE-acetaminophen (PERCOCET) 5-325 mg per tablet Take 1 tablet by mouth every 4 (four) hours as needed for Pain. 20 tablet 0    [DISCONTINUED] prenatal 168/iron/folic/omega3 (ONE-A-DAY PRENATAL-1 ORAL) Take by mouth.      [DISCONTINUED] TRUEDRAW LANCING DEVICE Misc directed       No facility-administered encounter medications on file as of 1/30/2023.     Orders Placed This Encounter   Procedures    CPAP/BIPAP SUPPLIES     Order Specific Question:   Length of need (1-99 months):     Answer:   99     Order Specific Question:   Choose ONE mask type and its corresponding cushions and/or pillows:     Answer:    Nasal Mask, 1 per 90 days:  Nasal Cushions, (6 per 90 days):  Nasal Pillows, (6 per 90 days)     Order Specific Question:   Choose EITHER Heated or Non-Heated Tubjing     Answer:    Non-Heated Tubing, 1 per 90 days     Order Specific Question:   All other supplies as needed as listed below:     Answer:    Headgear, 1 per 180 days     Order Specific Question:   All other supplies as needed as listed below:     Answer:    Disposable Filter, 6 per 90 days     Order Specific Question:   All other supplies as needed as listed below:     Answer:    Non-Disposable Filter, 1 per 180 days     Order Specific Question:   All other supplies as needed as listed below:     Answer:    Humidifier Chamber, 1 per 180 days     Plan:      Dicussed mask leak. Compliant with PAP and benefits from use. Follow up annually in the sleep clinic.  Weight loss and exercise to improve overall health.    Problem List Items Addressed This Visit           Endocrine    Class 3 severe obesity due to excess calories with serious comorbidity and body mass index (BMI) of 45.0 to 49.9 in adult    Overview     01/11/2022-BMI 44.72.  Advised 10-15 lb total weight gain.  Consider 2000 calorie diabetic diet.  Daily baby aspirin at 16 weeks    Formatting of this note might be different from the original.  -Discussed lifestyle modifications including increasing physical activity healthy diet changes to achieve weight loss goals and improve overall health            Other    BERNARD (obstructive sleep apnea) - Primary    Relevant Orders    CPAP/BIPAP SUPPLIES     Other Visit Diagnoses       Hypersomnia

## 2023-03-09 ENCOUNTER — PATIENT MESSAGE (OUTPATIENT)
Dept: PULMONOLOGY | Facility: CLINIC | Age: 28
End: 2023-03-09
Payer: MEDICAID

## 2023-03-17 ENCOUNTER — PATIENT MESSAGE (OUTPATIENT)
Dept: RESEARCH | Facility: HOSPITAL | Age: 28
End: 2023-03-17
Payer: MEDICAID

## 2023-06-15 ENCOUNTER — PATIENT MESSAGE (OUTPATIENT)
Dept: OBSTETRICS AND GYNECOLOGY | Facility: CLINIC | Age: 28
End: 2023-06-15
Payer: MEDICAID

## 2023-11-13 ENCOUNTER — PATIENT MESSAGE (OUTPATIENT)
Dept: PULMONOLOGY | Facility: CLINIC | Age: 28
End: 2023-11-13
Payer: MEDICAID

## 2023-12-27 ENCOUNTER — TELEPHONE (OUTPATIENT)
Dept: OBSTETRICS AND GYNECOLOGY | Facility: CLINIC | Age: 28
End: 2023-12-27
Payer: MEDICAID

## 2023-12-27 NOTE — TELEPHONE ENCOUNTER
----- Message from Tc Miguel sent at 12/27/2023  1:25 PM CST -----  Contact: Kathy Valencia is calling to get a pregnancy confirmation. Please call back at [phone]                  Thanks  SW

## 2023-12-27 NOTE — TELEPHONE ENCOUNTER
Spoke to patient and she requested an appt for pregnancy confirmation. Pt. Requested MIKE Bravo. No soon appointments. Offered a sooner appt with one of our NP's. Patient agreed. Appt scheduled for 01/09/2024 to see SEGUNDO Chavez at the [place] location. Patient verbalized understanding.

## 2024-01-04 ENCOUNTER — TELEPHONE (OUTPATIENT)
Dept: OBSTETRICS AND GYNECOLOGY | Facility: CLINIC | Age: 29
End: 2024-01-04
Payer: MEDICAID

## 2024-01-04 NOTE — TELEPHONE ENCOUNTER
Successfully contacted patient regarding appointment. Rescheduled appointment due to provider canceling. Patient verbally confirmed appointment date, time, and location.

## 2024-01-10 ENCOUNTER — LAB VISIT (OUTPATIENT)
Dept: LAB | Facility: HOSPITAL | Age: 29
End: 2024-01-10
Payer: MEDICAID

## 2024-01-10 ENCOUNTER — OFFICE VISIT (OUTPATIENT)
Dept: OBSTETRICS AND GYNECOLOGY | Facility: CLINIC | Age: 29
End: 2024-01-10
Payer: MEDICAID

## 2024-01-10 VITALS
DIASTOLIC BLOOD PRESSURE: 82 MMHG | BODY MASS INDEX: 47.42 KG/M2 | HEIGHT: 63 IN | WEIGHT: 267.63 LBS | SYSTOLIC BLOOD PRESSURE: 140 MMHG

## 2024-01-10 DIAGNOSIS — Z86.32 HISTORY OF GESTATIONAL DIABETES MELLITUS (GDM): ICD-10-CM

## 2024-01-10 DIAGNOSIS — Z32.01 POSITIVE PREGNANCY TEST: Primary | ICD-10-CM

## 2024-01-10 DIAGNOSIS — Z32.01 POSITIVE PREGNANCY TEST: ICD-10-CM

## 2024-01-10 DIAGNOSIS — O09.299 HISTORY OF PRE-ECLAMPSIA IN PRIOR PREGNANCY, CURRENTLY PREGNANT: ICD-10-CM

## 2024-01-10 LAB
ABO + RH BLD: NORMAL
ALBUMIN SERPL BCP-MCNC: 3.6 G/DL (ref 3.5–5.2)
ALP SERPL-CCNC: 104 U/L (ref 55–135)
ALT SERPL W/O P-5'-P-CCNC: 21 U/L (ref 10–44)
ANION GAP SERPL CALC-SCNC: 12 MMOL/L (ref 8–16)
ANION GAP SERPL CALC-SCNC: 12 MMOL/L (ref 8–16)
AST SERPL-CCNC: 16 U/L (ref 10–40)
B-HCG UR QL: POSITIVE
BASOPHILS # BLD AUTO: 0.02 K/UL (ref 0–0.2)
BASOPHILS NFR BLD: 0.2 % (ref 0–1.9)
BILIRUB SERPL-MCNC: 0.3 MG/DL (ref 0.1–1)
BLD GP AB SCN CELLS X3 SERPL QL: NORMAL
BUN SERPL-MCNC: 10 MG/DL (ref 6–20)
BUN SERPL-MCNC: 10 MG/DL (ref 6–20)
CALCIUM SERPL-MCNC: 9.3 MG/DL (ref 8.7–10.5)
CALCIUM SERPL-MCNC: 9.3 MG/DL (ref 8.7–10.5)
CHLORIDE SERPL-SCNC: 106 MMOL/L (ref 95–110)
CHLORIDE SERPL-SCNC: 106 MMOL/L (ref 95–110)
CO2 SERPL-SCNC: 20 MMOL/L (ref 23–29)
CO2 SERPL-SCNC: 20 MMOL/L (ref 23–29)
CREAT SERPL-MCNC: 0.6 MG/DL (ref 0.5–1.4)
CREAT SERPL-MCNC: 0.6 MG/DL (ref 0.5–1.4)
CTP QC/QA: YES
DIFFERENTIAL METHOD BLD: ABNORMAL
EOSINOPHIL # BLD AUTO: 0.1 K/UL (ref 0–0.5)
EOSINOPHIL NFR BLD: 0.8 % (ref 0–8)
ERYTHROCYTE [DISTWIDTH] IN BLOOD BY AUTOMATED COUNT: 15.2 % (ref 11.5–14.5)
EST. GFR  (NO RACE VARIABLE): >60 ML/MIN/1.73 M^2
EST. GFR  (NO RACE VARIABLE): >60 ML/MIN/1.73 M^2
GLUCOSE SERPL-MCNC: 83 MG/DL (ref 70–110)
HAV IGM SERPL QL IA: NORMAL
HBV CORE IGM SERPL QL IA: NORMAL
HBV SURFACE AG SERPL QL IA: NORMAL
HCT VFR BLD AUTO: 42.4 % (ref 37–48.5)
HCV AB SERPL QL IA: NORMAL
HGB BLD-MCNC: 14.3 G/DL (ref 12–16)
HIV 1+2 AB+HIV1 P24 AG SERPL QL IA: NORMAL
IMM GRANULOCYTES # BLD AUTO: 0.03 K/UL (ref 0–0.04)
IMM GRANULOCYTES NFR BLD AUTO: 0.4 % (ref 0–0.5)
LYMPHOCYTES # BLD AUTO: 1.6 K/UL (ref 1–4.8)
LYMPHOCYTES NFR BLD: 18.7 % (ref 18–48)
MCH RBC QN AUTO: 29.4 PG (ref 27–31)
MCHC RBC AUTO-ENTMCNC: 33.7 G/DL (ref 32–36)
MCV RBC AUTO: 87 FL (ref 82–98)
MONOCYTES # BLD AUTO: 0.4 K/UL (ref 0.3–1)
MONOCYTES NFR BLD: 4.7 % (ref 4–15)
NEUTROPHILS # BLD AUTO: 6.3 K/UL (ref 1.8–7.7)
NEUTROPHILS NFR BLD: 75.2 % (ref 38–73)
NRBC BLD-RTO: 0 /100 WBC
PLATELET # BLD AUTO: 283 K/UL (ref 150–450)
PMV BLD AUTO: 10.3 FL (ref 9.2–12.9)
POTASSIUM SERPL-SCNC: 3.6 MMOL/L (ref 3.5–5.1)
POTASSIUM SERPL-SCNC: 3.6 MMOL/L (ref 3.5–5.1)
PROT SERPL-MCNC: 7.2 G/DL (ref 6–8.4)
RBC # BLD AUTO: 4.86 M/UL (ref 4–5.4)
SODIUM SERPL-SCNC: 138 MMOL/L (ref 136–145)
SODIUM SERPL-SCNC: 138 MMOL/L (ref 136–145)
SPECIMEN OUTDATE: NORMAL
WBC # BLD AUTO: 8.36 K/UL (ref 3.9–12.7)

## 2024-01-10 PROCEDURE — 1159F MED LIST DOCD IN RCRD: CPT | Mod: CPTII,,,

## 2024-01-10 PROCEDURE — 99213 OFFICE O/P EST LOW 20 MIN: CPT | Mod: PBBFAC,TH

## 2024-01-10 PROCEDURE — 86762 RUBELLA ANTIBODY: CPT

## 2024-01-10 PROCEDURE — 87086 URINE CULTURE/COLONY COUNT: CPT

## 2024-01-10 PROCEDURE — 80074 ACUTE HEPATITIS PANEL: CPT

## 2024-01-10 PROCEDURE — 80307 DRUG TEST PRSMV CHEM ANLYZR: CPT

## 2024-01-10 PROCEDURE — 99999 PR PBB SHADOW E&M-EST. PATIENT-LVL III: CPT | Mod: PBBFAC,,,

## 2024-01-10 PROCEDURE — 80053 COMPREHEN METABOLIC PANEL: CPT

## 2024-01-10 PROCEDURE — 87491 CHLMYD TRACH DNA AMP PROBE: CPT

## 2024-01-10 PROCEDURE — 99203 OFFICE O/P NEW LOW 30 MIN: CPT | Mod: S$PBB,TH,,

## 2024-01-10 PROCEDURE — 3008F BODY MASS INDEX DOCD: CPT | Mod: CPTII,,,

## 2024-01-10 PROCEDURE — 81025 URINE PREGNANCY TEST: CPT | Mod: PBBFAC

## 2024-01-10 PROCEDURE — 3077F SYST BP >= 140 MM HG: CPT | Mod: CPTII,,,

## 2024-01-10 PROCEDURE — 86850 RBC ANTIBODY SCREEN: CPT

## 2024-01-10 PROCEDURE — 36415 COLL VENOUS BLD VENIPUNCTURE: CPT

## 2024-01-10 PROCEDURE — 87389 HIV-1 AG W/HIV-1&-2 AB AG IA: CPT

## 2024-01-10 PROCEDURE — 84702 CHORIONIC GONADOTROPIN TEST: CPT

## 2024-01-10 PROCEDURE — 84156 ASSAY OF PROTEIN URINE: CPT | Mod: 59

## 2024-01-10 PROCEDURE — 86592 SYPHILIS TEST NON-TREP QUAL: CPT

## 2024-01-10 PROCEDURE — 1160F RVW MEDS BY RX/DR IN RCRD: CPT | Mod: CPTII,,,

## 2024-01-10 PROCEDURE — 99999PBSHW POCT URINE PREGNANCY: Mod: PBBFAC,,,

## 2024-01-10 PROCEDURE — 83020 HEMOGLOBIN ELECTROPHORESIS: CPT

## 2024-01-10 PROCEDURE — 99999PBSHW PR PBB SHADOW TECHNICAL ONLY FILED TO HB: Mod: PBBFAC,,,

## 2024-01-10 PROCEDURE — 85025 COMPLETE CBC W/AUTO DIFF WBC: CPT

## 2024-01-10 PROCEDURE — 3079F DIAST BP 80-89 MM HG: CPT | Mod: CPTII,,,

## 2024-01-10 RX ORDER — SEMAGLUTIDE 1.34 MG/ML
INJECTION, SOLUTION SUBCUTANEOUS
COMMUNITY
Start: 2023-12-07 | End: 2024-01-10

## 2024-01-10 NOTE — PROGRESS NOTES
Subjective:       Patient ID: Kathy Mishra is a 28 y.o. female.    Chief Complaint:  Possible Pregnancy      History of Present Illness  HPI  Missed Menses/ Possible Pregnancy  Patient complains of amenorrhea. She believes she could be pregnant. Pregnancy is desired. Sexual Activity: single partner, contraception: none. Current symptoms also include: breast tenderness, fatigue, frequent urination, nausea, and positive home pregnancy test. Last period was normal    EDC based on LMP: 24  EGA: 9 weeks      for failure to progress   GDM and pre-eclampsia with first pregnancy, induced at 35 weeks      Patient's last menstrual period was 2023.     GYN & OB History  Patient's last menstrual period was 2023.   Date of Last Pap: 2022    OB History    Para Term  AB Living   2 1   1 1 1   SAB IAB Ectopic Multiple Live Births   1     0 1      # Outcome Date GA Lbr Tera/2nd Weight Sex Delivery Anes PTL Lv   2  22 35w1d  2.69 kg (5 lb 14.9 oz) F CS-LTranv Spinal, EPI N GUILLERMINA      Complications: Failure to Progress in First Stage   1 SAB 16 12w0d              Review of Systems  Review of Systems   Constitutional:  Positive for fatigue.   Gastrointestinal:  Positive for nausea.   Genitourinary:  Positive for frequency.   Integumentary:  Positive for breast tenderness.   Breast: Positive for tenderness.          Objective:      Physical Exam:   Constitutional: She is oriented to person, place, and time. No distress.    HENT:   Head: Normocephalic and atraumatic.    Eyes: Pupils are equal, round, and reactive to light. Conjunctivae and EOM are normal.      Pulmonary/Chest: Effort normal.                  Musculoskeletal: Normal range of motion and moves all extremeties.       Neurological: She is alert and oriented to person, place, and time.    Skin: Skin is warm and dry. No rash noted. She is not diaphoretic. No erythema. No pallor.    Psychiatric: She has a  normal mood and affect. Her behavior is normal. Judgment and thought content normal.             Assessment:        1. Positive pregnancy test    2. History of gestational diabetes mellitus (GDM)    3. History of pre-eclampsia in prior pregnancy, currently pregnant               Plan:   Encouraged to start MV with Folic acid  OB labs ordered  US and F/U with midwife scheduled     Diagnosis and orders this visit:  Positive pregnancy test  -     C. trachomatis/N. gonorrhoeae by AMP DNA  -     CBC Auto Differential; Future; Expected date: 01/10/2024  -     HIV 1/2 Ag/Ab (4th Gen); Future; Expected date: 01/10/2024  -     POCT urine pregnancy  -     RPR; Future; Expected date: 01/10/2024  -     Rubella Antibody, IgG; Future; Expected date: 01/10/2024  -     Type & Screen; Future; Expected date: 01/10/2024  -     Urine culture  -     US OB/GYN Procedure ([facility]); Future; Expected date: 01/10/2024  -     Basic Metabolic Panel; Future; Expected date: 01/10/2024  -     Hepatitis Panel, Acute; Future; Expected date: 01/10/2024  -     Drug screen panel, in-house  -     Hemoglobin Electrophoresis,Hgb A2 [place].; Future; Expected date: 01/10/2024  -     HCG, Quantitative; Future; Expected date: 01/10/2024    History of gestational diabetes mellitus (GDM)  -     Glucose, Random; Future; Expected date: 01/10/2024    History of pre-eclampsia in prior pregnancy, currently pregnant  -     Protein/Creatinine Ratio, Urine  -     Comprehensive Metabolic Panel; Future; Expected date: 01/10/2024           Loraine Jaramillo NP

## 2024-01-11 LAB
AMPHET+METHAMPHET UR QL: NEGATIVE
BARBITURATES UR QL SCN>200 NG/ML: NEGATIVE
BENZODIAZ UR QL SCN>200 NG/ML: NEGATIVE
BZE UR QL SCN: NEGATIVE
CANNABINOIDS UR QL SCN: ABNORMAL
CREAT UR-MCNC: 216 MG/DL (ref 15–325)
CREAT UR-MCNC: 235 MG/DL (ref 15–325)
HCG INTACT+B SERPL-ACNC: NORMAL MIU/ML
METHADONE UR QL SCN>300 NG/ML: NEGATIVE
OPIATES UR QL SCN: NEGATIVE
PCP UR QL SCN>25 NG/ML: NEGATIVE
PROT UR-MCNC: 68 MG/DL (ref 0–15)
PROT/CREAT UR: 0.31 MG/G{CREAT} (ref 0–0.2)
RPR SER QL: NORMAL
RUBV IGG SER-ACNC: 20.4 IU/ML
RUBV IGG SER-IMP: REACTIVE
TOXICOLOGY INFORMATION: ABNORMAL

## 2024-01-12 LAB
BACTERIA UR CULT: NORMAL
BACTERIA UR CULT: NORMAL
C TRACH DNA SPEC QL NAA+PROBE: NOT DETECTED
N GONORRHOEA DNA SPEC QL NAA+PROBE: NOT DETECTED

## 2024-01-15 LAB
HGB A2 MFR BLD HPLC: 2.5 % (ref 2.2–3.2)
HGB FRACT BLD ELPH-IMP: NORMAL
HGB FRACT BLD ELPH-IMP: NORMAL

## 2024-01-24 ENCOUNTER — PROCEDURE VISIT (OUTPATIENT)
Dept: OBSTETRICS AND GYNECOLOGY | Facility: CLINIC | Age: 29
End: 2024-01-24
Payer: MEDICAID

## 2024-01-24 DIAGNOSIS — Z32.01 POSITIVE PREGNANCY TEST: ICD-10-CM

## 2024-01-24 PROCEDURE — 76801 OB US < 14 WKS SINGLE FETUS: CPT | Mod: PBBFAC | Performed by: OBSTETRICS & GYNECOLOGY

## 2024-01-25 ENCOUNTER — LAB VISIT (OUTPATIENT)
Dept: LAB | Facility: HOSPITAL | Age: 29
End: 2024-01-25
Attending: ADVANCED PRACTICE MIDWIFE
Payer: MEDICAID

## 2024-01-25 ENCOUNTER — INITIAL PRENATAL (OUTPATIENT)
Dept: OBSTETRICS AND GYNECOLOGY | Facility: CLINIC | Age: 29
End: 2024-01-25
Payer: MEDICAID

## 2024-01-25 VITALS
SYSTOLIC BLOOD PRESSURE: 108 MMHG | DIASTOLIC BLOOD PRESSURE: 62 MMHG | WEIGHT: 266.31 LBS | BODY MASS INDEX: 47.18 KG/M2

## 2024-01-25 DIAGNOSIS — O09.299 HISTORY OF GESTATIONAL DIABETES IN PRIOR PREGNANCY, CURRENTLY PREGNANT: ICD-10-CM

## 2024-01-25 DIAGNOSIS — E66.01 CLASS 3 SEVERE OBESITY DUE TO EXCESS CALORIES WITH SERIOUS COMORBIDITY AND BODY MASS INDEX (BMI) OF 45.0 TO 49.9 IN ADULT: ICD-10-CM

## 2024-01-25 DIAGNOSIS — E87.6 GITELMAN SYNDROME: ICD-10-CM

## 2024-01-25 DIAGNOSIS — O34.219 HISTORY OF CESAREAN DELIVERY, CURRENTLY PREGNANT: ICD-10-CM

## 2024-01-25 DIAGNOSIS — Z86.32 HISTORY OF GESTATIONAL DIABETES IN PRIOR PREGNANCY, CURRENTLY PREGNANT: ICD-10-CM

## 2024-01-25 DIAGNOSIS — O10.919 CHRONIC HYPERTENSION AFFECTING PREGNANCY: ICD-10-CM

## 2024-01-25 DIAGNOSIS — E83.42 GITELMAN SYNDROME: ICD-10-CM

## 2024-01-25 DIAGNOSIS — Z86.39 HISTORY OF THYROID DISORDER: ICD-10-CM

## 2024-01-25 DIAGNOSIS — E03.9 HYPOTHYROIDISM, UNSPECIFIED TYPE: ICD-10-CM

## 2024-01-25 DIAGNOSIS — E03.9 HYPOTHYROIDISM, UNSPECIFIED TYPE: Primary | ICD-10-CM

## 2024-01-25 DIAGNOSIS — G47.33 OSA (OBSTRUCTIVE SLEEP APNEA): ICD-10-CM

## 2024-01-25 PROBLEM — N15.8 GITELMAN SYNDROME: Status: ACTIVE | Noted: 2024-01-25

## 2024-01-25 PROCEDURE — 99213 OFFICE O/P EST LOW 20 MIN: CPT | Mod: PBBFAC,TH | Performed by: ADVANCED PRACTICE MIDWIFE

## 2024-01-25 PROCEDURE — 84439 ASSAY OF FREE THYROXINE: CPT | Performed by: ADVANCED PRACTICE MIDWIFE

## 2024-01-25 PROCEDURE — 99999 PR PBB SHADOW E&M-EST. PATIENT-LVL III: CPT | Mod: PBBFAC,,, | Performed by: ADVANCED PRACTICE MIDWIFE

## 2024-01-25 PROCEDURE — 99203 OFFICE O/P NEW LOW 30 MIN: CPT | Mod: TH,S$PBB,, | Performed by: ADVANCED PRACTICE MIDWIFE

## 2024-01-25 PROCEDURE — 36415 COLL VENOUS BLD VENIPUNCTURE: CPT | Performed by: ADVANCED PRACTICE MIDWIFE

## 2024-01-25 PROCEDURE — 84443 ASSAY THYROID STIM HORMONE: CPT | Performed by: ADVANCED PRACTICE MIDWIFE

## 2024-01-25 RX ORDER — METFORMIN HYDROCHLORIDE EXTENDED-RELEASE TABLETS 500 MG/1
500 TABLET, FILM COATED, EXTENDED RELEASE ORAL
COMMUNITY
End: 2024-03-07
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
## 2024-01-25 NOTE — PATIENT INSTRUCTIONS
Patient Education       Pregnancy - The Second Month   About this topic   It is important for you to learn how to take care of yourself to help you have a healthy baby and safe delivery. It is good to have health care throughout your pregnancy.  The second month of your pregnancy starts around week 5 and lasts through week 9. By knowing how far along you are, you can learn what is normal for this stage of your pregnancy and plan for what is next.  General   Your Body   During the second month of your pregnancy, here are some things you can expect.  There are more hormones active in your body and because of them, you may:  Have morning sickness or have an upset stomach at other times throughout the day  Feel just a little tired or need long naps each day  Go to the bathroom more often, even at night  Have tender or swollen breasts  Feel more emotional  Have color changes in nipples and areola  Have a brownish color over your forehead, temples, cheeks, and/or lips. This is melasma.  Your babys growth and development:  Your baby starts to develop organs like the brain, heart, liver, and lungs. Arms, legs, eyes, and ears are all starting to grow.  The heart beats about 150 times each minute by 6 weeks. Your doctor may be able to hear the heartbeat with a special tool by the end of this month.  Your baby has facial features, moves, and wiggles, but you won't be able to feel anything yet.  Your baby is about 1 inch (2.5 cm) long and is about the size of a blueberry.  Things to Think About   Avoid alcohol, drugs, tobacco products, and second hand smoke  Check with your doctor before taking any kind of drugs.  Ask about taking a vitamin. Take at least 400 micrograms of folic acid each day to help prevent some birth defects.  Making appointments with the doctor who will take care of you and your baby while you are pregnant.  Learn about what kinds of screening tests are offered while you are pregnant.  Is there anything I  need to do more or less of while I am pregnant?  Learn about which over the counter products are safe to use and take while you are pregnant.  Be sure to eat fresh, healthy foods while you are pregnant. Learn what foods are more likely to make you sick or have things that could harm your baby.  When do I need to call the doctor?   Not able to stop throwing up  Belly pain or cramps that keeps you from eating or sleeping  Period-like bleeding  Where can I learn more?   American Academy of Family Physicians  https://familydoctor.org/changes-in-your-body-during-pregnancy-first-trimester/   American Pregnancy Association  https://americanpregnancy.org/planning/first-prenatal-visit/   Last Reviewed Date   2020-04-20  Consumer Information Use and Disclaimer   This information is not specific medical advice and does not replace information you receive from your health care provider. This is only a brief summary of general information. It does NOT include all information about conditions, illnesses, injuries, tests, procedures, treatments, therapies, discharge instructions or life-style choices that may apply to you. You must talk with your health care provider for complete information about your health and treatment options. This information should not be used to decide whether or not to accept your health care providers advice, instructions or recommendations. Only your health care provider has the knowledge and training to provide advice that is right for you.  Copyright   Copyright © 2021 [employer] and its affiliates and/or licensors. All rights reserved.

## 2024-01-26 ENCOUNTER — TELEPHONE (OUTPATIENT)
Dept: OBSTETRICS AND GYNECOLOGY | Facility: CLINIC | Age: 29
End: 2024-01-26
Payer: MEDICAID

## 2024-01-26 ENCOUNTER — PATIENT MESSAGE (OUTPATIENT)
Dept: MATERNAL FETAL MEDICINE | Facility: CLINIC | Age: 29
End: 2024-01-26
Payer: MEDICAID

## 2024-01-26 LAB
T4 FREE SERPL-MCNC: 0.75 NG/DL (ref 0.71–1.51)
TSH SERPL DL<=0.005 MIU/L-ACNC: 6.11 UIU/ML (ref 0.4–4)

## 2024-01-26 NOTE — TELEPHONE ENCOUNTER
Message sent to [facility] MFM for scheduling of initial visit with MFM for virtual appt. Message sent to MIKE Bravo for clarity/preference on timing of visit

## 2024-02-02 ENCOUNTER — OFFICE VISIT (OUTPATIENT)
Dept: PULMONOLOGY | Facility: CLINIC | Age: 29
End: 2024-02-02
Payer: MEDICAID

## 2024-02-02 ENCOUNTER — PATIENT MESSAGE (OUTPATIENT)
Dept: OBSTETRICS AND GYNECOLOGY | Facility: CLINIC | Age: 29
End: 2024-02-02
Payer: MEDICAID

## 2024-02-02 VITALS
WEIGHT: 273.13 LBS | OXYGEN SATURATION: 97 % | HEIGHT: 63 IN | DIASTOLIC BLOOD PRESSURE: 82 MMHG | SYSTOLIC BLOOD PRESSURE: 144 MMHG | RESPIRATION RATE: 20 BRPM | BODY MASS INDEX: 48.39 KG/M2 | HEART RATE: 120 BPM

## 2024-02-02 DIAGNOSIS — E66.01 CLASS 3 SEVERE OBESITY DUE TO EXCESS CALORIES WITH SERIOUS COMORBIDITY AND BODY MASS INDEX (BMI) OF 45.0 TO 49.9 IN ADULT: ICD-10-CM

## 2024-02-02 DIAGNOSIS — G47.33 OSA (OBSTRUCTIVE SLEEP APNEA): Primary | ICD-10-CM

## 2024-02-02 PROCEDURE — 1159F MED LIST DOCD IN RCRD: CPT | Mod: CPTII,,, | Performed by: NURSE PRACTITIONER

## 2024-02-02 PROCEDURE — 99999 PR PBB SHADOW E&M-EST. PATIENT-LVL III: CPT | Mod: PBBFAC,,, | Performed by: NURSE PRACTITIONER

## 2024-02-02 PROCEDURE — 1160F RVW MEDS BY RX/DR IN RCRD: CPT | Mod: CPTII,,, | Performed by: NURSE PRACTITIONER

## 2024-02-02 PROCEDURE — 3079F DIAST BP 80-89 MM HG: CPT | Mod: CPTII,,, | Performed by: NURSE PRACTITIONER

## 2024-02-02 PROCEDURE — 3077F SYST BP >= 140 MM HG: CPT | Mod: CPTII,,, | Performed by: NURSE PRACTITIONER

## 2024-02-02 PROCEDURE — 99213 OFFICE O/P EST LOW 20 MIN: CPT | Mod: S$PBB,,, | Performed by: NURSE PRACTITIONER

## 2024-02-02 PROCEDURE — 3008F BODY MASS INDEX DOCD: CPT | Mod: CPTII,,, | Performed by: NURSE PRACTITIONER

## 2024-02-02 PROCEDURE — 99213 OFFICE O/P EST LOW 20 MIN: CPT | Mod: PBBFAC | Performed by: NURSE PRACTITIONER

## 2024-02-02 NOTE — PROGRESS NOTES
"Subjective:      Patient ID: Kathy Mishra is a 28 y.o. female.    Chief Complaint: Apnea    HPI  Presents to office for review of AutoPAP therapy. Patient states improved symptoms with use of AutoPAP. Sleeping more soundly. Waking up feeling more refreshed. Improved daytime sleepiness. Patient states she is greatly benefiting from use of the AutoPAP. [place] Sleepiness scale score: was 18, now 5. She feels like pressure could go up higher in middle of the night. She is also wanting to try new type mask  BP improved.       Patient Active Problem List   Diagnosis    Chronic hypertension affecting pregnancy    History of thyroid disorder    Class 3 severe obesity due to excess calories with serious comorbidity and body mass index (BMI) of 45.0 to 49.9 in adult    History of gestational diabetes in prior pregnancy, currently pregnant    Asthma    Hypothyroidism    Intractable migraine without aura and without status migrainosus    Vitamin D deficiency    BERNARD (obstructive sleep apnea)    History of  delivery, currently pregnant    Gitelman syndrome       BP (!) 144/82 (BP Location: Left arm, Patient Position: Sitting, BP Method: Large (Manual)) Comment: chasing a 18 month old at the time  Pulse (!) 120   Resp 20   Ht 5' 3" (1.6 m)   Wt 123.9 kg (273 lb 2.4 oz)   LMP 2023   SpO2 97%   BMI 48.39 kg/m²   Body mass index is 48.39 kg/m².    Review of Systems   Constitutional: Negative.    HENT: Negative.     Respiratory: Negative.     Cardiovascular: Negative.    Musculoskeletal: Negative.    Gastrointestinal: Negative.    Neurological: Negative.    Psychiatric/Behavioral: Negative.       Objective:      Physical Exam  Constitutional:       Appearance: She is well-developed. She is obese.   HENT:      Head: Normocephalic and atraumatic.      Nose: Nose normal.      Mouth/Throat:      Pharynx: Oropharynx is clear.   Cardiovascular:      Rate and Rhythm: Normal rate and regular rhythm.      Heart " sounds: No murmur heard.     No gallop.   Pulmonary:      Effort: Pulmonary effort is normal.      Breath sounds: Normal breath sounds.   Abdominal:      Palpations: Abdomen is soft. There is no mass.      Tenderness: There is no abdominal tenderness.   Musculoskeletal:         General: Normal range of motion.      Cervical back: Normal range of motion and neck supple.   Skin:     General: Skin is warm and dry.   Neurological:      Mental Status: She is alert and oriented to person, place, and time.   Psychiatric:         Mood and Affect: Mood normal.         Behavior: Behavior normal.       Personal Diagnostic Review  Compliance Report  Compliance  Payor Standard  Usage 12/31/2023 - 01/29/2024  Usage days 30/30 days (100%)  >= 4 hours 30 days (100%)  < 4 hours 0 days (0%)  Usage hours 218 hours 52 minutes  Average usage (total days) 7 hours 18 minutes  Average usage (days used) 7 hours 18 minutes  Median usage (days used) 7 hours 20 minutes  Total used hours (value since last reset - 01/29/2024) 2,750 hours  AirSense 11 AutoSet  Serial number [id number]  Mode AutoSet  Min Pressure 6 cmH2O  Max Pressure 15 cmH2O  EPR Fulltime  EPR level 3  Response Standard  Therapy  Pressure - cmH2O Median: 10.8 95th percentile: 14.0 Maximum: 14.7  Leaks - L/min Median: 1.4 95th percentile: 18.4 Maximum: 42.0  Events per hour AI: 0.1 HI: 0.1 AHI: 0.2  Apnea Index Central: 0.0 Obstructive: 0.0 Unknown: 0.0  RERA Index 0.5  Cheyne-Rogers respiration (average duration per night) 0 minutes (0%)        Assessment:       1. BERNARD (obstructive sleep apnea)    2. Class 3 severe obesity due to excess calories with serious comorbidity and body mass index (BMI) of 45.0 to 49.9 in adult        Outpatient Encounter Medications as of 2/2/2024   Medication Sig Dispense Refill    metFORMIN (FORTAMET) 500 mg 24hr tablet Take 500 mg by mouth daily with breakfast.      prenatal vitamins #45/iron/FA (PRENATAL VITAMIN #45-IRON-FA ORAL) Take by mouth.       [DISCONTINUED] albuterol (PROVENTIL/VENTOLIN HFA) 90 mcg/actuation inhaler       [DISCONTINUED] aspirin 81 MG Chew Take 1 tablet (81 mg total) by mouth once daily. 30 tablet 11    [DISCONTINUED] blood-glucose meter kit To check BG 4 times daily, to use with insurance preferred meter 1 each 0    [DISCONTINUED] metFORMIN (GLUCOPHAGE XR) 500 MG ER 24hr tablet Increase Metformin to 2 tablets twice a day. 120 tablet 11    [DISCONTINUED] TRUEDRAW LANCING DEVICE Misc directed       No facility-administered encounter medications on file as of 2/2/2024.     Orders Placed This Encounter   Procedures    CPAP/BIPAP SUPPLIES     Order Specific Question:   Length of need (1-99 months):     Answer:   99     Order Specific Question:   Choose ONE mask type and its corresponding cushions and/or pillows:     Answer:    Full Face Mask, 1 per 90 days:  Full Face Cushion, (3 per 90 days)     Order Specific Question:   Choose EITHER Heated or Non-Heated Tubjing     Answer:    Non-Heated Tubing, 1 per 90 days     Order Specific Question:   All other supplies as needed as listed below:     Answer:    Headgear, 1 per 180 days     Order Specific Question:   All other supplies as needed as listed below:     Answer:    Disposable Filter, 6 per 90 days     Order Specific Question:   All other supplies as needed as listed below:     Answer:    Non-Disposable Filter, 1 per 180 days     Order Specific Question:   All other supplies as needed as listed below:     Answer:    Humidifier Chamber, 1 per 180 days     Plan:        Problem List Items Addressed This Visit          Endocrine    Class 3 severe obesity due to excess calories with serious comorbidity and body mass index (BMI) of 45.0 to 49.9 in adult    Overview     01/25/2024-BMI 47.18.  Advised 10-15 lb total weight gain.  Consider 2000 calorie diabetic diet.  Daily baby aspirin at 16 weeks                Other    BERNARD (obstructive sleep apnea) - Primary     Overview     Compliant with PAP and benefits from use. Follow up annually in the sleep clinic.           Relevant Orders    CPAP/BIPAP SUPPLIES

## 2024-02-03 ENCOUNTER — TELEPHONE (OUTPATIENT)
Dept: OBSTETRICS AND GYNECOLOGY | Facility: CLINIC | Age: 29
End: 2024-02-03
Payer: MEDICAID

## 2024-02-03 RX ORDER — ONDANSETRON 4 MG/1
4 TABLET, FILM COATED ORAL DAILY PRN
Qty: 30 TABLET | Refills: 3 | Status: SHIPPED | OUTPATIENT
Start: 2024-02-03 | End: 2024-05-13

## 2024-02-03 RX ORDER — PROMETHAZINE HYDROCHLORIDE 12.5 MG/1
12.5 TABLET ORAL 4 TIMES DAILY
Qty: 30 TABLET | Refills: 2 | Status: SHIPPED | OUTPATIENT
Start: 2024-02-03 | End: 2024-03-25

## 2024-02-03 NOTE — TELEPHONE ENCOUNTER
Called to address concern and prescription is sent for Zofran to be used during the day and Phenergan in the evening

## 2024-02-04 RX ORDER — ONDANSETRON 4 MG/1
4 TABLET, ORALLY DISINTEGRATING ORAL 2 TIMES DAILY
Qty: 30 TABLET | Refills: 0 | Status: SHIPPED | OUTPATIENT
Start: 2024-02-04 | End: 2024-04-04 | Stop reason: SDUPTHER

## 2024-02-08 ENCOUNTER — TELEPHONE (OUTPATIENT)
Dept: OBSTETRICS AND GYNECOLOGY | Facility: CLINIC | Age: 29
End: 2024-02-08
Payer: MEDICAID

## 2024-02-08 ENCOUNTER — OFFICE VISIT (OUTPATIENT)
Dept: MATERNAL FETAL MEDICINE | Facility: CLINIC | Age: 29
End: 2024-02-08
Payer: MEDICAID

## 2024-02-08 DIAGNOSIS — G47.33 OSA (OBSTRUCTIVE SLEEP APNEA): ICD-10-CM

## 2024-02-08 DIAGNOSIS — O09.299 HISTORY OF GESTATIONAL DIABETES IN PRIOR PREGNANCY, CURRENTLY PREGNANT: ICD-10-CM

## 2024-02-08 DIAGNOSIS — O34.219 HISTORY OF CESAREAN DELIVERY, CURRENTLY PREGNANT: ICD-10-CM

## 2024-02-08 DIAGNOSIS — Z86.32 HISTORY OF GESTATIONAL DIABETES IN PRIOR PREGNANCY, CURRENTLY PREGNANT: ICD-10-CM

## 2024-02-08 DIAGNOSIS — O09.299 HISTORY OF GESTATIONAL DIABETES IN PRIOR PREGNANCY, CURRENTLY PREGNANT: Primary | ICD-10-CM

## 2024-02-08 DIAGNOSIS — Z86.32 HISTORY OF GESTATIONAL DIABETES IN PRIOR PREGNANCY, CURRENTLY PREGNANT: Primary | ICD-10-CM

## 2024-02-08 DIAGNOSIS — O10.919 CHRONIC HYPERTENSION AFFECTING PREGNANCY: ICD-10-CM

## 2024-02-08 DIAGNOSIS — Z86.39 HISTORY OF THYROID DISORDER: ICD-10-CM

## 2024-02-08 DIAGNOSIS — E66.01 CLASS 3 SEVERE OBESITY DUE TO EXCESS CALORIES WITH SERIOUS COMORBIDITY AND BODY MASS INDEX (BMI) OF 45.0 TO 49.9 IN ADULT: ICD-10-CM

## 2024-02-08 PROCEDURE — 1159F MED LIST DOCD IN RCRD: CPT | Mod: CPTII,95,, | Performed by: STUDENT IN AN ORGANIZED HEALTH CARE EDUCATION/TRAINING PROGRAM

## 2024-02-08 PROCEDURE — 99214 OFFICE O/P EST MOD 30 MIN: CPT | Mod: TH,95,, | Performed by: STUDENT IN AN ORGANIZED HEALTH CARE EDUCATION/TRAINING PROGRAM

## 2024-02-08 PROCEDURE — 1160F RVW MEDS BY RX/DR IN RCRD: CPT | Mod: CPTII,95,, | Performed by: STUDENT IN AN ORGANIZED HEALTH CARE EDUCATION/TRAINING PROGRAM

## 2024-02-08 NOTE — ASSESSMENT & PLAN NOTE
Obesity  The patient was counseled on the  risks associated with obesity which include and increased risk of hypertension, preeclampsia, gestational diabetes, venous thromboembolic disease,  delivery, macrosomia (with resultant shoulder dystocia, obstetric sphincter injury), IUFD, longer first stage of labor, decreased TOLAC success rate, emergent & scheduled  & complications of  (prolonged OR time, delayed delivery, excessive EBL, infection, wound separation/infection, higher spinal failure rate, more difficult intubation).  Obesity is also associated with fetal anomalies, specifically neural tube defects.  Studies have shown that the rate of complication increases with rising BMI and thus pregnancies with maternal morbid obesity are at increased risk.      Recommendations:  Discussed that TWG goal is 11-20 lbs  Screen for signs/symptoms of obstructive sleep apnea  Nutritionist consult offered (this is to be ordered by primary OB provider)  Consider early 1 hr GCT (between 14-16 weeks gestation); repeat at 24-28 weeks gestation > pt is currently on metformin and will record BG for one week prior to next MFM appt.  Start low dose aspirin 81 mg daily at 12-16 weeks for preeclampsia risk reduction  Targeted anatomical survey scheduled at 18-20 weeks  Fetal growth ultrasound at around 32 weeks if BMI >= 40  Weekly  testing at 34 weeks if pre-pregnancy BMI >= 40  Lovenox 40 mg BID for VTE prophylaxis while admitted to the hospital (antepartum or postpartum) if BMI >= 40.   Encouraged breastfeeding  Postpartum lifestyle modifications & weight loss

## 2024-02-08 NOTE — TELEPHONE ENCOUNTER
----- Message from Shelly Haddad CNM sent at 2024  3:40 PM CST -----  Regardin hour glucose  Good afternoon, could you call patient and have her stop her metformin next  and to stay off metformin until her appointment with us on the  when she will be doing a 1 hour glucose test per [facility].  The test is ordered.  Just needs linking.  Thank you  Price

## 2024-02-08 NOTE — TELEPHONE ENCOUNTER
Pt informed and appt made for lab on 2/22/2024 at 8:05am at the [place] location. Pt voiced understanding. VB, LPN

## 2024-02-08 NOTE — PROGRESS NOTES
The patient location is: home  The chief complaint leading to consultation is: h/o GDM, CHTN, h/o hypothyroidism    Visit type: audiovisual    Face to Face time with patient: 18 minutes  30 minutes of total time spent on the encounter, which includes face to face time and non-face to face time preparing to see the patient (eg, review of tests), Obtaining and/or reviewing separately obtained history, Documenting clinical information in the electronic or other health record, Independently interpreting results (not separately reported) and communicating results to the patient/family/caregiver, or Care coordination (not separately reported).     Each patient to whom he or she provides medical services by telemedicine is:  (1) informed of the relationship between the physician and patient and the respective role of any other health care provider with respect to management of the patient; and (2) notified that he or she may decline to receive medical services by telemedicine and may withdraw from such care at any time.    Notes:     MATERNAL-FETAL MEDICINE   CONSULT NOTE    Provider requesting consultation: Shelly Haddad CNM    SUBJECTIVE:     Ms. Kathy Mishra is a 28 y.o.  female with IUP at 10w2d who is seen in consultation by [facility] for evaluation and management of:  Problem   History of Gestational Diabetes in Prior Pregnancy, Currently Pregnant   Chronic Hypertension Affecting Pregnancy   History of Thyroid Disorder   Class 3 Severe Obesity Due to Excess Calories With Serious Comorbidity and Body Mass Index (Bmi) of 45.0 to 49.9 in Adult     Denies any complaints today. Faviola has been compliant with metformin prior to conceiving and throughout this pregnancy thus far. Does not check her BG. Does not check her BP daily but has heard of [employer]. Denies s/s of Pre-E. Reports has a h/o of hypothyroidism and took medications in the past but discontinued the medications herself and hasn't needed them for  several years.        Medication List with Changes/Refills   Current Medications    METFORMIN (FORTAMET) 500 MG 24HR TABLET    Take 500 mg by mouth daily with breakfast.    ONDANSETRON (ZOFRAN) 4 MG TABLET    Take 1 tablet (4 mg total) by mouth daily as needed for Nausea.    ONDANSETRON (ZOFRAN-ODT) 4 MG TBDL    Take 1 tablet (4 mg total) by mouth 2 (two) times daily.    PRENATAL VITAMINS #45/IRON/FA (PRENATAL VITAMIN #45-IRON-FA ORAL)    Take by mouth.    PROMETHAZINE (PHENERGAN) 12.5 MG TAB    Take 1 tablet (12.5 mg total) by mouth 4 (four) times daily.       Review of patient's allergies indicates:  No Known Allergies    PMH:  Past Medical History:   Diagnosis Date    Asthma 2022    Gestational diabetes mellitus (GDM) in third trimester controlled on oral hypoglycemic drug 2022    History of hypertension 2022-add PIH baseline labs to workup PCR 0.09 advised daily baby aspirin at 16 weeks    Hypertension     Thyroid disease        PObHx:  OB History    Para Term  AB Living   3 1   1 1 1   SAB IAB Ectopic Multiple Live Births   1     0 1      # Outcome Date GA Lbr Tera/2nd Weight Sex Delivery Anes PTL Lv   3 Current            2  22 35w1d  2.69 kg (5 lb 14.9 oz) F CS-LTranv Spinal, EPI N GUILLERMINA      Complications: Failure to Progress in First Stage   1 SAB 16 12w0d              PSH:  Past Surgical History:   Procedure Laterality Date    ADENOIDECTOMY       SECTION N/A 2022    Procedure:  SECTION;  Surgeon: Jhoana Alexander MD;  Location: [facility] L&D;  Service: OB/GYN;  Laterality: N/A;     SECTION      TONSILLECTOMY      age of 5 years old       Family history:family history includes Diabetes in her maternal grandfather and mother; Hypertension in her father and paternal grandmother.    Social history: reports that she has quit smoking. Her smoking use included cigarettes. She has never used smokeless tobacco. She reports that she does  not currently use alcohol. She reports that she does not use drugs.    Genetic history:  The patient denies any inherited genetic diseases or birth defects in herself or her partner's personal history or family.    Objective:   LMP 2023     Physical Exam  *Virtual visit*      Significant labs/imaging:  TSH 6.1, free T4 0.75    Lab Results   Component Value Date    WBC 8.36 01/10/2024    HGB 14.3 01/10/2024    HCT 42.4 01/10/2024    MCV 87 01/10/2024     01/10/2024     BMP  Lab Results   Component Value Date     01/10/2024     01/10/2024    K 3.6 01/10/2024    K 3.6 01/10/2024     01/10/2024     01/10/2024    CO2 20 (L) 01/10/2024    CO2 20 (L) 01/10/2024    BUN 10 01/10/2024    BUN 10 01/10/2024    CREATININE 0.6 01/10/2024    CREATININE 0.6 01/10/2024    CALCIUM 9.3 01/10/2024    CALCIUM 9.3 01/10/2024    ANIONGAP 12 01/10/2024    ANIONGAP 12 01/10/2024    EGFRNORACEVR >60.0 01/10/2024    EGFRNORACEVR >60.0 01/10/2024     Lab Results   Component Value Date    ALT 21 01/10/2024    AST 16 01/10/2024    ALKPHOS 104 01/10/2024    BILITOT 0.3 01/10/2024      P:C ratio 0.31    ASSESSMENT/PLAN:     28 y.o.  female with IUP at 10w2d     Chronic hypertension affecting pregnancy  Chronic Hypertension  Today I counseled the patient on maternal/fetal risks associated with CHTN during pregnancy. Risks include but not limited to fetal growth restriction, miscarriage, abruption, maternal end organ disease (renal failure, MI, and stroke),  delivery, development of superimposed preeclampsia, and eclampsia. She was counseled on the recommendations for blood pressure control, serial ultrasound for fetal growth assessment and  testing, and timing of delivery. I also counseled her on the recommendation for aspirin 81 mg daily which may decrease her risk of developing superimposed preeclampsia.     Recommendations (Please refer to [facility] guidelines):  Initiate aspirin 81  mg daily at 12-16 weeks gestation for preeclampsia risk reduction  Not currently on any medications  Baseline evaluation with primary OB:   24-hour urine protein or baseline P/C ratio, CMP, and CBC-completed; P:C ratio 0.31 (1/10), CBC and CMP WNL  Maternal EKG  Maternal ophthalmic evaluation  Maternal echocardiogram if HTN has been long-standing or EKG is abnormal  Serial fetal growth ultrasounds every 4-6 weeks, beginning at 26-28 weeks.   Continued close observation of patient's blood pressures. Avoid hypotension as this has been associated with uteroplacental insufficiency.  Recommend treatment to a goal blood pressure < 140/90  For women with evidence of end-organ damage (prior CVA, renal disease, etc) or co-morbid conditions (ie diabetes), a lower threshold may be recommended  Weekly antepartum testing at 32 weeks (NST+AFV); twice weekly testing if control is poor, multiple comorbidities are present, or requires several medications for control     Delivery timing:  No medications, no comorbid conditions: 39 0/7 - 39 6/7 weeks gestation  No medications, comorbid conditions: 38 0/7 - 38 6/7 weeks gestation  Controlled on single agent, no comorbid conditions*: 38 0/7 - 38 6/7 weeks gestation  Controlled on single agent, comorbid conditions*: 37 0/7 - 38 6/7 weeks gestation  Uncontrolled or requiring ? 2 medications: 36 0/7 - 37 6/7 weeks gestation    *Comorbid conditions include BMI >= 40, diabetes, and complex medical condition associated with placental dysfunction (ie lupus or other vascular disease)  Delivery may be recommended earlier pending results of fetal growth ultrasounds, AFV assessment, or antepartum testing results.       History of thyroid disorder  Subclinical Hypothyroidism  Thyroid requirements increase in pregnancy due to increases in metabolism and thyroid binding globulin. Thyroid function tests should be followed closely to guide treatment if needed, and then to titrate treatment as  necessary. When hypothyroidism is diagnosed, it has been associated with higher pregnancy complication rates including miscarriage, preeclampsia, abruption, low birth weight and stillbirth. Untreated hypothyroidism has also been associated with adverse fetal neurological development, but well treated hypothyroidism (i.e., euthyroid state) carries an excellent prognosis for mother and baby.      Recommendations:  Current labs demonstrate subclinical hypothyroidism (TSH 6.1, free T4 0.75)  -Recommend repeating TFT in one month     Class 3 severe obesity due to excess calories with serious comorbidity and body mass index (BMI) of 45.0 to 49.9 in adult  Obesity  The patient was counseled on the  risks associated with obesity which include and increased risk of hypertension, preeclampsia, gestational diabetes, venous thromboembolic disease,  delivery, macrosomia (with resultant shoulder dystocia, obstetric sphincter injury), IUFD, longer first stage of labor, decreased TOLAC success rate, emergent & scheduled  & complications of  (prolonged OR time, delayed delivery, excessive EBL, infection, wound separation/infection, higher spinal failure rate, more difficult intubation).  Obesity is also associated with fetal anomalies, specifically neural tube defects.  Studies have shown that the rate of complication increases with rising BMI and thus pregnancies with maternal morbid obesity are at increased risk.      Recommendations:  Discussed that TWG goal is 11-20 lbs  Screen for signs/symptoms of obstructive sleep apnea  Nutritionist consult offered (this is to be ordered by primary OB provider)  Consider early 1 hr GCT (between 14-16 weeks gestation); repeat at 24-28 weeks gestation > pt is currently on metformin and will record BG for one week prior to next MFM appt.  Start low dose aspirin 81 mg daily at 12-16 weeks for preeclampsia risk reduction  Targeted anatomical survey scheduled at  18-20 weeks  Fetal growth ultrasound at around 32 weeks if BMI >= 40  Weekly  testing at 34 weeks if pre-pregnancy BMI >= 40  Lovenox 40 mg BID for VTE prophylaxis while admitted to the hospital (antepartum or postpartum) if BMI >= 40.   Encouraged breastfeeding  Postpartum lifestyle modifications & weight loss     History of gestational diabetes in prior pregnancy, currently pregnant  Pregestational Pre-Diabetes  The patient was counseled regarding the risks of diabetes in pregnancy. These risks include but are not limited to an increased risk of , preeclampsia/gestational hypertension, fetal structural anomalies, macrosomia, prematurity, shoulder dystocia/birth injury,  hyperbilirubinemia and electrolyte issues, pulmonary immaturity and sudden stillbirth especially in those patients with poor glucose control. I also discussed with the patient the need for increased fetal surveillance with serial fetal growth assessments and third trimester fetal testing. I also reviewed the possibility of need for early delivery in those with poor glucose control or evidence of fetal growth abnormalities.    Recommendations (Please refer to [facility] guidelines):  Baseline evaluation (to be ordered by primary OB provider):  24 hour urine protein or urine P/C ratio, CBC, CMP>completed, see cHTN  Maternal EKG; echocardiogram if BMI > 30 or EKG is abnormal  Maternal ophthalmic exam (if hasn't been performed in last year) and podiatry exam  Hemoglobin A1C every trimester, last A1c with diagnosis of pre-diabets (5.7 on 23)  Diabetic education referral and counseling re: goal blood sugars (< 95 mg/dl for fasting, < 120 mg/dl for 2 hour postprandial)  Recommend early glucose screening test for gDM, pt to hold metformin for the week leading up to the test > reviewed with patient and confirmed understanding    Medications:   Start low dose aspirin 81 mg daily at 12-16 weeks for preeclampsia risk reduction  1  mg folic acid daily  Pt continued on Metformin-was taking prior to conceiving. Pt should continue to take 500mg metformin QHS and hold for one week prior to early GST as above  F/ U Ultrasounds, Prenatal Testing, and Delivery recommendations to follow based on early GCT results      FOLLOW UP:     F/u in 2-3 weeks for US/MFM visit      30 minutes of total time spent on the encounter, which includes face to face time and non-face to face time preparing to see the patient (eg, review of tests), obtaining and/or reviewing separately obtained history, documenting clinical information in the electronic or other health record, independently interpreting results (not separately reported) and communicating results to the patient/family/caregiver, or care coordination (not separately reported).      Betzy Sánchez MD  OBGYN PGY-3      Electronically Signed by Betzy Sánchez February 8, 2024

## 2024-02-08 NOTE — ASSESSMENT & PLAN NOTE
Pregestational Pre-Diabetes  The patient was counseled regarding the risks of diabetes in pregnancy. These risks include but are not limited to an increased risk of , preeclampsia/gestational hypertension, fetal structural anomalies, macrosomia, prematurity, shoulder dystocia/birth injury,  hyperbilirubinemia and electrolyte issues, pulmonary immaturity and sudden stillbirth especially in those patients with poor glucose control. I also discussed with the patient the need for increased fetal surveillance with serial fetal growth assessments and third trimester fetal testing. I also reviewed the possibility of need for early delivery in those with poor glucose control or evidence of fetal growth abnormalities.    Recommendations (Please refer to [facility] guidelines):  Baseline evaluation (to be ordered by primary OB provider):  24 hour urine protein or urine P/C ratio, CBC, CMP>completed, see cHTN  Maternal EKG; echocardiogram if BMI > 30 or EKG is abnormal  Maternal ophthalmic exam (if hasn't been performed in last year) and podiatry exam  Hemoglobin A1C every trimester, last A1c with diagnosis of pre-diabets (5.7 on 23)  Diabetic education referral and counseling re: goal blood sugars (< 95 mg/dl for fasting, < 120 mg/dl for 2 hour postprandial)  Recommend early glucose screening test for gDM, pt to hold metformin for the week leading up to the test > reviewed with patient and confirmed understanding    Medications:   Start low dose aspirin 81 mg daily at 12-16 weeks for preeclampsia risk reduction  1 mg folic acid daily  Pt continued on Metformin-was taking prior to conceiving. Pt should continue to take 500mg metformin QHS and hold for one week prior to early GST as above  F/ U Ultrasounds, Prenatal Testing, and Delivery recommendations to follow based on early GCT results

## 2024-02-08 NOTE — ASSESSMENT & PLAN NOTE
Chronic Hypertension  Today I counseled the patient on maternal/fetal risks associated with CHTN during pregnancy. Risks include but not limited to fetal growth restriction, miscarriage, abruption, maternal end organ disease (renal failure, MI, and stroke),  delivery, development of superimposed preeclampsia, and eclampsia. She was counseled on the recommendations for blood pressure control, serial ultrasound for fetal growth assessment and  testing, and timing of delivery. I also counseled her on the recommendation for aspirin 81 mg daily which may decrease her risk of developing superimposed preeclampsia.     Recommendations (Please refer to [facility] guidelines):  Initiate aspirin 81 mg daily at 12-16 weeks gestation for preeclampsia risk reduction  Not currently on any medications  Baseline evaluation with primary OB:   24-hour urine protein or baseline P/C ratio, CMP, and CBC-completed; P:C ratio 0.31 (1/10), CBC and CMP WNL  Maternal EKG  Maternal ophthalmic evaluation  Maternal echocardiogram if HTN has been long-standing or EKG is abnormal  Serial fetal growth ultrasounds every 4-6 weeks, beginning at 26-28 weeks.   Continued close observation of patient's blood pressures. Avoid hypotension as this has been associated with uteroplacental insufficiency.  Recommend treatment to a goal blood pressure < 140/90  For women with evidence of end-organ damage (prior CVA, renal disease, etc) or co-morbid conditions (ie diabetes), a lower threshold may be recommended  Weekly antepartum testing at 32 weeks (NST+AFV); twice weekly testing if control is poor, multiple comorbidities are present, or requires several medications for control     Delivery timing:  No medications, no comorbid conditions: 39 0/7 - 39 6/7 weeks gestation  No medications, comorbid conditions: 38 0/7 - 38 6/7 weeks gestation  Controlled on single agent, no comorbid conditions*: 38 0/7 - 38 6/7 weeks gestation  Controlled on single  agent, comorbid conditions*: 37 0/7 - 38 6/7 weeks gestation  Uncontrolled or requiring ? 2 medications: 36 0/7 - 37 6/7 weeks gestation    *Comorbid conditions include BMI >= 40, diabetes, and complex medical condition associated with placental dysfunction (ie lupus or other vascular disease)  Delivery may be recommended earlier pending results of fetal growth ultrasounds, AFV assessment, or antepartum testing results.

## 2024-02-08 NOTE — ASSESSMENT & PLAN NOTE
Subclinical Hypothyroidism  Thyroid requirements increase in pregnancy due to increases in metabolism and thyroid binding globulin. Thyroid function tests should be followed closely to guide treatment if needed, and then to titrate treatment as necessary. When hypothyroidism is diagnosed, it has been associated with higher pregnancy complication rates including miscarriage, preeclampsia, abruption, low birth weight and stillbirth. Untreated hypothyroidism has also been associated with adverse fetal neurological development, but well treated hypothyroidism (i.e., euthyroid state) carries an excellent prognosis for mother and baby.      Recommendations:  Current labs demonstrate subclinical hypothyroidism (TSH 6.1, free T4 0.75)  -Recommend repeating TFT in one month

## 2024-02-09 ENCOUNTER — PATIENT MESSAGE (OUTPATIENT)
Dept: ADMINISTRATIVE | Facility: OTHER | Age: 29
End: 2024-02-09
Payer: MEDICAID

## 2024-02-09 ENCOUNTER — TELEPHONE (OUTPATIENT)
Dept: MATERNAL FETAL MEDICINE | Facility: CLINIC | Age: 29
End: 2024-02-09
Payer: MEDICAID

## 2024-02-09 NOTE — TELEPHONE ENCOUNTER
Call to patient as she sent a message about follow up. Explained patient could send in her blood sugars next week. She stated she was not doing daily blood sugars. Explained would send Dr. Renteria a message and get back to her with a follow up. She stated understanding.

## 2024-02-09 NOTE — TELEPHONE ENCOUNTER
Call to patient and explained that Dr. Renteria stated she would like her to come back in 2-3 wks for a virtual follow up appointment. Asked patient if she was taking her blood pressures at home and she stated she just talked to the connected mom program and they were sending her stuff. Scheduled virtual appt with the patient. Explained that her next ultrasound would be her anatomy which can be scheduled after her next follow up. She stated understanding.

## 2024-02-18 ENCOUNTER — PATIENT MESSAGE (OUTPATIENT)
Dept: OBSTETRICS AND GYNECOLOGY | Facility: CLINIC | Age: 29
End: 2024-02-18
Payer: MEDICAID

## 2024-02-19 ENCOUNTER — TELEPHONE (OUTPATIENT)
Dept: OBSTETRICS AND GYNECOLOGY | Facility: CLINIC | Age: 29
End: 2024-02-19
Payer: MEDICAID

## 2024-02-19 DIAGNOSIS — O10.919 CHRONIC HYPERTENSION AFFECTING PREGNANCY: ICD-10-CM

## 2024-02-19 DIAGNOSIS — O34.219 HISTORY OF CESAREAN DELIVERY, CURRENTLY PREGNANT: ICD-10-CM

## 2024-02-19 DIAGNOSIS — E03.9 HYPOTHYROIDISM, UNSPECIFIED TYPE: Primary | ICD-10-CM

## 2024-02-19 NOTE — TELEPHONE ENCOUNTER
----- Message from Shelly Haddad CNM sent at 2/19/2024 11:10 AM CST -----  Regarding: Maternity 21  Good morning, this patient has an early glucose test scheduled at the [facility] at 8:20 a.m. on Wednesday 21st of February and would like to do her maternity 21 same time.  This seems very reasonable and I have placed the order.  Could you please schedule and link.  Thank you  Price

## 2024-02-19 NOTE — TELEPHONE ENCOUNTER
Left message informing pt of need for paperwork in order to have [id number] done. Paperwork to be left at  on 2nd floor for pt pickup before lab appt on 2/21/24. DARIEL WHEELERN

## 2024-02-21 ENCOUNTER — LAB VISIT (OUTPATIENT)
Dept: LAB | Facility: HOSPITAL | Age: 29
End: 2024-02-21
Attending: ADVANCED PRACTICE MIDWIFE
Payer: MEDICAID

## 2024-02-21 ENCOUNTER — OFFICE VISIT (OUTPATIENT)
Dept: OBSTETRICS AND GYNECOLOGY | Facility: CLINIC | Age: 29
End: 2024-02-21
Payer: MEDICAID

## 2024-02-21 DIAGNOSIS — O09.299 HISTORY OF GESTATIONAL DIABETES IN PRIOR PREGNANCY, CURRENTLY PREGNANT: ICD-10-CM

## 2024-02-21 DIAGNOSIS — O34.219 HISTORY OF CESAREAN DELIVERY, CURRENTLY PREGNANT: ICD-10-CM

## 2024-02-21 DIAGNOSIS — O10.919 CHRONIC HYPERTENSION AFFECTING PREGNANCY: ICD-10-CM

## 2024-02-21 DIAGNOSIS — O24.111 PRE-EXISTING TYPE 2 DIABETES MELLITUS DURING PREGNANCY IN FIRST TRIMESTER: Primary | ICD-10-CM

## 2024-02-21 DIAGNOSIS — Z86.32 HISTORY OF GESTATIONAL DIABETES IN PRIOR PREGNANCY, CURRENTLY PREGNANT: ICD-10-CM

## 2024-02-21 DIAGNOSIS — E03.9 HYPOTHYROIDISM, UNSPECIFIED TYPE: ICD-10-CM

## 2024-02-21 LAB — GLUCOSE SERPL-MCNC: 225 MG/DL (ref 70–140)

## 2024-02-21 PROCEDURE — 82950 GLUCOSE TEST: CPT | Performed by: ADVANCED PRACTICE MIDWIFE

## 2024-02-21 PROCEDURE — 99213 OFFICE O/P EST LOW 20 MIN: CPT | Mod: TH,95,, | Performed by: ADVANCED PRACTICE MIDWIFE

## 2024-02-21 PROCEDURE — 36415 COLL VENOUS BLD VENIPUNCTURE: CPT | Performed by: ADVANCED PRACTICE MIDWIFE

## 2024-02-21 RX ORDER — NAPROXEN SODIUM 220 MG/1
81 TABLET, FILM COATED ORAL DAILY
Qty: 30 TABLET | Refills: 8 | Status: SHIPPED | OUTPATIENT
Start: 2024-02-21 | End: 2025-02-20

## 2024-02-21 RX ORDER — LANCETS
EACH MISCELLANEOUS
Qty: 200 EACH | Refills: 6 | Status: SHIPPED | OUTPATIENT
Start: 2024-02-21

## 2024-02-21 RX ORDER — INSULIN PUMP SYRINGE, 3 ML
EACH MISCELLANEOUS
Qty: 1 EACH | Refills: 0 | Status: SHIPPED | OUTPATIENT
Start: 2024-02-21 | End: 2024-05-13

## 2024-02-21 NOTE — PROGRESS NOTES
28 y.o. female  at 12w3d   denies VB or cramping    High-risk pregnancy-see problem list and overview-followed by MFM    History of previous  section-mode of delivery to be discussed at a later visit    Chronic hypertension as a child and preeclampsia with previous pregnancy-daily baby aspirin to be initiated 16 weeks    History of gestational diabetes.  After metformin stopped for 1 week, A1c was 5.7.  Metformin restarted at 500 mg Q HS.    1 hour glucose was 225.  Diabetic diet Accu-Cheks and diabetic referral initiated  Referred to Radha Briscoe    History of thyroid disorder-no medication-TSH 6.105, free T4 0.75    Obstructive sleep apnea, et pressure was recently increased and she finds this helpful    Genetic testing 21 today    Reviewed warning signs, pregnancy precautions and how/when to call.  RTC x keep scheduled appointments with MFM, myself and diabetic clinic wks, call or present sooner prn.     I spent a total of 20 minutes on the day of the visit.This includes face to face time and non-face to face time preparing to see the patient (eg, review of tests), Obtaining and/or reviewing separately obtained history, Documenting clinical information in the electronic or other health record, Independently interpreting resultsand communicating results to the patient/family/caregiver, or Care coordination.     Audio Only Telehealth Visit     The patient location is:  Home  The chief complaint leading to consultation is:  Prenatal appointment  Visit type: Virtual visit with audio only (telephone)     The reason for the audio only service rather than synchronous audio and video virtual visit was related to technical difficulties or patient preference/necessity.     Each patient to whom I provide medical services by telemedicine is:  (1) informed of the relationship between the physician and patient and the respective role of any other health care provider with respect to management of the patient; and  (2) notified that they may decline to receive medical services by telemedicine and may withdraw from such care at any time. Patient verbally consented to receive this service via voice-only telephone call.   This service was not originating from a related E/M service provided within the previous 7 days nor will  to an E/M service or procedure within the next 24 hours or my soonest available appointment.  Prevailing standard of care was able to be met in this audio-only visit.

## 2024-02-22 ENCOUNTER — TELEPHONE (OUTPATIENT)
Dept: OBSTETRICS AND GYNECOLOGY | Facility: CLINIC | Age: 29
End: 2024-02-22
Payer: MEDICAID

## 2024-02-22 ENCOUNTER — PATIENT MESSAGE (OUTPATIENT)
Dept: OBSTETRICS AND GYNECOLOGY | Facility: CLINIC | Age: 29
End: 2024-02-22
Payer: MEDICAID

## 2024-02-22 NOTE — TELEPHONE ENCOUNTER
S/w pt scheduled diabetes edu appt. Pt states she went to get blood sugar monitoring supplies on yesterday but lancets were missing, she plans to get them today. Explained she should start checking her sugars asap, logging 4 numbers per day. Fasting at the start of day prior to eating and two hours post each meal for a total of four numbers total. Also informed her it will be important to her care plan that these numbers are with her for visits especially with the MFM team. Pt verbalized understanding.

## 2024-02-26 ENCOUNTER — CLINICAL SUPPORT (OUTPATIENT)
Dept: DIABETES | Facility: CLINIC | Age: 29
End: 2024-02-26
Payer: MEDICAID

## 2024-02-26 DIAGNOSIS — O24.111 PRE-EXISTING TYPE 2 DIABETES MELLITUS DURING PREGNANCY IN FIRST TRIMESTER: Primary | ICD-10-CM

## 2024-02-26 PROCEDURE — 99999PBSHW PR PBB SHADOW TECHNICAL ONLY FILED TO HB: Mod: PBBFAC,,,

## 2024-02-26 PROCEDURE — G0108 DIAB MANAGE TRN  PER INDIV: HCPCS | Mod: PBBFAC,PO | Performed by: DIETITIAN, REGISTERED

## 2024-02-26 PROCEDURE — 99212 OFFICE O/P EST SF 10 MIN: CPT | Mod: PBBFAC,PO | Performed by: DIETITIAN, REGISTERED

## 2024-02-26 PROCEDURE — 99999 PR PBB SHADOW E&M-EST. PATIENT-LVL II: CPT | Mod: PBBFAC,,, | Performed by: DIETITIAN, REGISTERED

## 2024-02-26 NOTE — PROGRESS NOTES
"Diabetes Care Specialist Progress Note  Author: Mckayla Sandra RD  Date: 2/29/2024    Program Intake  Reason for Diabetes Program Visit:: Initial Diabetes Assessment  Type of Intervention:: Individual  Individual: Education  Education: Self-Management Skill Review, Nutrition and Meal Planning  Current diabetes risk level:: low    Lab Results   Component Value Date    HGBA1C 5.7 (H) 11/30/2023       Clinical    REFERRING PROVIDER: Shelly Haddad CNM    HISTORY OF PRESENT ILLNESS: 28 y.o. patient is in clinic today for gestational diabetes.   She is 13w4d  gestation.   PORFIRIO is 9/3/2024.   DM medications: metformin, 500 mg at night     Weight: 120.7 kg (266 lb 1.5 oz)   Height: 5' 3" (160 cm)     Patient Health Rating  Compared to other people your age, how would you rate your health?: Fair    Problem Review  Reviewed Problem List with Patient: yes  Active comorbidities affecting diabetes self-care.: yes  Comorbidities: Hypertension, Other (comment) (thyroid d/o)    Clinical Assessment  Current Diabetes Treatment: Oral Medication  Have you ever experienced hypoglycemia (low blood sugar)?: no  Have you ever experienced hyperglycemia (high blood sugar)?: no    Medication Information  How do you obtain your medications?: Patient drives  How many days a week do you miss your medications?: Never  Do you sometimes have difficulty refilling your medications?: No  Medication adherence impacting ability to self-manage diabetes?: No    Labs  Do you have regular lab work to monitor your medications?: Yes  Where do you get your labs drawn?: [facility]  Lab Compliance Barriers: No    Nutritional Status  Meal Plan 24 Hour Recall - Breakfast: eggs, mortensen, smoothie with bananas and strawberries  Meal Plan 24 Hour Recall - Lunch: none  Meal Plan 24 Hour Recall - Dinner: 3 tamales; water  Meal Plan 24 Hour Recall - Snack: Graham's 1/2 ice cream cone  Change in appetite?: No  Dentation:: Intact  Recent Changes in Weight: No Recent " Weight Change  Current nutritional status an area of need that is impacting patient's ability to self-manage diabetes?: No    Additional Social History    Support  Does anyone support you with your diabetes care?: yes  Who supports you?: self, significant other  Who takes you to your medical appointments?: self  Does the current support meet the patient's needs?: Yes  Is Support an area impacting ability to self-manage diabetes?: No    Access to Mass Media & Technology  Does the patient have access to any of the following devices or technologies?: Smart phone  Media or technology needs impacting ability to self-manage diabetes?: No    Cognitive/Behavioral Health  Alert and Oriented: Yes  Difficulty Thinking: No  Requires Prompting: No  Requires assistance for routine expression?: No  Cognitive or behavioral barriers impacting ability to self-manage diabetes?: No    Culture/Pentecostalism  Culture or Druze beliefs that may impact ability to access healthcare: No    Communication  Language preference: English  Hearing Problems: No  Vision Problems: Yes  Vision problem type:: Decreased Vision  Vision Assistance: Glasses  Communication needs impacting ability to self-manage diabetes?: No    Health Literacy  How often do you need to have someone help you read instructions, pamphlets, or written material from your doctor or pharmacy?: Never  Health literacy needs impacting ability to self-manage diabetes?: No      Diabetes Self-Management Skills Assessment    Diabetes Disease Process/Treatment Options  Patient/caregiver able to state what happens when someone has diabetes.: yes  Patient/caregiver knows what type of diabetes they have.: yes  Diabetes Type : Gestational  Patient/caregiver able to identify at least three signs and symptoms of diabetes.: yes  Patient able to identify at least three risk factors for diabetes.: yes  Assessment indicates:: Other (comment) (pt wanted review)  Area of need?: Yes    Nutrition/Healthy  Eating  Challenges to healthy eating::  (pt doesn't always have time to prepare meals - eating foods such as chicken nuggets)  Patient-identified foods:: starches (bread, pasta, rice, cereal), fruit/fruit juice, soda  Nutrition/Healthy Eating Skills Assessment Completed:: Yes  Assessment indicates:: Instruction Needed  Area of need?: Yes    Physical Activity/Exercise  Patient's daily activity level:: moderately active  Patient formally exercises outside of work.: no  Exercise Type: walking (pt goes to the park to walk the dog, she has chickens at home and she is outside feeding,etc.)  Intensity: Low  Frequency: four or more times a week  Patient can identify forms of physical activity.: yes  Stated forms of physical activity:: moving to burn calories  Physical Activity/Exercise Skills Assessment Completed: : Yes  Assessment indicates:: Adequate understanding  Area of need?: No    Medications  Patient is able to describe current diabetes management routine.: yes  Diabetes management routine:: diet, oral medications  Patient understands the purpose of the medications taken for diabetes.: no  Medication regimen problems/concerns:: does not feel like regimen is working  Assessment indicates:: Knowledge deficit  Area of need?: Yes    Home Blood Glucose Monitoring  When do you check your blood sugar?: Before breakfast, 2 hours after meal  When you check what is your typical blood sugar range? : fasting , 108, 125, 100 //  pp BG   Blood glucose logs:: yes  Blood glucose logs reviewed today?: yes  Home Blood Glucose Monitoring Skills Assessment Completed: : Yes  Assessment indicates:: Instruction Needed  Area of need?: Yes    Acute Complications  Patient is able to identify types of acute complications: No  Assessment indicates:: Knowledge deficit  Area of need?: Yes       Assessment Summary and Plan    Based on today's diabetes care assessment, the following areas of need were identified:          2/26/2024     12:01 AM   Social   Support No   Access to Mass Media/Tech No   Cognitive/Behavioral Health No   Culture/Oriental orthodox No   Communication No   Health Literacy No            2/26/2024    12:01 AM   Clinical   Medication Adherence No   Lab Compliance No   Nutritional Status No            2/26/2024    12:01 AM   Diabetes Self-Management Skills   Diabetes Disease Process/Treatment Options Yes - Discussed pathology of Type 2 diabetes vs GDM, risk factors and treatment options.      Nutrition/Healthy Eating Yes - care plan added      Physical Activity/Exercise No   Medication Yes - Discussed MOA, onset, side effects, dosage of meds.  Pt is currently taking 500 mg metformin at night.   Since fasting BG remains high, she would benefit from increase to 1000 mg      Home Blood Glucose Monitoring Yes - care plan added     Acute Complications Yes - Reviewed blood glucose goals, prevention, detection, signs and symptoms, and treatment of hypoglycemia and hyperglycemia, and when to contact the clinic.            Today's interventions were provided through individual discussion, instruction, and written materials were provided.      Patient verbalized understanding of instruction and written materials.  Pt was able to return back demonstration of instructions today. Patient understood key points, needs reinforcement and further instruction.     Diabetes Self-Management Care Plan:    Today's Diabetes Self-Management Care Plan was developed with Kathy's input. Kathy has agreed to work toward the following goal(s) to improve his/her overall diabetes control.      Care Plan: Diabetes Management   Updates made since 1/30/2024 12:00 AM        Problem: Healthy Eating         Goal: Eat 3 meals daily with 30-45g or 2-3 servings of Carbohydrate per meal.    Start Date: 2/26/2024   Expected End Date: 9/3/2024   Priority: High   Barriers: No Barriers Identified   Note:    Pt has not been counting carbs grams nor carb servings.   Now that her  OGTT was high, she will be more conscious of her intake.   As pregnancy progresses, carb intake may need to change, depending on BG readings.        Task: Reviewed the sources and role of Carbohydrate, Protein, and Fat and how each nutrient impacts blood sugar. Completed 2/29/2024        Task: Provided visual examples using dry measuring cups, food models, and other familiar objects such as computer mouse, deck or cards, tennis ball etc. to help with visualization of portions. Completed 2/29/2024        Task: Explained how to count carbohydrates using the food label and the use of dry measuring cups for accurate carb counting. Completed 2/29/2024        Task: Recommended replacing beverages containing high sugar content with noncaloric/sugar free options and/or water. Completed 2/29/2024        Task: Review the importance of balancing carbohydrates with each meal using portion control techniques to count servings of carbohydrate and label reading to identify serving size and amount of total carbs per serving. Completed 2/29/2024        Problem: Blood Glucose Self-Monitoring         Goal: Patient agrees to check and record blood sugars 4 times per day.    Start Date: 2/26/2024   Expected End Date: 9/3/2024   Priority: Medium   Barriers: No Barriers Identified   Note:    Pt just started keeping log of BG and will continue       Task: Provided patient with blood glucose logs, reviewed appropriate timing and frequency to SMBG, education on parameters on when to notify provider and advised patient to bring logs to all appts with PCP/Endocrinologist/Diabetes Care Specialist. Completed 2/29/2024          Follow Up Plan     Follow up in 1 week (on 3/4/2024).  Will evaluate goals, review BG logs, diet and medications.     Today's care plan and follow up schedule was discussed with patient.  Kathy verbalized understanding of the care plan, goals, and agrees to follow up plan.        The patient was encouraged to communicate  with his/her health care provider/physician and care team regarding his/her condition(s) and treatment.  I provided the patient with my contact information today and encouraged to contact me via phone or [facility] Patient Portal as needed.     Length of Visit   Total Time: 60 Minutes

## 2024-02-26 NOTE — LETTER
February 29, 2024        Shelly Haddad, MIKE  [address]  [place] [address]             [facility]  [address] [place] [address]  Phone: [phone]  Fax: [phone]   Patient: Kathy Mishra   MR Number: [id number]   YOB: 1995   Date of Visit: 2/26/2024       Dear Dr. Haddad:    Thank you for referring Kathy Mishra to me for evaluation. Below are the relevant portions of my assessment and plan of care.    If you have questions, please do not hesitate to call me. I look forward to following Kathy along with you.    Sincerely,      Mckayla Sandra, RD           CC  No Recipients

## 2024-02-27 ENCOUNTER — TELEPHONE (OUTPATIENT)
Dept: OBSTETRICS AND GYNECOLOGY | Facility: CLINIC | Age: 29
End: 2024-02-27
Payer: MEDICAID

## 2024-02-27 NOTE — TELEPHONE ENCOUNTER
Called to follow-up on her concern and she is doing perfectly fine with no worries no complaints of bleeding or pain-reassured

## 2024-02-29 VITALS — BODY MASS INDEX: 47.15 KG/M2 | HEIGHT: 63 IN | WEIGHT: 266.13 LBS

## 2024-03-01 ENCOUNTER — PATIENT MESSAGE (OUTPATIENT)
Dept: OBSTETRICS AND GYNECOLOGY | Facility: CLINIC | Age: 29
End: 2024-03-01
Payer: MEDICAID

## 2024-03-01 ENCOUNTER — PATIENT MESSAGE (OUTPATIENT)
Dept: MATERNAL FETAL MEDICINE | Facility: CLINIC | Age: 29
End: 2024-03-01

## 2024-03-01 ENCOUNTER — OFFICE VISIT (OUTPATIENT)
Dept: MATERNAL FETAL MEDICINE | Facility: CLINIC | Age: 29
End: 2024-03-01
Attending: OBSTETRICS & GYNECOLOGY
Payer: MEDICAID

## 2024-03-01 DIAGNOSIS — O10.919 CHRONIC HYPERTENSION AFFECTING PREGNANCY: Primary | ICD-10-CM

## 2024-03-01 DIAGNOSIS — O24.415 GESTATIONAL DIABETES MELLITUS (GDM) IN SECOND TRIMESTER CONTROLLED ON ORAL HYPOGLYCEMIC DRUG: ICD-10-CM

## 2024-03-01 DIAGNOSIS — E66.01 CLASS 3 SEVERE OBESITY DUE TO EXCESS CALORIES WITH SERIOUS COMORBIDITY AND BODY MASS INDEX (BMI) OF 45.0 TO 49.9 IN ADULT: ICD-10-CM

## 2024-03-01 PROCEDURE — 99214 OFFICE O/P EST MOD 30 MIN: CPT | Mod: TH,95,, | Performed by: OBSTETRICS & GYNECOLOGY

## 2024-03-01 NOTE — PROGRESS NOTES
The patient location is: [facility]  The chief complaint leading to consultation is: Diabetes complicating pregnancy    Visit type: audiovisual    Face to Face time with patient: 15 min  30 minutes of total time spent on the encounter, which includes face to face time and non-face to face time preparing to see the patient (eg, review of tests), Obtaining and/or reviewing separately obtained history, Documenting clinical information in the electronic or other health record, Independently interpreting results (not separately reported) and communicating results to the patient/family/caregiver, or Care coordination (not separately reported).         Each patient to whom he or she provides medical services by telemedicine is:  (1) informed of the relationship between the physician and patient and the respective role of any other health care provider with respect to management of the patient; and (2) notified that he or she may decline to receive medical services by telemedicine and may withdraw from such care at any time.    Notes:       Follow up consultation regarding diabetes in pregnancy provided today.  Risks of uncontrolled diabetes in pregnancy reviewed once again.  Blood glucose measurements assessed.  All of her fasting blood sugars remain elevated.  The vast majority of her postprandial sugars are in target range.  I did adjust her medications today.  She presently takes 500 mg of metformin at bedtime.  I increased this to 1000 mg at bedtime.  If this controls her she will need a new prescription written on her next visit if she remains uncontrolled we will need to start insulin.  Epic was unable to load her blood sugar log and she showed us this over the telemedicine camera.  We had a discussion on the danette phenomena which is the reason for her elevated fasting blood sugars.  We also discussed carb intake.  She will stablize to 30, 45-50, 45-50 of carbohydrate for breakfast lunch and dinner respectively.    We  also reviewed her blood pressure log from connected mom.  With the exception of the 1st 2 days all of her blood pressures are in acceptable range.    The patient was given an opportunity to ask questions about management and the diease process.  She expressed an understanding of and agreement to the above impression and plan. All questions were answered to her satisfaction.  She was given contact information to the [facility] clinic to address further concerns.

## 2024-03-04 ENCOUNTER — CLINICAL SUPPORT (OUTPATIENT)
Dept: DIABETES | Facility: CLINIC | Age: 29
End: 2024-03-04
Payer: MEDICAID

## 2024-03-04 VITALS — WEIGHT: 266.75 LBS | BODY MASS INDEX: 47.27 KG/M2 | HEIGHT: 63 IN

## 2024-03-04 DIAGNOSIS — O24.111 PRE-EXISTING TYPE 2 DIABETES MELLITUS DURING PREGNANCY IN FIRST TRIMESTER: Primary | ICD-10-CM

## 2024-03-04 PROCEDURE — 99999 PR PBB SHADOW E&M-EST. PATIENT-LVL I: CPT | Mod: PBBFAC,,, | Performed by: DIETITIAN, REGISTERED

## 2024-03-04 PROCEDURE — G0108 DIAB MANAGE TRN  PER INDIV: HCPCS | Mod: PBBFAC,PO | Performed by: DIETITIAN, REGISTERED

## 2024-03-04 PROCEDURE — 99211 OFF/OP EST MAY X REQ PHY/QHP: CPT | Mod: PBBFAC,PO | Performed by: DIETITIAN, REGISTERED

## 2024-03-04 PROCEDURE — 99999PBSHW PR PBB SHADOW TECHNICAL ONLY FILED TO HB: Mod: PBBFAC,,,

## 2024-03-04 NOTE — PROGRESS NOTES
"Diabetes Care Specialist Follow-up Note  Author: Mckayla Sandra RD  Date: 3/4/2024    Program Intake  Reason for Diabetes Program Visit:: Intervention  Type of Intervention:: Individual  Individual: Education  Education: Self-Management Skill Review, Nutrition and Meal Planning    Lab Results   Component Value Date    HGBA1C 5.7 (H) 11/30/2023       Clinical    28 y.o. patient is in clinic today for gestational diabetes.   She is 14w4d  gestation.   PORFIRIO is 9/3/24.   DM medications: metformin, 1000 mg at night     Weight: 121 kg (266 lb 12.1 oz)   Height: 5' 3" (160 cm)       Weight: 121 kg (266 lb 12.1 oz)   Height: 5' 3" (160 cm)   Body mass index is 47.25 kg/m².  No significant wt change since last visit on 2/26/2024    Nutritional Status  Meal Plan 24 Hour Recall - Breakfast: eggs, mortensen, grits; water  Meal Plan 24 Hour Recall - Lunch: none  Meal Plan 24 Hour Recall - Dinner: smoked chicken, sausage, corn, 1/2 pc of garlic bread; water  Meal Plan 24 Hour Recall - Snack: small portion of ice cream      SMBG: checking 3-4x/day - sometimes not eating lunch so no pp BG            Today's interventions were provided through individual discussion, instruction, and written materials were provided.    Patient verbalized understanding of instruction and written materials.  Pt was able to return back demonstration of instructions today. Patient understood key points, needs reinforcement and further instruction.     Diabetes Self-Management Care Plan Review and Evaluation of Progress:    During today's follow-up Kathy's Diabetes Self-Management Care Plan progress was reviewed and progress was evaluated including his/her input. Kathy has agreed to continue his/her journey to improve/maintain overall diabetes control by continuing to set health goals. See care plan progress below.      Care Plan: Diabetes Management   Updates made since 2/3/2024 12:00 AM        Problem: Healthy Eating         Goal: Eat 3 meals daily with " 30-45g or 2-3 servings of Carbohydrate per meal.    Start Date: 2/26/2024   Expected End Date: 9/3/2024   This Visit's Progress: On track   Priority: High   Barriers: No Barriers Identified   Note:    Pt is limiting carb intake at almost all meals, but not avoiding carbs.   She aims for 2-3 carb servings per meal  Drinking only water  She has had a few meals higher in carbs and/or higher in fat and ppBG was high. Discussed ways to change meals.          Problem: Blood Glucose Self-Monitoring         Goal: Patient agrees to check and record blood sugars 4 times per day.    Start Date: 2/26/2024   Expected End Date: 9/3/2024   Priority: Medium   Barriers: No Barriers Identified   Note:    Fasting, remains high despite increase in evening metformin   Post prandial, 3 high readings - pt can pinpoint what she ate that made BG high     Discussed Dexcom, which she would be eligible for if she starts insulin.   Based on note from MFM, pt will likely start on insulin soon since fasting BG is high, with no improvement from increase in metformin.            Follow Up Plan     Follow up if symptoms worsen or fail to improve or for Dexcom or insulin training.  Pt will call when she gets Dexcom, which she should be able to get after starting insulin.   Discussed how to inject insulin with insulin pen or vial and syringe in case patient is started on insulin.       Today's care plan and follow up schedule was discussed with patient.  Kathy verbalized understanding of the care plan, goals, and agrees to follow up plan.        The patient was encouraged to communicate with his/her health care provider/physician and care team regarding his/her condition(s) and treatment.  I provided the patient with my contact information today and encouraged to contact me via phone or [facility] Patient Portal as needed.     Length of Visit   Total Time: 30 Minutes

## 2024-03-04 NOTE — Clinical Note
Patient's fasting BG remains high despite increase in metformin. I saw in your note that you would start insulin If increased metformin did not help, so I demonstrated to her how to give injections in case insulin is prescribed.  If insulin is prescribed, Medicaid will cover Dexcom, which would be very beneficial. I can train her on how to use the device and get the report to you on f/u.

## 2024-03-07 ENCOUNTER — ROUTINE PRENATAL (OUTPATIENT)
Dept: OBSTETRICS AND GYNECOLOGY | Facility: CLINIC | Age: 29
End: 2024-03-07
Payer: MEDICAID

## 2024-03-07 ENCOUNTER — TELEPHONE (OUTPATIENT)
Dept: OBSTETRICS AND GYNECOLOGY | Facility: CLINIC | Age: 29
End: 2024-03-07

## 2024-03-07 VITALS
WEIGHT: 264.56 LBS | BODY MASS INDEX: 46.86 KG/M2 | DIASTOLIC BLOOD PRESSURE: 62 MMHG | SYSTOLIC BLOOD PRESSURE: 120 MMHG

## 2024-03-07 DIAGNOSIS — E66.01 CLASS 3 SEVERE OBESITY DUE TO EXCESS CALORIES WITH SERIOUS COMORBIDITY AND BODY MASS INDEX (BMI) OF 45.0 TO 49.9 IN ADULT: ICD-10-CM

## 2024-03-07 DIAGNOSIS — O10.919 CHRONIC HYPERTENSION AFFECTING PREGNANCY: ICD-10-CM

## 2024-03-07 DIAGNOSIS — O09.299 HISTORY OF GESTATIONAL DIABETES IN PRIOR PREGNANCY, CURRENTLY PREGNANT: ICD-10-CM

## 2024-03-07 DIAGNOSIS — O34.219 HISTORY OF CESAREAN DELIVERY, CURRENTLY PREGNANT: Primary | ICD-10-CM

## 2024-03-07 DIAGNOSIS — O24.415 GESTATIONAL DIABETES MELLITUS (GDM) IN SECOND TRIMESTER CONTROLLED ON ORAL HYPOGLYCEMIC DRUG: ICD-10-CM

## 2024-03-07 DIAGNOSIS — Z86.39 HISTORY OF THYROID DISORDER: ICD-10-CM

## 2024-03-07 DIAGNOSIS — Z86.32 HISTORY OF GESTATIONAL DIABETES IN PRIOR PREGNANCY, CURRENTLY PREGNANT: ICD-10-CM

## 2024-03-07 DIAGNOSIS — G47.33 OSA (OBSTRUCTIVE SLEEP APNEA): ICD-10-CM

## 2024-03-07 PROCEDURE — 99213 OFFICE O/P EST LOW 20 MIN: CPT | Mod: TH,S$PBB,, | Performed by: ADVANCED PRACTICE MIDWIFE

## 2024-03-07 PROCEDURE — 99213 OFFICE O/P EST LOW 20 MIN: CPT | Mod: PBBFAC,TH | Performed by: ADVANCED PRACTICE MIDWIFE

## 2024-03-07 PROCEDURE — 99999 PR PBB SHADOW E&M-EST. PATIENT-LVL III: CPT | Mod: PBBFAC,,, | Performed by: ADVANCED PRACTICE MIDWIFE

## 2024-03-07 RX ORDER — METFORMIN HYDROCHLORIDE 1000 MG/1
1000 TABLET ORAL 2 TIMES DAILY WITH MEALS
Qty: 180 TABLET | Refills: 3 | Status: SHIPPED | OUTPATIENT
Start: 2024-03-07 | End: 2024-04-04

## 2024-03-07 NOTE — PROGRESS NOTES
28 y.o. female  at 14w2d   denies VB or cramping    Doing well without concerns   First trimester s/s improving:  Still experiencing some slight nausea and advised to eat little and often    TWG: -2 lbs   Reviewed prenatal labs:   Genetic testing maternity 21- boy    History of  delivery, currently pregnant  Is planning on repeat    Chronic hypertension affecting pregnancy  Normotensive today taking daily baby aspirin    gestational diabetes in prior pregnancy, currently pregnant-followed by MFM-note is reviewed  Metformin changed from 500 mg Q HS to 1000 Q HS and prescription is sent.    Log is not provided today but states fasting blood sugars between 95 and 116 and 2 hour post prandials mostly less than 120    Class 3 severe obesity due to excess calories with serious comorbidity and body mass index (BMI) of 45.0 to 49.9 in adult  Continue with daily baby aspirin    History of thyroid disorder  Will check thyroid next visit    BERNARD (obstructive sleep apnea)  CPAP increased and finding it helpful        Reviewed warning signs, pregnancy precautions and how/when to call. Patient states understanding.  RTC x 4 wks, call or present sooner prn.   Will need anatomy ultrasound with Maternal-Fetal Medicine

## 2024-03-07 NOTE — TELEPHONE ENCOUNTER
S/w pt at this time and scheduled anatomy with MFM at [facility] per request of Reuben TIJERINA MA post pt's AILYN today as instructed by Shelly Haddad CNM. Discussed date/time/location & advised details are also available via [facility] by way of Visit info icon. Pt verbalized understanding.

## 2024-03-08 ENCOUNTER — PATIENT MESSAGE (OUTPATIENT)
Dept: MATERNAL FETAL MEDICINE | Facility: CLINIC | Age: 29
End: 2024-03-08

## 2024-03-08 ENCOUNTER — OFFICE VISIT (OUTPATIENT)
Dept: MATERNAL FETAL MEDICINE | Facility: CLINIC | Age: 29
End: 2024-03-08
Attending: OBSTETRICS & GYNECOLOGY
Payer: MEDICAID

## 2024-03-08 DIAGNOSIS — O24.414 INSULIN CONTROLLED GESTATIONAL DIABETES MELLITUS (GDM) IN SECOND TRIMESTER: Primary | ICD-10-CM

## 2024-03-08 PROCEDURE — 1160F RVW MEDS BY RX/DR IN RCRD: CPT | Mod: CPTII,95,, | Performed by: OBSTETRICS & GYNECOLOGY

## 2024-03-08 PROCEDURE — 99214 OFFICE O/P EST MOD 30 MIN: CPT | Mod: TH,95,, | Performed by: OBSTETRICS & GYNECOLOGY

## 2024-03-08 PROCEDURE — 1159F MED LIST DOCD IN RCRD: CPT | Mod: CPTII,95,, | Performed by: OBSTETRICS & GYNECOLOGY

## 2024-03-08 RX ORDER — HUMAN INSULIN 100 [IU]/ML
15 INJECTION, SUSPENSION SUBCUTANEOUS NIGHTLY
Qty: 10 ML | Refills: 11 | Status: SHIPPED | OUTPATIENT
Start: 2024-03-08 | End: 2024-04-09

## 2024-03-08 RX ORDER — PEN NEEDLE, DIABETIC 29 G X1/2"
1 NEEDLE, DISPOSABLE MISCELLANEOUS DAILY
Qty: 30 EACH | Refills: 12 | Status: SHIPPED | OUTPATIENT
Start: 2024-03-08

## 2024-03-08 NOTE — PROGRESS NOTES
Follow up consultation regarding diabetes in pregnancy provided today.  Risks of uncontrolled diabetes in pregnancy reviewed once again.  Blood glucose measurements assessed.  She has been unable to eat well as she feels food is getting stuck in her esophagus.  She does not have diarrhea or other GI symptoms.  Because of this she feels that she has been unable to eat meals on a regular basis in her blood sugar log is incomplete because of this.  We can tell that she needs more than metformin coverage for her fasting blood sugars.        I did adjust her medications today.  I started her on 15 units of Novolin N by flex pen q.h.s..  She will stop metformin and hopefully get on a better regimen for eating meals so that we can determine whether or not she needs coverage.  We will see her back in 1 week for follow-up a prescription for novel and was sent to her pharmacy.  The patient was given an opportunity to ask questions about management and the diease process.  She expressed an understanding of and agreement to the above impression and plan. All questions were answered to her satisfaction.  She was given contact information to the [facility] clinic to address further concerns.

## 2024-03-11 ENCOUNTER — TELEPHONE (OUTPATIENT)
Dept: MATERNAL FETAL MEDICINE | Facility: CLINIC | Age: 29
End: 2024-03-11
Payer: MEDICAID

## 2024-03-11 ENCOUNTER — PATIENT MESSAGE (OUTPATIENT)
Dept: MATERNAL FETAL MEDICINE | Facility: CLINIC | Age: 29
End: 2024-03-11
Payer: MEDICAID

## 2024-03-13 DIAGNOSIS — O24.414 INSULIN CONTROLLED GESTATIONAL DIABETES MELLITUS (GDM) IN SECOND TRIMESTER: Primary | ICD-10-CM

## 2024-03-13 RX ORDER — INSULIN DETEMIR 100 [IU]/ML
15 INJECTION, SOLUTION SUBCUTANEOUS NIGHTLY
Qty: 4.5 ML | Refills: 11 | Status: SHIPPED | OUTPATIENT
Start: 2024-03-13 | End: 2024-04-04

## 2024-03-15 ENCOUNTER — OFFICE VISIT (OUTPATIENT)
Dept: MATERNAL FETAL MEDICINE | Facility: CLINIC | Age: 29
End: 2024-03-15
Attending: OBSTETRICS & GYNECOLOGY
Payer: MEDICAID

## 2024-03-15 ENCOUNTER — PATIENT MESSAGE (OUTPATIENT)
Dept: MATERNAL FETAL MEDICINE | Facility: CLINIC | Age: 29
End: 2024-03-15

## 2024-03-15 DIAGNOSIS — O24.112 PRE-EXISTING TYPE 2 DIABETES MELLITUS DURING PREGNANCY IN SECOND TRIMESTER: Primary | ICD-10-CM

## 2024-03-15 PROCEDURE — 99213 OFFICE O/P EST LOW 20 MIN: CPT | Mod: TH,95,, | Performed by: OBSTETRICS & GYNECOLOGY

## 2024-03-15 NOTE — PROGRESS NOTES
VIRTUAL VISIT    The patient location is: remote location  [facility] Physician location: [facility]   [facility] physician: Dr. Shira Haynes  The chief complaint leading to consultation is: glucose log review  Visit type: telemedicine via audio and video using [employer] technology    Face to Face time with patient: 10 minutes  About 20 minutes of total time spent on the encounter, which includes face to face time and non-face to face time preparing to see the patient (eg, review of tests), Obtaining and/or reviewing separately obtained history, Documenting clinical information in the electronic or other health record, Independently interpreting results (not separately reported) and communicating results to the patient/family/caregiver, or Care coordination (not separately reported).       Each patient to whom he or she provides medical services by telemedicine is:  (1) informed of the relationship between the physician and patient and the respective role of any other health care provider with respect to management of the patient; and (2) notified that he or she may decline to receive medical services by telemedicine and may withdraw from such care at any time.        Patient had an [facility] virtual visit about a week ago and was advised to stop Metformin and start Novolin N 15 qhs     Patient reports she just got her insulin last night and insurance would not pay for Novolin N. She received Levemir and she took 15 units last night.   Patient's glucose log above was reviewed but not accurate for insulin since she just started last night.   Patient already saw Diabetic educator.   Today she was counseled on adherence to diet, incorporation of snacks especially night snack (ex. Almonds or string cheese). Patient advised to check a 2 am tomorrow to insure no hypoglycemia with the start of the insulin. She was advised to contact [facility] office if hypoglycemia and parameters were given. Patient counseled on recommendation for a f/u  virtual visit with MFM in 1 wk.     Recommendations:  -check a 2am tomorrow to insure no hypoglycemia   -virtual visit in 1 wk with MFM for review of glucose log: MFM staff will assist in setting up  -f/u appt with diabetic educator if patient needs refresher on diet: Primary Ob to order as needed

## 2024-03-19 ENCOUNTER — PATIENT MESSAGE (OUTPATIENT)
Dept: OTHER | Facility: OTHER | Age: 29
End: 2024-03-19
Payer: MEDICAID

## 2024-03-20 ENCOUNTER — TELEPHONE (OUTPATIENT)
Dept: OBSTETRICS AND GYNECOLOGY | Facility: CLINIC | Age: 29
End: 2024-03-20
Payer: MEDICAID

## 2024-03-20 NOTE — TELEPHONE ENCOUNTER
Called patient regarding blood pressure hat taken it today, asymptomatic.  Advised to check blood pressure daily basis and if more than 140/90 or symptomatic to call clinic or Labor and delivery for advice

## 2024-03-21 ENCOUNTER — PATIENT MESSAGE (OUTPATIENT)
Dept: MATERNAL FETAL MEDICINE | Facility: CLINIC | Age: 29
End: 2024-03-21
Payer: MEDICAID

## 2024-03-21 ENCOUNTER — PATIENT MESSAGE (OUTPATIENT)
Dept: OBSTETRICS AND GYNECOLOGY | Facility: CLINIC | Age: 29
End: 2024-03-21
Payer: MEDICAID

## 2024-03-22 NOTE — TELEPHONE ENCOUNTER
Refill Routing Note   Medication(s) are not appropriate for processing by [facility] for the following reason(s):        Outside of protocol    [facility] action(s):  Route               Appointments  past 12m or future 3m with PCP    Date Provider   Last Visit   3/7/2024 Shelly Haddad CNM   Next Visit   4/4/2024 Shelly Haddad CNM   ED visits in past 90 days: 0        Note composed:6:08 PM 03/22/2024

## 2024-03-25 RX ORDER — PROMETHAZINE HYDROCHLORIDE 12.5 MG/1
12.5 TABLET ORAL 4 TIMES DAILY
Qty: 30 TABLET | Refills: 2 | Status: SHIPPED | OUTPATIENT
Start: 2024-03-25 | End: 2024-05-13

## 2024-03-26 ENCOUNTER — PATIENT MESSAGE (OUTPATIENT)
Dept: OTHER | Facility: OTHER | Age: 29
End: 2024-03-26
Payer: MEDICAID

## 2024-03-26 ENCOUNTER — OFFICE VISIT (OUTPATIENT)
Dept: MATERNAL FETAL MEDICINE | Facility: CLINIC | Age: 29
End: 2024-03-26
Attending: OBSTETRICS & GYNECOLOGY
Payer: MEDICAID

## 2024-03-26 ENCOUNTER — PATIENT MESSAGE (OUTPATIENT)
Dept: MATERNAL FETAL MEDICINE | Facility: CLINIC | Age: 29
End: 2024-03-26

## 2024-03-26 DIAGNOSIS — E66.01 CLASS 3 SEVERE OBESITY DUE TO EXCESS CALORIES WITH SERIOUS COMORBIDITY AND BODY MASS INDEX (BMI) OF 45.0 TO 49.9 IN ADULT: Primary | ICD-10-CM

## 2024-03-26 DIAGNOSIS — O24.414 INSULIN CONTROLLED GESTATIONAL DIABETES MELLITUS (GDM) IN SECOND TRIMESTER: ICD-10-CM

## 2024-03-26 PROCEDURE — 1159F MED LIST DOCD IN RCRD: CPT | Mod: CPTII,95,, | Performed by: OBSTETRICS & GYNECOLOGY

## 2024-03-26 PROCEDURE — 1160F RVW MEDS BY RX/DR IN RCRD: CPT | Mod: CPTII,95,, | Performed by: OBSTETRICS & GYNECOLOGY

## 2024-03-26 PROCEDURE — 99214 OFFICE O/P EST MOD 30 MIN: CPT | Mod: TH,95,, | Performed by: OBSTETRICS & GYNECOLOGY

## 2024-03-26 NOTE — PROGRESS NOTES
The patient location is: Home  The chief complaint leading to consultation is: Diabetes  complicating pregnancy    Visit type: audiovisual    Face to Face time with patient: 10 min  20 minutes of total time spent on the encounter, which includes face to face time and non-face to face time preparing to see the patient (eg, review of tests), Obtaining and/or reviewing separately obtained history, Documenting clinical information in the electronic or other health record, Independently interpreting results (not separately reported) and communicating results to the patient/family/caregiver, or Care coordination (not separately reported).         Each patient to whom he or she provides medical services by telemedicine is:  (1) informed of the relationship between the physician and patient and the respective role of any other health care provider with respect to management of the patient; and (2) notified that he or she may decline to receive medical services by telemedicine and may withdraw from such care at any time.    Notes:       Follow up consultation regarding diabetes in pregnancy provided today.  Risks of uncontrolled diabetes in pregnancy reviewed once again.  Blood glucose measurements assessed. Her FBS remains elevated on 15 units Levimir QHS.  There remains variability in her PP sugars likely 2/2 diet.  She will log intake with elevations and we will review on next visit.       did adjust her medications today.  I increased her HS Levimir to 25 units,  She also c/o H/A that is mainly top of her head.  She has had migraines in the past but I suggested this sounds mainly like a stress H/A.  They have just bought land and are clearing for a house which has proven stressful.  She took Benadryl and a new CPAP mask last night and did not have a H/A.  Told to continue Benadryl prn and tylenol/excedrin prn. Will discuss next visit.  The patient was given an opportunity to ask questions about management and the diease  process.  She expressed an understanding of and agreement to the above impression and plan. All questions were answered to her satisfaction.  She was given contact information to the [facility] clinic to address further concerns.

## 2024-04-04 ENCOUNTER — ROUTINE PRENATAL (OUTPATIENT)
Dept: OBSTETRICS AND GYNECOLOGY | Facility: CLINIC | Age: 29
End: 2024-04-04
Payer: MEDICAID

## 2024-04-04 DIAGNOSIS — O10.919 CHRONIC HYPERTENSION AFFECTING PREGNANCY: ICD-10-CM

## 2024-04-04 DIAGNOSIS — E66.01 CLASS 3 SEVERE OBESITY DUE TO EXCESS CALORIES WITH SERIOUS COMORBIDITY AND BODY MASS INDEX (BMI) OF 45.0 TO 49.9 IN ADULT: ICD-10-CM

## 2024-04-04 DIAGNOSIS — O34.219 HISTORY OF CESAREAN DELIVERY, CURRENTLY PREGNANT: Primary | ICD-10-CM

## 2024-04-04 DIAGNOSIS — O24.414 INSULIN CONTROLLED GESTATIONAL DIABETES MELLITUS (GDM) IN SECOND TRIMESTER: ICD-10-CM

## 2024-04-04 PROCEDURE — 99213 OFFICE O/P EST LOW 20 MIN: CPT | Mod: TH,S$PBB,, | Performed by: ADVANCED PRACTICE MIDWIFE

## 2024-04-04 PROCEDURE — 99999 PR PBB SHADOW E&M-EST. PATIENT-LVL III: CPT | Mod: PBBFAC,,, | Performed by: ADVANCED PRACTICE MIDWIFE

## 2024-04-04 PROCEDURE — 99213 OFFICE O/P EST LOW 20 MIN: CPT | Mod: PBBFAC,TH | Performed by: ADVANCED PRACTICE MIDWIFE

## 2024-04-04 RX ORDER — NIFEDIPINE 30 MG/1
30 TABLET, EXTENDED RELEASE ORAL DAILY
Qty: 30 TABLET | Refills: 11 | Status: SHIPPED | OUTPATIENT
Start: 2024-04-04 | End: 2024-05-14

## 2024-04-04 NOTE — PROGRESS NOTES
28 y.o. female  at 18w2d   is feeling flutters /FM, denies VB, LOF or cramping  Doing well without concerns   TW lbs   Anatomy US NV  Genetic testing maternity 21neg- boy  Connected MoMs    History of  delivery, currently pregnant  For repeat    Chronic hypertension affecting pregnancy  Blood pressure 184/86 repeat 170/74.  Has had recent headaches but doing well today.  States blood pressures at home 123-136/64-77  Discussed with Dr. Wooten will add Procardia 30 mg XL-prescription sent  Will also bring blood pressure cuff in for calibration next visit    Class 3 severe obesity due to excess calories with serious comorbidity and body mass index (BMI) of 45.0 to 49.9 in adult  Watch diet, daily baby aspirin    Insulin controlled gestational diabetes mellitus (GDM) in second trimester  Recently seen by [facility] and Levemir is increased from 15-25 units q.h.s. which patient started 2 days ago.  Nine days since she was seen by MFM only 2 days of log is provided.    Fasting blood sugars x6-0% on target 111-142.    2 hour post prandials should be 18 readings there was 7 all of which are 120 or less.    Compliancy is strongly emphasized.    Managed by MFM has appointment on Tuesday with ultrasound      Reviewed warning signs, normal FM,  labor precautions and how/when to call. Patient states understanding.  RTC x 1 wks, call or present sooner prn.

## 2024-04-06 ENCOUNTER — PATIENT MESSAGE (OUTPATIENT)
Dept: OBSTETRICS AND GYNECOLOGY | Facility: CLINIC | Age: 29
End: 2024-04-06
Payer: MEDICAID

## 2024-04-06 VITALS
SYSTOLIC BLOOD PRESSURE: 170 MMHG | BODY MASS INDEX: 47.33 KG/M2 | WEIGHT: 267.19 LBS | DIASTOLIC BLOOD PRESSURE: 74 MMHG

## 2024-04-06 NOTE — PATIENT INSTRUCTIONS
Patient Education       Pregnancy - The Fourth Month   About this topic   It is important for you to learn how to take care of yourself to help you have a healthy baby and safe delivery. It is good to have health care throughout your pregnancy.  The fourth month of your pregnancy starts around week 14 and lasts through week 18. By knowing how far along you are, you can learn what is normal for this stage of your pregnancy and plan for what is next.  General   Growth and Development   During the fourth month of your pregnancy, here are some things you can expect.  You may:  Start to show that you are pregnant. It is normal to gain about 5 to 10 pounds (2.3 to 4.5 kg) total in your first 4 months.  Have heartburn  Feel like you have trouble paying attention to things  Have less nausea  Notice your breasts are growing and the veins are easier to see on them  Have swollen veins in your legs and feet, more nosebleeds, or bleeding when you brush your teeth. These are all because of the extra blood your body has while you are pregnant.  Notice more swelling in your hands and feet  Start to feel fluttering when you are lying or sitting quietly. This is your baby kicking.  Have pain in your sides with sudden movement. This is normal and happens because the ligaments in your belly are stretching.  Have a little more energy. Exercise is good for you, but check with your doctor before starting new exercises.  Most of the time it is safe for you to have sex while you are pregnant. It wont hurt the baby.  Your babys:  Skin is very thin and you can easily see blood vessels through it. Your baby is covered with lots of fine hair to protect their skin.  Bones are starting to harden. Your baby is able to frown, smile, stretch, and move.  Practicing breathing movements while inside of your womb  About 6 inches (16 cm) long and weighs about 7 ounces (200 gm). Your baby is about the size of an orange.  Things to Think About   Avoid  alcohol, drugs, tobacco products, and second hand smoke  Check with your doctor before taking any kind of drugs. Continue to take your vitamin with folic acid.  Avoid cleaning cat litter boxes. This can cause a disease that causes birth defects in your baby.  Amniocentesis and other prenatal screening tests may be done this month.  Try sleeping on your side. Use a pillow between your legs. Avoid sleeping on your back. This will help with the blood flow to your baby.  Change positions and get up slowly. Your heart has to work hard to cope with all of the extra blood volume.  Where will you take your baby for care after they are born? This is a good time to find a doctor for your baby.  Eat fresh fruits and foods with a lot of fiber to help with hard stools.  Drink at least 6 to 8 glasses of water each day.  When do I need to call the doctor?   Vaginal bleeding  Leaking of fluid from your vagina  Problems with constipation  Belly pain  Any illness or infection  Severe headaches or headaches that wont go away  Where can I learn more?   Better Health  https://www.betterhealth.jai.gov.[place]/health/HealthyLiving/pregnancy-stages-and-changes   Family Doctor  https://familydoctor.org/changes-in-your-body-during-pregnancy-first-trimester/   Last Reviewed Date   2020-04-20  Consumer Information Use and Disclaimer   This information is not specific medical advice and does not replace information you receive from your health care provider. This is only a brief summary of general information. It does NOT include all information about conditions, illnesses, injuries, tests, procedures, treatments, therapies, discharge instructions or life-style choices that may apply to you. You must talk with your health care provider for complete information about your health and treatment options. This information should not be used to decide whether or not to accept your health care providers advice, instructions or recommendations. Only your  health care provider has the knowledge and training to provide advice that is right for you.  Copyright   Copyright © 2021 [employer] and its affiliates and/or licensors. All rights reserved.

## 2024-04-08 ENCOUNTER — PATIENT MESSAGE (OUTPATIENT)
Dept: OBSTETRICS AND GYNECOLOGY | Facility: CLINIC | Age: 29
End: 2024-04-08
Payer: MEDICAID

## 2024-04-08 ENCOUNTER — TELEPHONE (OUTPATIENT)
Dept: OBSTETRICS AND GYNECOLOGY | Facility: CLINIC | Age: 29
End: 2024-04-08
Payer: MEDICAID

## 2024-04-09 ENCOUNTER — PROCEDURE VISIT (OUTPATIENT)
Dept: OBSTETRICS AND GYNECOLOGY | Facility: CLINIC | Age: 29
End: 2024-04-09
Payer: MEDICAID

## 2024-04-09 ENCOUNTER — TELEPHONE (OUTPATIENT)
Dept: OBSTETRICS AND GYNECOLOGY | Facility: CLINIC | Age: 29
End: 2024-04-09

## 2024-04-09 ENCOUNTER — PATIENT MESSAGE (OUTPATIENT)
Dept: MATERNAL FETAL MEDICINE | Facility: CLINIC | Age: 29
End: 2024-04-09
Payer: MEDICAID

## 2024-04-09 VITALS — HEIGHT: 63 IN | WEIGHT: 267.44 LBS | BODY MASS INDEX: 47.39 KG/M2

## 2024-04-09 DIAGNOSIS — Z86.39 HISTORY OF THYROID DISORDER: ICD-10-CM

## 2024-04-09 DIAGNOSIS — O10.919 CHRONIC HYPERTENSION AFFECTING PREGNANCY: Primary | ICD-10-CM

## 2024-04-09 DIAGNOSIS — O24.415 GESTATIONAL DIABETES MELLITUS (GDM) IN SECOND TRIMESTER CONTROLLED ON ORAL HYPOGLYCEMIC DRUG: ICD-10-CM

## 2024-04-09 DIAGNOSIS — E66.01 CLASS 3 SEVERE OBESITY DUE TO EXCESS CALORIES WITH SERIOUS COMORBIDITY AND BODY MASS INDEX (BMI) OF 45.0 TO 49.9 IN ADULT: ICD-10-CM

## 2024-04-09 DIAGNOSIS — G47.33 OSA (OBSTRUCTIVE SLEEP APNEA): ICD-10-CM

## 2024-04-09 DIAGNOSIS — O34.219 HISTORY OF CESAREAN DELIVERY, CURRENTLY PREGNANT: ICD-10-CM

## 2024-04-09 DIAGNOSIS — O24.414 INSULIN CONTROLLED GESTATIONAL DIABETES MELLITUS (GDM) IN SECOND TRIMESTER: Primary | ICD-10-CM

## 2024-04-09 DIAGNOSIS — O10.919 CHRONIC HYPERTENSION AFFECTING PREGNANCY: ICD-10-CM

## 2024-04-09 PROCEDURE — 99214 OFFICE O/P EST MOD 30 MIN: CPT | Mod: TH,25,S$PBB, | Performed by: OBSTETRICS & GYNECOLOGY

## 2024-04-09 PROCEDURE — 76811 OB US DETAILED SNGL FETUS: CPT | Mod: PBBFAC,PO | Performed by: OBSTETRICS & GYNECOLOGY

## 2024-04-09 RX ORDER — HUMAN INSULIN 100 [IU]/ML
30 INJECTION, SUSPENSION SUBCUTANEOUS NIGHTLY
Qty: 10 ML | Refills: 11 | Status: SHIPPED | OUTPATIENT
Start: 2024-04-09 | End: 2024-04-22

## 2024-04-09 NOTE — PROGRESS NOTES
Maternal Fetal Medicine follow up consult    SUBJECTIVE:     Kathy Mishra is a 28 y.o.  female with IUP at 19w0d  who is seen in follow up consultation by MFM.  Pregnancy complications include:   Problem   Insulin Controlled Gestational Diabetes Mellitus (Gdm) in Second Trimester   Chronic Hypertension Affecting Pregnancy   History of Thyroid Disorder       Previous notes reviewed.   No changes to medical, surgical, family, social, or obstetric history.    Interval history since last MFM visit: no changes. Feels well. Occasional headache that improves with Excedrin and Benadryl.     Medications:  PNV  ASA  NPH 25u qhs     OBJECTIVE:     Blood Pressure: 144/82 > 126/72    Ultrasound performed. See viewpoint for full ultrasound report.  A detailed fetal anatomic ultrasound examination was performed for the following high risk indication: DM2, obesity.   No fetal structural malformations are identified; however, fetal imaging is incomplete today.   A follow-up study will be scheduled to complete the fetal anatomic survey.   Fetal size today is consistent with established gestational age.   Cervical length by TA scanning is normal.   Placental location is Fundal  without evidence of previa.     ASSESSMENT/PLAN:     28 y.o.  female with IUP at 19w0d     Problems addressed at today's visit:  Insulin controlled gestational diabetes mellitus (GDM) in second trimester  History of GDM with abnormal early GTT. HbA1c 5.7% immediately prior to pregnancy while on Metformin.  Pregnancy management per T2DM guidelines.    BGs reviewed, see Media tab:  Fastings: all elevated  2hr PP: majority within range    Counseling:  We again discussed the importance of achieving blood sugar goals of fasting <95 and 2 hour postprandial <120 in order to decrease the risk of adverse pregnancy outcomes.  Continue to evaluate BGs 4x/day  The patient was advised to call for blood sugars less than 70 or greater than 200 prior to her  next appointment. She was also advised that if she notes nausea/vomiting, inability to tolerate PO, or concerns about being able to take medications that she should contact her provider or present to the OB ED.    Recommendations:  See original M note for full consultation and management recommendations regarding diabetes in pregnancy  Changes made today to medication regimen: NPH 30u qhs (patient thinks she is taking NPH - unsure due to insurance restrictions. If taking Levemir rather than NPH, instructed to increase to 30u qhs)  Growth scans every 4 weeks. An ultrasound for estimated fetal weight should be obtained within 3 weeks of anticipated delivery; if the EFW is >= 4500 grams, a  should be offered.    Pre-gestational diabetes:  Maternal EKG/Echo/Ophtho/Podiatry: to be coordinated by primary OB if not already completed  Fetal echocardiogram ordered  Twice weekly  fetal surveillance is recommended at 32 weeks gestation (BPP alternating with NST)  Delivery timin 0/7 to 38 and 6/7 weeks if under good control without comorbidities  37 0/7 to 37 and 67 weeks if longstanding diabetes or poorly controlled, polyhydramnios, EFW>90th percentile, or BMI >= 40  36 0/7 to 37 and 6/7 weeks if vascular complications, prior stillbirth or other complicating conditions.  Recommend consideration of earlier delivery if IUGR, HTN, or other complications    See previous recommendations pertaining to intrapartum and postpartum insulin and glucose management guidelines.     History of thyroid disorder  No recent TSH. Will check today.  See previous notes for additional recommendations.    Chronic hypertension affecting pregnancy  See previous M consultation for full recommendations.  The patient's blood pressure has been labile since her last visit.  Her CMOM cuff is not sized appropriately. Her wrist cuff always reads BPs WNL. Her arm CMOM cuff always reads mildly elevated.  She discontinued Procardia due  to nosebleeds and headache. She is not currently on any medication.    Recommendations:  - If needed, complete baseline evaluation as recommended in initial consultation (baseline preE labs, EKG/Echo)  - Continue aspirin 81 mg daily for preeclampsia risk reduction  - Will refer patient to digital medicine tech - needs larger cuff. Will review BPs in 1-2 weeks at virtual visit to determine if Labetalol is indicated for BP control.   - Continue close observation of patient's blood pressures. Avoid hypotension as this has been associated with uteroplacental insufficiency.  -Recommend treatment to a goal blood pressure < 140/90      Please see original MFM consultation for full details regarding management recommendations of these and other obstetric co-morbidities  Advised patient to take Tylenol + Benadryl for treatment of headaches. Advised OTC Magnesium for prevention.    FOLLOW UP:   Patient sent to lab for HbA1c and TSH and EKG  Fetal echo ordered  She will be scheduled in 1-2 weeks for a virtual visit for BG and BP review  She will be scheduled for ultrasound and MFM MD visit in 5 weeks    Today I have spent 30 minutes in the care of the patient. This includes face to face time and non-face to face time preparing to see the patient (eg, review of tests), obtaining and/or reviewing separately obtained history, documenting clinical information in the electronic or other health record, independently interpreting results and communicating results to the patient/family/caregiver, or care coordination.     Pearl Prince MD  Maternal Fetal Medicine

## 2024-04-09 NOTE — TELEPHONE ENCOUNTER
Message sent to [facility] inbasket for scheduling of 1-2wk virtual visit for blood sugar review per Dr. Prince

## 2024-04-09 NOTE — ASSESSMENT & PLAN NOTE
See previous [facility] consultation for full recommendations.  The patient's blood pressure has been labile since her last visit.  Her CMOM cuff is not sized appropriately. Her wrist cuff always reads BPs WNL. Her arm CMOM cuff always reads mildly elevated.  She discontinued Procardia due to nosebleeds and headache. She is not currently on any medication.    Recommendations:  - If needed, complete baseline evaluation as recommended in initial consultation (baseline preE labs, EKG/Echo)  - Continue aspirin 81 mg daily for preeclampsia risk reduction  - Will refer patient to digital medicine tech - needs larger cuff. Will review BPs in 1-2 weeks at virtual visit to determine if Labetalol is indicated for BP control.   - Continue close observation of patient's blood pressures. Avoid hypotension as this has been associated with uteroplacental insufficiency.  -Recommend treatment to a goal blood pressure < 140/90

## 2024-04-09 NOTE — ASSESSMENT & PLAN NOTE
History of GDM with abnormal early GTT. HbA1c 5.7% immediately prior to pregnancy while on Metformin.  Pregnancy management per T2DM guidelines.    BGs reviewed, see Media tab:  Fastings: all elevated  2hr PP: majority within range    Counseling:  We again discussed the importance of achieving blood sugar goals of fasting <95 and 2 hour postprandial <120 in order to decrease the risk of adverse pregnancy outcomes.  Continue to evaluate BGs 4x/day  The patient was advised to call for blood sugars less than 70 or greater than 200 prior to her next appointment. She was also advised that if she notes nausea/vomiting, inability to tolerate PO, or concerns about being able to take medications that she should contact her provider or present to the OB ED.    Recommendations:  See original MFM note for full consultation and management recommendations regarding diabetes in pregnancy  Changes made today to medication regimen: NPH 30u qhs (patient thinks she is taking NPH - unsure due to insurance restrictions. If taking Levemir rather than NPH, instructed to increase to 30u qhs)  Growth scans every 4 weeks. An ultrasound for estimated fetal weight should be obtained within 3 weeks of anticipated delivery; if the EFW is >= 4500 grams, a  should be offered.    Pre-gestational diabetes:  Maternal EKG/Echo/Ophtho/Podiatry: to be coordinated by primary OB if not already completed  Fetal echocardiogram ordered  Twice weekly  fetal surveillance is recommended at 32 weeks gestation (BPP alternating with NST)  Delivery timin 0/7 to 38 and 6/7 weeks if under good control without comorbidities  37 0/7 to 37 and 67 weeks if longstanding diabetes or poorly controlled, polyhydramnios, EFW>90th percentile, or BMI >= 40  36 0/7 to 37 and 6/7 weeks if vascular complications, prior stillbirth or other complicating conditions.  Recommend consideration of earlier delivery if IUGR, HTN, or other complications    See previous  recommendations pertaining to intrapartum and postpartum insulin and glucose management guidelines.

## 2024-04-09 NOTE — TELEPHONE ENCOUNTER
Fetal echo order placed. Message sent to [facility] cardiology staff for scheduling at this time.

## 2024-04-10 ENCOUNTER — ROUTINE PRENATAL (OUTPATIENT)
Dept: OBSTETRICS AND GYNECOLOGY | Facility: CLINIC | Age: 29
End: 2024-04-10
Payer: MEDICAID

## 2024-04-10 ENCOUNTER — PATIENT MESSAGE (OUTPATIENT)
Dept: OBSTETRICS AND GYNECOLOGY | Facility: CLINIC | Age: 29
End: 2024-04-10
Payer: MEDICAID

## 2024-04-10 VITALS
BODY MASS INDEX: 47.18 KG/M2 | DIASTOLIC BLOOD PRESSURE: 66 MMHG | SYSTOLIC BLOOD PRESSURE: 160 MMHG | WEIGHT: 266.31 LBS

## 2024-04-10 DIAGNOSIS — O24.414 INSULIN CONTROLLED GESTATIONAL DIABETES MELLITUS (GDM) IN SECOND TRIMESTER: ICD-10-CM

## 2024-04-10 DIAGNOSIS — E66.01 CLASS 3 SEVERE OBESITY DUE TO EXCESS CALORIES WITH SERIOUS COMORBIDITY AND BODY MASS INDEX (BMI) OF 45.0 TO 49.9 IN ADULT: ICD-10-CM

## 2024-04-10 DIAGNOSIS — O34.219 HISTORY OF CESAREAN DELIVERY, CURRENTLY PREGNANT: Primary | ICD-10-CM

## 2024-04-10 DIAGNOSIS — O10.919 CHRONIC HYPERTENSION AFFECTING PREGNANCY: ICD-10-CM

## 2024-04-10 PROCEDURE — 99213 OFFICE O/P EST LOW 20 MIN: CPT | Mod: TH,S$PBB,, | Performed by: ADVANCED PRACTICE MIDWIFE

## 2024-04-10 PROCEDURE — 99999 PR PBB SHADOW E&M-EST. PATIENT-LVL III: CPT | Mod: PBBFAC,,, | Performed by: ADVANCED PRACTICE MIDWIFE

## 2024-04-10 PROCEDURE — 99213 OFFICE O/P EST LOW 20 MIN: CPT | Mod: PBBFAC,TH | Performed by: ADVANCED PRACTICE MIDWIFE

## 2024-04-11 ENCOUNTER — TELEPHONE (OUTPATIENT)
Dept: OBSTETRICS AND GYNECOLOGY | Facility: CLINIC | Age: 29
End: 2024-04-11
Payer: MEDICAID

## 2024-04-11 RX ORDER — LABETALOL 200 MG/1
200 TABLET, FILM COATED ORAL 2 TIMES DAILY
Qty: 60 TABLET | Refills: 11 | Status: SHIPPED | OUTPATIENT
Start: 2024-04-11 | End: 2025-04-11

## 2024-04-11 NOTE — TELEPHONE ENCOUNTER
----- Message from Rona Blake sent at 4/11/2024  2:57 PM CDT -----  Contact: pt  Pt is calling in regard to needing the nurse to call her about her med, insulin NPH isoph U-100 human (NOVOLIN N FLEXPEN) 100 unit/mL (3 mL) InPn is what they gave her at the pharm, but she normally takes Levemir.  Please call her to advise if this is correct.  Please call her back at  [phone]  thanks/mpd

## 2024-04-11 NOTE — PATIENT INSTRUCTIONS
Patient Education       Pregnancy - The Fourth Month   About this topic   It is important for you to learn how to take care of yourself to help you have a healthy baby and safe delivery. It is good to have health care throughout your pregnancy.  The fourth month of your pregnancy starts around week 14 and lasts through week 18. By knowing how far along you are, you can learn what is normal for this stage of your pregnancy and plan for what is next.  General   Growth and Development   During the fourth month of your pregnancy, here are some things you can expect.  You may:  Start to show that you are pregnant. It is normal to gain about 5 to 10 pounds (2.3 to 4.5 kg) total in your first 4 months.  Have heartburn  Feel like you have trouble paying attention to things  Have less nausea  Notice your breasts are growing and the veins are easier to see on them  Have swollen veins in your legs and feet, more nosebleeds, or bleeding when you brush your teeth. These are all because of the extra blood your body has while you are pregnant.  Notice more swelling in your hands and feet  Start to feel fluttering when you are lying or sitting quietly. This is your baby kicking.  Have pain in your sides with sudden movement. This is normal and happens because the ligaments in your belly are stretching.  Have a little more energy. Exercise is good for you, but check with your doctor before starting new exercises.  Most of the time it is safe for you to have sex while you are pregnant. It wont hurt the baby.  Your babys:  Skin is very thin and you can easily see blood vessels through it. Your baby is covered with lots of fine hair to protect their skin.  Bones are starting to harden. Your baby is able to frown, smile, stretch, and move.  Practicing breathing movements while inside of your womb  About 6 inches (16 cm) long and weighs about 7 ounces (200 gm). Your baby is about the size of an orange.  Things to Think About   Avoid  alcohol, drugs, tobacco products, and second hand smoke  Check with your doctor before taking any kind of drugs. Continue to take your vitamin with folic acid.  Avoid cleaning cat litter boxes. This can cause a disease that causes birth defects in your baby.  Amniocentesis and other prenatal screening tests may be done this month.  Try sleeping on your side. Use a pillow between your legs. Avoid sleeping on your back. This will help with the blood flow to your baby.  Change positions and get up slowly. Your heart has to work hard to cope with all of the extra blood volume.  Where will you take your baby for care after they are born? This is a good time to find a doctor for your baby.  Eat fresh fruits and foods with a lot of fiber to help with hard stools.  Drink at least 6 to 8 glasses of water each day.  When do I need to call the doctor?   Vaginal bleeding  Leaking of fluid from your vagina  Problems with constipation  Belly pain  Any illness or infection  Severe headaches or headaches that wont go away  Where can I learn more?   Better Health  https://www.betterhealth.jai.gov.[place]/health/HealthyLiving/pregnancy-stages-and-changes   Family Doctor  https://familydoctor.org/changes-in-your-body-during-pregnancy-first-trimester/   Last Reviewed Date   2020-04-20  Consumer Information Use and Disclaimer   This information is not specific medical advice and does not replace information you receive from your health care provider. This is only a brief summary of general information. It does NOT include all information about conditions, illnesses, injuries, tests, procedures, treatments, therapies, discharge instructions or life-style choices that may apply to you. You must talk with your health care provider for complete information about your health and treatment options. This information should not be used to decide whether or not to accept your health care providers advice, instructions or recommendations. Only your  health care provider has the knowledge and training to provide advice that is right for you.  Copyright   Copyright © 2021 [employer] and its affiliates and/or licensors. All rights reserved.

## 2024-04-11 NOTE — PROGRESS NOTES
2024-message for Dr. Wooten to start labetalol 200 mg twice a day.  Patient is informed and prescription is sent.  She is also calibrating her blood pressure cuff this morning.  Support given      28 y.o. female  at 19w1d   is feeling flutters /FM, denies VB, LOF or cramping  Doing well   TW lbs   Reviewed anatomy US:  MFM note-incomplete views scheduled for follow-up with them  Genetic testing maternity 21- boy     History of  delivery, currently pregnant  For repeat, timing to be determined    Chronic hypertension affecting pregnancy  Initial blood pressure 160/66.  Had been started on Procardia but secondary to nosebleeds and headaches this was discontinued.    [place] that blood pressure cuff needs calibrating, she had bloated in but unfortunately personnel had left for the day and will come again tomorrow morning and continue to monitor blood pressure at home.  Aware of blood pressure precautions  Fetal echo scheduled 2024.    EKG scheduled for 2024    Insulin controlled gestational diabetes mellitus (GDM) in second trimester  Seen by [facility] and Levemir had been increased from 25-30 units and she started this therapy 1st time last night.    Review of blood sugars for past week.    0% on target for fasting blood sugar.    80% target-breakfast.    66% target lunch.    60% target supper.    Appears to be really trying with diet and monitoring-encouragement given  very supportive    Class 3 severe obesity due to excess calories with serious comorbidity and body mass index (BMI) of 45.0 to 49.9 in adult  Continue with daily baby aspirin, diabetic diet and close observation    Reviewed warning signs, normal FM,  labor precautions and how/when to call. Patient states understanding.  RTC x 1 wks, call or present sooner prn.

## 2024-04-12 ENCOUNTER — PATIENT MESSAGE (OUTPATIENT)
Dept: OBSTETRICS AND GYNECOLOGY | Facility: CLINIC | Age: 29
End: 2024-04-12
Payer: MEDICAID

## 2024-04-15 ENCOUNTER — PATIENT MESSAGE (OUTPATIENT)
Dept: OBSTETRICS AND GYNECOLOGY | Facility: CLINIC | Age: 29
End: 2024-04-15
Payer: MEDICAID

## 2024-04-15 NOTE — TELEPHONE ENCOUNTER
Shelly Haddad, Shannon Gómez, LPN  Caller: Unspecified (4 days ago,  3:00 PM)  Good afternoon, her insulin is managed by [facility] and she is correct she does take Levemir which was recently updated by them.  I would direct message to them.  Hope that helps and have a great weekend    Called patient and she believe the pharmacy gave her to wrong insulin.  She is calling the pharmacy today.  Advised patient to call back with any questions and message would be directed to [facility] for assistance.  Patient verbalized understanding.

## 2024-04-16 ENCOUNTER — PATIENT MESSAGE (OUTPATIENT)
Dept: OTHER | Facility: OTHER | Age: 29
End: 2024-04-16
Payer: MEDICAID

## 2024-04-16 ENCOUNTER — HOSPITAL ENCOUNTER (OUTPATIENT)
Dept: CARDIOLOGY | Facility: HOSPITAL | Age: 29
Discharge: HOME | End: 2024-04-16
Payer: MEDICAID

## 2024-04-16 DIAGNOSIS — O10.919 CHRONIC HYPERTENSION AFFECTING PREGNANCY: ICD-10-CM

## 2024-04-16 DIAGNOSIS — G47.33 OSA (OBSTRUCTIVE SLEEP APNEA): ICD-10-CM

## 2024-04-16 LAB
OHS QRS DURATION: 92 MS
OHS QTC CALCULATION: 452 MS

## 2024-04-16 PROCEDURE — 93005 ELECTROCARDIOGRAM TRACING: CPT | Mod: PO

## 2024-04-16 PROCEDURE — 93010 ELECTROCARDIOGRAM REPORT: CPT | Mod: ,,, | Performed by: INTERNAL MEDICINE

## 2024-04-17 ENCOUNTER — OFFICE VISIT (OUTPATIENT)
Dept: PEDIATRIC CARDIOLOGY | Facility: CLINIC | Age: 29
End: 2024-04-17
Payer: MEDICAID

## 2024-04-17 DIAGNOSIS — O24.414 INSULIN CONTROLLED GESTATIONAL DIABETES MELLITUS (GDM) IN SECOND TRIMESTER: Primary | ICD-10-CM

## 2024-04-17 PROCEDURE — 3044F HG A1C LEVEL LT 7.0%: CPT | Mod: CPTII,,, | Performed by: PEDIATRICS

## 2024-04-17 PROCEDURE — 99214 OFFICE O/P EST MOD 30 MIN: CPT | Mod: S$PBB,,, | Performed by: PEDIATRICS

## 2024-04-17 PROCEDURE — 99211 OFF/OP EST MAY X REQ PHY/QHP: CPT | Mod: PBBFAC | Performed by: PEDIATRICS

## 2024-04-17 PROCEDURE — 1160F RVW MEDS BY RX/DR IN RCRD: CPT | Mod: CPTII,,, | Performed by: PEDIATRICS

## 2024-04-17 PROCEDURE — 99999 PR PBB SHADOW E&M-EST. PATIENT-LVL I: CPT | Mod: PBBFAC,,, | Performed by: PEDIATRICS

## 2024-04-17 PROCEDURE — 1159F MED LIST DOCD IN RCRD: CPT | Mod: CPTII,,, | Performed by: PEDIATRICS

## 2024-04-17 NOTE — PROGRESS NOTES
[facility]      OB:  Shelly Haddad CNM    MFM:  Dr. Correia      Today, I had the pleasure of evaluating Kathy Mishra who is now 28 y.o. and carrying her third pregnancy at 20 weeks gestation with an PORFIRIO of 2024. She was referred for evaluation of the fetal heart due maternal diabetes.    She is carrying a male fetus named Dov Lobo.      Obstetric History:    .  Her OB history is otherwise unremarkable. Maternity 21 negative.    Past Medical History:   Diagnosis Date    Asthma 2022    Gestational diabetes mellitus (GDM) in third trimester controlled on oral hypoglycemic drug 2022    History of hypertension 2022-add PIH baseline labs to workup PCR 0.09 advised daily baby aspirin at 16 weeks    Hypertension     Thyroid disease        Current Outpatient Medications:     aspirin 81 MG Chew, Take 1 tablet (81 mg total) by mouth once daily., Disp: 30 tablet, Rfl: 8    blood sugar diagnostic Strp, To check BG 4 times daily, to use with insurance preferred meter, Disp: 200 strip, Rfl: 6    blood-glucose meter kit, To check BG 4 times daily, to use with insurance preferred meter, Disp: 1 each, Rfl: 0    insulin NPH isoph U-100 human (NOVOLIN N FLEXPEN) 100 unit/mL (3 mL) InPn, Inject 30 Units into the skin every evening., Disp: 10 mL, Rfl: 11    labetaloL (NORMODYNE) 200 MG tablet, Take 1 tablet (200 mg total) by mouth 2 (two) times daily., Disp: 60 tablet, Rfl: 11    lancets Misc, To check BG 4 times daily, to use with insurance preferred meter, Disp: 200 each, Rfl: 6    NIFEdipine (PROCARDIA-XL) 30 MG (OSM) 24 hr tablet, Take 1 tablet (30 mg total) by mouth once daily. (Patient not taking: Reported on 4/10/2024), Disp: 30 tablet, Rfl: 11    ondansetron (ZOFRAN) 4 MG tablet, Take 1 tablet (4 mg total) by mouth daily as needed for Nausea., Disp: 30 tablet, Rfl: 3    pen needle, diabetic (LITE TOUCH INSULIN PEN NEEDLES) 31  "gauge x 1/4" Ndle, 1 Needle by Misc.(Non-Drug; Combo Route) route once daily., Disp: 30 each, Rfl: 12    prenatal vitamins #45/iron/FA (PRENATAL VITAMIN #45-IRON-FA ORAL), Take by mouth., Disp: , Rfl:     promethazine (PHENERGAN) 12.5 MG Tab, Take 1 tablet (12.5 mg total) by mouth 4 (four) times daily., Disp: 30 tablet, Rfl: 2    Family History: Negative for congenital heart disease, early coronary artery disease, sudden unexplained death, connective tissues disorders, genetic syndromes, multiple miscarriages or other congenital anomalies.    Social History: Ms. Kathy Mishra is single.  The father of the baby is involved.     FETAL ECHOCARDIOGRAM (summary):    Technically difficult study due to fetal lie and maternal body habitus. The fetal echocardiogram demonstrated a grossly structurally normal fetal heart. There is a normal fetal foramen ovale with right to left flow. There is no significant atrioventricular valve insufficiency. Good cardiac contractility. The ductus arteriosus was visualized with right to left flow. The aortic arch appeared normal in size without obstruction. No pericardial effusion.       Impression:  Single active male fetus at 20 wga.  Grossly normal fetal echocardiogram.      Todays fetal echocardiogram is normal, within the limitations of fetal echocardiography.  I discussed with her that fetal echocardiography is insufficiently sensitive to rule out all septal defects, anomalies of pulmonary and systemic veins, arch anomalies, and some valvar abnormalities, nor can it ensure that the ductus arteriosus and foramen ovale will spontaneously close.       Recommendations:  Location, timing, and mode of delivery will be determined by the obstetrical team.  She does not require further follow-up in the fetal echocardiography clinic, but I would be happy to see her again if additional questions or concerns arise.    Should there be any concerns about the baby's heart after birth, a " post-jh echocardiogram and cardiology consultation are recommended.       The above information was discussed in detail including the use of diagrams, with 35 minutes of total face to face time, with greater than 50% with counseling and coordination of care.  The discussion of the diagnosis and treatment options is as described above.        Johnny Box MD  Pediatric Cardiology  [address] [facility]  [place] [address]  Office: [phone]  Cell: [phone]

## 2024-04-19 ENCOUNTER — PATIENT MESSAGE (OUTPATIENT)
Dept: OBSTETRICS AND GYNECOLOGY | Facility: CLINIC | Age: 29
End: 2024-04-19
Payer: MEDICAID

## 2024-04-22 ENCOUNTER — PATIENT MESSAGE (OUTPATIENT)
Dept: OBSTETRICS AND GYNECOLOGY | Facility: CLINIC | Age: 29
End: 2024-04-22
Payer: MEDICAID

## 2024-04-22 ENCOUNTER — PATIENT MESSAGE (OUTPATIENT)
Dept: MATERNAL FETAL MEDICINE | Facility: CLINIC | Age: 29
End: 2024-04-22

## 2024-04-22 ENCOUNTER — OFFICE VISIT (OUTPATIENT)
Dept: MATERNAL FETAL MEDICINE | Facility: CLINIC | Age: 29
End: 2024-04-22
Attending: OBSTETRICS & GYNECOLOGY
Payer: MEDICAID

## 2024-04-22 DIAGNOSIS — O24.414 INSULIN CONTROLLED GESTATIONAL DIABETES MELLITUS (GDM) IN SECOND TRIMESTER: ICD-10-CM

## 2024-04-22 DIAGNOSIS — O10.919 CHRONIC HYPERTENSION AFFECTING PREGNANCY: Primary | ICD-10-CM

## 2024-04-22 PROCEDURE — 1160F RVW MEDS BY RX/DR IN RCRD: CPT | Mod: CPTII,95,, | Performed by: OBSTETRICS & GYNECOLOGY

## 2024-04-22 PROCEDURE — 3044F HG A1C LEVEL LT 7.0%: CPT | Mod: CPTII,95,, | Performed by: OBSTETRICS & GYNECOLOGY

## 2024-04-22 PROCEDURE — 1159F MED LIST DOCD IN RCRD: CPT | Mod: CPTII,95,, | Performed by: OBSTETRICS & GYNECOLOGY

## 2024-04-22 PROCEDURE — 99214 OFFICE O/P EST MOD 30 MIN: CPT | Mod: TH,95,, | Performed by: OBSTETRICS & GYNECOLOGY

## 2024-04-22 RX ORDER — INSULIN DETEMIR 100 [IU]/ML
40 INJECTION, SOLUTION SUBCUTANEOUS NIGHTLY
Qty: 3 EACH | Refills: 10 | Status: SHIPPED | OUTPATIENT
Start: 2024-04-22 | End: 2024-05-14 | Stop reason: ALTCHOICE

## 2024-04-22 NOTE — PROGRESS NOTES
The patient location is: [facility]  The chief complaint leading to consultation is: Diabetes complicating pregnancy    Visit type: audiovisual    Face to Face time with patient: 20 min  30 minutes of total time spent on the encounter, which includes face to face time and non-face to face time preparing to see the patient (eg, review of tests), Obtaining and/or reviewing separately obtained history, Documenting clinical information in the electronic or other health record, Independently interpreting results (not separately reported) and communicating results to the patient/family/caregiver, or Care coordination (not separately reported).         Each patient to whom he or she provides medical services by telemedicine is:  (1) informed of the relationship between the physician and patient and the respective role of any other health care provider with respect to management of the patient; and (2) notified that he or she may decline to receive medical services by telemedicine and may withdraw from such care at any time.    Notes:      Maternal Fetal Medicine follow up consult    SUBJECTIVE:     Kathy Mishra is a 28 y.o.  female with IUP at 20w6d who is seen in follow up consultation by M.  Pregnancy complications include:   No problems updated.    Previous notes reviewed.   No changes to medical, surgical, family, social, or obstetric history.    Interval history since last MFM visit:  She has been experiencing epistaxis.  No other complaints.  Her fasting blood sugars remain elevated.    Medications reviewed.    Care team members:  Dr. Sal - Primary OB       OBJECTIVE:   LMP 2023     ASSESSMENT/PLAN:     28 y.o.  female with IUP at 20w6d    Follow up consultation regarding diabetes in pregnancy provided today.  Risks of uncontrolled diabetes in pregnancy reviewed once again.   Blood glucose measurements assessed. Her fasting blood sugars remain elevated.        I did adjust her  medications today.  Her p.m. Levemir was increased to 40 units.    The patient also complains a nosebleed that is minimal but virtually continuous.  She does use CPAP and was told to humidify her CPAP.  I explained the mucosal engorgement that accompanies pregnancy in exposure of the vessels because of this.  I told her to use a saline nose spray with lubricant before the CPAP this during the day.  For 1 or 2 doses I also told her to use Afrin to constrict the vessels.  I will see her back in 1 week by TeleMed for follow-up.    The patient was given an opportunity to ask questions about management and the diease process.  She expressed an understanding of and agreement to the above impression and plan. All questions were answered to her satisfaction.  She was given contact information to the [facility] to address further concerns.

## 2024-04-30 ENCOUNTER — ROUTINE PRENATAL (OUTPATIENT)
Dept: OBSTETRICS AND GYNECOLOGY | Facility: CLINIC | Age: 29
End: 2024-04-30
Payer: MEDICAID

## 2024-04-30 VITALS
BODY MASS INDEX: 48.02 KG/M2 | WEIGHT: 271.06 LBS | DIASTOLIC BLOOD PRESSURE: 76 MMHG | SYSTOLIC BLOOD PRESSURE: 136 MMHG

## 2024-04-30 DIAGNOSIS — O10.919 CHRONIC HYPERTENSION AFFECTING PREGNANCY: ICD-10-CM

## 2024-04-30 DIAGNOSIS — G47.33 OSA (OBSTRUCTIVE SLEEP APNEA): ICD-10-CM

## 2024-04-30 DIAGNOSIS — O24.414 INSULIN CONTROLLED GESTATIONAL DIABETES MELLITUS (GDM) IN SECOND TRIMESTER: ICD-10-CM

## 2024-04-30 DIAGNOSIS — E66.01 CLASS 3 SEVERE OBESITY DUE TO EXCESS CALORIES WITH SERIOUS COMORBIDITY AND BODY MASS INDEX (BMI) OF 45.0 TO 49.9 IN ADULT: ICD-10-CM

## 2024-04-30 DIAGNOSIS — O34.219 HISTORY OF CESAREAN DELIVERY, CURRENTLY PREGNANT: Primary | ICD-10-CM

## 2024-04-30 PROCEDURE — 99999 PR PBB SHADOW E&M-EST. PATIENT-LVL III: CPT | Mod: PBBFAC,,, | Performed by: ADVANCED PRACTICE MIDWIFE

## 2024-04-30 PROCEDURE — 99213 OFFICE O/P EST LOW 20 MIN: CPT | Mod: PBBFAC,TH,PN | Performed by: ADVANCED PRACTICE MIDWIFE

## 2024-04-30 PROCEDURE — 99213 OFFICE O/P EST LOW 20 MIN: CPT | Mod: TH,S$PBB,, | Performed by: ADVANCED PRACTICE MIDWIFE

## 2024-04-30 NOTE — PROGRESS NOTES
28 y.o. female  at 22w0d   Reports + FM, denies VB, LOF, or cramping  Doing well without concerns   TW lbs       Reviewed upcoming 28wk labs, (A POS) and orders placed,     History of  delivery, currently pregnant  -     CBC Auto Differential; Future; Expected date: 2024  -     HIV 1/2 Ag/Ab (4th Gen); Future; Expected date: 2024  -     Treponema Pallidium Antibodies IgG, IgM; Future; Expected date: 2024  FOR REPEAT     Chronic hypertension affecting pregnancy  Taking labetalol 200 mg twice a day.    States blood pressure is 130s over 70s at home which compares with a finding of 136/76 today.    Continue with daily baby aspirin    Insulin controlled gestational diabetes mellitus (GDM) in second trimester  -     CBC Auto Differential; Future; Expected date: 2024  -     HIV 1/2 Ag/Ab (4th Gen); Future; Expected date: 2024  -     Treponema Pallidium Antibodies IgG, IgM; Future; Expected date: 2024  Reviewed by MFM who increased p.m. Levemir to 40 units and FBS results do seem to be improving.  Will be reviewed again by MFM on Friday    Class 3 severe obesity due to excess calories with serious comorbidity and body mass index (BMI) of 45.0 to 49.9 in adult  Continue with diabetic diet    BERNARD (obstructive sleep apnea)  Continue with see Pap and consider humidifier    History of hypothyroidism with recent TSH 5.033 and free T4 0.61.  Advised to discuss with MFM this Friday    Reviewed warning signs, normal FM,  labor precautions and how/when to call. Patient states understanding.  RTC x 4 wks, call or present sooner prn.

## 2024-05-03 ENCOUNTER — OFFICE VISIT (OUTPATIENT)
Dept: MATERNAL FETAL MEDICINE | Facility: CLINIC | Age: 29
End: 2024-05-03
Payer: MEDICAID

## 2024-05-03 ENCOUNTER — PATIENT MESSAGE (OUTPATIENT)
Dept: MATERNAL FETAL MEDICINE | Facility: CLINIC | Age: 29
End: 2024-05-03

## 2024-05-03 DIAGNOSIS — O10.919 CHRONIC HYPERTENSION AFFECTING PREGNANCY: ICD-10-CM

## 2024-05-03 DIAGNOSIS — O24.414 INSULIN CONTROLLED GESTATIONAL DIABETES MELLITUS (GDM) IN SECOND TRIMESTER: ICD-10-CM

## 2024-05-03 DIAGNOSIS — O24.112 PRE-EXISTING TYPE 2 DIABETES MELLITUS DURING PREGNANCY IN SECOND TRIMESTER: Primary | ICD-10-CM

## 2024-05-03 DIAGNOSIS — E03.9 HYPOTHYROIDISM, UNSPECIFIED TYPE: ICD-10-CM

## 2024-05-03 PROCEDURE — 3044F HG A1C LEVEL LT 7.0%: CPT | Mod: CPTII,95,, | Performed by: OBSTETRICS & GYNECOLOGY

## 2024-05-03 PROCEDURE — 99214 OFFICE O/P EST MOD 30 MIN: CPT | Mod: TH,95,, | Performed by: OBSTETRICS & GYNECOLOGY

## 2024-05-03 RX ORDER — INSULIN ASPART 100 [IU]/ML
4 INJECTION, SOLUTION INTRAVENOUS; SUBCUTANEOUS
Qty: 3 ML | Refills: 3 | Status: SHIPPED | OUTPATIENT
Start: 2024-05-03 | End: 2024-05-14 | Stop reason: DRUGHIGH

## 2024-05-03 NOTE — ASSESSMENT & PLAN NOTE
Please see original consult for full counseling and recommendations   Patient is currently on Labetalol 200mg BID and tolerating well.  Encouraged patient to attempt to use Connected MOM cuff more often in order to obtain more data points.   PreE precautions were reviewed.     Xray Chest 1 View- PORTABLE-Urgent

## 2024-05-03 NOTE — ASSESSMENT & PLAN NOTE
History of GDM with abnormal early GTT. HbA1c 5.7% immediately prior to pregnancy while on Metformin.  Pregnancy management per T2DM guidelines.  Please see original consult for full counseling and recommendations     Age of Dx:   Comorbidities: BMI  Prepregnancy regimen:           N/A  Baseline HgA1c: 5.7%  Last A1c:   Lab Results   Component Value Date    HGBA1C 5.4 2024     Current regimen (as of 2024):   Levemir 40 u Qhs  FETAL ECHO - WNL - Charu  MATERNAL ECHO - N/A    Blood sugar review:  - Sugars per above. Control subooptimal for fastings and dinner. BF and lunch within goal.   We discussed the MOA for levemir and the difficulty in being able to target fastings. As such, will plan to switch to NPH. We discussed risk of hypoglycemia and patient would like to slowly uptitrate. Also discussed the need for short acting at dinner    Recommendations:  Given sugars values above we will adjust her regimen:  NPH 40u Qha (patient will start at 30 and uptitrate based on her fasting the next morning)  Aspart 4u at dinner  Will add additional doses depending on BS log  Continue to check BS 4x daily  Send BS log weekly through portal.   Hypoglycemia precautions were once again reviewed.  F/U check in 2 weeks for virtual (or sooner if available)  Twice weekly BPP/ NST + AFV starting at 32 weeks  Growth in 4 weeks from prior   Please see prior note for baseline labs/workup recommended    Delivery timin 0/7 to 37 and 67 weeks if longstanding diabetes or poorly controlled, polyhydramnios, EFW>90th percentile, or BMI >= 40  36 0/7 to 37 and 6/7 weeks if vascular complications, prior stillbirth or other complicating conditions.  Recommend consideration of earlier delivery if IUGR, HTN, or other complications  Recommend offering  for delivery is EFW is 4500g or more near the time of delivery

## 2024-05-03 NOTE — PROGRESS NOTES
Maternal Fetal Medicine follow up consult    The patient location is: Home  The chief complaint leading to consultation is: BS check    Visit type: audiovisual    Face to Face time with patient: 24m 35s    Each patient to whom he or she provides medical services by telemedicine is:  (1) informed of the relationship between the physician and patient and the respective role of any other health care provider with respect to management of the patient; and (2) notified that he or she may decline to receive medical services by telemedicine and may withdraw from such care at any time.    SUBJECTIVE:     Kathy Mishra is a 28 y.o.  female with IUP at 22w3d who is seen in follow up consultation by [facility].  Pregnancy complications include:   Problem   Insulin controlled gestational diabetes mellitus (GDM) in second trimester - treat as pregestational DM   Hypothyroidism   Chronic Hypertension Affecting Pregnancy     Previous notes reviewed.   No changes to medical, surgical, family, social, or obstetric history.    Interval history since last [facility] visit:   Patient has no complaints today. She is overall feeling well.  Patient denies any contractions/cramping, vaginal bleeding or leakage of fluid.  She reports good fetal movement.    Medications:  Current Outpatient Medications   Medication Instructions    aspirin 81 mg, Oral, Daily    blood sugar diagnostic Strp To check BG 4 times daily, to use with insurance preferred meter    blood-glucose meter kit To check BG 4 times daily, to use with insurance preferred meter    insulin aspart U-100 (NOVOLOG) 4 Units, Subcutaneous, Before dinner    labetaloL (NORMODYNE) 200 mg, Oral, 2 times daily    lancets Misc To check BG 4 times daily, to use with insurance preferred meter    LEVEMIR FLEXPEN 40 Units, Subcutaneous, Nightly    NIFEdipine (PROCARDIA-XL) 30 mg, Oral, Daily    ondansetron (ZOFRAN) 4 mg, Oral, Daily PRN    pen needle, diabetic (LITE TOUCH INSULIN PEN NEEDLES)  "31 gauge x /" Ndle 1 Needle, Misc.(Non-Drug; Combo Route), Daily    prenatal vitamins #45/iron/FA (PRENATAL VITAMIN #45-IRON-FA ORAL) Oral    promethazine (PHENERGAN) 12.5 mg, Oral, 4 times daily     Care team members:  Jonelle - Primary OB     OBJECTIVE:   LMP 2023     Physical Exam:  Deferred - virtual    Significant labs/imaging:  Lab Results   Component Value Date    HGBA1C 5.4 2024         ASSESSMENT/PLAN:     28 y.o.  female with IUP at 22w3d    Insulin controlled gestational diabetes mellitus (GDM) in second trimester - treat as pregestational DM  History of GDM with abnormal early GTT. HbA1c 5.7% immediately prior to pregnancy while on Metformin.  Pregnancy management per T2DM guidelines.  Please see original consult for full counseling and recommendations     Age of Dx:   Comorbidities: BMI  Prepregnancy regimen:           N/A  Baseline HgA1c: 5.7%  Last A1c:   Lab Results   Component Value Date    HGBA1C 5.4 2024     Current regimen (as of 2024):   Levemir 40 u Qhs  FETAL ECHO - WNL - Charu  MATERNAL ECHO - N/A    Blood sugar review:  - Sugars per above. Control subooptimal for fastings and dinner. BF and lunch within goal.   We discussed the MOA for levemir and the difficulty in being able to target fastings. As such, will plan to switch to NPH. We discussed risk of hypoglycemia and patient would like to slowly uptitrate. Also discussed the need for short acting at dinner    Recommendations:  Given sugars values above we will adjust her regimen:  NPH 40u Qha (patient will start at 30 and uptitrate based on her fasting the next morning)  Aspart 4u at dinner  Will add additional doses depending on BS log  Continue to check BS 4x daily  Send BS log weekly through portal.   Hypoglycemia precautions were once again reviewed.  F/U check in 2 weeks for virtual (or sooner if available)  Twice weekly BPP/ NST + AFV starting at 32 weeks  Growth in 4 weeks from prior   Please " see prior note for baseline labs/workup recommended    Delivery timin 0/7 to 37 and 67 weeks if longstanding diabetes or poorly controlled, polyhydramnios, EFW>90th percentile, or BMI >= 40  36 0/7 to 37 and 6/7 weeks if vascular complications, prior stillbirth or other complicating conditions.  Recommend consideration of earlier delivery if IUGR, HTN, or other complications  Recommend offering  for delivery is EFW is 4500g or more near the time of delivery      Chronic hypertension affecting pregnancy  Please see original consult for full counseling and recommendations   Patient is currently on Labetalol 200mg BID and tolerating well.  Encouraged patient to attempt to use Connected MOM cuff more often in order to obtain more data points.   PreE precautions were reviewed.      Hypothyroidism  Management per primary OB provider      Patient's other comorbidites were not addressed in today's visit.  If primary OB provider would like MFM input on these, please feel free to reconsult as clinically indicated.  Otherwise, will defer management to primary OB provider      FOLLOW UP: A follow up MFM MD visit will be made for 2 weeks from today      The patient was given an opportunity to ask questions about the management of her high risk pregnancy problems. She expressed an understanding of and agreement to the above impression and plan. All questions were answered to her satisfaction.      Contreras Shannon MD   Maternal-Fetal Medicine      Electronically Signed by Contreras Shannon May 3, 2024

## 2024-05-06 DIAGNOSIS — O24.414 INSULIN CONTROLLED GESTATIONAL DIABETES MELLITUS (GDM) IN SECOND TRIMESTER: Primary | ICD-10-CM

## 2024-05-06 RX ORDER — PEN NEEDLE, DIABETIC 31 GX5/16"
NEEDLE, DISPOSABLE MISCELLANEOUS
COMMUNITY
Start: 2024-03-08

## 2024-05-08 ENCOUNTER — PATIENT MESSAGE (OUTPATIENT)
Dept: OBSTETRICS AND GYNECOLOGY | Facility: CLINIC | Age: 29
End: 2024-05-08
Payer: MEDICAID

## 2024-05-09 ENCOUNTER — TELEPHONE (OUTPATIENT)
Dept: MATERNAL FETAL MEDICINE | Facility: CLINIC | Age: 29
End: 2024-05-09
Payer: MEDICAID

## 2024-05-09 DIAGNOSIS — E03.9 HYPOTHYROIDISM, UNSPECIFIED TYPE: Primary | ICD-10-CM

## 2024-05-09 RX ORDER — LEVOTHYROXINE SODIUM 100 UG/1
100 TABLET ORAL
Qty: 30 TABLET | Refills: 4 | Status: SHIPPED | OUTPATIENT
Start: 2024-05-09 | End: 2025-05-09

## 2024-05-09 NOTE — TELEPHONE ENCOUNTER
Given patient's recent thyroid labs, likely diagnosis of hypothyroidism.  Will start patient on 100mcg synthroid. Patient was made aware.  Further counseling and recommendations will be performed at [facility] follow up visit next week.      Contreras Shannon MD   Maternal-Fetal Medicine

## 2024-05-10 ENCOUNTER — PATIENT MESSAGE (OUTPATIENT)
Dept: OBSTETRICS AND GYNECOLOGY | Facility: CLINIC | Age: 29
End: 2024-05-10
Payer: MEDICAID

## 2024-05-13 RX ORDER — PROMETHAZINE HYDROCHLORIDE 12.5 MG/1
12.5 TABLET ORAL 4 TIMES DAILY
Qty: 30 TABLET | Refills: 2 | Status: SHIPPED | OUTPATIENT
Start: 2024-05-13

## 2024-05-13 RX ORDER — BLOOD-GLUCOSE METER
EACH MISCELLANEOUS
Qty: 1 EACH | Refills: 0 | Status: SHIPPED | OUTPATIENT
Start: 2024-05-13

## 2024-05-13 RX ORDER — ONDANSETRON 4 MG/1
4 TABLET, FILM COATED ORAL DAILY PRN
Qty: 30 TABLET | Refills: 3 | Status: SHIPPED | OUTPATIENT
Start: 2024-05-13 | End: 2025-05-13

## 2024-05-13 NOTE — ASSESSMENT & PLAN NOTE
Baseline TSH (11/30/2023): 9.330  Last TSH (4/16/24): 5.033  Prepregnancy regimen:  - No meds  Current regimen (05/13/2024):   - Levothyroxine 100 mcg daily     New diagnosis, just started on levothyroxine per Dr. Shannon.    Thyroid requirements increase in pregnancy due to increases in metabolism and thyroid binding globulin. Thyroid function tests should be followed closely to ensure adequate treatment.  Hypothyroidism has been associated with higher pregnancy complication rates including miscarriage, preeclampsia, abruption, low birth weight and stillbirth. Untreated hypothyroidism has also been associated with adverse fetal neurological development, but well treated hypothyroidism (i.e., euthyroid state) carries an excellent prognosis for mother and baby.      Recommendations:  1. Medication management  - Continue current management for now  2. TSH should be monitored at least every trimester, or every 4 weeks after any medication adjustment - due 5/16 (primary team to coordinate)  - As other thyroid markers, such as T3 or free T4, do not inform titration of therapy, there is no need to draw them during pregnancy  - Adjust treatment as necessary to maintain TSH goal < 2.5 mIU/L

## 2024-05-13 NOTE — ASSESSMENT & PLAN NOTE
Pre-pregnancy regimen:  - No meds  Current regimen:  - Labetalol 200 mg BID  Baseline preE labs:  - Cr 0.6  - AST/ALT 16/21  - PC ratio 0.31    BP on target today with current regimen. Connected MOM values also on target.    Recommendations:  1. Continue current regimen   - Targets for therapy should be 140/90; titrate dosing to maintain blood pressures under those levels.  - Connected MOM values should be considered with modifying dosing  2. Fetal growth, prenatal testing and delivery timing per DM header

## 2024-05-13 NOTE — ASSESSMENT & PLAN NOTE
Age of Dx: 28 (White's class B)  Comorbidities: CHTN, BMI > 40  Prepregnancy regimen:  - Metformin  Baseline HgA1c: 5.7%  Last A1c (4/16/24): 5.4%   EKG (4/16/24): NSR  Fetal echo (4/17/24): wnl    Current regimen:   - NPH: 40 u QHS  - Aspart: 4 u dinner    See prior counseling with Teresa Prince & Shiva  Switched at last visit from Levemir to NPH + aspart with dinner due to persistent high fastings and elevated dinner values with lower-normal breakfast and lunch values. She does not have a log today but reports persistent fastings in the 120s to 140s with postprandial values hovering from 130 to 150. She feels quite frustrated as the Levemir had her much better controlled, though we discussed Dr. Shannon's rationale for transitioning given the need to target fastings without dropping post meals. She just needs more insulin. I also discussed that advancing gestational age is going to drive hyperglycemia going forward, and that this is a marker for a healthy placenta. We discussed CGM monitoring, which is reasonable in the context of pregestational insulin dependent diabetes.    Recommendations:  1. New regimen:  - NPH 50 u PM  - Aspart 4 u breakfast/ 4 u lunch/ 8 u dinner  2. Dexcom CGM ordered today  3. Continue 4 x daily blood sugar checks  4. Send in blood sugars in 1 week via [facility] [employer], virtual BS check with MFM at that time  5. HgA1c each trimester, next due in July (primary team to coordinate)  6. Initiate serial monthly growth assessments, next due at 28 weeks, follow with MFM at that time  7. Initiate twice weekly antepartum fetal surveillance at 32 weeks  8. Delivery timing for preexisting diabetes with BMI > 40 and CHTN is 36w0d - 37w6d

## 2024-05-14 ENCOUNTER — PROCEDURE VISIT (OUTPATIENT)
Dept: OBSTETRICS AND GYNECOLOGY | Facility: CLINIC | Age: 29
End: 2024-05-14
Payer: MEDICAID

## 2024-05-14 ENCOUNTER — TELEPHONE (OUTPATIENT)
Dept: OBSTETRICS AND GYNECOLOGY | Facility: CLINIC | Age: 29
End: 2024-05-14

## 2024-05-14 ENCOUNTER — PATIENT MESSAGE (OUTPATIENT)
Dept: MATERNAL FETAL MEDICINE | Facility: CLINIC | Age: 29
End: 2024-05-14
Payer: MEDICAID

## 2024-05-14 ENCOUNTER — PATIENT MESSAGE (OUTPATIENT)
Dept: OTHER | Facility: OTHER | Age: 29
End: 2024-05-14
Payer: MEDICAID

## 2024-05-14 ENCOUNTER — TELEPHONE (OUTPATIENT)
Dept: MATERNAL FETAL MEDICINE | Facility: CLINIC | Age: 29
End: 2024-05-14
Payer: MEDICAID

## 2024-05-14 VITALS
WEIGHT: 271.38 LBS | BODY MASS INDEX: 48.09 KG/M2 | DIASTOLIC BLOOD PRESSURE: 64 MMHG | SYSTOLIC BLOOD PRESSURE: 138 MMHG | HEIGHT: 63 IN

## 2024-05-14 DIAGNOSIS — E66.01 CLASS 3 SEVERE OBESITY DUE TO EXCESS CALORIES WITH SERIOUS COMORBIDITY AND BODY MASS INDEX (BMI) OF 45.0 TO 49.9 IN ADULT: ICD-10-CM

## 2024-05-14 DIAGNOSIS — O24.112 PREGNANCY WITH TYPE 2 DIABETES MELLITUS IN SECOND TRIMESTER: Primary | ICD-10-CM

## 2024-05-14 DIAGNOSIS — O24.414 INSULIN CONTROLLED GESTATIONAL DIABETES MELLITUS (GDM) IN SECOND TRIMESTER: ICD-10-CM

## 2024-05-14 DIAGNOSIS — O34.219 HISTORY OF CESAREAN DELIVERY, CURRENTLY PREGNANT: ICD-10-CM

## 2024-05-14 DIAGNOSIS — O10.919 CHRONIC HYPERTENSION AFFECTING PREGNANCY: ICD-10-CM

## 2024-05-14 DIAGNOSIS — O24.415 GESTATIONAL DIABETES MELLITUS (GDM) IN SECOND TRIMESTER CONTROLLED ON ORAL HYPOGLYCEMIC DRUG: ICD-10-CM

## 2024-05-14 DIAGNOSIS — Z36.89 ENCOUNTER FOR ULTRASOUND TO ASSESS FETAL GROWTH: Primary | ICD-10-CM

## 2024-05-14 DIAGNOSIS — E03.9 ACQUIRED HYPOTHYROIDISM: ICD-10-CM

## 2024-05-14 PROCEDURE — 76816 OB US FOLLOW-UP PER FETUS: CPT | Mod: PBBFAC,PO | Performed by: OBSTETRICS & GYNECOLOGY

## 2024-05-14 PROCEDURE — 99215 OFFICE O/P EST HI 40 MIN: CPT | Mod: S$PBB,TH,, | Performed by: OBSTETRICS & GYNECOLOGY

## 2024-05-14 RX ORDER — INSULIN ASPART 100 [IU]/ML
4 INJECTION, SOLUTION INTRAVENOUS; SUBCUTANEOUS
COMMUNITY
End: 2024-05-14 | Stop reason: SDUPTHER

## 2024-05-14 RX ORDER — BLOOD-GLUCOSE SENSOR
1 EACH MISCELLANEOUS
Qty: 4 EACH | Refills: 5 | Status: SHIPPED | OUTPATIENT
Start: 2024-05-14 | End: 2024-11-10

## 2024-05-14 RX ORDER — INSULIN ASPART 100 [IU]/ML
INJECTION, SOLUTION INTRAVENOUS; SUBCUTANEOUS
Qty: 4.8 ML | Refills: 5 | Status: SHIPPED | OUTPATIENT
Start: 2024-05-14 | End: 2024-05-23 | Stop reason: SDUPTHER

## 2024-05-14 NOTE — TELEPHONE ENCOUNTER
Call to patient about her appointment. Explained there was a My Chart message for her to review and to be sure to send in her blood sugar logs prior to the appointment.

## 2024-05-14 NOTE — TELEPHONE ENCOUNTER
Fu directive per Dr. Mariscal: 1 wk fu for virtual blood sugar review, [facility].

## 2024-05-14 NOTE — PROGRESS NOTES
"Maternal Fetal Medicine  Follow Up Consult    SUBJECTIVE:     Diagnosis: DM2 + CHTN    Kathy Mishra is a 28 y.o.  female with IUP at 24w0d who is seen in follow up consultation by M for diabetes management.    Pregnancy complications include:   Problem   Insulin controlled gestational diabetes mellitus (GDM) in second trimester - treat as pregestational DM   Hypothyroidism   Chronic Hypertension Affecting Pregnancy       Previous notes reviewed.   No changes to medical, surgical, family, social, or obstetric history.    Interval history since last M visit: Soaring BS since transition to NPH from levemir, pt frustrated. Started levothyroxine, will be due soon for repeat TSH.     Medications:  Current Outpatient Medications   Medication Instructions    aspirin 81 mg, Oral, Daily    blood sugar diagnostic Strp To check BG 4 times daily, to use with insurance preferred meter    EASY TOUCH 31 gauge x 5/16" Ndle USE DAILY    insulin aspart U-100 (NOVOLOG) 4 Units, Subcutaneous, Before dinner    insulin NPH 40 Units, Subcutaneous, Nightly    labetaloL (NORMODYNE) 200 mg, Oral, 2 times daily    lancets Misc To check BG 4 times daily, to use with insurance preferred meter    levothyroxine (SYNTHROID) 100 mcg, Oral, Before breakfast    ondansetron (ZOFRAN) 4 mg, Oral, Daily PRN    pen needle, diabetic (LITE TOUCH INSULIN PEN NEEDLES) 31 gauge x 1/4" Ndle 1 Needle, Misc.(Non-Drug; Combo Route), Daily    prenatal vitamins #45/iron/FA (PRENATAL VITAMIN #45-IRON-FA ORAL) Oral    promethazine (PHENERGAN) 12.5 mg, Oral, 4 times daily    TRUE METRIX GLUCOSE METER Misc To check BG 4 times daily       Care team members:  Dr. Sal - Primary OB     OBJECTIVE:   /64   Ht 5' 3" (1.6 m)   Wt 123.1 kg (271 lb 6.2 oz)   LMP 2023   BMI 48.07 kg/m²     Ultrasound performed. See viewpoint for full ultrasound report.  1. Fetal anatomy was again surveyed today. While no fetal structural anomalies are " Recommended weight and BMI control through healthy diet and exercise, green leafy veggies, UV protection, and not smoking. Reviewed the benefits of AREDS VITAMINS VERUS GENOTYPE directed vitamin therapy, and recommended following one or the other to try and prevent the progression of the disease. I advised if patient smokes or has ever smoked to avoid high doses of vitamin A (beta carotene) due to increased risk of lung cancer. Reviewed the importance of daily monitoring of the vision in each eye independently, along with the use of the Amsler grid daily and instructed patient to call and return immediately for any new changes in their vision or on the Amsler grid. Patient instructed on the importance of regular follow up and monitoring for the early detection of conversion to wet AMD as early detection results in early treatment and better outcomes. identified on sufficiently visualized structures, cardiac and neck views remain suboptimal. No findings raise concern for underlying anomaly that would modify mode or location of delivery. She has had a normal fetal echocardiogram that was likewise limited by habitus.  US is incompletely sensitive to detect all congenital anomalies.  2. Fetal size is AGA with the EFW at the 82%.   3. AFV is normal.   4. Placenta site is posterior without evidence of previa.     Home blood glucose log:  No log today    Pertinent labs/imaging:  Lab Results   Component Value Date    HGBA1C 5.4 2024     Lab Results   Component Value Date    CREATININE 0.6 01/10/2024    CREATININE 0.6 01/10/2024     Lab Results   Component Value Date    WBC 8.36 01/10/2024    HGB 14.3 01/10/2024    HCT 42.4 01/10/2024    MCV 87 01/10/2024     01/10/2024       Lab Results   Component Value Date    ALT 21 01/10/2024    AST 16 01/10/2024    ALKPHOS 104 01/10/2024    BILITOT 0.3 01/10/2024     Prot/Creat Ratio, Urine   Date Value Ref Range Status   01/10/2024 0.31 (H) 0.00 - 0.20 Final       ASSESSMENT/PLAN:     28 y.o.  female with IUP at 24w0d for blood glucose monitoring    Counseling:  I reviewed diet, exercise, blood sugar monitoring, and compliance with regimen with patient today.    Insulin controlled gestational diabetes mellitus (GDM) in second trimester - treat as pregestational DM  Age of Dx: 28 (White's class B)  Comorbidities: CHTN, BMI > 40  Prepregnancy regimen:  - Metformin  Baseline HgA1c: 5.7%  Last A1c (24): 5.4%   EKG (24): NSR  Fetal echo (24): wnl    Current regimen:   - NPH: 40 u QHS  - Aspart: 4 u dinner    See prior counseling with Teresa Prince & [facility]  Switched at last visit from Levemir to NPH + aspart with dinner due to persistent high fastings and elevated dinner values with lower-normal breakfast and lunch values. She does not have a log today but reports persistent fastings in the 120s  to 140s with postprandial values hovering from 130 to 150. She feels quite frustrated as the Levemir had her much better controlled, though we discussed Dr. Shannon's rationale for transitioning given the need to target fastings without dropping post meals. She just needs more insulin. I also discussed that advancing gestational age is going to drive hyperglycemia going forward, and that this is a marker for a healthy placenta. We discussed CGM monitoring, which is reasonable in the context of pregestational insulin dependent diabetes.    Recommendations:  1. New regimen:  - NPH 50 u PM  - Aspart 4 u breakfast/ 4 u lunch/ 8 u dinner  2. Dexcom CGM ordered today  3. Continue 4 x daily blood sugar checks  4. Send in blood sugars in 1 week via [employer], virtual BS check with MFM at that time  5. HgA1c each trimester, next due in July (primary team to coordinate)  6. Initiate serial monthly growth assessments, next due at 28 weeks, follow with MFM at that time  7. Initiate twice weekly antepartum fetal surveillance at 32 weeks  8. Delivery timing for preexisting diabetes with BMI > 40 and CHTN is 36w0d - 37w6d    Chronic hypertension affecting pregnancy  Pre-pregnancy regimen:  - No meds  Current regimen:  - Labetalol 200 mg BID  Baseline preE labs:  - Cr 0.6  - AST/ALT 16/21  - PC ratio 0.31    BP on target today with current regimen. Connected MOM values also on target.    Recommendations:  1. Continue current regimen   - Targets for therapy should be 140/90; titrate dosing to maintain blood pressures under those levels.  - Connected MOM values should be considered with modifying dosing  2. Fetal growth, prenatal testing and delivery timing per DM header    Hypothyroidism  Baseline TSH (11/30/2023): 9.330  Last TSH (4/16/24): 5.033  Prepregnancy regimen:  - No meds  Current regimen (05/13/2024):   - Levothyroxine 100 mcg daily     New diagnosis, just started on levothyroxine per Dr. Shannon.    Thyroid requirements  increase in pregnancy due to increases in metabolism and thyroid binding globulin. Thyroid function tests should be followed closely to ensure adequate treatment.  Hypothyroidism has been associated with higher pregnancy complication rates including miscarriage, preeclampsia, abruption, low birth weight and stillbirth. Untreated hypothyroidism has also been associated with adverse fetal neurological development, but well treated hypothyroidism (i.e., euthyroid state) carries an excellent prognosis for mother and baby.      Recommendations:  1. Medication management  - Continue current management for now  2. TSH should be monitored at least every trimester, or every 4 weeks after any medication adjustment - due 5/16 (primary team to coordinate)  - As other thyroid markers, such as T3 or free T4, do not inform titration of therapy, there is no need to draw them during pregnancy  - Adjust treatment as necessary to maintain TSH goal < 2.5 mIU/L    Follow in 1 weeks for [facility] visit  Follow in 4 weeks for US    Today I spent 42 minutes in the care of Ms. Mishra. This includes face to face time and non-face to face time preparing to see the patient (eg, review of tests), obtaining and/or reviewing separately obtained history, documenting clinical information in the electronic or other health record, independently interpreting results and communicating results to the patient/family/caregiver, or care coordination.       RAJEEV Mariscal MD/MPH  Maternal Fetal Medicine

## 2024-05-15 ENCOUNTER — PATIENT MESSAGE (OUTPATIENT)
Dept: OBSTETRICS AND GYNECOLOGY | Facility: CLINIC | Age: 29
End: 2024-05-15
Payer: MEDICAID

## 2024-05-15 ENCOUNTER — PATIENT MESSAGE (OUTPATIENT)
Dept: MATERNAL FETAL MEDICINE | Facility: CLINIC | Age: 29
End: 2024-05-15
Payer: MEDICAID

## 2024-05-16 DIAGNOSIS — O24.414 INSULIN CONTROLLED GESTATIONAL DIABETES MELLITUS (GDM) IN SECOND TRIMESTER: ICD-10-CM

## 2024-05-16 DIAGNOSIS — O24.112 PRE-EXISTING TYPE 2 DIABETES MELLITUS DURING PREGNANCY IN SECOND TRIMESTER: Primary | ICD-10-CM

## 2024-05-17 RX ORDER — SYRINGE,SAFETY WITH NEEDLE,1ML 25GX1"
100 SYRINGE (EA) MISCELLANEOUS 4 TIMES DAILY
Qty: 100 EACH | Refills: 6 | Status: SHIPPED | OUTPATIENT
Start: 2024-05-17

## 2024-05-21 ENCOUNTER — PATIENT MESSAGE (OUTPATIENT)
Dept: OBSTETRICS AND GYNECOLOGY | Facility: CLINIC | Age: 29
End: 2024-05-21
Payer: MEDICAID

## 2024-05-23 ENCOUNTER — PATIENT MESSAGE (OUTPATIENT)
Dept: MATERNAL FETAL MEDICINE | Facility: CLINIC | Age: 29
End: 2024-05-23

## 2024-05-23 ENCOUNTER — OFFICE VISIT (OUTPATIENT)
Dept: MATERNAL FETAL MEDICINE | Facility: CLINIC | Age: 29
End: 2024-05-23
Payer: MEDICAID

## 2024-05-23 DIAGNOSIS — O24.112 PREGNANCY WITH TYPE 2 DIABETES MELLITUS IN SECOND TRIMESTER: ICD-10-CM

## 2024-05-23 PROCEDURE — 3044F HG A1C LEVEL LT 7.0%: CPT | Mod: CPTII,95,, | Performed by: OBSTETRICS & GYNECOLOGY

## 2024-05-23 PROCEDURE — 99214 OFFICE O/P EST MOD 30 MIN: CPT | Mod: TH,95,, | Performed by: OBSTETRICS & GYNECOLOGY

## 2024-05-23 RX ORDER — INSULIN ASPART 100 [IU]/ML
INJECTION, SOLUTION INTRAVENOUS; SUBCUTANEOUS
Qty: 6.6 ML | Refills: 5 | Status: SHIPPED | OUTPATIENT
Start: 2024-05-23 | End: 2024-06-17

## 2024-05-23 NOTE — PROGRESS NOTES
The patient location is: Home  The chief complaint leading to consultation is: BG Check     Visit type: audiovisual    Face to Face time with patient: 15 minutes   30 minutes of total time spent on the encounter, which includes face to face time and non-face to face time preparing to see the patient (eg, review of tests), Obtaining and/or reviewing separately obtained history, Documenting clinical information in the electronic or other health record, Independently interpreting results (not separately reported) and communicating results to the patient/family/caregiver, or Care coordination (not separately reported).     Each patient to whom he or she provides medical services by telemedicine is:  (1) informed of the relationship between the physician and patient and the respective role of any other health care provider with respect to management of the patient; and (2) notified that he or she may decline to receive medical services by telemedicine and may withdraw from such care at any time.    Notes:     Maternal Fetal Medicine follow up consult  Kathy Mishra is a 28 y.o.  female with IUP at 25w2d who is seen in follow up consultation by M.  Problems addressed today:  diabetes in pregnancy - BG check only    Interval history since last MFM visit: Feeling well overall. Does report some right-sided tightness/pain that is positional, improves with rest. Pain is never constant. Denies HA, vision changes, CP, SOB. Denies contractions/cramping, vaginal bleeding, or loss of fluid. +FM.     Current regimen: NPH 50u QHS, Aspart     BGs reviewed, see Media tab:  All fasting BG remain elevated, primarily 110s-120s (108-134)  Greater than 50% of postprandial elevated with all meals.       Recommendations:  See original MF note for full consultation and management recommendations regarding diabetes in pregnancy  Changes made today to insulin regimen: NPH 60u QHS, aspart 6/6/10  Next MFM appointment will be  scheduled: In 1-2 weeks for BG check. In 3 weeks for BG check/fetal growth assessment.     Counseling:  We again discussed the importance of achieving blood sugar goals of fasting <95 and 2 hour postprandial <120 in order to decrease the risk of adverse pregnancy outcomes.  Continue to evaluate BGs 4x/day  The patient was advised to call for blood sugars less than 70 or greater than 200 prior to her next appointment. She was also advised that if she notes nausea/vomiting, inability to tolerate PO, or concerns about being able to take medications that she should contact her provider or present to the OB ED.    Surveillance and delivery timing:  Growth scans every 4 weeks. An ultrasound for estimated fetal weight should be obtained within 3 weeks of anticipated delivery; if the EFW is >= 4500 grams, a  should be offered.    Pre-gestational diabetes:  Twice weekly  fetal surveillance is recommended at 32 weeks gestation (BPP alternating with NST)  Delivery timin 0/7 to 38 and 6/7 weeks if under good control without comorbidities  37 0/7 to 37 and 67 weeks if longstanding diabetes or poorly controlled, polyhydramnios, EFW>90th percentile, or BMI >= 40  36 0/7 to 37 and 6/7 weeks if vascular complications, prior stillbirth or other complicating conditions.  Recommend consideration of earlier delivery if IUGR, HTN, or other complications    See previous recommendations pertaining to intrapartum and postpartum insulin and glucose management guidelines.     Chronic hypertension affecting pregnancy  Pre-pregnancy regimen:  - No meds  Current regimen:  - Labetalol 200 mg BID  Baseline preE labs:  - Cr 0.6  - AST/ALT 16/21  - PC ratio 0.31     BP on target today with current regimen. Connected MOM values also on target.     Recommendations:  1. Continue current regimen   - Targets for therapy should be 140/90; titrate dosing to maintain blood pressures under those levels.  - Connected MOM values should  be considered with modifying dosing  2. Fetal growth, prenatal testing and delivery timing per DM header  3. Symptoms reviewed, low suspicion they are related to BP/preE. KEYONA precautions given.       Stella Orta MD  OBGYN, PGY-3

## 2024-05-28 ENCOUNTER — ROUTINE PRENATAL (OUTPATIENT)
Dept: OBSTETRICS AND GYNECOLOGY | Facility: CLINIC | Age: 29
End: 2024-05-28
Payer: MEDICAID

## 2024-05-28 ENCOUNTER — LAB VISIT (OUTPATIENT)
Dept: LAB | Facility: HOSPITAL | Age: 29
End: 2024-05-28
Attending: ADVANCED PRACTICE MIDWIFE
Payer: MEDICAID

## 2024-05-28 ENCOUNTER — PATIENT MESSAGE (OUTPATIENT)
Dept: OTHER | Facility: OTHER | Age: 29
End: 2024-05-28
Payer: MEDICAID

## 2024-05-28 VITALS
WEIGHT: 275.81 LBS | BODY MASS INDEX: 48.86 KG/M2 | SYSTOLIC BLOOD PRESSURE: 128 MMHG | DIASTOLIC BLOOD PRESSURE: 70 MMHG

## 2024-05-28 DIAGNOSIS — Z86.32 HISTORY OF GESTATIONAL DIABETES IN PRIOR PREGNANCY, CURRENTLY PREGNANT: ICD-10-CM

## 2024-05-28 DIAGNOSIS — O10.919 CHRONIC HYPERTENSION AFFECTING PREGNANCY: ICD-10-CM

## 2024-05-28 DIAGNOSIS — O09.299 HISTORY OF GESTATIONAL DIABETES IN PRIOR PREGNANCY, CURRENTLY PREGNANT: ICD-10-CM

## 2024-05-28 DIAGNOSIS — O34.219 HISTORY OF CESAREAN DELIVERY, CURRENTLY PREGNANT: ICD-10-CM

## 2024-05-28 DIAGNOSIS — O24.414 INSULIN CONTROLLED GESTATIONAL DIABETES MELLITUS (GDM) IN SECOND TRIMESTER: ICD-10-CM

## 2024-05-28 DIAGNOSIS — E66.01 CLASS 3 SEVERE OBESITY DUE TO EXCESS CALORIES WITH SERIOUS COMORBIDITY AND BODY MASS INDEX (BMI) OF 45.0 TO 49.9 IN ADULT: ICD-10-CM

## 2024-05-28 DIAGNOSIS — O24.112 PREGNANCY WITH TYPE 2 DIABETES MELLITUS IN SECOND TRIMESTER: ICD-10-CM

## 2024-05-28 DIAGNOSIS — Z86.39 HISTORY OF THYROID DISORDER: ICD-10-CM

## 2024-05-28 DIAGNOSIS — E03.8 OTHER SPECIFIED HYPOTHYROIDISM: Primary | ICD-10-CM

## 2024-05-28 LAB
BASOPHILS # BLD AUTO: 0.01 K/UL (ref 0–0.2)
BASOPHILS NFR BLD: 0.1 % (ref 0–1.9)
DIFFERENTIAL METHOD BLD: ABNORMAL
EOSINOPHIL # BLD AUTO: 0.1 K/UL (ref 0–0.5)
EOSINOPHIL NFR BLD: 0.5 % (ref 0–8)
ERYTHROCYTE [DISTWIDTH] IN BLOOD BY AUTOMATED COUNT: 15 % (ref 11.5–14.5)
HCT VFR BLD AUTO: 34.6 % (ref 37–48.5)
HGB BLD-MCNC: 11.4 G/DL (ref 12–16)
HIV 1+2 AB+HIV1 P24 AG SERPL QL IA: NORMAL
IMM GRANULOCYTES # BLD AUTO: 0.08 K/UL (ref 0–0.04)
IMM GRANULOCYTES NFR BLD AUTO: 0.8 % (ref 0–0.5)
LYMPHOCYTES # BLD AUTO: 1.3 K/UL (ref 1–4.8)
LYMPHOCYTES NFR BLD: 12.3 % (ref 18–48)
MCH RBC QN AUTO: 30.5 PG (ref 27–31)
MCHC RBC AUTO-ENTMCNC: 32.9 G/DL (ref 32–36)
MCV RBC AUTO: 93 FL (ref 82–98)
MONOCYTES # BLD AUTO: 0.4 K/UL (ref 0.3–1)
MONOCYTES NFR BLD: 3.9 % (ref 4–15)
NEUTROPHILS # BLD AUTO: 8.5 K/UL (ref 1.8–7.7)
NEUTROPHILS NFR BLD: 82.4 % (ref 38–73)
NRBC BLD-RTO: 0 /100 WBC
PLATELET # BLD AUTO: 290 K/UL (ref 150–450)
PMV BLD AUTO: 10.5 FL (ref 9.2–12.9)
RBC # BLD AUTO: 3.74 M/UL (ref 4–5.4)
TREPONEMA PALLIDUM IGG+IGM AB [PRESENCE] IN SERUM OR PLASMA BY IMMUNOASSAY: NONREACTIVE
WBC # BLD AUTO: 10.31 K/UL (ref 3.9–12.7)

## 2024-05-28 PROCEDURE — 99999 PR PBB SHADOW E&M-EST. PATIENT-LVL III: CPT | Mod: PBBFAC,,, | Performed by: ADVANCED PRACTICE MIDWIFE

## 2024-05-28 PROCEDURE — 86593 SYPHILIS TEST NON-TREP QUANT: CPT | Performed by: ADVANCED PRACTICE MIDWIFE

## 2024-05-28 PROCEDURE — 87389 HIV-1 AG W/HIV-1&-2 AB AG IA: CPT | Performed by: ADVANCED PRACTICE MIDWIFE

## 2024-05-28 PROCEDURE — 85025 COMPLETE CBC W/AUTO DIFF WBC: CPT | Performed by: ADVANCED PRACTICE MIDWIFE

## 2024-05-28 PROCEDURE — 99213 OFFICE O/P EST LOW 20 MIN: CPT | Mod: PBBFAC,TH,PN | Performed by: ADVANCED PRACTICE MIDWIFE

## 2024-05-28 PROCEDURE — 36415 COLL VENOUS BLD VENIPUNCTURE: CPT | Mod: PN | Performed by: ADVANCED PRACTICE MIDWIFE

## 2024-05-28 PROCEDURE — 99213 OFFICE O/P EST LOW 20 MIN: CPT | Mod: TH,S$PBB,, | Performed by: ADVANCED PRACTICE MIDWIFE

## 2024-05-28 RX ORDER — HUMAN INSULIN 100 [IU]/ML
INJECTION, SUSPENSION SUBCUTANEOUS
COMMUNITY
Start: 2024-05-28

## 2024-05-28 NOTE — PATIENT INSTRUCTIONS
Patient Education       Pregnancy - The Sixth Month   About this topic   It is important for you to learn how to take care of yourself to help you have a healthy baby and safe delivery. It is good to have health care throughout your pregnancy.  The sixth month of your pregnancy starts around week 24 and lasts through week 28. By knowing how far along you are, you can learn what is normal for this stage of your pregnancy and plan for what is next.  General   Your body   During the sixth month of your pregnancy, here are some things you can expect.  You may:  Start to gain a little more weight. It is normal to gain about 10 to 15 pounds (4.5 to 7 kg) total in your first 6 months.  Have problems like back pain, dry eyes, or aching hands and wrists  Itching of palms, abdomen, or soles of your feet  Swelling of ankles, fingers, or face.  Need to urinate more frequently.  Have problems with hemorrhoids. Be sure to eat plenty of fresh fruits and vegetables to increase the fiber in your diet. Drink plenty of water to help keep your stools soft.  Continue having [place] Macario contractions. You may feel your belly squeezing or getting tight.  Have a glucose test to test for gestational diabetes.  Have more headaches. Talk to your doctor about how to help with them.  See stretch marks on your belly, breasts, or legs. Stay active to try and keep good muscle tone.  See an increase in vaginal discharge. Talk to your doctor about what to expect.  Your baby's growth and development:  Your baby looks like a very small  baby.  They are able to live outside of your womb but would need a lot of help breathing, eating, and staying warm in an intensive care unit.  Your baby has times of being awake and times of being asleep. The babys eyelids are able to open and close.  They move more and have hiccups.  Your baby is about 14 inches (36 cm) long and weighs about 2 pounds (900 gm). Your baby is about the size of an eggplant.  Baby  may be able to hear voices.  Things to Think About   Avoid alcohol, drugs, tobacco products, and second hand smoke  Eat small meals throughout the day instead of larger meals.  Avoid spicy, greasy, or fatty foods.  Avoid lying down or bending over for around 3 hours after eating  Warm baths are fine to help you relax. Avoid hot tubs while you are pregnant.  Avoid straining when having bowel movements.  Learning how to care for your baby. You may want to sign up for classes on how to take care of a .  Are you planning on going back to work after having your baby? Its not too early to think about .  Consider starting your birth plan if you have specific needs or wishes for delivery.  When do I need to call the doctor?   Contractions every 10 minutes or more often that do not go away with drinking water or position changes  Low, dull back pain that does not go away  Pressure in your pelvis that feels like your baby is pushing down  A gush or constant trickle of watery or bloody fluid leaking from your vagina  Cramps in your lower belly that come and go or are constant  Change in your baby's movement  Fever of 100.4°F (38°C) or higher  Little to no movement felt by baby in 2 hours. Your baby should be moving at least 10 times every 2 hours.  Headache that does not go away; blurry vision; seeing spots or halos; increase in swelling in your hands, feet, or face; and pain under your ribs on the right side  Vaginal bleeding with or without pain  After a car accident, fall, or any trauma to your belly  Having thoughts of harming yourself or others, or do not feel safe at home  Where can I learn more?   American Academy of Family Physicians  https://familydoctor.org/changes-in-your-body-during-pregnancy-second-trimester/   KidsHealth  http://kidshealth.org/en/parents/pregnancy-calendar-intro.html?WT.ac=p-antionette   Office of Womens  Health  https://www.womenshealth.gov/pregnancy/youre-pregnant-now-what/stages-pregnancy   Last Reviewed Date   2020-05-06  Consumer Information Use and Disclaimer   This information is not specific medical advice and does not replace information you receive from your health care provider. This is only a brief summary of general information. It does NOT include all information about conditions, illnesses, injuries, tests, procedures, treatments, therapies, discharge instructions or life-style choices that may apply to you. You must talk with your health care provider for complete information about your health and treatment options. This information should not be used to decide whether or not to accept your health care providers advice, instructions or recommendations. Only your health care provider has the knowledge and training to provide advice that is right for you.  Copyright   Copyright © 2021 UpToDate, Inc. and its affiliates and/or licensors. All rights reserved.  Patient Education       Fetal Movement   About this topic   Feeling your baby move for the first time is a good sign that your baby is doing well. You may begin to feel these movements between the 18th and 25th weeks of your pregnancy. If this is your first time being pregnant, it may be closer to 25 weeks. Your baby has been moving around before this, but the kicks have not been strong enough for you to feel. During the first weeks of feeling movement, you may start to see a pattern during the day when your baby is most active. You can track your baby's kicks each day at home. This is also known as kick counting. It is a good way to check on your baby's movements and well being.  Most often, fetal kick counting is used in high-risk pregnancies. It may be useful for all pregnancies. Counting and writing down your baby's kicks, jabs, twists, flutters, rolls, turns, flips, and swishes may help find a problem that needs more evaluation. The American  "College of Obstetricians and Gynecologists, or ACOG, suggests that you record how much time it takes you to feel 10 of these movements. Ideally, you should be able to feel 10 movements within 2 hours. Many people will track these movements in much less time.  General   How to Track Your Baby's Kick Counts   Most often your doctor will want you to wait until the 28th to 30th weeks of your pregnancy to start kick counting. Here are some tips to help you get started.  Find the time of day when your baby is most active. For some people, this is right after eating. Others find their baby moving a lot after they have been exercising or more active. Some babies are more active in the evenings when your blood sugar starts to lower.  Try to count kicks at about the same time each day.  Before you start counting, have something to eat or drink. Also take a short walk or do some light activity.  Choose a quiet place where you can focus on your baby's movements. Also get in a comfortable position. Try and lie on one side or the other. You may need to change positions until you find one that works best for you and your baby.  Keep a notebook to track your baby's kicks. Your doctor may give you a chart to use or you can make your own. Write down the date, time you started counting, and the time of each "kick" during a 2-hour period until you have felt 10 kicks.  Once you have recorded 10 kicks within 2 hours you can stop counting.  If you are not able to record 10 movements over 2 hours you should get up and move around or eat something and try again.  If you are not able to record 10 movements over 2 hours the second time, call your doctor. They may want you to go to the hospital to get your baby checked.  When do I need to call the doctor?   You have felt less than 10 movements over a period of 2 hours.  It takes longer each day to record 10 movements.  There is a big change in the pattern of movements you are writing " down.  You feel no movement for 2 hours even after eating a snack, light activity, and position changes.  Teach Back: Helping You Understand   The Teach Back Method helps you understand the information we are giving you. After you talk with the staff, tell them in your own words what you learned. This helps to make sure the staff has described each thing clearly. It also helps to explain things that may have been confusing. Before going home, make sure you can do these:  I can tell you about feeling my baby move.  I can tell you how I will track my babys kicks.  I can tell you what I will do if I feel less than 10 movements in 2 hours, it takes longer to feel my baby move 10 times, or there is a big change in how my baby is moving.  Last Reviewed Date   2021-10-08  Consumer Information Use and Disclaimer   This information is not specific medical advice and does not replace information you receive from your health care provider. This is only a brief summary of general information. It does NOT include all information about conditions, illnesses, injuries, tests, procedures, treatments, therapies, discharge instructions or life-style choices that may apply to you. You must talk with your health care provider for complete information about your health and treatment options. This information should not be used to decide whether or not to accept your health care providers advice, instructions or recommendations. Only your health care provider has the knowledge and training to provide advice that is right for you.  Copyright   Copyright © 2021 UpToDate, Inc. and its affiliates and/or licensors. All rights reserved.

## 2024-05-29 ENCOUNTER — PATIENT MESSAGE (OUTPATIENT)
Dept: DIABETES | Facility: CLINIC | Age: 29
End: 2024-05-29
Payer: MEDICAID

## 2024-05-29 NOTE — PROGRESS NOTES
28 y.o. female  at 26w1d   Reports + FM, denies VB, LOF, or cramping  Doing well without concerns   TW lbs       Reviewed upcoming 28wk labs, (A POS) and orders placed, tdap handout provided and explained  Glucose test will not be done but will check thyroid    Other specified hypothyroidism  -     TSH; Future; Expected date: 2024  Started on Synthroid 100 mcg will check TSH at next visit with 28 week labs    History of gestational diabetes in prior pregnancy, currently pregnant  -     Hemoglobin A1C; Future; Expected date: 2024  Has gestational diabetes see below note    History of  delivery, currently pregnant  For repeat     Chronic hypertension affecting pregnancy  Daily baby aspirin.  Normotensive today  Labetalol 200 mg twice a day    Pregnancy with type 2 diabetes mellitus in second trimester  Followed by MFM .  Has Dex com 80% in range, 19% high 1% low.    NPH 60 units q.h.s. with aspart 6/6/10.    Has ultrasound with MFM on     Class 3 severe obesity due to excess calories with serious comorbidity and body mass index (BMI) of 45.0 to 49.9 in adult  Continue with baby aspirin, diabetic diet      Reviewed warning signs, normal FM,  labor precautions and how/when to call. Patient states understanding.  RTC x 2 wks, call or present sooner prn.

## 2024-06-04 NOTE — ASSESSMENT & PLAN NOTE
Age of Dx: 28 (White's class B)  Comorbidities: CHTN, BMI > 40  Prepregnancy regimen:  - Metformin  Baseline HgA1c: 5.7%  Last A1c (4/16/24): 5.4%   EKG (4/16/24): NSR  Fetal echo (4/17/24): wnl    Current regimen:   - NPH: 60 u qHS  - Aspart 6/6/10    See prior counseling. Has previously been on Levemir, transition to NPH/Aspart due to persistent fasting hyperglycemia with elevated dinner and low/normal breakfast and lunch values. Fastings remain elevated today as well as post prandial values (although predominantly breakfast and dinner values). We also discussed checking pre-meal lunch for a few days to see if patient would benefit from transition to NPH twice daily with Aspart twice daily rather than current regimen.       Recommendations:  1. New regimen:  - NPH: 70 u qHS  - Aspart 10/6/14  2. Addition of pre-meal lunch value  3. Continue 4 x daily blood sugar checks  4. Send in blood sugars in 1 week via [employer], virtual BS check with MFM at that time  5. HgA1c each trimester, next due in July (primary team to coordinate)  6. Initiate serial monthly growth assessments, next due at 28 weeks, follow with MFM at that time  7. Initiate twice weekly antepartum fetal surveillance at 32 weeks  8. Delivery timing for preexisting diabetes with BMI > 40 and CHTN is 36w0d - 37w6d

## 2024-06-04 NOTE — ASSESSMENT & PLAN NOTE
Pre-pregnancy regimen:  - No meds  Current regimen:  - Labetalol 200 mg BID  Baseline preE labs:  - Cr 0.6  - AST/ALT 16/21  - PC ratio 0.31    Connected MOM values also predominantly within mild range.    Recommendations:  1. Continue current regimen   - Targets for therapy should be 140/90; titrate dosing to maintain blood pressures under those levels.  - Connected MOM values should be considered with modifying dosing  2. Fetal growth, prenatal testing and delivery timing per DM header

## 2024-06-05 ENCOUNTER — OFFICE VISIT (OUTPATIENT)
Dept: MATERNAL FETAL MEDICINE | Facility: CLINIC | Age: 29
End: 2024-06-05
Attending: OBSTETRICS & GYNECOLOGY
Payer: MEDICAID

## 2024-06-05 ENCOUNTER — PATIENT MESSAGE (OUTPATIENT)
Dept: MATERNAL FETAL MEDICINE | Facility: CLINIC | Age: 29
End: 2024-06-05

## 2024-06-05 DIAGNOSIS — O10.919 CHRONIC HYPERTENSION AFFECTING PREGNANCY: ICD-10-CM

## 2024-06-05 DIAGNOSIS — O24.112 PREGNANCY WITH TYPE 2 DIABETES MELLITUS IN SECOND TRIMESTER: Primary | ICD-10-CM

## 2024-06-05 PROCEDURE — 1160F RVW MEDS BY RX/DR IN RCRD: CPT | Mod: CPTII,95,, | Performed by: OBSTETRICS & GYNECOLOGY

## 2024-06-05 PROCEDURE — 99214 OFFICE O/P EST MOD 30 MIN: CPT | Mod: TH,95,, | Performed by: OBSTETRICS & GYNECOLOGY

## 2024-06-05 PROCEDURE — 3044F HG A1C LEVEL LT 7.0%: CPT | Mod: CPTII,95,, | Performed by: OBSTETRICS & GYNECOLOGY

## 2024-06-05 PROCEDURE — 1159F MED LIST DOCD IN RCRD: CPT | Mod: CPTII,95,, | Performed by: OBSTETRICS & GYNECOLOGY

## 2024-06-05 NOTE — PROGRESS NOTES
Maternal Fetal Medicine follow up consult    The patient location is: Home  The chief complaint leading to consultation is: T2DM in pregnancy    Visit type: audiovisual    Face to Face time with patient: 5  20 minutes of total time spent on the encounter, which includes face to face time and non-face to face time preparing to see the patient (eg, review of tests), Obtaining and/or reviewing separately obtained history, Documenting clinical information in the electronic or other health record, Independently interpreting results (not separately reported) and communicating results to the patient/family/caregiver, or Care coordination (not separately reported).     Each patient to whom he or she provides medical services by telemedicine is:  (1) informed of the relationship between the physician and patient and the respective role of any other health care provider with respect to management of the patient; and (2) notified that he or she may decline to receive medical services by telemedicine and may withdraw from such care at any time.    SUBJECTIVE:     Kathy Mishra is a 28 y.o.  female with IUP at 27w1d who is seen in follow up consultation by MFM.  Pregnancy complications include:   Problem   Pregnancy With Type 2 Diabetes Mellitus in Second Trimester   Chronic Hypertension Affecting Pregnancy       Previous notes reviewed.   No changes to medical, surgical, family, social, or obstetric history.    Interval history since last MFM visit:   Overall doing well. Has been sick with a URI over the past week. Adherent with insulin and BP regimen.     Medications reviewed.    Care team members:  Dr. Sal - Primary OB     OBJECTIVE:   Virtual visit    Significant labs/imaging:      ASSESSMENT/PLAN:     28 y.o.  female with IUP at 27w1d    Pregnancy with type 2 diabetes mellitus in second trimester  Age of Dx: 28 (White's class B)  Comorbidities: CHTN, BMI > 40  Prepregnancy regimen:  -  Metformin  Baseline HgA1c: 5.7%  Last A1c (4/16/24): 5.4%   EKG (4/16/24): NSR  Fetal echo (4/17/24): wnl    Current regimen:   - NPH: 60 u qHS  - Aspart 6/6/10    See prior counseling. Has previously been on Levemir, transition to NPH/Aspart due to persistent fasting hyperglycemia with elevated dinner and low/normal breakfast and lunch values. Fastings remain elevated today as well as post prandial values (although predominantly breakfast and dinner values). We also discussed checking pre-meal lunch for a few days to see if patient would benefit from transition to NPH twice daily with Aspart twice daily rather than current regimen.       Recommendations:  1. New regimen:  - NPH: 70 u qHS  - Aspart 10/6/14  2. Addition of pre-meal lunch value  3. Continue 4 x daily blood sugar checks  4. Send in blood sugars in 1 week via [employer], virtual BS check with MFM at that time  5. HgA1c each trimester, next due in July (primary team to coordinate)  6. Initiate serial monthly growth assessments, next due at 28 weeks, follow with MFM at that time  7. Initiate twice weekly antepartum fetal surveillance at 32 weeks  8. Delivery timing for preexisting diabetes with BMI > 40 and CHTN is 36w0d - 37w6d    Chronic hypertension affecting pregnancy  Pre-pregnancy regimen:  - No meds  Current regimen:  - Labetalol 200 mg BID  Baseline preE labs:  - Cr 0.6  - AST/ALT 16/21  - PC ratio 0.31    Connected MOM values also predominantly within mild range.    Recommendations:  1. Continue current regimen   - Targets for therapy should be 140/90; titrate dosing to maintain blood pressures under those levels.  - Connected MOM values should be considered with modifying dosing  2. Fetal growth, prenatal testing and delivery timing per DM header    F/u in 1 weeks for MFM visit and US    Stephie Castillo MD  PGY 6  Maternal Fetal Medicine  [facility]'

## 2024-06-06 ENCOUNTER — NURSE TRIAGE (OUTPATIENT)
Dept: ADMINISTRATIVE | Facility: CLINIC | Age: 29
End: 2024-06-06
Payer: MEDICAID

## 2024-06-06 ENCOUNTER — HOSPITAL ENCOUNTER (OUTPATIENT)
Facility: HOSPITAL | Age: 29
Discharge: HOME | End: 2024-06-06
Attending: OBSTETRICS & GYNECOLOGY | Admitting: OBSTETRICS & GYNECOLOGY
Payer: MEDICAID

## 2024-06-06 VITALS
OXYGEN SATURATION: 97 % | DIASTOLIC BLOOD PRESSURE: 61 MMHG | RESPIRATION RATE: 18 BRPM | HEART RATE: 95 BPM | SYSTOLIC BLOOD PRESSURE: 138 MMHG

## 2024-06-06 DIAGNOSIS — O36.8190 DECREASED FETAL MOVEMENT AFFECTING MANAGEMENT OF MOTHER, ANTEPARTUM: ICD-10-CM

## 2024-06-06 LAB
BACTERIA #/AREA URNS HPF: ABNORMAL /HPF
BILIRUB UR QL STRIP: NEGATIVE
CLARITY UR: ABNORMAL
COLOR UR: YELLOW
GLUCOSE UR QL STRIP: NEGATIVE
HGB UR QL STRIP: ABNORMAL
HYALINE CASTS #/AREA URNS LPF: 0 /LPF
KETONES UR QL STRIP: NEGATIVE
LEUKOCYTE ESTERASE UR QL STRIP: ABNORMAL
MICROSCOPIC COMMENT: ABNORMAL
NITRITE UR QL STRIP: NEGATIVE
PH UR STRIP: 7 [PH] (ref 5–8)
POCT GLUCOSE: 178 MG/DL (ref 70–110)
PROT UR QL STRIP: ABNORMAL
RBC #/AREA URNS HPF: 33 /HPF (ref 0–4)
SP GR UR STRIP: 1.02 (ref 1–1.03)
SQUAMOUS #/AREA URNS HPF: 13 /HPF
URN SPEC COLLECT METH UR: ABNORMAL
UROBILINOGEN UR STRIP-ACNC: NEGATIVE EU/DL
WBC #/AREA URNS HPF: 8 /HPF (ref 0–5)

## 2024-06-06 PROCEDURE — 99211 OFF/OP EST MAY X REQ PHY/QHP: CPT | Mod: 25

## 2024-06-06 PROCEDURE — 81000 URINALYSIS NONAUTO W/SCOPE: CPT | Performed by: ADVANCED PRACTICE MIDWIFE

## 2024-06-06 PROCEDURE — 87086 URINE CULTURE/COLONY COUNT: CPT | Performed by: ADVANCED PRACTICE MIDWIFE

## 2024-06-06 PROCEDURE — 99213 OFFICE O/P EST LOW 20 MIN: CPT | Mod: TH,,, | Performed by: OBSTETRICS & GYNECOLOGY

## 2024-06-06 NOTE — H&P
[facility] - Labor & Delivery  Obstetrics  History & Physical    Patient Name: Kathy Chaney  MRN: [id number]  Admission Date: 2024  Primary Care Provider: Monica, Primary Doctor    Subjective:     Principal Problem:Decreased fetal movement in pregnancy, antepartum    History of Present Illness:  28 y.o. female  at 27w2d EGA, c/o decreased fetal movement x past 2 weeks.  Had not felt any movement today until arrival here at L&D unit.  Feeling fetal movement now.  No other complaints except recent URI symptoms - sinus congestion and dry cough.  No fever or chills.  No shortness of breath or asthma exacerbation.  No contractions, vaginal bleeding, or fluid leakage.  No abdominal pain.     Pregnancy complicated by CHTN, type 2 IDDM, obesity, previous  x 1, asthma, hypothyroidism, and sleep apnea.  Followed by MFM.      OB History    Para Term  AB Living   3 1 0 1 1 1   SAB IAB Ectopic Multiple Live Births   1 0 0 0 1      # Outcome Date GA Lbr Tera/2nd Weight Sex Type Anes PTL Lv   3 Current            2  22 35w1d  2.69 kg (5 lb 14.9 oz) F CS-LTranv Spinal, EPI N GUILLERMINA      Complications: Failure to Progress in First Stage      Name: MICKEY CHANEY      Apgar1: 8  Apgar5: 9   1 SAB 16 12w0d            Past Medical History:   Diagnosis Date    Asthma 2022    Gestational diabetes mellitus (GDM) in third trimester controlled on oral hypoglycemic drug 2022    History of hypertension 2022-add PIH baseline labs to workup PCR 0.09 advised daily baby aspirin at 16 weeks    Hypertension     Thyroid disease      Past Surgical History:   Procedure Laterality Date    ADENOIDECTOMY       SECTION N/A 2022    Procedure:  SECTION;  Surgeon: Jhoana Alexander MD;  Location: [facility];  Service: OB/GYN;  Laterality: N/A;     SECTION      TONSILLECTOMY      age of 5 years old       PTA Medications   Medication Sig    aspirin 81 MG  "Chew Take 1 tablet (81 mg total) by mouth once daily.    blood sugar diagnostic Strp To check BG 4 times daily, to use with insurance preferred meter    blood-glucose sensor (DEXCOM G7 SENSOR) Fern 1 Device by Misc.(Non-Drug; Combo Route) route every 10 days.    EASY TOUCH 31 gauge x 5/16" Ndle USE DAILY    insulin aspart U-100 (NOVOLOG) 100 unit/mL injection Inject 6 Units into the skin daily with breakfast AND 6 Units with lunch AND 10 Units daily with dinner or evening meal.    insulin  unit/mL injection Inject 60 Units into the skin every evening.    insulin syringe-needle U-100 1 mL 31 gauge x 5/16 Syrg 100 each by Misc.(Non-Drug; Combo Route) route 4 (four) times daily.    labetaloL (NORMODYNE) 200 MG tablet Take 1 tablet (200 mg total) by mouth 2 (two) times daily.    lancets Misc To check BG 4 times daily, to use with insurance preferred meter    levothyroxine (SYNTHROID) 100 MCG tablet Take 1 tablet (100 mcg total) by mouth before breakfast.    NOVOLIN N FLEXPEN 100 unit/mL (3 mL) InPn     ondansetron (ZOFRAN) 4 MG tablet Take 1 tablet (4 mg total) by mouth daily as needed for Nausea.    pen needle, diabetic (LITE TOUCH INSULIN PEN NEEDLES) 31 gauge x 1/4" Ndle 1 Needle by Misc.(Non-Drug; Combo Route) route once daily.    prenatal vitamins #45/iron/FA (PRENATAL VITAMIN #45-IRON-FA ORAL) Take by mouth.    promethazine (PHENERGAN) 12.5 MG Tab Take 1 tablet (12.5 mg total) by mouth 4 (four) times daily.    TRUE METRIX GLUCOSE METER Misc To check BG 4 times daily       Review of patient's allergies indicates:  No Known Allergies     Family History       Problem Relation (Age of Onset)    Diabetes Maternal Grandfather, Mother    Hypertension Paternal Grandmother, Father          Tobacco Use    Smoking status: Former     Types: Cigarettes    Smokeless tobacco: Never   Substance and Sexual Activity    Alcohol use: Not Currently    Drug use: Never    Sexual activity: Yes     Partners: Male     Birth " control/protection: None     Review of Systems   Constitutional:  Negative for chills and fever.   Respiratory:  Negative for shortness of breath.    Cardiovascular:  Negative for leg swelling.   Gastrointestinal:  Negative for abdominal pain, nausea and vomiting.   Genitourinary:  Negative for dysuria and vaginal bleeding.      Objective:     Vital Signs (Most Recent):  Pulse: 94 (24 1315)  Resp: 18 (24 1307)  BP: (!) 142/60 (24 1315)  SpO2: 98 % (24 1315) Vital Signs (24h Range):  Pulse:  [] 94  Resp:  [18] 18  SpO2:  [96 %-98 %] 98 %  BP: (130-142)/(44-60) 142/60        There is no height or weight on file to calculate BMI.    FHT: 140 Cat 1 (reassuring)  TOCO:  Q 0 minutes     Physical Exam:   Constitutional: She is oriented to person, place, and time. No distress.    HENT:   Head: Normocephalic and atraumatic.      Cardiovascular:  Normal rate.             Pulmonary/Chest: Effort normal.        Abdominal: Soft. There is no abdominal tenderness.             Musculoskeletal: No tenderness or edema.       Neurological: She is alert and oriented to person, place, and time.     Psychiatric: She has a normal mood and affect.             Significant Labs:  Lab Results   Component Value Date    GROUPTRH A POS 01/10/2024    HEPBSAG Non-reactive 01/10/2024    STREPBCULT No Group B Streptococcus isolated 2022       Urinalysis  Recent Labs   Lab 24  1230   COLORU Yellow   SPECGRAV 1.020   PHUR 7.0   PROTEINUA 1+*   BACTERIA Rare   NITRITE Negative   LEUKOCYTESUR 1+*   UROBILINOGEN Negative   HYALINECASTS 0      U/S:  BPP 8/8, fetal growth 97th percentile. LIA 11.8 cm      Assessment/Plan:     28 y.o. female  at 27w2d for:    * Decreased fetal movement in pregnancy, antepartum  Reassuring fetal monitoring.  U/S - BPP 8/8  Patient stable for discharge to home.    Pregnancy with type 2 diabetes mellitus in second trimester  Follow up with M as planned.    Chronic hypertension  affecting pregnancy  BP stable today.  F/U with MFM as planned.        Cha Malcolm MD  Obstetrics  [facility] - Labor & Delivery

## 2024-06-06 NOTE — TELEPHONE ENCOUNTER
Pt is tearful during call. Pt reports decreased fetal movement and has not felt the baby move at all today. Instructed patient to go to L&D now. Pt verbalized understanding.     Reason for Disposition   Pregnant 23 or more weeks and baby moving less today by kick count (e.g., kick count < 5 in 1 hour or < 10 in 2 hours)    Additional Information   Negative: Sounds like a life-threatening emergency to the triager   Negative: Blurred vision or visual change   Negative: SEVERE headache and not relieved with acetaminophen (e.g., Tylenol)   Negative: Leakage of fluid from vagina    Protocols used: Pregnancy - Decreased Fetal Movement-A-OH

## 2024-06-06 NOTE — HPI
28 y.o. female  at 27w2d EGA, c/o decreased fetal movement x past 2 weeks.  Had not felt any movement today until arrival here at L&D unit.  Feeling fetal movement now.  No other complaints except recent URI symptoms - sinus congestion and dry cough.  No fever or chills.  No shortness of breath or asthma exacerbation.  No contractions, vaginal bleeding, or fluid leakage.  No abdominal pain.     Pregnancy complicated by CHTN, type 2 IDDM, obesity, previous  x 1, asthma, hypothyroidism, and sleep apnea.  Followed by [facility].

## 2024-06-06 NOTE — DISCHARGE INSTRUCTIONS

## 2024-06-06 NOTE — HOSPITAL COURSE
EFM - FHT's 140's, moderate variability.  Accelerations present.  No contractions noted on toco monitor  BPP 8/8

## 2024-06-06 NOTE — SUBJECTIVE & OBJECTIVE
"  OB History    Para Term  AB Living   3 1 0 1 1 1   SAB IAB Ectopic Multiple Live Births   1 0 0 0 1      # Outcome Date GA Lbr Tera/2nd Weight Sex Type Anes PTL Lv   3 Current            2  22 35w1d  2.69 kg (5 lb 14.9 oz) F CS-LTranv Spinal, EPI N GUILLERMINA      Complications: Failure to Progress in First Stage      Name: MICKEY CHANEY      Apgar1: 8  Apgar5: 9   1 SAB 16 12w0d            Past Medical History:   Diagnosis Date    Asthma 2022    Gestational diabetes mellitus (GDM) in third trimester controlled on oral hypoglycemic drug 2022    History of hypertension 2022-add PIH baseline labs to workup PCR 0.09 advised daily baby aspirin at 16 weeks    Hypertension     Thyroid disease      Past Surgical History:   Procedure Laterality Date    ADENOIDECTOMY       SECTION N/A 2022    Procedure:  SECTION;  Surgeon: Jhoana Alexander MD;  Location: [facility] L&D;  Service: OB/GYN;  Laterality: N/A;     SECTION      TONSILLECTOMY      age of 5 years old       PTA Medications   Medication Sig    aspirin 81 MG Chew Take 1 tablet (81 mg total) by mouth once daily.    blood sugar diagnostic Strp To check BG 4 times daily, to use with insurance preferred meter    blood-glucose sensor (DEXCOM G7 SENSOR) Fern 1 Device by Misc.(Non-Drug; Combo Route) route every 10 days.    EASY TOUCH 31 gauge x 5/16" Ndle USE DAILY    insulin aspart U-100 (NOVOLOG) 100 unit/mL injection Inject 6 Units into the skin daily with breakfast AND 6 Units with lunch AND 10 Units daily with dinner or evening meal.    insulin  unit/mL injection Inject 60 Units into the skin every evening.    insulin syringe-needle U-100 1 mL 31 gauge x 5/16 Syrg 100 each by Misc.(Non-Drug; Combo Route) route 4 (four) times daily.    labetaloL (NORMODYNE) 200 MG tablet Take 1 tablet (200 mg total) by mouth 2 (two) times daily.    lancets Mis To check BG 4 times daily, to use with " "insurance preferred meter    levothyroxine (SYNTHROID) 100 MCG tablet Take 1 tablet (100 mcg total) by mouth before breakfast.    NOVOLIN N FLEXPEN 100 unit/mL (3 mL) InPn     ondansetron (ZOFRAN) 4 MG tablet Take 1 tablet (4 mg total) by mouth daily as needed for Nausea.    pen needle, diabetic (LITE TOUCH INSULIN PEN NEEDLES) 31 gauge x 1/4" Ndle 1 Needle by Misc.(Non-Drug; Combo Route) route once daily.    prenatal vitamins #45/iron/FA (PRENATAL VITAMIN #45-IRON-FA ORAL) Take by mouth.    promethazine (PHENERGAN) 12.5 MG Tab Take 1 tablet (12.5 mg total) by mouth 4 (four) times daily.    TRUE METRIX GLUCOSE METER Misc To check BG 4 times daily       Review of patient's allergies indicates:  No Known Allergies     Family History       Problem Relation (Age of Onset)    Diabetes Maternal Grandfather, Mother    Hypertension Paternal Grandmother, Father          Tobacco Use    Smoking status: Former     Types: Cigarettes    Smokeless tobacco: Never   Substance and Sexual Activity    Alcohol use: Not Currently    Drug use: Never    Sexual activity: Yes     Partners: Male     Birth control/protection: None     Review of Systems   Constitutional:  Negative for chills and fever.   Respiratory:  Negative for shortness of breath.    Cardiovascular:  Negative for leg swelling.   Gastrointestinal:  Negative for abdominal pain, nausea and vomiting.   Genitourinary:  Negative for dysuria and vaginal bleeding.      Objective:     Vital Signs (Most Recent):  Pulse: 94 (06/06/24 1315)  Resp: 18 (06/06/24 1307)  BP: (!) 142/60 (06/06/24 1315)  SpO2: 98 % (06/06/24 1315) Vital Signs (24h Range):  Pulse:  [] 94  Resp:  [18] 18  SpO2:  [96 %-98 %] 98 %  BP: (130-142)/(44-60) 142/60        There is no height or weight on file to calculate BMI.    FHT: 140 Cat 1 (reassuring)  TOCO:  Q 0 minutes     Physical Exam:   Constitutional: She is oriented to person, place, and time. No distress.    HENT:   Head: Normocephalic and " atraumatic.      Cardiovascular:  Normal rate.             Pulmonary/Chest: Effort normal.        Abdominal: Soft. There is no abdominal tenderness.             Musculoskeletal: No tenderness or edema.       Neurological: She is alert and oriented to person, place, and time.     Psychiatric: She has a normal mood and affect.             Significant Labs:  Lab Results   Component Value Date    GROUPTRH A POS 01/10/2024    HEPBSAG Non-reactive 01/10/2024    STREPBCULT No Group B Streptococcus isolated 08/02/2022       Urinalysis  Recent Labs   Lab 06/06/24  1230   COLORU Yellow   SPECGRAV 1.020   PHUR 7.0   PROTEINUA 1+*   BACTERIA Rare   NITRITE Negative   LEUKOCYTESUR 1+*   UROBILINOGEN Negative   HYALINECASTS 0      U/S:  BPP 8/8, fetal growth 97th percentile. LIA 11.8 cm

## 2024-06-07 ENCOUNTER — TELEPHONE (OUTPATIENT)
Dept: OBSTETRICS AND GYNECOLOGY | Facility: CLINIC | Age: 29
End: 2024-06-07
Payer: MEDICAID

## 2024-06-07 LAB
BACTERIA UR CULT: NORMAL
BACTERIA UR CULT: NORMAL

## 2024-06-11 ENCOUNTER — PROCEDURE VISIT (OUTPATIENT)
Dept: OBSTETRICS AND GYNECOLOGY | Facility: CLINIC | Age: 29
End: 2024-06-11
Payer: MEDICAID

## 2024-06-11 ENCOUNTER — TELEPHONE (OUTPATIENT)
Dept: OBSTETRICS AND GYNECOLOGY | Facility: CLINIC | Age: 29
End: 2024-06-11

## 2024-06-11 ENCOUNTER — ROUTINE PRENATAL (OUTPATIENT)
Dept: OBSTETRICS AND GYNECOLOGY | Facility: CLINIC | Age: 29
End: 2024-06-11
Payer: MEDICAID

## 2024-06-11 ENCOUNTER — PATIENT MESSAGE (OUTPATIENT)
Dept: OTHER | Facility: OTHER | Age: 29
End: 2024-06-11
Payer: MEDICAID

## 2024-06-11 ENCOUNTER — PATIENT MESSAGE (OUTPATIENT)
Dept: MATERNAL FETAL MEDICINE | Facility: CLINIC | Age: 29
End: 2024-06-11
Payer: MEDICAID

## 2024-06-11 VITALS
HEIGHT: 63 IN | DIASTOLIC BLOOD PRESSURE: 70 MMHG | BODY MASS INDEX: 48.24 KG/M2 | SYSTOLIC BLOOD PRESSURE: 122 MMHG | WEIGHT: 272.25 LBS

## 2024-06-11 VITALS
SYSTOLIC BLOOD PRESSURE: 125 MMHG | WEIGHT: 277.31 LBS | DIASTOLIC BLOOD PRESSURE: 70 MMHG | BODY MASS INDEX: 49.13 KG/M2

## 2024-06-11 DIAGNOSIS — Z36.89 ENCOUNTER FOR ULTRASOUND TO CHECK FETAL GROWTH: ICD-10-CM

## 2024-06-11 DIAGNOSIS — O10.919 CHRONIC HYPERTENSION AFFECTING PREGNANCY: ICD-10-CM

## 2024-06-11 DIAGNOSIS — O24.112 PREGNANCY WITH TYPE 2 DIABETES MELLITUS IN SECOND TRIMESTER: ICD-10-CM

## 2024-06-11 DIAGNOSIS — O34.219 HISTORY OF CESAREAN DELIVERY, CURRENTLY PREGNANT: Primary | ICD-10-CM

## 2024-06-11 DIAGNOSIS — Z36.89 ENCOUNTER FOR FETAL ANATOMIC SURVEY: Primary | ICD-10-CM

## 2024-06-11 DIAGNOSIS — Z36.89 ENCOUNTER FOR ULTRASOUND TO ASSESS FETAL GROWTH: ICD-10-CM

## 2024-06-11 DIAGNOSIS — O10.919 CHRONIC HYPERTENSION AFFECTING PREGNANCY: Primary | ICD-10-CM

## 2024-06-11 DIAGNOSIS — E66.01 CLASS 3 SEVERE OBESITY DUE TO EXCESS CALORIES WITH SERIOUS COMORBIDITY AND BODY MASS INDEX (BMI) OF 45.0 TO 49.9 IN ADULT: ICD-10-CM

## 2024-06-11 DIAGNOSIS — Z86.32 HISTORY OF GESTATIONAL DIABETES IN PRIOR PREGNANCY, CURRENTLY PREGNANT: ICD-10-CM

## 2024-06-11 DIAGNOSIS — O09.299 HISTORY OF GESTATIONAL DIABETES IN PRIOR PREGNANCY, CURRENTLY PREGNANT: ICD-10-CM

## 2024-06-11 DIAGNOSIS — E03.9 HYPOTHYROIDISM, UNSPECIFIED TYPE: ICD-10-CM

## 2024-06-11 DIAGNOSIS — Z86.39 HISTORY OF THYROID DISORDER: ICD-10-CM

## 2024-06-11 PROCEDURE — 99213 OFFICE O/P EST LOW 20 MIN: CPT | Mod: TH,S$PBB,, | Performed by: ADVANCED PRACTICE MIDWIFE

## 2024-06-11 PROCEDURE — 76816 OB US FOLLOW-UP PER FETUS: CPT | Mod: PBBFAC,PO | Performed by: STUDENT IN AN ORGANIZED HEALTH CARE EDUCATION/TRAINING PROGRAM

## 2024-06-11 PROCEDURE — 99213 OFFICE O/P EST LOW 20 MIN: CPT | Mod: PBBFAC,25,TH,PN | Performed by: ADVANCED PRACTICE MIDWIFE

## 2024-06-11 PROCEDURE — 99214 OFFICE O/P EST MOD 30 MIN: CPT | Mod: S$PBB,TH,, | Performed by: STUDENT IN AN ORGANIZED HEALTH CARE EDUCATION/TRAINING PROGRAM

## 2024-06-11 PROCEDURE — 99999 PR PBB SHADOW E&M-EST. PATIENT-LVL III: CPT | Mod: PBBFAC,,, | Performed by: ADVANCED PRACTICE MIDWIFE

## 2024-06-11 NOTE — PROGRESS NOTES
28 y.o. female  at 28w0d   Reports + FM, denies VB, LOF or CTX  Doing well without concerns   TW lbs   Reviewed 28wk lab results (A POS) H/H:  11.4/34.6, RPR negative, HIV negative     Tdap consider at next visit      History of  delivery, currently pregnant  For repeat  sections    History of gestational diabetes in prior pregnancy, currently pregnant  Followed by MFM presently 12 units a.m., 8 units lunch 16 units dinner, aspirin at 80 units q.h.s..  No metformin follows up in 1 week on video call.  A1c Q trimester last A1c was 5. 6 on 2024  Next ultrasound 2024 at 32 weeks    Chronic hypertension affecting pregnancy  Daily baby aspirin.  Normotensive today.    Labetalol 200 mg twice a day      Class 3 severe obesity due to excess calories with serious comorbidity and body mass index (BMI) of 45.0 to 49.9 in adult  Daily baby aspirin, on diabetic diet    Pregnancy with type 2 diabetes mellitus in second trimester  Followed by MFM see above note    History of thyroid disorder  Synthroid 100 mcg for thyroid level    Reviewed warning signs, normal FKCs,  labor precautions and how/when to call. Patient states understanding  RTC x 2 wks, call or present sooner prn

## 2024-06-11 NOTE — PROGRESS NOTES
"Telemedicine [facility] Ultrasound Note    The chief complaint leading to consultation is: T2DM, CHTN  The patient location is: [facility]  Spoke nurse at bedside with patient assisting consultant.     Total time spent with patient: < 30 Minutes      Maternal Fetal Medicine follow up consult    SUBJECTIVE:     Kathy Mishra is a 28 y.o.  female with IUP at 28w0d who is seen in follow up consultation by [facility].  Pregnancy complications include:   Problem   Pregnancy With Type 2 Diabetes Mellitus in Second Trimester   Hypothyroidism   Chronic Hypertension Affecting Pregnancy       Previous notes reviewed.   No changes to medical, surgical, family, social, or obstetric history.    Interval history since last M visit: Patient states she is feeling well. Reports active fetal movement and denies contractions, vaginal bleeding, or leakage of fluid. She has no complaints or concerns today.    Medications reviewed.       OBJECTIVE:   /70 (BP Location: Right arm)   Ht 5' 3" (1.6 m)   Wt 123.5 kg (272 lb 4.3 oz)   LMP 2023   BMI 48.23 kg/m²     Ultrasound performed. See viewpoint for full ultrasound report.  Castaneda live IUP  Fetal size is appropriate for gestational age, with the EFW (1459 g) plotting at the 67% and the AC plotting at the 96%.   A limited repeat fetal anatomic survey appears normal.   The MVP is normal.       ASSESSMENT/PLAN:     28 y.o.  female with IUP at 28w0d    Chronic hypertension affecting pregnancy  BP on target today with current regimen.   Connected MOM values overall also on target, except for elevations on  (patient states she was sick with a cold)  Advised patient on strict preeclampsia precautions    Recommendations:  1. Continue current regimen of Labetalol 200mg BID  - Targets for therapy should be 140/90; titrate dosing to maintain blood pressures under those levels.  - Connected MOM values should be considered with modifying dosing  2. Fetal " growth, prenatal testing and delivery timing per DM header    Hypothyroidism  New diagnosis this pregnancy.  See prior notes for full counseling.  Last TSH on 5/28 was 1.7 (within goal).    Recommendations:  1. Medication management  - Continue current management for now  2. TSH should be monitored at least every trimester, or every 4 weeks after any medication adjustment (primary team to coordinate)  - As other thyroid markers, such as T3 or free T4, do not inform titration of therapy, there is no need to draw them during pregnancy  - Adjust treatment as necessary to maintain TSH goal < 2.5 mIU/L    Pregnancy with type 2 diabetes mellitus in second trimester  Current regimen:   - NPH: 70 u QHS  - Aspart: 10/6/14 u with meals    See prior counseling with Shiva Arboleda, and Davey.  Logs reviewed. Fastings continue to be elevated, also post-meal elevations (see Media tab).    Recommendations:  1. New regimen:  - NPH 80 u PM  - Aspart 12 u breakfast/ 8 u lunch/ 16 u dinner  2. Continue 4 x daily blood sugar checks  3. Send in blood sugars in 1 week via [employer], virtual BS check with MFM at that time  4. HgA1c each trimester, next due in July (primary team to coordinate)  5. Initiate serial monthly growth assessments, next due at 28 weeks, follow with MFM at that time  6. Initiate twice weekly antepartum fetal surveillance at 32 weeks  7. Delivery timing for preexisting diabetes with BMI > 40 and CHTN is 36w0d - 37w6d      F/u in 1 weeks for MFM visit (virtual)  F/u in 4 weeks for US and MFM visit    30 minutes of total time spent on the encounter, which includes face to face time and non-face to face time preparing to see the patient (eg, review of tests), obtaining and/or reviewing separately obtained history, documenting clinical information in the electronic or other health record, independently interpreting results (not separately reported) and communicating results to the patient/family/caregiver, or care  coordination (not separately reported).    RODOLFO Renteria MD   Maternal-Fetal Medicine

## 2024-06-11 NOTE — ASSESSMENT & PLAN NOTE
Current regimen:   - NPH: 70 u QHS  - Aspart: 10/6/14 u with meals    See prior counseling with Shiva Arboleda, and Davey.  Logs reviewed. Fastings continue to be elevated, also post-meal elevations (see Media tab).    Recommendations:  1. New regimen:  - NPH 80 u PM  - Aspart 12 u breakfast/ 8 u lunch/ 16 u dinner  2. Continue 4 x daily blood sugar checks  3. Send in blood sugars in 1 week via [employer], virtual BS check with MFM at that time  4. HgA1c each trimester, next due in July (primary team to coordinate)  5. Initiate serial monthly growth assessments, next due at 28 weeks, follow with MFM at that time  6. Initiate twice weekly antepartum fetal surveillance at 32 weeks  7. Delivery timing for preexisting diabetes with BMI > 40 and CHTN is 36w0d - 37w6d

## 2024-06-11 NOTE — ASSESSMENT & PLAN NOTE
New diagnosis this pregnancy.  See prior notes for full counseling.  Last TSH on 5/28 was 1.7 (within goal).    Recommendations:  1. Medication management  - Continue current management for now  2. TSH should be monitored at least every trimester, or every 4 weeks after any medication adjustment (primary team to coordinate)  - As other thyroid markers, such as T3 or free T4, do not inform titration of therapy, there is no need to draw them during pregnancy  - Adjust treatment as necessary to maintain TSH goal < 2.5 mIU/L

## 2024-06-11 NOTE — ASSESSMENT & PLAN NOTE
BP on target today with current regimen.   Connected MOM values overall also on target, except for elevations on 6/9 (patient states she was sick with a cold)  Advised patient on strict preeclampsia precautions    Recommendations:  1. Continue current regimen of Labetalol 200mg BID  - Targets for therapy should be 140/90; titrate dosing to maintain blood pressures under those levels.  - Connected MOM values should be considered with modifying dosing  2. Fetal growth, prenatal testing and delivery timing per DM header

## 2024-06-11 NOTE — TELEPHONE ENCOUNTER
Request for virtual blood sugar check in 1wk sent to [facility] inbasket at this time.

## 2024-06-13 NOTE — PATIENT INSTRUCTIONS
Patient Education       Pregnancy - The Sixth Month   About this topic   It is important for you to learn how to take care of yourself to help you have a healthy baby and safe delivery. It is good to have health care throughout your pregnancy.  The sixth month of your pregnancy starts around week 24 and lasts through week 28. By knowing how far along you are, you can learn what is normal for this stage of your pregnancy and plan for what is next.  General   Your body   During the sixth month of your pregnancy, here are some things you can expect.  You may:  Start to gain a little more weight. It is normal to gain about 10 to 15 pounds (4.5 to 7 kg) total in your first 6 months.  Have problems like back pain, dry eyes, or aching hands and wrists  Itching of palms, abdomen, or soles of your feet  Swelling of ankles, fingers, or face.  Need to urinate more frequently.  Have problems with hemorrhoids. Be sure to eat plenty of fresh fruits and vegetables to increase the fiber in your diet. Drink plenty of water to help keep your stools soft.  Continue having Tippah Macario contractions. You may feel your belly squeezing or getting tight.  Have a glucose test to test for gestational diabetes.  Have more headaches. Talk to your doctor about how to help with them.  See stretch marks on your belly, breasts, or legs. Stay active to try and keep good muscle tone.  See an increase in vaginal discharge. Talk to your doctor about what to expect.  Your baby's growth and development:  Your baby looks like a very small  baby.  They are able to live outside of your womb but would need a lot of help breathing, eating, and staying warm in an intensive care unit.  Your baby has times of being awake and times of being asleep. The babys eyelids are able to open and close.  They move more and have hiccups.  Your baby is about 14 inches (36 cm) long and weighs about 2 pounds (900 gm). Your baby is about the size of an eggplant.  Baby  may be able to hear voices.  Things to Think About   Avoid alcohol, drugs, tobacco products, and second hand smoke  Eat small meals throughout the day instead of larger meals.  Avoid spicy, greasy, or fatty foods.  Avoid lying down or bending over for around 3 hours after eating  Warm baths are fine to help you relax. Avoid hot tubs while you are pregnant.  Avoid straining when having bowel movements.  Learning how to care for your baby. You may want to sign up for classes on how to take care of a .  Are you planning on going back to work after having your baby? Its not too early to think about .  Consider starting your birth plan if you have specific needs or wishes for delivery.  When do I need to call the doctor?   Contractions every 10 minutes or more often that do not go away with drinking water or position changes  Low, dull back pain that does not go away  Pressure in your pelvis that feels like your baby is pushing down  A gush or constant trickle of watery or bloody fluid leaking from your vagina  Cramps in your lower belly that come and go or are constant  Change in your baby's movement  Fever of 100.4°F (38°C) or higher  Little to no movement felt by baby in 2 hours. Your baby should be moving at least 10 times every 2 hours.  Headache that does not go away; blurry vision; seeing spots or halos; increase in swelling in your hands, feet, or face; and pain under your ribs on the right side  Vaginal bleeding with or without pain  After a car accident, fall, or any trauma to your belly  Having thoughts of harming yourself or others, or do not feel safe at home  Where can I learn more?   American Academy of Family Physicians  https://familydoctor.org/changes-in-your-body-during-pregnancy-second-trimester/   KidsHealth  http://kidshealth.org/en/parents/pregnancy-calendar-intro.html?WT.ac=p-antionette   Office of Womens  Health  https://www.womenshealth.gov/pregnancy/youre-pregnant-now-what/stages-pregnancy   Last Reviewed Date   2020-05-06  Consumer Information Use and Disclaimer   This information is not specific medical advice and does not replace information you receive from your health care provider. This is only a brief summary of general information. It does NOT include all information about conditions, illnesses, injuries, tests, procedures, treatments, therapies, discharge instructions or life-style choices that may apply to you. You must talk with your health care provider for complete information about your health and treatment options. This information should not be used to decide whether or not to accept your health care providers advice, instructions or recommendations. Only your health care provider has the knowledge and training to provide advice that is right for you.  Copyright   Copyright © 2021 UpToDate, Inc. and its affiliates and/or licensors. All rights reserved.  Patient Education       Fetal Movement   About this topic   Feeling your baby move for the first time is a good sign that your baby is doing well. You may begin to feel these movements between the 18th and 25th weeks of your pregnancy. If this is your first time being pregnant, it may be closer to 25 weeks. Your baby has been moving around before this, but the kicks have not been strong enough for you to feel. During the first weeks of feeling movement, you may start to see a pattern during the day when your baby is most active. You can track your baby's kicks each day at home. This is also known as kick counting. It is a good way to check on your baby's movements and well being.  Most often, fetal kick counting is used in high-risk pregnancies. It may be useful for all pregnancies. Counting and writing down your baby's kicks, jabs, twists, flutters, rolls, turns, flips, and swishes may help find a problem that needs more evaluation. The American  "College of Obstetricians and Gynecologists, or ACOG, suggests that you record how much time it takes you to feel 10 of these movements. Ideally, you should be able to feel 10 movements within 2 hours. Many people will track these movements in much less time.  General   How to Track Your Baby's Kick Counts   Most often your doctor will want you to wait until the 28th to 30th weeks of your pregnancy to start kick counting. Here are some tips to help you get started.  Find the time of day when your baby is most active. For some people, this is right after eating. Others find their baby moving a lot after they have been exercising or more active. Some babies are more active in the evenings when your blood sugar starts to lower.  Try to count kicks at about the same time each day.  Before you start counting, have something to eat or drink. Also take a short walk or do some light activity.  Choose a quiet place where you can focus on your baby's movements. Also get in a comfortable position. Try and lie on one side or the other. You may need to change positions until you find one that works best for you and your baby.  Keep a notebook to track your baby's kicks. Your doctor may give you a chart to use or you can make your own. Write down the date, time you started counting, and the time of each "kick" during a 2-hour period until you have felt 10 kicks.  Once you have recorded 10 kicks within 2 hours you can stop counting.  If you are not able to record 10 movements over 2 hours you should get up and move around or eat something and try again.  If you are not able to record 10 movements over 2 hours the second time, call your doctor. They may want you to go to the hospital to get your baby checked.  When do I need to call the doctor?   You have felt less than 10 movements over a period of 2 hours.  It takes longer each day to record 10 movements.  There is a big change in the pattern of movements you are writing " down.  You feel no movement for 2 hours even after eating a snack, light activity, and position changes.  Teach Back: Helping You Understand   The Teach Back Method helps you understand the information we are giving you. After you talk with the staff, tell them in your own words what you learned. This helps to make sure the staff has described each thing clearly. It also helps to explain things that may have been confusing. Before going home, make sure you can do these:  I can tell you about feeling my baby move.  I can tell you how I will track my babys kicks.  I can tell you what I will do if I feel less than 10 movements in 2 hours, it takes longer to feel my baby move 10 times, or there is a big change in how my baby is moving.  Last Reviewed Date   2021-10-08  Consumer Information Use and Disclaimer   This information is not specific medical advice and does not replace information you receive from your health care provider. This is only a brief summary of general information. It does NOT include all information about conditions, illnesses, injuries, tests, procedures, treatments, therapies, discharge instructions or life-style choices that may apply to you. You must talk with your health care provider for complete information about your health and treatment options. This information should not be used to decide whether or not to accept your health care providers advice, instructions or recommendations. Only your health care provider has the knowledge and training to provide advice that is right for you.  Copyright   Copyright © 2021 UpToDate, Inc. and its affiliates and/or licensors. All rights reserved.

## 2024-06-14 ENCOUNTER — PATIENT OUTREACH (OUTPATIENT)
Dept: DIABETES | Facility: CLINIC | Age: 29
End: 2024-06-14
Payer: MEDICAID

## 2024-06-14 NOTE — PROGRESS NOTES
Called patient in response to message about starting insulin. Pt was worried about her BG readings after change from Levemir to Novolin plus Novolog. Scheduled appt for Monday, June 17.

## 2024-06-17 ENCOUNTER — OFFICE VISIT (OUTPATIENT)
Dept: MATERNAL FETAL MEDICINE | Facility: CLINIC | Age: 29
End: 2024-06-17
Payer: MEDICAID

## 2024-06-17 DIAGNOSIS — O24.112 PREGNANCY WITH TYPE 2 DIABETES MELLITUS IN SECOND TRIMESTER: Primary | ICD-10-CM

## 2024-06-17 DIAGNOSIS — E66.01 CLASS 3 SEVERE OBESITY DUE TO EXCESS CALORIES WITH SERIOUS COMORBIDITY AND BODY MASS INDEX (BMI) OF 45.0 TO 49.9 IN ADULT: ICD-10-CM

## 2024-06-17 DIAGNOSIS — O10.919 CHRONIC HYPERTENSION AFFECTING PREGNANCY: ICD-10-CM

## 2024-06-17 PROCEDURE — 3044F HG A1C LEVEL LT 7.0%: CPT | Mod: CPTII,95,, | Performed by: OBSTETRICS & GYNECOLOGY

## 2024-06-17 PROCEDURE — 99214 OFFICE O/P EST MOD 30 MIN: CPT | Mod: TH,95,, | Performed by: OBSTETRICS & GYNECOLOGY

## 2024-06-17 RX ORDER — INSULIN ASPART 100 [IU]/ML
INJECTION, SOLUTION INTRAVENOUS; SUBCUTANEOUS
Qty: 12 ML | Refills: 5 | Status: SHIPPED | OUTPATIENT
Start: 2024-06-17 | End: 2024-12-14

## 2024-06-17 NOTE — PROGRESS NOTES
"Maternal Fetal Medicine follow up consult    The patient location is: Home  The chief complaint leading to consultation is: BS check    Visit type: audiovisual    Face to Face time with patient: 7m 09s    Each patient to whom he or she provides medical services by telemedicine is:  (1) informed of the relationship between the physician and patient and the respective role of any other health care provider with respect to management of the patient; and (2) notified that he or she may decline to receive medical services by telemedicine and may withdraw from such care at any time.    SUBJECTIVE:     Kathy Mishra is a 28 y.o.  female with IUP at 28w6d who is seen in follow up consultation by [facility].  Pregnancy complications include:   Problem   Pregnancy With Type 2 Diabetes Mellitus in Second Trimester   Chronic Hypertension Affecting Pregnancy   Class 3 Severe Obesity Due to Excess Calories With Serious Comorbidity and Body Mass Index (Bmi) of 45.0 to 49.9 in Adult     Previous notes reviewed.   No changes to medical, surgical, family, social, or obstetric history.    Interval history since last [facility] visit:   Patient has no complaints today. She is overall feeling well.  Patient denies any contractions/cramping, vaginal bleeding or leakage of fluid.  She reports good fetal movement.    Medications:  Current Outpatient Medications   Medication Instructions    aspirin 81 mg, Oral, Daily    blood sugar diagnostic Strp To check BG 4 times daily, to use with insurance preferred meter    blood-glucose sensor (DEXCOM G7 SENSOR) Fern 1 Device, Misc.(Non-Drug; Combo Route), Every 10 days    EASY TOUCH 31 gauge x 5/16" Ndle USE DAILY    insulin aspart U-100 (NOVOLOG) 100 unit/mL injection Inject 14 Units into the skin daily with breakfast AND 10 Units with lunch AND 18 Units daily with dinner or evening meal.    insulin NPH 80 Units, Subcutaneous, Nightly    insulin syringe-needle U-100 1 mL 31 gauge x 5/16 Syrg 100 " "each, Misc.(Non-Drug; Combo Route), 4 times daily    labetaloL (NORMODYNE) 200 mg, Oral, 2 times daily    lancets Misc To check BG 4 times daily, to use with insurance preferred meter    levothyroxine (SYNTHROID) 100 mcg, Oral, Before breakfast    NOVOLIN N FLEXPEN 100 unit/mL (3 mL) InPn     ondansetron (ZOFRAN) 4 mg, Oral, Daily PRN    pen needle, diabetic (LITE TOUCH INSULIN PEN NEEDLES) 31 gauge x 1/4" Ndle 1 Needle, Misc.(Non-Drug; Combo Route), Daily    prenatal vitamins #45/iron/FA (PRENATAL VITAMIN #45-IRON-FA ORAL) Oral    promethazine (PHENERGAN) 12.5 mg, Oral, 4 times daily    TRUE METRIX GLUCOSE METER Misc To check BG 4 times daily     Care team members:  MIKE YOUNGER - Primary      OBJECTIVE:   LMP 2023     Physical Exam:  Deferred - virtual    Significant labs/imaging:      ASSESSMENT/PLAN:     28 y.o.  female with IUP at 28w6d    Pregnancy with type 2 diabetes mellitus in second trimester  Current regimen:   - NPH: 80 u QHS  - Aspart: 12/8/16 u with meals    See prior counseling with Me, Masood Arboleda and Davey.  Logs reviewed. Fastings continue to be elevated, also post-meal elevations (see above)    Patient has appt with DME next week to review diet.   Given elevations, will need to continue to increase insulin. There does not appear to be a clear association with types of food and elevations for patient. Recommend maintaining a diet log to determine if any changes can be made for that.    Recommendations:  1. New regimen:  - NPH 90 u PM  - Aspart 14 u breakfast/ 10 u lunch/ 18 u dinner  2. Continue 4 x daily blood sugar checks  3. Send in blood sugars in 1 week via VisTrackshart, virtual BS check with M at that time  4. HgA1c each trimester, next due in July (primary team to coordinate)  5. Initiate serial monthly growth assessments, Scheduled in July  6. Initiate twice weekly antepartum fetal surveillance at 32 weeks   (Should be ordered and arranged by the patient's primary OB " provider).  7. Delivery timing for preexisting diabetes with BMI > 40 and CHTN is 36w0d - 37w6d\      Chronic hypertension affecting pregnancy  Please see original consult for full counseling and recommendations   Patient is currently on Labetalol 200mg BID and tolerating well.  Encouraged patient to attempt to use Connected MOM cuff more often in order to obtain more data points. Reports likely needing a new machine as it has not been charging.   PreE precautions were reviewed.      Class 3 severe obesity due to excess calories with serious comorbidity and body mass index (BMI) of 45.0 to 49.9 in adult  Management per primary OB provider  Please see original consult for full counseling and recommendations       FOLLOW UP: 1 week virtual.      The patient was given an opportunity to ask questions about the management of her high risk pregnancy problems. She expressed an understanding of and agreement to the above impression and plan. All questions were answered to her satisfaction.        Contreras Shannon MD   Maternal-Fetal Medicine      Electronically Signed by Contreras Shannon June 17, 2024

## 2024-06-17 NOTE — ASSESSMENT & PLAN NOTE
Management per primary OB provider  Please see original consult for full counseling and recommendations

## 2024-06-17 NOTE — ASSESSMENT & PLAN NOTE
Please see original consult for full counseling and recommendations   Patient is currently on Labetalol 200mg BID and tolerating well.  Encouraged patient to attempt to use Connected MOM cuff more often in order to obtain more data points. Reports likely needing a new machine as it has not been charging.   PreE precautions were reviewed.

## 2024-06-17 NOTE — ASSESSMENT & PLAN NOTE
Current regimen:   - NPH: 80 u QHS  - Aspart: 12/8/16 u with meals    See prior counseling with Me, Masood Arboleda and Davey.  Logs reviewed. Fastings continue to be elevated, also post-meal elevations (see above)    Patient has appt with DME next week to review diet.   Given elevations, will need to continue to increase insulin. There does not appear to be a clear association with types of food and elevations for patient. Recommend maintaining a diet log to determine if any changes can be made for that.    Recommendations:  1. New regimen:  - NPH 90 u PM  - Aspart 14 u breakfast/ 10 u lunch/ 18 u dinner  2. Continue 4 x daily blood sugar checks  3. Send in blood sugars in 1 week via Epos, virtual BS check with MFM at that time  4. HgA1c each trimester, next due in July (primary team to coordinate)  5. Initiate serial monthly growth assessments, Scheduled in July  6. Initiate twice weekly antepartum fetal surveillance at 32 weeks   (Should be ordered and arranged by the patient's primary OB provider).  7. Delivery timing for preexisting diabetes with BMI > 40 and CHTN is 36w0d - 37w6d\

## 2024-06-18 DIAGNOSIS — G47.33 OSA (OBSTRUCTIVE SLEEP APNEA): Primary | ICD-10-CM

## 2024-06-21 ENCOUNTER — PATIENT MESSAGE (OUTPATIENT)
Dept: OBSTETRICS AND GYNECOLOGY | Facility: CLINIC | Age: 29
End: 2024-06-21
Payer: MEDICAID

## 2024-06-24 ENCOUNTER — PATIENT MESSAGE (OUTPATIENT)
Dept: OBSTETRICS AND GYNECOLOGY | Facility: CLINIC | Age: 29
End: 2024-06-24
Payer: MEDICAID

## 2024-06-25 ENCOUNTER — PATIENT MESSAGE (OUTPATIENT)
Dept: OBSTETRICS AND GYNECOLOGY | Facility: CLINIC | Age: 29
End: 2024-06-25
Payer: MEDICAID

## 2024-06-25 ENCOUNTER — LAB VISIT (OUTPATIENT)
Dept: LAB | Facility: HOSPITAL | Age: 29
End: 2024-06-25
Attending: ADVANCED PRACTICE MIDWIFE
Payer: MEDICAID

## 2024-06-25 ENCOUNTER — PATIENT MESSAGE (OUTPATIENT)
Dept: OTHER | Facility: OTHER | Age: 29
End: 2024-06-25
Payer: MEDICAID

## 2024-06-25 ENCOUNTER — ROUTINE PRENATAL (OUTPATIENT)
Dept: OBSTETRICS AND GYNECOLOGY | Facility: CLINIC | Age: 29
End: 2024-06-25
Payer: MEDICAID

## 2024-06-25 VITALS — SYSTOLIC BLOOD PRESSURE: 155 MMHG | WEIGHT: 278.88 LBS | DIASTOLIC BLOOD PRESSURE: 80 MMHG | BODY MASS INDEX: 49.4 KG/M2

## 2024-06-25 DIAGNOSIS — E66.01 CLASS 3 SEVERE OBESITY DUE TO EXCESS CALORIES WITH SERIOUS COMORBIDITY AND BODY MASS INDEX (BMI) OF 45.0 TO 49.9 IN ADULT: ICD-10-CM

## 2024-06-25 DIAGNOSIS — O24.112 PREGNANCY WITH TYPE 2 DIABETES MELLITUS IN SECOND TRIMESTER: ICD-10-CM

## 2024-06-25 DIAGNOSIS — O34.219 HISTORY OF CESAREAN DELIVERY, CURRENTLY PREGNANT: Primary | ICD-10-CM

## 2024-06-25 DIAGNOSIS — O10.919 CHRONIC HYPERTENSION AFFECTING PREGNANCY: ICD-10-CM

## 2024-06-25 DIAGNOSIS — Z86.39 HISTORY OF THYROID DISORDER: ICD-10-CM

## 2024-06-25 LAB
CREAT UR-MCNC: 70 MG/DL (ref 15–325)
PROT UR-MCNC: 10 MG/DL (ref 0–15)
PROT/CREAT UR: 0.14 MG/G{CREAT} (ref 0–0.2)

## 2024-06-25 PROCEDURE — 84156 ASSAY OF PROTEIN URINE: CPT | Performed by: ADVANCED PRACTICE MIDWIFE

## 2024-06-25 PROCEDURE — 36415 COLL VENOUS BLD VENIPUNCTURE: CPT | Mod: PN | Performed by: ADVANCED PRACTICE MIDWIFE

## 2024-06-25 PROCEDURE — 99999 PR PBB SHADOW E&M-EST. PATIENT-LVL III: CPT | Mod: PBBFAC,,, | Performed by: ADVANCED PRACTICE MIDWIFE

## 2024-06-25 PROCEDURE — 99213 OFFICE O/P EST LOW 20 MIN: CPT | Mod: TH,S$PBB,, | Performed by: ADVANCED PRACTICE MIDWIFE

## 2024-06-25 PROCEDURE — 99213 OFFICE O/P EST LOW 20 MIN: CPT | Mod: PBBFAC,TH,PN | Performed by: ADVANCED PRACTICE MIDWIFE

## 2024-06-25 PROCEDURE — 80053 COMPREHEN METABOLIC PANEL: CPT | Performed by: ADVANCED PRACTICE MIDWIFE

## 2024-06-25 PROCEDURE — 85025 COMPLETE CBC W/AUTO DIFF WBC: CPT | Performed by: ADVANCED PRACTICE MIDWIFE

## 2024-06-25 NOTE — PROGRESS NOTES
28 y.o. female  at 30w0d   Reports + FM, denies VB, LOF or CTX  Doing well without concerns   TW lbs   Reviewed 28wk lab results (A POS) H/H:  11.4/34.6, RPR negative, HIV negative     Tdap consider next visit      History of  delivery, currently pregnant  Please consultation visit with OBGYN regarding mode and timing of delivery will schedule    Chronic hypertension affecting pregnancy  -     Comprehensive Metabolic Panel; Future; Expected date: 2024  -     Protein/Creatinine Ratio, Urine  -     CBC Auto Differential; Future; Expected date: 2024  Labetalol 200 mg b.i.d..  Daily baby aspirin.  Initial blood pressure 155/80, asymptomatic, recheck 140/78 but will run PIH labs today for evaluation and PIH precautions are reviewed    Pregnancy with type 2 diabetes mellitus in second trimester  Followed by [facility]-NPH 90 units p.m., as far 14 units breakfast/10 units lunch/80 units dinner  Check A1c at next visit has appointment with Dr. Prince 2024  Next ultrasound scheduled 24-will need to start twice weekly ultrasounds at next visit coordinating with [facility] ultrasounds       Class 3 severe obesity due to excess calories with serious comorbidity and body mass index (BMI) of 45.0 to 49.9 in adult  Continue with daily baby aspirin and ultrasounds    History of thyroid disorder  Synthroid 100 mcg    Reviewed warning signs, normal FKCs,  labor precautions and how/when to call. Patient states understanding  RTC x 2 wks, call or present sooner prn

## 2024-06-26 ENCOUNTER — TELEPHONE (OUTPATIENT)
Dept: OBSTETRICS AND GYNECOLOGY | Facility: CLINIC | Age: 29
End: 2024-06-26
Payer: MEDICAID

## 2024-06-26 ENCOUNTER — PATIENT MESSAGE (OUTPATIENT)
Dept: OBSTETRICS AND GYNECOLOGY | Facility: CLINIC | Age: 29
End: 2024-06-26
Payer: MEDICAID

## 2024-06-26 DIAGNOSIS — O10.919 CHRONIC HYPERTENSION AFFECTING PREGNANCY: ICD-10-CM

## 2024-06-26 DIAGNOSIS — O34.219 HISTORY OF CESAREAN DELIVERY, CURRENTLY PREGNANT: Primary | ICD-10-CM

## 2024-06-26 LAB
ALBUMIN SERPL BCP-MCNC: 2.8 G/DL (ref 3.5–5.2)
ALP SERPL-CCNC: 116 U/L (ref 55–135)
ALT SERPL W/O P-5'-P-CCNC: 9 U/L (ref 10–44)
ANION GAP SERPL CALC-SCNC: 10 MMOL/L (ref 8–16)
AST SERPL-CCNC: 11 U/L (ref 10–40)
BASOPHILS # BLD AUTO: 0.01 K/UL (ref 0–0.2)
BASOPHILS NFR BLD: 0.1 % (ref 0–1.9)
BILIRUB SERPL-MCNC: 0.2 MG/DL (ref 0.1–1)
BUN SERPL-MCNC: 9 MG/DL (ref 6–20)
CALCIUM SERPL-MCNC: 10 MG/DL (ref 8.7–10.5)
CHLORIDE SERPL-SCNC: 109 MMOL/L (ref 95–110)
CO2 SERPL-SCNC: 19 MMOL/L (ref 23–29)
CREAT SERPL-MCNC: 0.7 MG/DL (ref 0.5–1.4)
DIFFERENTIAL METHOD BLD: ABNORMAL
EOSINOPHIL # BLD AUTO: 0.1 K/UL (ref 0–0.5)
EOSINOPHIL NFR BLD: 0.7 % (ref 0–8)
ERYTHROCYTE [DISTWIDTH] IN BLOOD BY AUTOMATED COUNT: 14.9 % (ref 11.5–14.5)
EST. GFR  (NO RACE VARIABLE): >60 ML/MIN/1.73 M^2
GLUCOSE SERPL-MCNC: 91 MG/DL (ref 70–110)
HCT VFR BLD AUTO: 33.7 % (ref 37–48.5)
HGB BLD-MCNC: 11 G/DL (ref 12–16)
IMM GRANULOCYTES # BLD AUTO: 0.06 K/UL (ref 0–0.04)
IMM GRANULOCYTES NFR BLD AUTO: 0.7 % (ref 0–0.5)
LYMPHOCYTES # BLD AUTO: 1.2 K/UL (ref 1–4.8)
LYMPHOCYTES NFR BLD: 13.6 % (ref 18–48)
MCH RBC QN AUTO: 30.1 PG (ref 27–31)
MCHC RBC AUTO-ENTMCNC: 32.6 G/DL (ref 32–36)
MCV RBC AUTO: 92 FL (ref 82–98)
MONOCYTES # BLD AUTO: 0.5 K/UL (ref 0.3–1)
MONOCYTES NFR BLD: 5.4 % (ref 4–15)
NEUTROPHILS # BLD AUTO: 7.1 K/UL (ref 1.8–7.7)
NEUTROPHILS NFR BLD: 79.5 % (ref 38–73)
NRBC BLD-RTO: 0 /100 WBC
PLATELET # BLD AUTO: 288 K/UL (ref 150–450)
PMV BLD AUTO: 10.1 FL (ref 9.2–12.9)
POTASSIUM SERPL-SCNC: 3.8 MMOL/L (ref 3.5–5.1)
PROT SERPL-MCNC: 6.5 G/DL (ref 6–8.4)
RBC # BLD AUTO: 3.65 M/UL (ref 4–5.4)
SODIUM SERPL-SCNC: 138 MMOL/L (ref 136–145)
WBC # BLD AUTO: 8.95 K/UL (ref 3.9–12.7)

## 2024-06-26 NOTE — TELEPHONE ENCOUNTER
----- Message from Sugar Sal MD sent at 6/26/2024  7:16 AM CDT -----  Regarding: RE: High-risk patient timing and mode of delivery  Maria Guadalupe, go ahead and schedule pt's repeat C/S with me on Thursday 8/15.  Can do her consultation with me on the 7th if that's what is available.  ----- Message -----  From: Shelyl Haddad CNM  Sent: 6/25/2024   5:36 PM CDT  To: Sugar Sal MD  Subject: High-risk patient timing and mode of delivery    Good afternoon please review chart needs a consultation visit next available is 8 /7/ 24 when she will be 36 weeks pregnant, do you want to do an audiovisual earlier than that?  Price

## 2024-06-27 ENCOUNTER — OFFICE VISIT (OUTPATIENT)
Dept: MATERNAL FETAL MEDICINE | Facility: CLINIC | Age: 29
End: 2024-06-27
Payer: MEDICAID

## 2024-06-27 ENCOUNTER — PATIENT MESSAGE (OUTPATIENT)
Dept: MATERNAL FETAL MEDICINE | Facility: CLINIC | Age: 29
End: 2024-06-27

## 2024-06-27 DIAGNOSIS — O24.112 PREGNANCY WITH TYPE 2 DIABETES MELLITUS IN SECOND TRIMESTER: ICD-10-CM

## 2024-06-27 PROCEDURE — 3044F HG A1C LEVEL LT 7.0%: CPT | Mod: CPTII,95,, | Performed by: OBSTETRICS & GYNECOLOGY

## 2024-06-27 PROCEDURE — 99214 OFFICE O/P EST MOD 30 MIN: CPT | Mod: TH,95,, | Performed by: OBSTETRICS & GYNECOLOGY

## 2024-06-27 RX ORDER — INSULIN ASPART 100 [IU]/ML
INJECTION, SOLUTION INTRAVENOUS; SUBCUTANEOUS
Qty: 12 ML | Refills: 5 | Status: SHIPPED | OUTPATIENT
Start: 2024-06-27 | End: 2024-12-24

## 2024-06-27 NOTE — PROGRESS NOTES
The patient location is: home  The chief complaint leading to consultation is: T2DM    Visit type: audiovisual    Face to Face time with patient: 10 minutes  15 minutes of total time spent on the encounter, which includes face to face time and non-face to face time preparing to see the patient (eg, review of tests), Obtaining and/or reviewing separately obtained history, Documenting clinical information in the electronic or other health record, Independently interpreting results (not separately reported) and communicating results to the patient/family/caregiver, or Care coordination (not separately reported).     Each patient to whom he or she provides medical services by telemedicine is:  (1) informed of the relationship between the physician and patient and the respective role of any other health care provider with respect to management of the patient; and (2) notified that he or she may decline to receive medical services by telemedicine and may withdraw from such care at any time.    Notes:     Maternal Fetal Medicine follow up consult  Kathy Mishra is a 28 y.o.  female with IUP at 30w2d who is seen in follow up consultation by M.  Problems addressed today:  T2 diabetes in pregnancy - BG check only    Interval history since last MFM visit: no changes. The patient reports adequate fetal movement. She denies leakage of fluid, vaginal bleeding, contractions.  The patient denies symptoms of pre-eclampsia including headache, blurred vision, right upper quadrant pain, chest pain, and shortness of breath.   Current regimen: NPH 90u qhs, Aspart 14/10/18    BGs reviewed, see Media tab:  Fasting BGs all elevated, improving in compared to previous  2hr PP B majority elevated  2hr PP L and D majority within range    Recommendations:  See original MFM note for full consultation and management recommendations regarding diabetes in pregnancy  Changes made today to insulin regimen: NPH 100u qhs, Aspart 16/10/18  Next  MFM appointment will be scheduled: In 1-2 weeks for BG check. In 2-3 weeks for BG check/fetal growth assessment.     Counseling:  We again discussed the importance of achieving blood sugar goals of fasting <95 and 2 hour postprandial <120 in order to decrease the risk of adverse pregnancy outcomes.  Continue to evaluate BGs 4x/day  The patient was advised to call for blood sugars less than 70 or greater than 200 prior to her next appointment. She was also advised that if she notes nausea/vomiting, inability to tolerate PO, or concerns about being able to take medications that she should contact her provider or present to the OB ED.    Surveillance and delivery timing:  Growth scans every 4 weeks. An ultrasound for estimated fetal weight should be obtained within 3 weeks of anticipated delivery; if the EFW is >= 4500 grams, a  should be offered.    Pre-gestational diabetes:  Twice weekly  fetal surveillance is recommended at 32 weeks gestation (BPP alternating with NST)  Delivery timin 0/7 to 38 and 6/7 weeks if under good control without comorbidities  37 0/7 to 37 and 67 weeks if longstanding diabetes or poorly controlled, polyhydramnios, EFW>90th percentile, or BMI >= 40  36 0/7 to 37 and 6/7 weeks if vascular complications, prior stillbirth or other complicating conditions.  Recommend consideration of earlier delivery if IUGR, HTN, or other complications    See previous recommendations pertaining to intrapartum and postpartum insulin and glucose management guidelines.     Pearl Prince MD  Maternal Fetal Medicine

## 2024-07-01 ENCOUNTER — PATIENT MESSAGE (OUTPATIENT)
Dept: OBSTETRICS AND GYNECOLOGY | Facility: CLINIC | Age: 29
End: 2024-07-01
Payer: MEDICAID

## 2024-07-01 ENCOUNTER — CLINICAL SUPPORT (OUTPATIENT)
Dept: DIABETES | Facility: CLINIC | Age: 29
End: 2024-07-01
Payer: MEDICAID

## 2024-07-01 VITALS — BODY MASS INDEX: 48.82 KG/M2 | HEIGHT: 63 IN | WEIGHT: 275.56 LBS

## 2024-07-01 DIAGNOSIS — O24.112 PREGNANCY WITH TYPE 2 DIABETES MELLITUS IN SECOND TRIMESTER: Primary | ICD-10-CM

## 2024-07-01 PROCEDURE — G0108 DIAB MANAGE TRN  PER INDIV: HCPCS | Mod: PBBFAC,PO | Performed by: DIETITIAN, REGISTERED

## 2024-07-01 PROCEDURE — 99999PBSHW PR PBB SHADOW TECHNICAL ONLY FILED TO HB: Mod: PBBFAC,,,

## 2024-07-01 PROCEDURE — 99211 OFF/OP EST MAY X REQ PHY/QHP: CPT | Mod: PBBFAC,PO | Performed by: DIETITIAN, REGISTERED

## 2024-07-01 PROCEDURE — 99999 PR PBB SHADOW E&M-EST. PATIENT-LVL I: CPT | Mod: PBBFAC,,, | Performed by: DIETITIAN, REGISTERED

## 2024-07-01 NOTE — Clinical Note
Pt was previously on Levemir and Novolog, but Levemir is no longer available so she was switched to NPH.  NPH only prescribed for night.   Pt is still having high fasting BG and higher than normal for pregnancy between meals. (Dexcom Clarity report in media)  Either switching to Lantus or taking NPH BID would be helpful. Having better coverage during the day between meals could also help with BG rising from snacks.   She will also work on keeping carb intake consistent at meals since her Novolog doses are set.

## 2024-07-01 NOTE — PROGRESS NOTES
"Diabetes Care Specialist Follow-up Note  Author: Mckayla Sandra RD  Date: 7/1/2024    Program Intake  Reason for Diabetes Program Visit:: Intervention  Type of Intervention:: Individual  Individual: Education  Education: Self-Management Skill Review, Nutrition and Meal Planning    Lab Results   Component Value Date    HGBA1C 5.6 05/28/2024       CURRENT DM MEDICATIONS:   NPH, 100 units at night  Novolog, 16/10/18     Clinical    Weight: 125 kg (275 lb 9.2 oz)   Height: 5' 3" (160 cm)   Body mass index is 48.82 kg/m².    Nutritional Status  Meal Plan 24 Hour Recall: Breakfast, Lunch, Dinner, Snack  Meal Plan 24 Hour Recall - Breakfast: coffee w/[place] milk or chocolate creamer then 2 cinnamon rolls, 1 pc sausage; SF Gatorade  Meal Plan 24 Hour Recall - Lunch: 2p - 3 chicken strips, apple; SF Gatorade  Meal Plan 24 Hour Recall - Dinner: 9p - 2 slices pizza ([employer]); SF Gatorade  Meal Plan 24 Hour Recall - Snack: orange, apple      SMBG: Dexcom G7  Clarity report summary below; full report in media            Today's interventions were provided through individual discussion, instruction, and written materials were provided.    Patient verbalized understanding of instruction and written materials.  Pt was able to return back demonstration of instructions today. Patient understood key points, needs reinforcement and further instruction.     Diabetes Self-Management Care Plan Review and Evaluation of Progress:    During today's follow-up Valentina Diabetes Self-Management Care Plan progress was reviewed and progress was evaluated including his/her input. Kathy has agreed to continue his/her journey to improve/maintain overall diabetes control by continuing to set health goals. See care plan progress below.      Care Plan: Diabetes Management   Updates made since 6/1/2024 12:00 AM        Problem: Healthy Eating         Goal: Eat 3 meals daily with 30-45g or 2-3 servings of Carbohydrate per meal.    Start Date: " 2/26/2024   Expected End Date: 9/3/2024   This Visit's Progress: On track   Recent Progress: On track   Priority: High   Barriers: No Barriers Identified   Note:    Pt is limiting carbs, but not counting carbs in each meal.   Her goal for meals is to eat the nonstarchy vegetables, then protein source then carb food.   Noted two occasions of higher BG readings - cinnamon rolls for breakfast and pizza for dinner.     Pt is to try to keep carb intake the same each breakfast (20-30 grams); And the same each lunch and dinner (45 grams)  Advised that keeping carb intake consistent helps when on set doses of insulin as she is.   Recommended having fruit with meals since she is taking insulin for meals rather than having CHO for snacks.          Problem: Blood Glucose Self-Monitoring         Goal: Patient agrees to check and record blood sugars 4 times per day.    Start Date: 2/26/2024   Expected End Date: 9/3/2024   This Visit's Progress: On track   Priority: Medium   Barriers: No Barriers Identified   Note:    Pt is checking fasting BG daily with finger stick.   She is also on Dexcom and keeping track of her premeal and 2 hour post prandial BG.        Problem: Medications         Goal: Patient Agrees to take NPH at the same time each night and Novolog 10 min before meals.    Start Date: 7/1/2024   Expected End Date: 8/19/2024   Priority: High   Barriers: No Barriers Identified   Note:    Pt was previously on Levemir and Novolog, but Levemir is no longer available so she was switched to NPH.   NPH only prescribed for night.     Pt is still having high fasting BG and higher than normal for pregnancy between meals.     Either switching to Lantus or taking NPH at night and in am would be helpful. Having better coverage during the day between meals could also help with BG rising from snacks.          Follow Up Plan     Follow up if symptoms worsen or fail to improve.    Today's care plan and follow up schedule was discussed  with patient.  Kathy verbalized understanding of the care plan, goals, and agrees to follow up plan.        The patient was encouraged to communicate with his/her health care provider/physician and care team regarding his/her condition(s) and treatment.  I provided the patient with my contact information today and encouraged to contact me via phone or [facility] Patient Portal as needed.     Length of Visit   Total Time: 60 Minutes

## 2024-07-09 ENCOUNTER — PATIENT MESSAGE (OUTPATIENT)
Dept: OTHER | Facility: OTHER | Age: 29
End: 2024-07-09
Payer: MEDICAID

## 2024-07-10 ENCOUNTER — PATIENT MESSAGE (OUTPATIENT)
Dept: OBSTETRICS AND GYNECOLOGY | Facility: CLINIC | Age: 29
End: 2024-07-10
Payer: MEDICAID

## 2024-07-11 ENCOUNTER — PATIENT MESSAGE (OUTPATIENT)
Dept: MATERNAL FETAL MEDICINE | Facility: CLINIC | Age: 29
End: 2024-07-11
Payer: MEDICAID

## 2024-07-12 ENCOUNTER — OFFICE VISIT (OUTPATIENT)
Dept: MATERNAL FETAL MEDICINE | Facility: CLINIC | Age: 29
End: 2024-07-12
Payer: MEDICAID

## 2024-07-12 ENCOUNTER — PROCEDURE VISIT (OUTPATIENT)
Dept: MATERNAL FETAL MEDICINE | Facility: CLINIC | Age: 29
End: 2024-07-12
Payer: MEDICAID

## 2024-07-12 ENCOUNTER — PATIENT MESSAGE (OUTPATIENT)
Dept: MATERNAL FETAL MEDICINE | Facility: CLINIC | Age: 29
End: 2024-07-12

## 2024-07-12 VITALS
HEIGHT: 63 IN | WEIGHT: 277.56 LBS | BODY MASS INDEX: 49.18 KG/M2 | SYSTOLIC BLOOD PRESSURE: 149 MMHG | DIASTOLIC BLOOD PRESSURE: 76 MMHG

## 2024-07-12 DIAGNOSIS — Z36.89 ENCOUNTER FOR FETAL ANATOMIC SURVEY: ICD-10-CM

## 2024-07-12 DIAGNOSIS — O10.919 CHRONIC HYPERTENSION AFFECTING PREGNANCY: ICD-10-CM

## 2024-07-12 DIAGNOSIS — Z36.89 ENCOUNTER FOR ULTRASOUND TO CHECK FETAL GROWTH: ICD-10-CM

## 2024-07-12 DIAGNOSIS — Z36.89 ENCOUNTER FOR ULTRASOUND TO CHECK FETAL GROWTH: Primary | ICD-10-CM

## 2024-07-12 DIAGNOSIS — O24.112 PREGNANCY WITH TYPE 2 DIABETES MELLITUS IN SECOND TRIMESTER: ICD-10-CM

## 2024-07-12 PROCEDURE — 76819 FETAL BIOPHYS PROFIL W/O NST: CPT | Mod: 26,S$PBB,, | Performed by: OBSTETRICS & GYNECOLOGY

## 2024-07-12 PROCEDURE — 76816 OB US FOLLOW-UP PER FETUS: CPT | Mod: PBBFAC | Performed by: OBSTETRICS & GYNECOLOGY

## 2024-07-12 PROCEDURE — 99999 PR PBB SHADOW E&M-EST. PATIENT-LVL III: CPT | Mod: PBBFAC,,, | Performed by: OBSTETRICS & GYNECOLOGY

## 2024-07-12 PROCEDURE — 99213 OFFICE O/P EST LOW 20 MIN: CPT | Mod: PBBFAC,TH | Performed by: OBSTETRICS & GYNECOLOGY

## 2024-07-12 RX ORDER — METFORMIN HYDROCHLORIDE 500 MG/1
500 TABLET ORAL 2 TIMES DAILY WITH MEALS
Qty: 180 TABLET | Refills: 3 | Status: SHIPPED | OUTPATIENT
Start: 2024-07-12 | End: 2025-07-12

## 2024-07-12 RX ORDER — INSULIN ASPART 100 [IU]/ML
INJECTION, SOLUTION INTRAVENOUS; SUBCUTANEOUS
Qty: 12 ML | Refills: 5 | Status: SHIPPED | OUTPATIENT
Start: 2024-07-12 | End: 2025-01-08

## 2024-07-12 NOTE — PROGRESS NOTES
Maternal Fetal Medicine follow up consult    SUBJECTIVE:     Kathy Mishra is a 28 y.o.  female with IUP at 32w3d  who is seen in follow up consultation by MFM.  Pregnancy complications include:   Problem   Pregnancy With Type 2 Diabetes Mellitus in Second Trimester   Chronic Hypertension Affecting Pregnancy       Previous notes reviewed.   No changes to medical, surgical, family, social, or obstetric history.    Interval history since last MFM visit: no changes. The patient reports adequate fetal movement. She denies leakage of fluid, vaginal bleeding, contractions.  The patient denies symptoms of pre-eclampsia including headache, blurred vision, right upper quadrant pain, chest pain, and shortness of breath.     Medications:  PNV  ASA  Labetalol 200 BID  NPH 100u qhs  Aspart 16/10/18     OBJECTIVE:     Blood Pressure: 149/76    Ultrasound performed. See viewpoint for full ultrasound report.  Fetal size is AGA with the EFW plotting at the 63% and the AC plotting at the >99%.   The AC plots 4 weeks ahead.   The EFW is 2570 g.  A limited repeat fetal anatomic survey shows no abnormalities of the structures that were adequately imaged.    The BPP score is reassuring at 8/8, and the AFV is normal.     ASSESSMENT/PLAN:     28 y.o.  female with IUP at 32w3d     Problems addressed at today's visit:  Chronic hypertension affecting pregnancy  BPs have been mildly elevated at home and in clinic. No severe range BPs or preE symptoms.  CMOM cuff doesn't fit correctly. Patient has wrist cuff at home. Advised to check daily and present to [facility] if >160/110.  Reviewed preE precautions.   Would not recommend increased titration of anti-hypertensives beyond 32 weeks due to risk of masking pre-eclampsia. If any severe range BPs or symptoms of pre-eclampsia, recommend inpatient evaluation and consideration for delivery.     Pregnancy with type 2 diabetes mellitus in second trimester  T2DM in pregnancy    BGs  reviewed, see Media tab:  Majority elevated    Counseling:  We again discussed the importance of achieving blood sugar goals of fasting <95 and 2 hour postprandial <120 in order to decrease the risk of adverse pregnancy outcomes.  Continue to evaluate BGs 4x/day  The patient was advised to call for blood sugars less than 70 or greater than 200 prior to her next appointment. She was also advised that if she notes nausea/vomiting, inability to tolerate PO, or concerns about being able to take medications that she should contact her provider or present to the OB ED.    Recommendations:  See original [facility] note for full consultation and management recommendations regarding diabetes in pregnancy  Changes made today to medication regimen: NPH 110u qhs (split into two sites, 55u each), Aspart 20. Patient willing to try Metformin again now that she's out of the 1st T with associated N/V. Will initiate Metformin 500mg BID.   Growth scans every 4 weeks. An ultrasound for estimated fetal weight should be obtained within 3 weeks of anticipated delivery; if the EFW is >= 4500 grams, a  should be offered.    Pre-gestational diabetes:  Maternal EKG/Echo/Ophtho/Podiatry: to be coordinated by primary OB if not already completed  Fetal echocardiogram WNL  Twice weekly  fetal surveillance is recommended at 32 weeks gestation (BPP alternating with NST)  Delivery timin 0/7 to 37 and 6/7 weeks if vascular complications, prior stillbirth or other complicating conditions.  Recommend consideration of earlier delivery if IUGR, HTN, or other complications    See previous recommendations pertaining to intrapartum and postpartum insulin and glucose management guidelines.       Please see original [facility] consultation for full details regarding management recommendations of these and other obstetric co-morbidities    FOLLOW UP:   VV in 1-2 weeks for BG check  TINA AUGUST and U/S in 3-4 weeks    Today I have spent 20 minutes in  the care of the patient. This includes face to face time and non-face to face time preparing to see the patient (eg, review of tests), obtaining and/or reviewing separately obtained history, documenting clinical information in the electronic or other health record, independently interpreting results and communicating results to the patient/family/caregiver, or care coordination.     Pearl Prince MD  Maternal Fetal Medicine

## 2024-07-12 NOTE — ASSESSMENT & PLAN NOTE
BPs have been mildly elevated at home and in clinic. No severe range BPs or preE symptoms.  CMOM cuff doesn't fit correctly. Patient has wrist cuff at home. Advised to check daily and present to [facility] if >160/110.  Reviewed preE precautions.   Would not recommend increased titration of anti-hypertensives beyond 32 weeks due to risk of masking pre-eclampsia. If any severe range BPs or symptoms of pre-eclampsia, recommend inpatient evaluation and consideration for delivery.

## 2024-07-12 NOTE — ASSESSMENT & PLAN NOTE
T2DM in pregnancy    BGs reviewed, see Media tab:  Majority elevated    Counseling:  We again discussed the importance of achieving blood sugar goals of fasting <95 and 2 hour postprandial <120 in order to decrease the risk of adverse pregnancy outcomes.  Continue to evaluate BGs 4x/day  The patient was advised to call for blood sugars less than 70 or greater than 200 prior to her next appointment. She was also advised that if she notes nausea/vomiting, inability to tolerate PO, or concerns about being able to take medications that she should contact her provider or present to the OB ED.    Recommendations:  See original MFM note for full consultation and management recommendations regarding diabetes in pregnancy  Changes made today to medication regimen: NPH 110u qhs (split into two sites, 55u each), Aspart 20. Patient willing to try Metformin again now that she's out of the 1st T with associated N/V. Will initiate Metformin 500mg BID.   Growth scans every 4 weeks. An ultrasound for estimated fetal weight should be obtained within 3 weeks of anticipated delivery; if the EFW is >= 4500 grams, a  should be offered.    Pre-gestational diabetes:  Maternal EKG/Echo/Ophtho/Podiatry: to be coordinated by primary OB if not already completed  Fetal echocardiogram WNL  Twice weekly  fetal surveillance is recommended at 32 weeks gestation (BPP alternating with NST)  Delivery timin 0/7 to 37 and 6/7 weeks if vascular complications, prior stillbirth or other complicating conditions.  Recommend consideration of earlier delivery if IUGR, HTN, or other complications    See previous recommendations pertaining to intrapartum and postpartum insulin and glucose management guidelines.

## 2024-07-16 ENCOUNTER — PROCEDURE VISIT (OUTPATIENT)
Dept: OBSTETRICS AND GYNECOLOGY | Facility: CLINIC | Age: 29
End: 2024-07-16
Payer: MEDICAID

## 2024-07-16 ENCOUNTER — ROUTINE PRENATAL (OUTPATIENT)
Dept: OBSTETRICS AND GYNECOLOGY | Facility: CLINIC | Age: 29
End: 2024-07-16
Payer: MEDICAID

## 2024-07-16 VITALS
SYSTOLIC BLOOD PRESSURE: 126 MMHG | DIASTOLIC BLOOD PRESSURE: 66 MMHG | BODY MASS INDEX: 48.74 KG/M2 | WEIGHT: 275.13 LBS

## 2024-07-16 DIAGNOSIS — O24.113 PREGNANCY WITH TYPE 2 DIABETES MELLITUS IN THIRD TRIMESTER: ICD-10-CM

## 2024-07-16 DIAGNOSIS — O34.219 HISTORY OF CESAREAN DELIVERY, CURRENTLY PREGNANT: ICD-10-CM

## 2024-07-16 DIAGNOSIS — O24.113 PREGNANCY WITH TYPE 2 DIABETES MELLITUS IN THIRD TRIMESTER: Primary | ICD-10-CM

## 2024-07-16 DIAGNOSIS — E66.01 CLASS 3 SEVERE OBESITY DUE TO EXCESS CALORIES WITH SERIOUS COMORBIDITY AND BODY MASS INDEX (BMI) OF 45.0 TO 49.9 IN ADULT: ICD-10-CM

## 2024-07-16 DIAGNOSIS — E03.9 HYPOTHYROIDISM, UNSPECIFIED TYPE: ICD-10-CM

## 2024-07-16 DIAGNOSIS — O10.919 CHRONIC HYPERTENSION AFFECTING PREGNANCY: ICD-10-CM

## 2024-07-16 PROBLEM — O36.8190 DECREASED FETAL MOVEMENT IN PREGNANCY, ANTEPARTUM: Status: RESOLVED | Noted: 2024-06-06 | Resolved: 2024-07-16

## 2024-07-16 PROCEDURE — 99999 PR PBB SHADOW E&M-EST. PATIENT-LVL III: CPT | Mod: PBBFAC,,, | Performed by: OBSTETRICS & GYNECOLOGY

## 2024-07-16 PROCEDURE — 90715 TDAP VACCINE 7 YRS/> IM: CPT | Mod: PBBFAC

## 2024-07-16 PROCEDURE — 76819 FETAL BIOPHYS PROFIL W/O NST: CPT | Mod: PBBFAC | Performed by: OBSTETRICS & GYNECOLOGY

## 2024-07-16 PROCEDURE — 99213 OFFICE O/P EST LOW 20 MIN: CPT | Mod: PBBFAC,TH | Performed by: OBSTETRICS & GYNECOLOGY

## 2024-07-16 PROCEDURE — 99999PBSHW PR PBB SHADOW TECHNICAL ONLY FILED TO HB: Mod: PBBFAC,,,

## 2024-07-16 PROCEDURE — 99213 OFFICE O/P EST LOW 20 MIN: CPT | Mod: TH,S$PBB,, | Performed by: OBSTETRICS & GYNECOLOGY

## 2024-07-16 PROCEDURE — 90471 IMMUNIZATION ADMIN: CPT | Mod: PBBFAC

## 2024-07-16 RX ORDER — LANCETS 33 GAUGE
EACH MISCELLANEOUS
COMMUNITY
Start: 2024-07-15

## 2024-07-16 RX ORDER — METFORMIN HYDROCHLORIDE 500 MG/1
500 TABLET, EXTENDED RELEASE ORAL
COMMUNITY
Start: 2024-07-12

## 2024-07-16 RX ADMIN — TETANUS TOXOID, REDUCED DIPHTHERIA TOXOID AND ACELLULAR PERTUSSIS VACCINE, ADSORBED 0.5 ML: 5; 2.5; 8; 8; 2.5 SUSPENSION INTRAMUSCULAR at 02:07

## 2024-07-16 NOTE — PROGRESS NOTES
Verified patient with 2 patient identifiers. Allergies and medications reviewed.       Tdap given IM to left deltoid using aseptic technique.    No discomfort noted. Patient tolerated well.       Patient advised to wait 15 minutes in lobby to monitor for reaction.   Patient verbalized understanding.

## 2024-07-16 NOTE — PROGRESS NOTES
Presents for routine OB visit:  Pregnancy:  -feels well; having some BH ctxns, but no labor pattern  -CDC and ACOG info on TDaP discussed;  TDaP today  -BPP today: , MVP 6cm    2.  T2DM:  -follows closely with MFM  -on insulin  u qhs, aspart 20  -resumed metformin 500mg bid last week  -HbA1C ordered  -start  testing (BPP today )  -continue close f/u with MFM for management of this    3. CHTN:  -BP normal to mild range on Connected MOM and in the office  -compliant with labetalol 200mg bid and asa 81mg    4. Hypothyroidism:  -synthroid 100mcg  -TSH today    5. Hx of C/S:  -desires repeat C/S.  Scheduled with me on 8/15/24  -surgical consent reviewed in detail and signed.  All questions answered.    L&D and preeclampsia precautions  RTC 1 week for routine OB visit with BPP

## 2024-07-17 ENCOUNTER — LAB VISIT (OUTPATIENT)
Dept: LAB | Facility: HOSPITAL | Age: 29
End: 2024-07-17
Attending: OBSTETRICS & GYNECOLOGY
Payer: MEDICAID

## 2024-07-17 DIAGNOSIS — E03.9 HYPOTHYROIDISM, UNSPECIFIED TYPE: ICD-10-CM

## 2024-07-17 DIAGNOSIS — O24.113 PREGNANCY WITH TYPE 2 DIABETES MELLITUS IN THIRD TRIMESTER: ICD-10-CM

## 2024-07-17 LAB
ESTIMATED AVG GLUCOSE: 120 MG/DL (ref 68–131)
HBA1C MFR BLD: 5.8 % (ref 4–5.6)
TSH SERPL DL<=0.005 MIU/L-ACNC: 2.39 UIU/ML (ref 0.4–4)

## 2024-07-17 PROCEDURE — 83036 HEMOGLOBIN GLYCOSYLATED A1C: CPT | Performed by: OBSTETRICS & GYNECOLOGY

## 2024-07-17 PROCEDURE — 84443 ASSAY THYROID STIM HORMONE: CPT | Performed by: OBSTETRICS & GYNECOLOGY

## 2024-07-17 PROCEDURE — 36415 COLL VENOUS BLD VENIPUNCTURE: CPT | Performed by: OBSTETRICS & GYNECOLOGY

## 2024-07-18 ENCOUNTER — PATIENT MESSAGE (OUTPATIENT)
Dept: MATERNAL FETAL MEDICINE | Facility: CLINIC | Age: 29
End: 2024-07-18
Payer: MEDICAID

## 2024-07-21 ENCOUNTER — HOSPITAL ENCOUNTER (OUTPATIENT)
Facility: HOSPITAL | Age: 29
LOS: 1 days | Discharge: HOME | End: 2024-07-21
Attending: OBSTETRICS & GYNECOLOGY | Admitting: OBSTETRICS & GYNECOLOGY
Payer: MEDICAID

## 2024-07-21 VITALS
HEART RATE: 96 BPM | RESPIRATION RATE: 18 BRPM | DIASTOLIC BLOOD PRESSURE: 80 MMHG | OXYGEN SATURATION: 98 % | SYSTOLIC BLOOD PRESSURE: 156 MMHG

## 2024-07-21 DIAGNOSIS — O10.919 CHRONIC HYPERTENSION AFFECTING PREGNANCY: ICD-10-CM

## 2024-07-21 DIAGNOSIS — O24.113 PREGNANCY WITH TYPE 2 DIABETES MELLITUS IN THIRD TRIMESTER: ICD-10-CM

## 2024-07-21 DIAGNOSIS — O10.919 CHRONIC HYPERTENSION AFFECTING PREGNANCY: Primary | ICD-10-CM

## 2024-07-21 LAB
ALBUMIN SERPL BCP-MCNC: 2.6 G/DL (ref 3.5–5.2)
ALP SERPL-CCNC: 128 U/L (ref 55–135)
ALT SERPL W/O P-5'-P-CCNC: 11 U/L (ref 10–44)
ANION GAP SERPL CALC-SCNC: 13 MMOL/L (ref 8–16)
AST SERPL-CCNC: 12 U/L (ref 10–40)
BASOPHILS # BLD AUTO: 0.01 K/UL (ref 0–0.2)
BASOPHILS NFR BLD: 0.1 % (ref 0–1.9)
BILIRUB SERPL-MCNC: 0.2 MG/DL (ref 0.1–1)
BUN SERPL-MCNC: 5 MG/DL (ref 6–20)
CALCIUM SERPL-MCNC: 9.7 MG/DL (ref 8.7–10.5)
CHLORIDE SERPL-SCNC: 107 MMOL/L (ref 95–110)
CO2 SERPL-SCNC: 17 MMOL/L (ref 23–29)
CREAT SERPL-MCNC: 0.7 MG/DL (ref 0.5–1.4)
CREAT UR-MCNC: 68.7 MG/DL (ref 15–325)
DIFFERENTIAL METHOD BLD: ABNORMAL
EOSINOPHIL # BLD AUTO: 0 K/UL (ref 0–0.5)
EOSINOPHIL NFR BLD: 0.5 % (ref 0–8)
ERYTHROCYTE [DISTWIDTH] IN BLOOD BY AUTOMATED COUNT: 15.5 % (ref 11.5–14.5)
EST. GFR  (NO RACE VARIABLE): >60 ML/MIN/1.73 M^2
GLUCOSE SERPL-MCNC: 138 MG/DL (ref 70–110)
HCT VFR BLD AUTO: 34.1 % (ref 37–48.5)
HGB BLD-MCNC: 11.5 G/DL (ref 12–16)
IMM GRANULOCYTES # BLD AUTO: 0.06 K/UL (ref 0–0.04)
IMM GRANULOCYTES NFR BLD AUTO: 0.8 % (ref 0–0.5)
LYMPHOCYTES # BLD AUTO: 1.2 K/UL (ref 1–4.8)
LYMPHOCYTES NFR BLD: 16.1 % (ref 18–48)
MCH RBC QN AUTO: 29.6 PG (ref 27–31)
MCHC RBC AUTO-ENTMCNC: 33.7 G/DL (ref 32–36)
MCV RBC AUTO: 88 FL (ref 82–98)
MONOCYTES # BLD AUTO: 0.4 K/UL (ref 0.3–1)
MONOCYTES NFR BLD: 5.4 % (ref 4–15)
NEUTROPHILS # BLD AUTO: 5.7 K/UL (ref 1.8–7.7)
NEUTROPHILS NFR BLD: 77.1 % (ref 38–73)
NRBC BLD-RTO: 0 /100 WBC
PLATELET # BLD AUTO: 248 K/UL (ref 150–450)
PMV BLD AUTO: 9.6 FL (ref 9.2–12.9)
POCT GLUCOSE: 148 MG/DL (ref 70–110)
POTASSIUM SERPL-SCNC: 3.6 MMOL/L (ref 3.5–5.1)
PROT SERPL-MCNC: 6.5 G/DL (ref 6–8.4)
PROT UR-MCNC: 9 MG/DL (ref 0–15)
PROT/CREAT UR: 0.13 MG/G{CREAT} (ref 0–0.2)
RBC # BLD AUTO: 3.88 M/UL (ref 4–5.4)
SODIUM SERPL-SCNC: 137 MMOL/L (ref 136–145)
WBC # BLD AUTO: 7.38 K/UL (ref 3.9–12.7)

## 2024-07-21 PROCEDURE — 99211 OFF/OP EST MAY X REQ PHY/QHP: CPT

## 2024-07-21 PROCEDURE — 59025 FETAL NON-STRESS TEST: CPT | Mod: 26,,, | Performed by: ADVANCED PRACTICE MIDWIFE

## 2024-07-21 PROCEDURE — 80053 COMPREHEN METABOLIC PANEL: CPT | Performed by: ADVANCED PRACTICE MIDWIFE

## 2024-07-21 PROCEDURE — 82570 ASSAY OF URINE CREATININE: CPT | Performed by: ADVANCED PRACTICE MIDWIFE

## 2024-07-21 PROCEDURE — 85025 COMPLETE CBC W/AUTO DIFF WBC: CPT | Performed by: ADVANCED PRACTICE MIDWIFE

## 2024-07-21 PROCEDURE — 59025 FETAL NON-STRESS TEST: CPT

## 2024-07-21 PROCEDURE — 25000003 PHARM REV CODE 250: Performed by: ADVANCED PRACTICE MIDWIFE

## 2024-07-21 RX ORDER — BUTALBITAL, ACETAMINOPHEN AND CAFFEINE 50; 325; 40 MG/1; MG/1; MG/1
1 TABLET ORAL EVERY 4 HOURS PRN
Status: DISCONTINUED | OUTPATIENT
Start: 2024-07-21 | End: 2024-07-21 | Stop reason: HOSPADM

## 2024-07-21 RX ORDER — SODIUM CHLORIDE, SODIUM LACTATE, POTASSIUM CHLORIDE, CALCIUM CHLORIDE 600; 310; 30; 20 MG/100ML; MG/100ML; MG/100ML; MG/100ML
INJECTION, SOLUTION INTRAVENOUS CONTINUOUS
Status: DISCONTINUED | OUTPATIENT
Start: 2024-07-21 | End: 2024-07-21 | Stop reason: HOSPADM

## 2024-07-21 RX ORDER — ACETAMINOPHEN 500 MG
500 TABLET ORAL EVERY 6 HOURS PRN
Status: DISCONTINUED | OUTPATIENT
Start: 2024-07-21 | End: 2024-07-21 | Stop reason: HOSPADM

## 2024-07-21 RX ORDER — LABETALOL 200 MG/1
200 TABLET, FILM COATED ORAL EVERY 12 HOURS
Status: DISCONTINUED | OUTPATIENT
Start: 2024-07-21 | End: 2024-07-21 | Stop reason: HOSPADM

## 2024-07-21 RX ORDER — LABETALOL 200 MG/1
200 TABLET, FILM COATED ORAL 3 TIMES DAILY
Qty: 90 TABLET | Refills: 11 | Status: SHIPPED | OUTPATIENT
Start: 2024-07-21 | End: 2025-07-21

## 2024-07-21 RX ORDER — ONDANSETRON 8 MG/1
8 TABLET, ORALLY DISINTEGRATING ORAL EVERY 8 HOURS PRN
Status: DISCONTINUED | OUTPATIENT
Start: 2024-07-21 | End: 2024-07-21 | Stop reason: HOSPADM

## 2024-07-21 RX ADMIN — LABETALOL HYDROCHLORIDE 200 MG: 200 TABLET, FILM COATED ORAL at 07:07

## 2024-07-21 RX ADMIN — BUTALBITAL, ACETAMINOPHEN, AND CAFFEINE 1 TABLET: 325; 50; 40 TABLET ORAL at 08:07

## 2024-07-22 ENCOUNTER — PATIENT MESSAGE (OUTPATIENT)
Dept: OBSTETRICS AND GYNECOLOGY | Facility: CLINIC | Age: 29
End: 2024-07-22
Payer: MEDICAID

## 2024-07-22 ENCOUNTER — TELEPHONE (OUTPATIENT)
Dept: OBSTETRICS AND GYNECOLOGY | Facility: CLINIC | Age: 29
End: 2024-07-22
Payer: MEDICAID

## 2024-07-22 NOTE — DISCHARGE INSTRUCTIONS

## 2024-07-22 NOTE — SUBJECTIVE & OBJECTIVE
"Obstetric HPI:  Patient reports None contractions, active fetal movement, No vaginal bleeding , No loss of fluid     This pregnancy has been complicated by   Obesity  Type 2 DM on insulin  CHTN  Previous   Thyroid disorder  Asthma       OB History    Para Term  AB Living   3 1 0 1 1 1   SAB IAB Ectopic Multiple Live Births   1 0 0 0 1      # Outcome Date GA Lbr Tera/2nd Weight Sex Type Anes PTL Lv   3 Current            2  22 35w1d  2.69 kg (5 lb 14.9 oz) F CS-LTranv Spinal, EPI N GUILLERMINA      Complications: Failure to Progress in First Stage      Name: MICKEY CHANEY      Apgar1: 8  Apgar5: 9   1 SAB 16 12w0d            Past Medical History:   Diagnosis Date    Asthma 2022    Gestational diabetes mellitus (GDM) in third trimester controlled on oral hypoglycemic drug 2022    History of hypertension 2022-add PIH baseline labs to workup PCR 0.09 advised daily baby aspirin at 16 weeks    Hypertension     Thyroid disease      Past Surgical History:   Procedure Laterality Date    ADENOIDECTOMY       SECTION N/A 2022    Procedure:  SECTION;  Surgeon: Jhoana Alexander MD;  Location: [facility] L&D;  Service: OB/GYN;  Laterality: N/A;     SECTION      TONSILLECTOMY      age of 5 years old       PTA Medications   Medication Sig    aspirin 81 MG Chew Take 1 tablet (81 mg total) by mouth once daily.    blood sugar diagnostic Strp To check BG 4 times daily, to use with insurance preferred meter    blood-glucose sensor (DEXCOM G7 SENSOR) Fern 1 Device by Misc.(Non-Drug; Combo Route) route every 10 days.    EASY TOUCH 31 gauge x 5/16" Ndle     insulin aspart U-100 (NOVOLOG) 100 unit/mL injection Inject 18 Units into the skin daily with breakfast AND 12 Units with lunch AND 20 Units daily with dinner or evening meal.    insulin  unit/mL injection Inject 110 Units into the skin every evening.    insulin syringe-needle U-100 1 mL 31 " "gauge x 5/16 Syrg 100 each by Misc.(Non-Drug; Combo Route) route 4 (four) times daily.    levothyroxine (SYNTHROID) 100 MCG tablet Take 1 tablet (100 mcg total) by mouth before breakfast.    metFORMIN (GLUCOPHAGE-XR) 500 MG ER 24hr tablet Take 500 mg by mouth.    NOVOLIN N FLEXPEN 100 unit/mL (3 mL) InPn     ondansetron (ZOFRAN) 4 MG tablet Take 1 tablet (4 mg total) by mouth daily as needed for Nausea.    pen needle, diabetic (LITE TOUCH INSULIN PEN NEEDLES) 31 gauge x 1/4" Ndle 1 Needle by Misc.(Non-Drug; Combo Route) route once daily.    prenatal vitamins #45/iron/FA (PRENATAL VITAMIN #45-IRON-FA ORAL) Take by mouth.    promethazine (PHENERGAN) 12.5 MG Tab Take 1 tablet (12.5 mg total) by mouth 4 (four) times daily.    TRUE METRIX GLUCOSE METER Misc To check BG 4 times daily    TRUEPLUS LANCETS 33 gauge Misc SMARTSI Topical Twice Daily       Review of patient's allergies indicates:  No Known Allergies     Family History       Problem Relation (Age of Onset)    Diabetes Maternal Grandfather, Mother    Hypertension Paternal Grandmother, Father          Tobacco Use    Smoking status: Former     Types: Cigarettes    Smokeless tobacco: Never   Substance and Sexual Activity    Alcohol use: Not Currently    Drug use: Never    Sexual activity: Yes     Partners: Male     Birth control/protection: None     Review of Systems   Respiratory:  Negative for shortness of breath.    Cardiovascular:  Negative for chest pain.   Gastrointestinal:  Negative for nausea and vomiting.   Genitourinary:  Negative for vaginal bleeding and vaginal discharge.   Neurological:  Positive for headaches.   All other systems reviewed and are negative.     Objective:     Vital Signs (Most Recent):  Pulse: 96 (24)  Resp: 18 (24)  BP: (!) 156/80 (24)  SpO2: 98 % (24) Vital Signs (24h Range):  Pulse:  [94-99] 96  Resp:  [18] 18  SpO2:  [98 %-99 %] 98 %  BP: (156-170)/(79-82) 156/80        There is no " height or weight on file to calculate BMI.    FHT: 135 Cat 1 (reassuring)  TOCO:  Q none minutes     Physical Exam:   Constitutional: She is oriented to person, place, and time. She appears well-developed and well-nourished.    HENT:   Head: Normocephalic.      Cardiovascular:  Normal rate and regular rhythm.             Pulmonary/Chest: Effort normal.        Abdominal: Soft.     Genitourinary:    Vagina and uterus normal.             Musculoskeletal: Normal range of motion and moves all extremeties.       Neurological: She is alert and oriented to person, place, and time. She has normal reflexes.    Skin: Skin is warm and dry.         Cervix:  Deferred      Significant Labs:  Lab Results   Component Value Date    GROUPTRH A POS 01/10/2024    HEPBSAG Non-reactive 01/10/2024    STREPBCULT No Group B Streptococcus isolated 08/02/2022       I have personallly reviewed all pertinent lab results from the last 24 hours.

## 2024-07-22 NOTE — HPI
28 y.o.  at 33w5d presents to L&D with complaints of consistent elevated BP at home with a headache. She denies chest pain or SOB or visual disturbances. She takes labetalol 200mg BID for her CHTN. She has also had some elevated blood sugars at home even though she is staying on her insulin regimen. She denies LOF or vaginal bleeding.    Abormal VS: Temp > 100F or < 96.8F; SBP < 90 mmHG; HR > 120bpm; Resp > 24/min

## 2024-07-22 NOTE — PROCEDURES
Kathy Mishra is a 28 y.o. female patient.    Pulse: 96 (07/21/24 1945)  Resp: 18 (07/21/24 1915)  BP: (!) 156/80 (07/21/24 1945)  SpO2: 98 % (07/21/24 1945)       Obtain Fetal nonstress test (NST)    Date/Time: 7/21/2024 8:12 PM    Performed by: Keena Pepe CNM  Authorized by: Cha Malcolm MD    Nonstress Test:     Variability:  6-25 BPM    Decelerations:  None    Accelerations:  15 bpm    Acoustic Stimulator: No      Baseline:  135    Uterine Irritability: No      Contractions:  Not present  Biophysical Profile:     Nonstress Test Interpretation: reactive      Overall Impression:  Reassuring  Post-procedure:     Patient tolerance:  Patient tolerated the procedure well with no immediate complications      7/21/2024

## 2024-07-22 NOTE — H&P
[facility] - Labor & Delivery  Obstetrics  History & Physical    Patient Name: Kathy Chaney  MRN: [id number]  Admission Date: 2024  Primary Care Provider: Monica, Primary Doctor    Subjective:     Principal Problem:Chronic hypertension affecting pregnancy    History of Present Illness:  28 y.o.  at 33w5d presents to L&D with complaints of consistent elevated BP at home with a headache. She denies chest pain or SOB or visual disturbances. She takes labetalol 200mg BID for her CHTN. She has also had some elevated blood sugars at home even though she is staying on her insulin regimen. She denies LOF or vaginal bleeding.     Obstetric HPI:  Patient reports None contractions, active fetal movement, No vaginal bleeding , No loss of fluid     This pregnancy has been complicated by   Obesity  Type 2 DM on insulin  CHTN  Previous   Thyroid disorder  Asthma       OB History    Para Term  AB Living   3 1 0 1 1 1   SAB IAB Ectopic Multiple Live Births   1 0 0 0 1      # Outcome Date GA Lbr Tera/2nd Weight Sex Type Anes PTL Lv   3 Current            2  22 35w1d  2.69 kg (5 lb 14.9 oz) F CS-LTranv Spinal, EPI N GUILLERMINA      Complications: Failure to Progress in First Stage      Name: MICKEY CHANEY      Apgar1: 8  Apgar5: 9   1 SAB 16 12w0d            Past Medical History:   Diagnosis Date    Asthma 2022    Gestational diabetes mellitus (GDM) in third trimester controlled on oral hypoglycemic drug 2022    History of hypertension 2022-add PIH baseline labs to workup PCR 0.09 advised daily baby aspirin at 16 weeks    Hypertension     Thyroid disease      Past Surgical History:   Procedure Laterality Date    ADENOIDECTOMY       SECTION N/A 2022    Procedure:  SECTION;  Surgeon: Jhoana Alexander MD;  Location: [facility] L&D;  Service: OB/GYN;  Laterality: N/A;     SECTION      TONSILLECTOMY      age of 5 years old       PTA  "Medications   Medication Sig    aspirin 81 MG Chew Take 1 tablet (81 mg total) by mouth once daily.    blood sugar diagnostic Strp To check BG 4 times daily, to use with insurance preferred meter    blood-glucose sensor (DEXCOM G7 SENSOR) Fern 1 Device by Misc.(Non-Drug; Combo Route) route every 10 days.    EASY TOUCH 31 gauge x 5/16" Ndle     insulin aspart U-100 (NOVOLOG) 100 unit/mL injection Inject 18 Units into the skin daily with breakfast AND 12 Units with lunch AND 20 Units daily with dinner or evening meal.    insulin  unit/mL injection Inject 110 Units into the skin every evening.    insulin syringe-needle U-100 1 mL 31 gauge x 5/16 Syrg 100 each by Misc.(Non-Drug; Combo Route) route 4 (four) times daily.    levothyroxine (SYNTHROID) 100 MCG tablet Take 1 tablet (100 mcg total) by mouth before breakfast.    metFORMIN (GLUCOPHAGE-XR) 500 MG ER 24hr tablet Take 500 mg by mouth.    NOVOLIN N FLEXPEN 100 unit/mL (3 mL) InPn     ondansetron (ZOFRAN) 4 MG tablet Take 1 tablet (4 mg total) by mouth daily as needed for Nausea.    pen needle, diabetic (LITE TOUCH INSULIN PEN NEEDLES) 31 gauge x 1/4" Ndle 1 Needle by Misc.(Non-Drug; Combo Route) route once daily.    prenatal vitamins #45/iron/FA (PRENATAL VITAMIN #45-IRON-FA ORAL) Take by mouth.    promethazine (PHENERGAN) 12.5 MG Tab Take 1 tablet (12.5 mg total) by mouth 4 (four) times daily.    TRUE METRIX GLUCOSE METER Misc To check BG 4 times daily    TRUEPLUS LANCETS 33 gauge Misc SMARTSI Topical Twice Daily       Review of patient's allergies indicates:  No Known Allergies     Family History       Problem Relation (Age of Onset)    Diabetes Maternal Grandfather, Mother    Hypertension Paternal Grandmother, Father          Tobacco Use    Smoking status: Former     Types: Cigarettes    Smokeless tobacco: Never   Substance and Sexual Activity    Alcohol use: Not Currently    Drug use: Never    Sexual activity: Yes     Partners: Male     Birth " control/protection: None     Review of Systems   Respiratory:  Negative for shortness of breath.    Cardiovascular:  Negative for chest pain.   Gastrointestinal:  Negative for nausea and vomiting.   Genitourinary:  Negative for vaginal bleeding and vaginal discharge.   Neurological:  Positive for headaches.   All other systems reviewed and are negative.     Objective:     Vital Signs (Most Recent):  Pulse: 96 (24)  Resp: 18 (24)  BP: (!) 156/80 (24)  SpO2: 98 % (24) Vital Signs (24h Range):  Pulse:  [94-99] 96  Resp:  [18] 18  SpO2:  [98 %-99 %] 98 %  BP: (156-170)/(79-82) 156/80        There is no height or weight on file to calculate BMI.    FHT: 135 Cat 1 (reassuring)  TOCO:  Q none minutes     Physical Exam:   Constitutional: She is oriented to person, place, and time. She appears well-developed and well-nourished.    HENT:   Head: Normocephalic.      Cardiovascular:  Normal rate and regular rhythm.             Pulmonary/Chest: Effort normal.        Abdominal: Soft.     Genitourinary:    Vagina and uterus normal.             Musculoskeletal: Normal range of motion and moves all extremeties.       Neurological: She is alert and oriented to person, place, and time. She has normal reflexes.    Skin: Skin is warm and dry.         Cervix:  Deferred      Significant Labs:  Lab Results   Component Value Date    GROUPTRH A POS 01/10/2024    HEPBSAG Non-reactive 01/10/2024    STREPBCULT No Group B Streptococcus isolated 2022       I have personallly reviewed all pertinent lab results from the last 24 hours.  Assessment/Plan:     28 y.o. female  at 33w5d for:    * Chronic hypertension affecting pregnancy  Serial BP down to 150's/80's after nighttime dose of labetalol. Pre-E work up WNLs.   Discussed POC with Dr. Garcia and history. Will titrate dose of labetalol TID and treat HA with fioricet.   Start twice weekly testing in clinic for CHTN on meds and type 2DM on  insulin.   NST reactive.         Keena Pepe CNM  Obstetrics  [facility] - Labor & Delivery

## 2024-07-22 NOTE — TELEPHONE ENCOUNTER
----- Message from Keena Pepe CNM sent at 7/21/2024  8:08 PM CDT -----  Thu Berumen,   This patient should have been started on twice weekly ultrasounds with one visit each week 2 weeks ago.   She has an appt Radha this coming Thursday with an US but she will need one like tomorrow or Tuesday as well (just the ultrasound), for BPP (hypertension and diabetes).   Thanks,   Keena

## 2024-07-22 NOTE — HOSPITAL COURSE
Serial BP down to 150's/80's after nighttime dose of labetalol. Pre-E work up WNLs.   Discussed POC with Dr. Garcia and history. Will titrate dose of labetalol TID and treat HA with fioricet.   Start twice weekly testing in clinic for CHTN on meds and type 2DM on insulin.   NST reactive.

## 2024-07-25 ENCOUNTER — PROCEDURE VISIT (OUTPATIENT)
Dept: OBSTETRICS AND GYNECOLOGY | Facility: CLINIC | Age: 29
End: 2024-07-25
Payer: MEDICAID

## 2024-07-25 ENCOUNTER — LAB VISIT (OUTPATIENT)
Dept: LAB | Facility: HOSPITAL | Age: 29
End: 2024-07-25
Attending: ADVANCED PRACTICE MIDWIFE
Payer: MEDICAID

## 2024-07-25 ENCOUNTER — ROUTINE PRENATAL (OUTPATIENT)
Dept: OBSTETRICS AND GYNECOLOGY | Facility: CLINIC | Age: 29
End: 2024-07-25
Payer: MEDICAID

## 2024-07-25 ENCOUNTER — TELEPHONE (OUTPATIENT)
Dept: MATERNAL FETAL MEDICINE | Facility: CLINIC | Age: 29
End: 2024-07-25
Payer: MEDICAID

## 2024-07-25 VITALS
WEIGHT: 277.56 LBS | DIASTOLIC BLOOD PRESSURE: 85 MMHG | BODY MASS INDEX: 49.17 KG/M2 | SYSTOLIC BLOOD PRESSURE: 145 MMHG

## 2024-07-25 DIAGNOSIS — E11.69 TYPE 2 DIABETES MELLITUS WITH OTHER SPECIFIED COMPLICATION, UNSPECIFIED WHETHER LONG TERM INSULIN USE: Primary | ICD-10-CM

## 2024-07-25 DIAGNOSIS — O16.9 HYPERTENSION DURING PREGNANCY, ANTEPARTUM, UNSPECIFIED HYPERTENSION IN PREGNANCY TYPE: ICD-10-CM

## 2024-07-25 DIAGNOSIS — O34.219 HISTORY OF CESAREAN DELIVERY, CURRENTLY PREGNANT: ICD-10-CM

## 2024-07-25 DIAGNOSIS — E66.01 CLASS 3 SEVERE OBESITY DUE TO EXCESS CALORIES WITH SERIOUS COMORBIDITY AND BODY MASS INDEX (BMI) OF 45.0 TO 49.9 IN ADULT: ICD-10-CM

## 2024-07-25 DIAGNOSIS — O10.919 CHRONIC HYPERTENSION AFFECTING PREGNANCY: ICD-10-CM

## 2024-07-25 DIAGNOSIS — O24.113 PREGNANCY WITH TYPE 2 DIABETES MELLITUS IN THIRD TRIMESTER: ICD-10-CM

## 2024-07-25 LAB
ALBUMIN SERPL BCP-MCNC: 2.6 G/DL (ref 3.5–5.2)
ALP SERPL-CCNC: 144 U/L (ref 55–135)
ALT SERPL W/O P-5'-P-CCNC: 11 U/L (ref 10–44)
ANION GAP SERPL CALC-SCNC: 11 MMOL/L (ref 8–16)
AST SERPL-CCNC: 12 U/L (ref 10–40)
BASOPHILS # BLD AUTO: 0.01 K/UL (ref 0–0.2)
BASOPHILS NFR BLD: 0.1 % (ref 0–1.9)
BILIRUB SERPL-MCNC: 0.2 MG/DL (ref 0.1–1)
BUN SERPL-MCNC: 6 MG/DL (ref 6–20)
CALCIUM SERPL-MCNC: 9.6 MG/DL (ref 8.7–10.5)
CHLORIDE SERPL-SCNC: 107 MMOL/L (ref 95–110)
CO2 SERPL-SCNC: 18 MMOL/L (ref 23–29)
CREAT SERPL-MCNC: 0.7 MG/DL (ref 0.5–1.4)
CREAT UR-MCNC: 94 MG/DL (ref 15–325)
DIFFERENTIAL METHOD BLD: ABNORMAL
EOSINOPHIL # BLD AUTO: 0 K/UL (ref 0–0.5)
EOSINOPHIL NFR BLD: 0.4 % (ref 0–8)
ERYTHROCYTE [DISTWIDTH] IN BLOOD BY AUTOMATED COUNT: 16.3 % (ref 11.5–14.5)
EST. GFR  (NO RACE VARIABLE): >60 ML/MIN/1.73 M^2
GLUCOSE SERPL-MCNC: 132 MG/DL (ref 70–110)
HCT VFR BLD AUTO: 36.1 % (ref 37–48.5)
HGB BLD-MCNC: 11.4 G/DL (ref 12–16)
IMM GRANULOCYTES # BLD AUTO: 0.04 K/UL (ref 0–0.04)
IMM GRANULOCYTES NFR BLD AUTO: 0.5 % (ref 0–0.5)
LYMPHOCYTES # BLD AUTO: 1.1 K/UL (ref 1–4.8)
LYMPHOCYTES NFR BLD: 14.3 % (ref 18–48)
MCH RBC QN AUTO: 29.3 PG (ref 27–31)
MCHC RBC AUTO-ENTMCNC: 31.6 G/DL (ref 32–36)
MCV RBC AUTO: 93 FL (ref 82–98)
MONOCYTES # BLD AUTO: 0.3 K/UL (ref 0.3–1)
MONOCYTES NFR BLD: 4.2 % (ref 4–15)
NEUTROPHILS # BLD AUTO: 6.3 K/UL (ref 1.8–7.7)
NEUTROPHILS NFR BLD: 80.5 % (ref 38–73)
NRBC BLD-RTO: 0 /100 WBC
PLATELET # BLD AUTO: 263 K/UL (ref 150–450)
PMV BLD AUTO: 10.4 FL (ref 9.2–12.9)
POTASSIUM SERPL-SCNC: 4.1 MMOL/L (ref 3.5–5.1)
PROT SERPL-MCNC: 6.5 G/DL (ref 6–8.4)
PROT UR-MCNC: 14 MG/DL (ref 0–15)
PROT/CREAT UR: 0.15 MG/G{CREAT} (ref 0–0.2)
RBC # BLD AUTO: 3.89 M/UL (ref 4–5.4)
SODIUM SERPL-SCNC: 136 MMOL/L (ref 136–145)
WBC # BLD AUTO: 7.83 K/UL (ref 3.9–12.7)

## 2024-07-25 PROCEDURE — 99999 PR PBB SHADOW E&M-EST. PATIENT-LVL III: CPT | Mod: PBBFAC,,, | Performed by: ADVANCED PRACTICE MIDWIFE

## 2024-07-25 PROCEDURE — 84156 ASSAY OF PROTEIN URINE: CPT | Performed by: ADVANCED PRACTICE MIDWIFE

## 2024-07-25 PROCEDURE — 36415 COLL VENOUS BLD VENIPUNCTURE: CPT | Performed by: ADVANCED PRACTICE MIDWIFE

## 2024-07-25 PROCEDURE — 99214 OFFICE O/P EST MOD 30 MIN: CPT | Mod: S$PBB,TH,UC, | Performed by: ADVANCED PRACTICE MIDWIFE

## 2024-07-25 PROCEDURE — 80053 COMPREHEN METABOLIC PANEL: CPT | Performed by: ADVANCED PRACTICE MIDWIFE

## 2024-07-25 PROCEDURE — 85025 COMPLETE CBC W/AUTO DIFF WBC: CPT | Performed by: ADVANCED PRACTICE MIDWIFE

## 2024-07-25 PROCEDURE — 99213 OFFICE O/P EST LOW 20 MIN: CPT | Mod: PBBFAC,TH | Performed by: ADVANCED PRACTICE MIDWIFE

## 2024-07-25 PROCEDURE — 76819 FETAL BIOPHYS PROFIL W/O NST: CPT | Mod: PBBFAC | Performed by: OBSTETRICS & GYNECOLOGY

## 2024-07-25 PROCEDURE — 1111F DSCHRG MED/CURRENT MED MERGE: CPT | Mod: CPTII,,, | Performed by: ADVANCED PRACTICE MIDWIFE

## 2024-07-25 NOTE — TELEPHONE ENCOUNTER
Call to patient about insulin- aspart. Asked if she was having issues filling it as we received prior auth paperwork. She stated she can't fill it  yet. Call to pharmacy and they stated that tomorrow she can fill the prescription. Called to let the patient know. She stated understanding.

## 2024-07-25 NOTE — PROGRESS NOTES
28 y.o. female  at 34w2d   Reports + FM, denies VB, LOF or CTX  Doing well without concerns   Tdap 24    Type 2 diabetes mellitus with other specified complication, unspecified whether long term insulin use  -     Echo Saline Bubble? No; Ultrasound enhancing contrast? No; Future    Insulin adjustments per MFM  CURRENT:  u QHS  Aspart 20  Next virtual with MFM 24    BPP today 8 of 8, VTX, MVP 6.2      Hypertension during pregnancy, antepartum, unspecified hypertension in pregnancy type  -     Protein/Creatinine Ratio, Urine  -     CBC Auto Differential; Future; Expected date: 2024  -     COMPREHENSIVE METABOLIC PANEL; Future; Expected date: 2024  States when she went to [facility] on  was instructed to increase her labetalol to TID.  States she did not due to MFM recommending not to increase any BP medication that may mask superimposed preeclampsia.  Connected Mom's values with only 1 value in severe range.   Repeat Pre-e work up today.  Reviewed pre-e warning S/S  Continue with twice weekly testing    Class 3 severe obesity due to excess calories with serious comorbidity and body mass index (BMI) of 45.0 to 49.9 in adult  ASA daily    History of  delivery, currently pregnant  Repeat scheduled       Reviewed warning signs, normal FKCs,  labor precautions and how/when to call.  RTC x 2 wks, call or present sooner prn.

## 2024-07-26 DIAGNOSIS — E11.9 TYPE 2 DIABETES MELLITUS WITHOUT COMPLICATION, UNSPECIFIED WHETHER LONG TERM INSULIN USE: Primary | ICD-10-CM

## 2024-07-29 ENCOUNTER — PROCEDURE VISIT (OUTPATIENT)
Dept: OBSTETRICS AND GYNECOLOGY | Facility: CLINIC | Age: 29
End: 2024-07-29
Payer: MEDICAID

## 2024-07-29 ENCOUNTER — PATIENT MESSAGE (OUTPATIENT)
Dept: OBSTETRICS AND GYNECOLOGY | Facility: CLINIC | Age: 29
End: 2024-07-29
Payer: MEDICAID

## 2024-07-29 ENCOUNTER — ROUTINE PRENATAL (OUTPATIENT)
Dept: OBSTETRICS AND GYNECOLOGY | Facility: CLINIC | Age: 29
End: 2024-07-29
Payer: MEDICAID

## 2024-07-29 VITALS
SYSTOLIC BLOOD PRESSURE: 142 MMHG | DIASTOLIC BLOOD PRESSURE: 84 MMHG | WEIGHT: 278.69 LBS | BODY MASS INDEX: 49.36 KG/M2

## 2024-07-29 DIAGNOSIS — O10.919 CHRONIC HYPERTENSION AFFECTING PREGNANCY: ICD-10-CM

## 2024-07-29 DIAGNOSIS — O24.113 PREGNANCY WITH TYPE 2 DIABETES MELLITUS IN THIRD TRIMESTER: ICD-10-CM

## 2024-07-29 DIAGNOSIS — E66.01 CLASS 3 SEVERE OBESITY DUE TO EXCESS CALORIES WITH SERIOUS COMORBIDITY AND BODY MASS INDEX (BMI) OF 45.0 TO 49.9 IN ADULT: ICD-10-CM

## 2024-07-29 DIAGNOSIS — O10.919 CHRONIC HYPERTENSION AFFECTING PREGNANCY: Primary | ICD-10-CM

## 2024-07-29 PROCEDURE — 76819 FETAL BIOPHYS PROFIL W/O NST: CPT | Mod: PBBFAC | Performed by: OBSTETRICS & GYNECOLOGY

## 2024-07-29 PROCEDURE — 99999 PR PBB SHADOW E&M-EST. PATIENT-LVL III: CPT | Mod: PBBFAC,,, | Performed by: ADVANCED PRACTICE MIDWIFE

## 2024-07-29 PROCEDURE — 99213 OFFICE O/P EST LOW 20 MIN: CPT | Mod: TH,S$PBB,UC, | Performed by: ADVANCED PRACTICE MIDWIFE

## 2024-07-29 PROCEDURE — 99213 OFFICE O/P EST LOW 20 MIN: CPT | Mod: PBBFAC,25,TH | Performed by: ADVANCED PRACTICE MIDWIFE

## 2024-07-29 PROCEDURE — 1111F DSCHRG MED/CURRENT MED MERGE: CPT | Mod: CPTII,,, | Performed by: ADVANCED PRACTICE MIDWIFE

## 2024-07-29 NOTE — PROGRESS NOTES
28 y.o. female  at 35w1d  Reports + FM, denies VB, LOF or regular CTX  Doing well without concerns      GBS discussed and handout attached to AVS  BPP 8 of 8, MVP 6.8, VTX    Chronic hypertension affecting pregnancy  Compliant with meds    Class 3 severe obesity due to excess calories with serious comorbidity and body mass index (BMI) of 45.0 to 49.9 in adult    Pregnancy with type 2 diabetes mellitus in third trimester  BS managed per MFM   Continue with twice weekly testing    Reviewed warning signs, normal FKCs, labor precautions and how/when to call.  RTC 24 with BPP, call or present sooner prn.      
There are no Wet Read(s) to document.

## 2024-07-29 NOTE — PROGRESS NOTES
S: Doing ok. Pain has increased and stadol is helping. C/O increasing HB and nausea  O:  VS reviewed, afebrile   Vitals:    08/03/22 0334 08/03/22 0359 08/03/22 0401 08/03/22 0404   BP:   (!) 162/83    Pulse: 81 80 86 81   SpO2: 98% (!) 93%  97%       FHTs; 140s with moderate variability, Cat 1, reassuring.  UC: Irregular.  SVE: Deferred.    A: IUP @ 35w0d ; Severe Pre-E IOL    Patient Active Problem List   Diagnosis    History of miscarriage, currently pregnant    Pre-existing hypertension affecting pregnancy in third trimester    History of thyroid disorder    Class 3 severe obesity due to excess calories with serious comorbidity and body mass index (BMI) of 45.0 to 49.9 in adult    Gestational diabetes mellitus (GDM) in third trimester controlled on oral hypoglycemic drug    Asthma    Hypertension    Hypothyroidism    Intractable migraine without aura and without status migrainosus    Vitamin D deficiency    Elevated blood pressure reading    Obesity complicating pregnancy in third trimester       P:   Continue close monitoring of maternal/fetal status  Discussed with pt and SO of need for Magnesium sulfate d/t BP uncontrolled with IV labetalol. Pt ok with POC.   Pepcid and zofran for HB and nausea.  Anticipate progress toward vaginal delivery    Brittney     29-Jul-2024 14:43

## 2024-07-30 ENCOUNTER — PATIENT MESSAGE (OUTPATIENT)
Dept: OTHER | Facility: OTHER | Age: 29
End: 2024-07-30
Payer: MEDICAID

## 2024-07-30 ENCOUNTER — OFFICE VISIT (OUTPATIENT)
Dept: MATERNAL FETAL MEDICINE | Facility: CLINIC | Age: 29
End: 2024-07-30
Attending: OBSTETRICS & GYNECOLOGY
Payer: MEDICAID

## 2024-07-30 ENCOUNTER — TELEPHONE (OUTPATIENT)
Dept: MATERNAL FETAL MEDICINE | Facility: CLINIC | Age: 29
End: 2024-07-30
Payer: MEDICAID

## 2024-07-30 ENCOUNTER — PATIENT MESSAGE (OUTPATIENT)
Dept: MATERNAL FETAL MEDICINE | Facility: CLINIC | Age: 29
End: 2024-07-30

## 2024-07-30 DIAGNOSIS — O24.113 PREGNANCY WITH TYPE 2 DIABETES MELLITUS IN THIRD TRIMESTER: Primary | ICD-10-CM

## 2024-07-30 DIAGNOSIS — O24.112 PREGNANCY WITH TYPE 2 DIABETES MELLITUS IN SECOND TRIMESTER: ICD-10-CM

## 2024-07-30 PROCEDURE — 3044F HG A1C LEVEL LT 7.0%: CPT | Mod: CPTII,95,, | Performed by: OBSTETRICS & GYNECOLOGY

## 2024-07-30 PROCEDURE — 1111F DSCHRG MED/CURRENT MED MERGE: CPT | Mod: CPTII,95,, | Performed by: OBSTETRICS & GYNECOLOGY

## 2024-07-30 PROCEDURE — 99213 OFFICE O/P EST LOW 20 MIN: CPT | Mod: TH,95,, | Performed by: OBSTETRICS & GYNECOLOGY

## 2024-07-30 RX ORDER — INSULIN ASPART 100 [IU]/ML
INJECTION, SOLUTION INTRAVENOUS; SUBCUTANEOUS
Qty: 20 ML | Refills: 1 | Status: SHIPPED | OUTPATIENT
Start: 2024-07-30 | End: 2025-01-26

## 2024-07-30 NOTE — TELEPHONE ENCOUNTER
Tuesday, July 30, 2024    Contacted patient to inform her that she will no longer need a physical visit. However, Dr. Correia would like for her blood sugar log to be sent in electronically on the day he will be in clinic, which is Aug 7th.    Patient was informed that I would send in a reminder the day before for her to submit the log.     Thanks,   Kathy

## 2024-07-30 NOTE — TELEPHONE ENCOUNTER
Tuesday, July 30, 2024    Contacted patient to request she submit her BS log since she hasn't read the courtesy message sent through portal. The patient responded and stated that she would send them over shortly.     Kathy

## 2024-07-30 NOTE — ASSESSMENT & PLAN NOTE
Current regimen:  -  u PM (55 u split into two injection sites)  - Aspart 22 u breakfast/ 12 u lunch/ 20 u dinner  - Metformin 500/ 1000 mg    Blood sugar review   See above    Counseling:  We again discussed the importance of achieving blood sugar goals of fasting <95 and 2 hour postprandial <120 in order to decrease the risk of adverse pregnancy outcomes.  Continue to evaluate BGs 4x/day  The patient was advised to call for blood sugars less than 70 or greater than 200 prior to her next appointment.      Recommendations:  See original MFM note for full consultation and management recommendations regarding diabetes in pregnancy  New regimen:  -  u PM, (split 70 u into two injection sites)  - Aspart 22 u breakfast/ 18 u lunch/ 20 u dinner  - Metformin 1000 mg BID  - Continue 4 x daily blood sugar checks  - M follow up to review BS in 2 weeks  - Continue serial monthly growth assessment  - Continue twice weekly antepartum fetal surveillance (BPP alternating with NST)  Delivery timing:  - 36 0/7 to 37 and 6/7 weeks if vascular complications, prior stillbirth or other complicating conditions.  Recommend consideration of earlier delivery if IUGR, HTN, or other complications

## 2024-07-30 NOTE — PROGRESS NOTES
Maternal Fetal Medicine follow up consult    SUBJECTIVE:     Kathy Mishra is a 28 y.o.  female with IUP at 35w0d who is seen in follow up consultation by [facility].  Pregnancy complications include:   Problem   Pregnancy With Type 2 Diabetes Mellitus in Third Trimester     The patient location is: home  The chief complaint leading to consultation is: Pre gestational diabetes, blood sugar review      Visit type: audiovisual     Each patient to whom he or she provides medical services by telemedicine is:  (1) informed of the relationship between the physician and patient and the respective role of any other health care provider with respect to management of the patient; and (2) notified that he or she may decline to receive medical services by telemedicine and may withdraw from such care at any time.    Previous notes reviewed.   No changes to medical, surgical, family, social, or obstetric history.    Interval history since last MFM visit: Patient doing well. Denies any GI upset with metformin.     Medications reviewed.    Care team members:  Sugar Sal- Primary OB       OBJECTIVE:   LMP 2023     Ultrasound performed. See viewpoint for full ultrasound report.      Significant labs/imaging:      ASSESSMENT/PLAN:     28 y.o.  female with IUP at 35w0d    Pregnancy with type 2 diabetes mellitus in third trimester  Current regimen:  -  u PM (55 u split into two injection sites)  - Aspart 22 u breakfast/ 12 u lunch/ 20 u dinner  - Metformin 500/ 1000 mg    Blood sugar review   See above    Counseling:  We again discussed the importance of achieving blood sugar goals of fasting <95 and 2 hour postprandial <120 in order to decrease the risk of adverse pregnancy outcomes.  Continue to evaluate BGs 4x/day  The patient was advised to call for blood sugars less than 70 or greater than 200 prior to her next appointment.      Recommendations:  See original [facility] note for full consultation and  management recommendations regarding diabetes in pregnancy  New regimen:  -  u PM, (split 70 u into two injection sites)  - Aspart 22 u breakfast/ 18 u lunch/ 20 u dinner  - Metformin 1000 mg BID  - Continue 4 x daily blood sugar checks  - MFM follow up to review BS in 2 weeks  - Continue serial monthly growth assessment  - Continue twice weekly antepartum fetal surveillance (BPP alternating with NST)  Delivery timing:  - 36 0/7 to 37 and 6/7 weeks if vascular complications, prior stillbirth or other complicating conditions.  Recommend consideration of earlier delivery if IUGR, HTN, or other complications    F/u in 2 weeks for MFM visit    Margarette Nguyen MD  Obstetrics and Gynecology, PGY-2

## 2024-08-01 ENCOUNTER — PROCEDURE VISIT (OUTPATIENT)
Dept: OBSTETRICS AND GYNECOLOGY | Facility: CLINIC | Age: 29
End: 2024-08-01
Payer: MEDICAID

## 2024-08-01 ENCOUNTER — PATIENT MESSAGE (OUTPATIENT)
Dept: OBSTETRICS AND GYNECOLOGY | Facility: CLINIC | Age: 29
End: 2024-08-01

## 2024-08-01 DIAGNOSIS — O10.919 CHRONIC HYPERTENSION AFFECTING PREGNANCY: ICD-10-CM

## 2024-08-01 DIAGNOSIS — O24.113 PREGNANCY WITH TYPE 2 DIABETES MELLITUS IN THIRD TRIMESTER: ICD-10-CM

## 2024-08-01 PROCEDURE — 76819 FETAL BIOPHYS PROFIL W/O NST: CPT | Mod: PBBFAC | Performed by: OBSTETRICS & GYNECOLOGY

## 2024-08-02 ENCOUNTER — PATIENT MESSAGE (OUTPATIENT)
Dept: CARDIOLOGY | Facility: HOSPITAL | Age: 29
End: 2024-08-02
Payer: MEDICAID

## 2024-08-06 ENCOUNTER — PATIENT MESSAGE (OUTPATIENT)
Dept: MATERNAL FETAL MEDICINE | Facility: CLINIC | Age: 29
End: 2024-08-06
Payer: MEDICAID

## 2024-08-07 ENCOUNTER — TELEPHONE (OUTPATIENT)
Dept: MATERNAL FETAL MEDICINE | Facility: CLINIC | Age: 29
End: 2024-08-07
Payer: MEDICAID

## 2024-08-07 ENCOUNTER — LAB VISIT (OUTPATIENT)
Dept: LAB | Facility: HOSPITAL | Age: 29
End: 2024-08-07
Attending: OBSTETRICS & GYNECOLOGY
Payer: MEDICAID

## 2024-08-07 ENCOUNTER — PATIENT MESSAGE (OUTPATIENT)
Dept: MATERNAL FETAL MEDICINE | Facility: CLINIC | Age: 29
End: 2024-08-07
Payer: MEDICAID

## 2024-08-07 ENCOUNTER — ROUTINE PRENATAL (OUTPATIENT)
Dept: OBSTETRICS AND GYNECOLOGY | Facility: CLINIC | Age: 29
End: 2024-08-07
Payer: MEDICAID

## 2024-08-07 ENCOUNTER — PATIENT MESSAGE (OUTPATIENT)
Dept: OBSTETRICS AND GYNECOLOGY | Facility: CLINIC | Age: 29
End: 2024-08-07

## 2024-08-07 ENCOUNTER — PROCEDURE VISIT (OUTPATIENT)
Dept: OBSTETRICS AND GYNECOLOGY | Facility: CLINIC | Age: 29
End: 2024-08-07
Payer: MEDICAID

## 2024-08-07 VITALS — SYSTOLIC BLOOD PRESSURE: 150 MMHG | BODY MASS INDEX: 49.6 KG/M2 | WEIGHT: 280 LBS | DIASTOLIC BLOOD PRESSURE: 84 MMHG

## 2024-08-07 DIAGNOSIS — O24.113 PREGNANCY WITH TYPE 2 DIABETES MELLITUS IN THIRD TRIMESTER: ICD-10-CM

## 2024-08-07 DIAGNOSIS — O10.919 CHRONIC HYPERTENSION AFFECTING PREGNANCY: ICD-10-CM

## 2024-08-07 DIAGNOSIS — E66.01 CLASS 3 SEVERE OBESITY DUE TO EXCESS CALORIES WITH SERIOUS COMORBIDITY AND BODY MASS INDEX (BMI) OF 45.0 TO 49.9 IN ADULT: ICD-10-CM

## 2024-08-07 DIAGNOSIS — O34.219 HISTORY OF CESAREAN DELIVERY, CURRENTLY PREGNANT: ICD-10-CM

## 2024-08-07 DIAGNOSIS — E03.9 HYPOTHYROIDISM, UNSPECIFIED TYPE: ICD-10-CM

## 2024-08-07 DIAGNOSIS — O09.93 SUPERVISION OF HIGH RISK PREGNANCY IN THIRD TRIMESTER: Primary | ICD-10-CM

## 2024-08-07 LAB
ALBUMIN SERPL BCP-MCNC: 2.8 G/DL (ref 3.5–5.2)
ALP SERPL-CCNC: 146 U/L (ref 55–135)
ALT SERPL W/O P-5'-P-CCNC: 12 U/L (ref 10–44)
ANION GAP SERPL CALC-SCNC: 13 MMOL/L (ref 8–16)
AST SERPL-CCNC: 10 U/L (ref 10–40)
BASOPHILS # BLD AUTO: 0.02 K/UL (ref 0–0.2)
BASOPHILS NFR BLD: 0.2 % (ref 0–1.9)
BILIRUB SERPL-MCNC: 0.2 MG/DL (ref 0.1–1)
BUN SERPL-MCNC: 8 MG/DL (ref 6–20)
CALCIUM SERPL-MCNC: 9.9 MG/DL (ref 8.7–10.5)
CHLORIDE SERPL-SCNC: 108 MMOL/L (ref 95–110)
CO2 SERPL-SCNC: 16 MMOL/L (ref 23–29)
CREAT SERPL-MCNC: 0.7 MG/DL (ref 0.5–1.4)
CREAT UR-MCNC: 94.3 MG/DL (ref 15–325)
DIFFERENTIAL METHOD BLD: ABNORMAL
EOSINOPHIL # BLD AUTO: 0 K/UL (ref 0–0.5)
EOSINOPHIL NFR BLD: 0.3 % (ref 0–8)
ERYTHROCYTE [DISTWIDTH] IN BLOOD BY AUTOMATED COUNT: 16.1 % (ref 11.5–14.5)
EST. GFR  (NO RACE VARIABLE): >60 ML/MIN/1.73 M^2
GLUCOSE SERPL-MCNC: 129 MG/DL (ref 70–110)
HCT VFR BLD AUTO: 37.8 % (ref 37–48.5)
HGB BLD-MCNC: 12.4 G/DL (ref 12–16)
IMM GRANULOCYTES # BLD AUTO: 0.04 K/UL (ref 0–0.04)
IMM GRANULOCYTES NFR BLD AUTO: 0.4 % (ref 0–0.5)
LYMPHOCYTES # BLD AUTO: 1.3 K/UL (ref 1–4.8)
LYMPHOCYTES NFR BLD: 14.3 % (ref 18–48)
MCH RBC QN AUTO: 29.3 PG (ref 27–31)
MCHC RBC AUTO-ENTMCNC: 32.8 G/DL (ref 32–36)
MCV RBC AUTO: 89 FL (ref 82–98)
MONOCYTES # BLD AUTO: 0.4 K/UL (ref 0.3–1)
MONOCYTES NFR BLD: 4.5 % (ref 4–15)
NEUTROPHILS # BLD AUTO: 7.3 K/UL (ref 1.8–7.7)
NEUTROPHILS NFR BLD: 80.3 % (ref 38–73)
NRBC BLD-RTO: 0 /100 WBC
PLATELET # BLD AUTO: 276 K/UL (ref 150–450)
PMV BLD AUTO: 9.7 FL (ref 9.2–12.9)
POTASSIUM SERPL-SCNC: 4.2 MMOL/L (ref 3.5–5.1)
PROT SERPL-MCNC: 6.8 G/DL (ref 6–8.4)
PROT UR-MCNC: 17 MG/DL (ref 0–15)
PROT/CREAT UR: 0.18 MG/G{CREAT} (ref 0–0.2)
RBC # BLD AUTO: 4.23 M/UL (ref 4–5.4)
SODIUM SERPL-SCNC: 137 MMOL/L (ref 136–145)
WBC # BLD AUTO: 9.05 K/UL (ref 3.9–12.7)

## 2024-08-07 PROCEDURE — 87653 STREP B DNA AMP PROBE: CPT | Performed by: OBSTETRICS & GYNECOLOGY

## 2024-08-07 PROCEDURE — 76816 OB US FOLLOW-UP PER FETUS: CPT | Mod: PBBFAC | Performed by: OBSTETRICS & GYNECOLOGY

## 2024-08-07 PROCEDURE — 85025 COMPLETE CBC W/AUTO DIFF WBC: CPT | Performed by: OBSTETRICS & GYNECOLOGY

## 2024-08-07 PROCEDURE — 80053 COMPREHEN METABOLIC PANEL: CPT | Performed by: OBSTETRICS & GYNECOLOGY

## 2024-08-07 PROCEDURE — 76819 FETAL BIOPHYS PROFIL W/O NST: CPT | Mod: 26,S$PBB,, | Performed by: OBSTETRICS & GYNECOLOGY

## 2024-08-07 PROCEDURE — 84156 ASSAY OF PROTEIN URINE: CPT | Performed by: OBSTETRICS & GYNECOLOGY

## 2024-08-07 PROCEDURE — 36415 COLL VENOUS BLD VENIPUNCTURE: CPT | Performed by: OBSTETRICS & GYNECOLOGY

## 2024-08-07 PROCEDURE — 1111F DSCHRG MED/CURRENT MED MERGE: CPT | Mod: CPTII,,, | Performed by: OBSTETRICS & GYNECOLOGY

## 2024-08-07 PROCEDURE — 99213 OFFICE O/P EST LOW 20 MIN: CPT | Mod: PBBFAC,TH,25 | Performed by: OBSTETRICS & GYNECOLOGY

## 2024-08-07 PROCEDURE — 99213 OFFICE O/P EST LOW 20 MIN: CPT | Mod: TH,S$PBB,, | Performed by: OBSTETRICS & GYNECOLOGY

## 2024-08-07 PROCEDURE — 99999 PR PBB SHADOW E&M-EST. PATIENT-LVL III: CPT | Mod: PBBFAC,,, | Performed by: OBSTETRICS & GYNECOLOGY

## 2024-08-07 PROCEDURE — 82570 ASSAY OF URINE CREATININE: CPT | Performed by: OBSTETRICS & GYNECOLOGY

## 2024-08-08 LAB — GROUP B STREPTOCOCCUS, PCR: NEGATIVE

## 2024-08-12 ENCOUNTER — ANESTHESIA EVENT (OUTPATIENT)
Dept: OBSTETRICS AND GYNECOLOGY | Facility: HOSPITAL | Age: 29
End: 2024-08-12
Payer: MEDICAID

## 2024-08-12 ENCOUNTER — PATIENT MESSAGE (OUTPATIENT)
Dept: OBSTETRICS AND GYNECOLOGY | Facility: CLINIC | Age: 29
End: 2024-08-12
Payer: MEDICAID

## 2024-08-13 ENCOUNTER — PROCEDURE VISIT (OUTPATIENT)
Dept: OBSTETRICS AND GYNECOLOGY | Facility: CLINIC | Age: 29
End: 2024-08-13
Payer: MEDICAID

## 2024-08-13 ENCOUNTER — PATIENT MESSAGE (OUTPATIENT)
Dept: MATERNAL FETAL MEDICINE | Facility: CLINIC | Age: 29
End: 2024-08-13
Payer: MEDICAID

## 2024-08-13 VITALS — BODY MASS INDEX: 49.77 KG/M2 | HEIGHT: 63 IN | WEIGHT: 280.88 LBS

## 2024-08-13 DIAGNOSIS — Z36.89 ENCOUNTER FOR ULTRASOUND TO CHECK FETAL GROWTH: ICD-10-CM

## 2024-08-13 DIAGNOSIS — O24.113 PREGNANCY WITH TYPE 2 DIABETES MELLITUS IN THIRD TRIMESTER: ICD-10-CM

## 2024-08-13 DIAGNOSIS — O10.919 CHRONIC HYPERTENSION AFFECTING PREGNANCY: Primary | ICD-10-CM

## 2024-08-13 PROCEDURE — 76819 FETAL BIOPHYS PROFIL W/O NST: CPT | Mod: PBBFAC,PO | Performed by: OBSTETRICS & GYNECOLOGY

## 2024-08-13 PROCEDURE — 99213 OFFICE O/P EST LOW 20 MIN: CPT | Mod: TH,25,S$PBB, | Performed by: OBSTETRICS & GYNECOLOGY

## 2024-08-13 NOTE — ASSESSMENT & PLAN NOTE
T2DM in pregnancy  HbA1c 5.8% on 24  BGs reviewed, see Media tab:  Majority elevated    Counseling:  We again discussed the importance of achieving blood sugar goals of fasting <95 and 2 hour postprandial <120 in order to decrease the risk of adverse pregnancy outcomes.  Continue to evaluate BGs 4x/day  The patient was advised to call for blood sugars less than 70 or greater than 200 prior to her next appointment. She was also advised that if she notes nausea/vomiting, inability to tolerate PO, or concerns about being able to take medications that she should contact her provider or present to the OB ED.    Recommendations:  See original MFM note for full consultation and management recommendations regarding diabetes in pregnancy  Changes made today to medication regimen: no changes made as patient is being delivered in 2 days.  The patient underwent a growth scan with primary OB last week. Of note, fetal macrosomia is suspected based on EFW >99th% and AC >99th%.     Pre-gestational diabetes:  Fetal echocardiogram WNL  Delivery timin 0/7 to 37 and 6/7 weeks if vascular complications, prior stillbirth or other complicating conditions. Delivery is scheduled in 2 days. Recommend delivery earlier if any concerns for elevated BP, preE, non-reassuring fetal status, or decreased fetal movement.  Recommend consideration of earlier delivery if IUGR, HTN, or other complications    See previous recommendations pertaining to intrapartum and postpartum insulin and glucose management guidelines.

## 2024-08-13 NOTE — PROGRESS NOTES
Maternal Fetal Medicine follow up consult    SUBJECTIVE:     Kathy Mishra is a 28 y.o.  female with IUP at 37w0d  who is seen in follow up consultation by M.  Pregnancy complications include:   Problem   Pregnancy With Type 2 Diabetes Mellitus in Third Trimester   Chronic Hypertension Affecting Pregnancy       Previous notes reviewed.   No changes to medical, surgical, family, social, or obstetric history.    Interval history since last MFM visit: no changes. Feels well. The patient reports adequate fetal movement. She denies leakage of fluid, vaginal bleeding, contractions.  The patient denies symptoms of pre-eclampsia including headache, blurred vision, right upper quadrant pain, chest pain, and shortness of breath.     Medications:  PNV  ASA  Labetalol 200 BID  NPH 140u qhs  Aspart /  Metformin 1000mg BID  Synthroid     OBJECTIVE:     Blood Pressure: 140/84    Ultrasound performed. See viewpoint for full ultrasound report.    ASSESSMENT/PLAN:     28 y.o.  female with IUP at 37w0d     Problems addressed at today's visit:  Chronic hypertension affecting pregnancy  BPs WNL.   Continue Labetalol 200 BID.    Pregnancy with type 2 diabetes mellitus in third trimester  T2DM in pregnancy  HbA1c 5.8% on 24  BGs reviewed, see Media tab:  Majority elevated    Counseling:  We again discussed the importance of achieving blood sugar goals of fasting <95 and 2 hour postprandial <120 in order to decrease the risk of adverse pregnancy outcomes.  Continue to evaluate BGs 4x/day  The patient was advised to call for blood sugars less than 70 or greater than 200 prior to her next appointment. She was also advised that if she notes nausea/vomiting, inability to tolerate PO, or concerns about being able to take medications that she should contact her provider or present to the OB ED.    Recommendations:  See original MFM note for full consultation and management recommendations regarding diabetes in  pregnancy  Changes made today to medication regimen: no changes made as patient is being delivered in 2 days.  The patient underwent a growth scan with primary OB last week. Of note, fetal macrosomia is suspected based on EFW >99th% and AC >99th%.     Pre-gestational diabetes:  Fetal echocardiogram WNL  Delivery timin 0/7 to 37 and 6/7 weeks if vascular complications, prior stillbirth or other complicating conditions. Delivery is scheduled in 2 days. Recommend delivery earlier if any concerns for elevated BP, preE, non-reassuring fetal status, or decreased fetal movement.  Recommend consideration of earlier delivery if IUGR, HTN, or other complications    See previous recommendations pertaining to intrapartum and postpartum insulin and glucose management guidelines.     Please see original MFM consultation for full details regarding management recommendations of these and other obstetric co-morbidities    FOLLOW UP:   No further ultrasounds or visits were scheduled    Today I have spent 20 minutes in the care of the patient. This includes face to face time and non-face to face time preparing to see the patient (eg, review of tests), obtaining and/or reviewing separately obtained history, documenting clinical information in the electronic or other health record, independently interpreting results and communicating results to the patient/family/caregiver, or care coordination.     Pearl Prince MD  Maternal Fetal Medicine

## 2024-08-14 NOTE — PRE-PROCEDURE INSTRUCTIONS
Pre op instructions reviewed with Pt via phone:    To confirm, your Surgeon has scheduled you for a  on 8/15/24.  Your OBGYN office will instruct you on the time of your surgery. Please call [phone] to inquire about time.    Please report to [facility] 4th floor at time instructed by your doctor.    IMPORTANT INSTRUCTIONS!!  Do not eat anything 8 hours prior to surgery.    You may have water and clear juice 3 hours prior to surgery.    OK to brush teeth, no gum, candy or mints!    Do not shave pubic hair 7 days prior to surgery  Do not shave legs 3 days prior to surgery    SHOWERING INSTRUCTIONS:   The night before and morning prior to coming to the hospital:    In the shower, it is best practice to wash and rinse your hair with shampoo, rinse completely     Wet entire body and wash with anti-bacterial soap, rinse completely    With your hand, apply one packet of Hibiclens soap to abdomen from under breasts to above pubic bone, gently scrubbing for 5 minutes.  Do not scrub your skin too hard  Avoid getting Hibiclens on your head, face, or genitals (private parts)    Once you have completed the wash, rinse the Hibiclens thoroughly.      Do not wash with regular soap or anti-bacterial soap after using the Hibiclens.    Pat yourself dry using clean dry towel.  DO NOT apply any lotions or powder to abdomen.    Put on clean clothes.    It is also recommended best practice to remove all artificial nails/polish prior to surgery.    Please remove all jewelery/piercings prior to arrival.      -Thank you,  [facility] Nurse

## 2024-08-15 ENCOUNTER — HOSPITAL ENCOUNTER (INPATIENT)
Facility: HOSPITAL | Age: 29
LOS: 2 days | Discharge: HOME | End: 2024-08-17
Attending: OBSTETRICS & GYNECOLOGY | Admitting: OBSTETRICS & GYNECOLOGY
Payer: MEDICAID

## 2024-08-15 ENCOUNTER — ANESTHESIA (OUTPATIENT)
Dept: OBSTETRICS AND GYNECOLOGY | Facility: HOSPITAL | Age: 29
End: 2024-08-15
Payer: MEDICAID

## 2024-08-15 DIAGNOSIS — E03.9 HYPOTHYROIDISM, UNSPECIFIED TYPE: ICD-10-CM

## 2024-08-15 DIAGNOSIS — Z98.891 STATUS POST REPEAT LOW TRANSVERSE CESAREAN SECTION: Primary | ICD-10-CM

## 2024-08-15 DIAGNOSIS — O34.219 HISTORY OF CESAREAN DELIVERY, CURRENTLY PREGNANT: ICD-10-CM

## 2024-08-15 DIAGNOSIS — O10.919 CHRONIC HYPERTENSION AFFECTING PREGNANCY: ICD-10-CM

## 2024-08-15 DIAGNOSIS — Z98.891 HISTORY OF CESAREAN DELIVERY: ICD-10-CM

## 2024-08-15 DIAGNOSIS — O24.113 PREGNANCY WITH TYPE 2 DIABETES MELLITUS IN THIRD TRIMESTER: ICD-10-CM

## 2024-08-15 LAB
ABO + RH BLD: NORMAL
ALBUMIN SERPL BCP-MCNC: 2.7 G/DL (ref 3.5–5.2)
ALP SERPL-CCNC: 168 U/L (ref 55–135)
ALT SERPL W/O P-5'-P-CCNC: 10 U/L (ref 10–44)
AMPHET+METHAMPHET UR QL: NEGATIVE
ANION GAP SERPL CALC-SCNC: 11 MMOL/L (ref 8–16)
AST SERPL-CCNC: 11 U/L (ref 10–40)
BARBITURATES UR QL SCN>200 NG/ML: NEGATIVE
BASOPHILS # BLD AUTO: 0.02 K/UL (ref 0–0.2)
BASOPHILS NFR BLD: 0.2 % (ref 0–1.9)
BENZODIAZ UR QL SCN>200 NG/ML: NEGATIVE
BILIRUB SERPL-MCNC: 0.2 MG/DL (ref 0.1–1)
BLD GP AB SCN CELLS X3 SERPL QL: NORMAL
BUN SERPL-MCNC: 11 MG/DL (ref 6–20)
BZE UR QL SCN: NEGATIVE
CALCIUM SERPL-MCNC: 9.9 MG/DL (ref 8.7–10.5)
CANNABINOIDS UR QL SCN: NEGATIVE
CHLORIDE SERPL-SCNC: 109 MMOL/L (ref 95–110)
CO2 SERPL-SCNC: 17 MMOL/L (ref 23–29)
CREAT SERPL-MCNC: 0.7 MG/DL (ref 0.5–1.4)
CREAT UR-MCNC: 109 MG/DL (ref 15–325)
DIFFERENTIAL METHOD BLD: ABNORMAL
EOSINOPHIL # BLD AUTO: 0 K/UL (ref 0–0.5)
EOSINOPHIL NFR BLD: 0.5 % (ref 0–8)
ERYTHROCYTE [DISTWIDTH] IN BLOOD BY AUTOMATED COUNT: 15.6 % (ref 11.5–14.5)
EST. GFR  (NO RACE VARIABLE): >60 ML/MIN/1.73 M^2
GLUCOSE SERPL-MCNC: 99 MG/DL (ref 70–110)
HCT VFR BLD AUTO: 35.9 % (ref 37–48.5)
HGB BLD-MCNC: 11.9 G/DL (ref 12–16)
HIV 1+2 AB+HIV1 P24 AG SERPL QL IA: NEGATIVE
IMM GRANULOCYTES # BLD AUTO: 0.05 K/UL (ref 0–0.04)
IMM GRANULOCYTES NFR BLD AUTO: 0.6 % (ref 0–0.5)
LYMPHOCYTES # BLD AUTO: 1.6 K/UL (ref 1–4.8)
LYMPHOCYTES NFR BLD: 18.3 % (ref 18–48)
MCH RBC QN AUTO: 29.6 PG (ref 27–31)
MCHC RBC AUTO-ENTMCNC: 33.1 G/DL (ref 32–36)
MCV RBC AUTO: 89 FL (ref 82–98)
METHADONE UR QL SCN>300 NG/ML: NEGATIVE
MONOCYTES # BLD AUTO: 0.5 K/UL (ref 0.3–1)
MONOCYTES NFR BLD: 5.7 % (ref 4–15)
NEUTROPHILS # BLD AUTO: 6.6 K/UL (ref 1.8–7.7)
NEUTROPHILS NFR BLD: 74.7 % (ref 38–73)
NRBC BLD-RTO: 0 /100 WBC
OPIATES UR QL SCN: NEGATIVE
PCP UR QL SCN>25 NG/ML: NEGATIVE
PLATELET # BLD AUTO: 251 K/UL (ref 150–450)
PMV BLD AUTO: 10.1 FL (ref 9.2–12.9)
POCT GLUCOSE: 129 MG/DL (ref 70–110)
POCT GLUCOSE: 77 MG/DL (ref 70–110)
POCT GLUCOSE: 99 MG/DL (ref 70–110)
POTASSIUM SERPL-SCNC: 3.7 MMOL/L (ref 3.5–5.1)
PROT SERPL-MCNC: 6.4 G/DL (ref 6–8.4)
RBC # BLD AUTO: 4.02 M/UL (ref 4–5.4)
SODIUM SERPL-SCNC: 137 MMOL/L (ref 136–145)
SPECIMEN OUTDATE: NORMAL
TOXICOLOGY INFORMATION: NORMAL
TREPONEMA PALLIDUM IGG+IGM AB [PRESENCE] IN SERUM OR PLASMA BY IMMUNOASSAY: NONREACTIVE
WBC # BLD AUTO: 8.76 K/UL (ref 3.9–12.7)

## 2024-08-15 PROCEDURE — 85025 COMPLETE CBC W/AUTO DIFF WBC: CPT | Performed by: OBSTETRICS & GYNECOLOGY

## 2024-08-15 PROCEDURE — 27200688 HC TRAY, SPINAL-HYPER/ ISOBARIC: Performed by: ANESTHESIOLOGY

## 2024-08-15 PROCEDURE — 36004724 HC OB OR TIME LEV III - 1ST 15 MIN: Performed by: OBSTETRICS & GYNECOLOGY

## 2024-08-15 PROCEDURE — 86850 RBC ANTIBODY SCREEN: CPT | Performed by: OBSTETRICS & GYNECOLOGY

## 2024-08-15 PROCEDURE — 63600175 PHARM REV CODE 636 W HCPCS: Performed by: NURSE ANESTHETIST, CERTIFIED REGISTERED

## 2024-08-15 PROCEDURE — 63600175 PHARM REV CODE 636 W HCPCS: Performed by: OBSTETRICS & GYNECOLOGY

## 2024-08-15 PROCEDURE — 86593 SYPHILIS TEST NON-TREP QUANT: CPT | Performed by: OBSTETRICS & GYNECOLOGY

## 2024-08-15 PROCEDURE — 36004725 HC OB OR TIME LEV III - EA ADD 15 MIN: Performed by: OBSTETRICS & GYNECOLOGY

## 2024-08-15 PROCEDURE — 59514 CESAREAN DELIVERY ONLY: CPT | Mod: AT,,, | Performed by: OBSTETRICS & GYNECOLOGY

## 2024-08-15 PROCEDURE — 11000001 HC ACUTE MED/SURG PRIVATE ROOM

## 2024-08-15 PROCEDURE — 80307 DRUG TEST PRSMV CHEM ANLYZR: CPT | Performed by: OBSTETRICS & GYNECOLOGY

## 2024-08-15 PROCEDURE — 37000008 HC ANESTHESIA 1ST 15 MINUTES: Performed by: OBSTETRICS & GYNECOLOGY

## 2024-08-15 PROCEDURE — 99211 OFF/OP EST MAY X REQ PHY/QHP: CPT

## 2024-08-15 PROCEDURE — 80053 COMPREHEN METABOLIC PANEL: CPT | Performed by: OBSTETRICS & GYNECOLOGY

## 2024-08-15 PROCEDURE — 25000003 PHARM REV CODE 250: Performed by: NURSE ANESTHETIST, CERTIFIED REGISTERED

## 2024-08-15 PROCEDURE — 87389 HIV-1 AG W/HIV-1&-2 AB AG IA: CPT | Performed by: OBSTETRICS & GYNECOLOGY

## 2024-08-15 PROCEDURE — 71000039 HC RECOVERY, EACH ADD'L HOUR: Performed by: OBSTETRICS & GYNECOLOGY

## 2024-08-15 PROCEDURE — 51702 INSERT TEMP BLADDER CATH: CPT

## 2024-08-15 PROCEDURE — 37000009 HC ANESTHESIA EA ADD 15 MINS: Performed by: OBSTETRICS & GYNECOLOGY

## 2024-08-15 PROCEDURE — 86900 BLOOD TYPING SEROLOGIC ABO: CPT | Performed by: OBSTETRICS & GYNECOLOGY

## 2024-08-15 PROCEDURE — 25000003 PHARM REV CODE 250: Performed by: OBSTETRICS & GYNECOLOGY

## 2024-08-15 PROCEDURE — 71000033 HC RECOVERY, INTIAL HOUR: Performed by: OBSTETRICS & GYNECOLOGY

## 2024-08-15 RX ORDER — CARBOPROST TROMETHAMINE 250 UG/ML
250 INJECTION, SOLUTION INTRAMUSCULAR
Status: DISCONTINUED | OUTPATIENT
Start: 2024-08-15 | End: 2024-08-15

## 2024-08-15 RX ORDER — PHENYLEPHRINE HYDROCHLORIDE 10 MG/ML
INJECTION INTRAVENOUS
Status: DISCONTINUED | OUTPATIENT
Start: 2024-08-15 | End: 2024-08-15

## 2024-08-15 RX ORDER — DOCUSATE SODIUM 100 MG/1
100 CAPSULE, LIQUID FILLED ORAL DAILY
Status: DISCONTINUED | OUTPATIENT
Start: 2024-08-15 | End: 2024-08-17 | Stop reason: HOSPADM

## 2024-08-15 RX ORDER — FAMOTIDINE 10 MG/ML
20 INJECTION INTRAVENOUS
Status: DISCONTINUED | OUTPATIENT
Start: 2024-08-15 | End: 2024-08-15

## 2024-08-15 RX ORDER — OXYTOCIN-SODIUM CHLORIDE 0.9% IV SOLN 30 UNIT/500ML 30-0.9/5 UT/ML-%
SOLUTION INTRAVENOUS CONTINUOUS PRN
Status: DISCONTINUED | OUTPATIENT
Start: 2024-08-15 | End: 2024-08-15

## 2024-08-15 RX ORDER — IBUPROFEN 800 MG/1
800 TABLET ORAL EVERY 8 HOURS
Status: DISCONTINUED | OUTPATIENT
Start: 2024-08-16 | End: 2024-08-17 | Stop reason: HOSPADM

## 2024-08-15 RX ORDER — TRANEXAMIC ACID 10 MG/ML
1000 INJECTION, SOLUTION INTRAVENOUS EVERY 30 MIN PRN
Status: DISCONTINUED | OUTPATIENT
Start: 2024-08-15 | End: 2024-08-17 | Stop reason: HOSPADM

## 2024-08-15 RX ORDER — INSULIN ASPART 100 [IU]/ML
6 INJECTION, SOLUTION INTRAVENOUS; SUBCUTANEOUS
Status: DISCONTINUED | OUTPATIENT
Start: 2024-08-15 | End: 2024-08-17 | Stop reason: HOSPADM

## 2024-08-15 RX ORDER — INSULIN ASPART 100 [IU]/ML
0-10 INJECTION, SOLUTION INTRAVENOUS; SUBCUTANEOUS
Status: DISCONTINUED | OUTPATIENT
Start: 2024-08-15 | End: 2024-08-17 | Stop reason: HOSPADM

## 2024-08-15 RX ORDER — PROCHLORPERAZINE EDISYLATE 5 MG/ML
5 INJECTION INTRAMUSCULAR; INTRAVENOUS EVERY 6 HOURS PRN
Status: DISCONTINUED | OUTPATIENT
Start: 2024-08-15 | End: 2024-08-17 | Stop reason: HOSPADM

## 2024-08-15 RX ORDER — ACETAMINOPHEN 325 MG/1
650 TABLET ORAL EVERY 6 HOURS PRN
Status: DISCONTINUED | OUTPATIENT
Start: 2024-08-15 | End: 2024-08-17 | Stop reason: HOSPADM

## 2024-08-15 RX ORDER — KETOROLAC TROMETHAMINE 30 MG/ML
INJECTION, SOLUTION INTRAMUSCULAR; INTRAVENOUS
Status: DISCONTINUED | OUTPATIENT
Start: 2024-08-15 | End: 2024-08-15

## 2024-08-15 RX ORDER — INSULIN ASPART 100 [IU]/ML
10 INJECTION, SOLUTION INTRAVENOUS; SUBCUTANEOUS
Status: DISCONTINUED | OUTPATIENT
Start: 2024-08-16 | End: 2024-08-17 | Stop reason: HOSPADM

## 2024-08-15 RX ORDER — MISOPROSTOL 200 UG/1
800 TABLET ORAL
Status: DISCONTINUED | OUTPATIENT
Start: 2024-08-15 | End: 2024-08-15

## 2024-08-15 RX ORDER — INSULIN ASPART 100 [IU]/ML
10 INJECTION, SOLUTION INTRAVENOUS; SUBCUTANEOUS
Status: DISCONTINUED | OUTPATIENT
Start: 2024-08-15 | End: 2024-08-17 | Stop reason: HOSPADM

## 2024-08-15 RX ORDER — OXYTOCIN-SODIUM CHLORIDE 0.9% IV SOLN 30 UNIT/500ML 30-0.9/5 UT/ML-%
10 SOLUTION INTRAVENOUS ONCE AS NEEDED
Status: DISCONTINUED | OUTPATIENT
Start: 2024-08-15 | End: 2024-08-17 | Stop reason: HOSPADM

## 2024-08-15 RX ORDER — DIPHENHYDRAMINE HYDROCHLORIDE 50 MG/ML
25 INJECTION INTRAMUSCULAR; INTRAVENOUS EVERY 4 HOURS PRN
Status: DISCONTINUED | OUTPATIENT
Start: 2024-08-15 | End: 2024-08-17 | Stop reason: HOSPADM

## 2024-08-15 RX ORDER — KETOROLAC TROMETHAMINE 30 MG/ML
30 INJECTION, SOLUTION INTRAMUSCULAR; INTRAVENOUS EVERY 6 HOURS
Status: COMPLETED | OUTPATIENT
Start: 2024-08-15 | End: 2024-08-16

## 2024-08-15 RX ORDER — HYDROCODONE BITARTRATE AND ACETAMINOPHEN 5; 325 MG/1; MG/1
1 TABLET ORAL EVERY 4 HOURS PRN
Status: DISCONTINUED | OUTPATIENT
Start: 2024-08-15 | End: 2024-08-17 | Stop reason: HOSPADM

## 2024-08-15 RX ORDER — LABETALOL 200 MG/1
200 TABLET, FILM COATED ORAL 3 TIMES DAILY
Status: DISCONTINUED | OUTPATIENT
Start: 2024-08-15 | End: 2024-08-17 | Stop reason: HOSPADM

## 2024-08-15 RX ORDER — METHYLERGONOVINE MALEATE 0.2 MG/ML
200 INJECTION INTRAVENOUS
Status: DISCONTINUED | OUTPATIENT
Start: 2024-08-15 | End: 2024-08-15

## 2024-08-15 RX ORDER — GLUCAGON 1 MG
1 KIT INJECTION
Status: DISCONTINUED | OUTPATIENT
Start: 2024-08-15 | End: 2024-08-17 | Stop reason: HOSPADM

## 2024-08-15 RX ORDER — DIPHENHYDRAMINE HYDROCHLORIDE 50 MG/ML
12.5 INJECTION INTRAMUSCULAR; INTRAVENOUS
Status: DISCONTINUED | OUTPATIENT
Start: 2024-08-15 | End: 2024-08-15 | Stop reason: SDUPTHER

## 2024-08-15 RX ORDER — BUPIVACAINE HYDROCHLORIDE 7.5 MG/ML
INJECTION, SOLUTION EPIDURAL; RETROBULBAR
Status: DISCONTINUED | OUTPATIENT
Start: 2024-08-15 | End: 2024-08-15

## 2024-08-15 RX ORDER — LEVOTHYROXINE SODIUM 50 UG/1
100 TABLET ORAL
Status: DISCONTINUED | OUTPATIENT
Start: 2024-08-16 | End: 2024-08-17 | Stop reason: HOSPADM

## 2024-08-15 RX ORDER — DIPHENOXYLATE HYDROCHLORIDE AND ATROPINE SULFATE 2.5; .025 MG/1; MG/1
2 TABLET ORAL EVERY 6 HOURS PRN
Status: DISCONTINUED | OUTPATIENT
Start: 2024-08-15 | End: 2024-08-17 | Stop reason: HOSPADM

## 2024-08-15 RX ORDER — SODIUM CHLORIDE 0.9 % (FLUSH) 0.9 %
10 SYRINGE (ML) INJECTION
Status: DISCONTINUED | OUTPATIENT
Start: 2024-08-15 | End: 2024-08-17 | Stop reason: HOSPADM

## 2024-08-15 RX ORDER — CARBOPROST TROMETHAMINE 250 UG/ML
250 INJECTION, SOLUTION INTRAMUSCULAR
Status: DISCONTINUED | OUTPATIENT
Start: 2024-08-15 | End: 2024-08-17 | Stop reason: HOSPADM

## 2024-08-15 RX ORDER — SODIUM CHLORIDE, SODIUM LACTATE, POTASSIUM CHLORIDE, CALCIUM CHLORIDE 600; 310; 30; 20 MG/100ML; MG/100ML; MG/100ML; MG/100ML
INJECTION, SOLUTION INTRAVENOUS CONTINUOUS
Status: DISCONTINUED | OUTPATIENT
Start: 2024-08-15 | End: 2024-08-15

## 2024-08-15 RX ORDER — OXYTOCIN-SODIUM CHLORIDE 0.9% IV SOLN 30 UNIT/500ML 30-0.9/5 UT/ML-%
95 SOLUTION INTRAVENOUS ONCE
Status: DISCONTINUED | OUTPATIENT
Start: 2024-08-15 | End: 2024-08-15

## 2024-08-15 RX ORDER — SODIUM CHLORIDE, SODIUM LACTATE, POTASSIUM CHLORIDE, CALCIUM CHLORIDE 600; 310; 30; 20 MG/100ML; MG/100ML; MG/100ML; MG/100ML
INJECTION, SOLUTION INTRAVENOUS CONTINUOUS PRN
Status: DISCONTINUED | OUTPATIENT
Start: 2024-08-15 | End: 2024-08-15

## 2024-08-15 RX ORDER — PRENATAL WITH FERROUS FUM AND FOLIC ACID 3080; 920; 120; 400; 22; 1.84; 3; 20; 10; 1; 12; 200; 27; 25; 2 [IU]/1; [IU]/1; MG/1; [IU]/1; MG/1; MG/1; MG/1; MG/1; MG/1; MG/1; UG/1; MG/1; MG/1; MG/1; MG/1
1 TABLET ORAL DAILY
Status: DISCONTINUED | OUTPATIENT
Start: 2024-08-15 | End: 2024-08-17 | Stop reason: HOSPADM

## 2024-08-15 RX ORDER — AMMONIA 15 % (W/V)
0.3 AMPUL (EA) INHALATION CONTINUOUS PRN
Status: DISCONTINUED | OUTPATIENT
Start: 2024-08-15 | End: 2024-08-17 | Stop reason: HOSPADM

## 2024-08-15 RX ORDER — ONDANSETRON HYDROCHLORIDE 2 MG/ML
4 INJECTION, SOLUTION INTRAVENOUS EVERY 6 HOURS PRN
Status: DISCONTINUED | OUTPATIENT
Start: 2024-08-15 | End: 2024-08-17 | Stop reason: HOSPADM

## 2024-08-15 RX ORDER — ONDANSETRON HYDROCHLORIDE 2 MG/ML
INJECTION, SOLUTION INTRAVENOUS
Status: DISCONTINUED | OUTPATIENT
Start: 2024-08-15 | End: 2024-08-15

## 2024-08-15 RX ORDER — OXYTOCIN-SODIUM CHLORIDE 0.9% IV SOLN 30 UNIT/500ML 30-0.9/5 UT/ML-%
95 SOLUTION INTRAVENOUS ONCE
Status: DISCONTINUED | OUTPATIENT
Start: 2024-08-15 | End: 2024-08-17 | Stop reason: HOSPADM

## 2024-08-15 RX ORDER — MISOPROSTOL 200 UG/1
800 TABLET ORAL ONCE AS NEEDED
Status: DISCONTINUED | OUTPATIENT
Start: 2024-08-15 | End: 2024-08-17 | Stop reason: HOSPADM

## 2024-08-15 RX ORDER — GLUCAGON 1 MG
1 KIT INJECTION
Status: DISCONTINUED | OUTPATIENT
Start: 2024-08-15 | End: 2024-08-15

## 2024-08-15 RX ORDER — OXYTOCIN-SODIUM CHLORIDE 0.9% IV SOLN 30 UNIT/500ML 30-0.9/5 UT/ML-%
10 SOLUTION INTRAVENOUS ONCE
Status: DISCONTINUED | OUTPATIENT
Start: 2024-08-15 | End: 2024-08-15

## 2024-08-15 RX ORDER — INSULIN GLARGINE 100 [IU]/ML
50 INJECTION, SOLUTION SUBCUTANEOUS NIGHTLY
Status: DISCONTINUED | OUTPATIENT
Start: 2024-08-15 | End: 2024-08-17 | Stop reason: HOSPADM

## 2024-08-15 RX ORDER — OXYTOCIN 10 [USP'U]/ML
10 INJECTION, SOLUTION INTRAMUSCULAR; INTRAVENOUS ONCE AS NEEDED
Status: DISCONTINUED | OUTPATIENT
Start: 2024-08-15 | End: 2024-08-17 | Stop reason: HOSPADM

## 2024-08-15 RX ORDER — IBUPROFEN 200 MG
24 TABLET ORAL
Status: DISCONTINUED | OUTPATIENT
Start: 2024-08-15 | End: 2024-08-17 | Stop reason: HOSPADM

## 2024-08-15 RX ORDER — DEXMEDETOMIDINE HYDROCHLORIDE 100 UG/ML
INJECTION, SOLUTION INTRAVENOUS
Status: DISCONTINUED | OUTPATIENT
Start: 2024-08-15 | End: 2024-08-15

## 2024-08-15 RX ORDER — OXYTOCIN-SODIUM CHLORIDE 0.9% IV SOLN 30 UNIT/500ML 30-0.9/5 UT/ML-%
95 SOLUTION INTRAVENOUS ONCE AS NEEDED
Status: DISCONTINUED | OUTPATIENT
Start: 2024-08-15 | End: 2024-08-17 | Stop reason: HOSPADM

## 2024-08-15 RX ORDER — HYDROCODONE BITARTRATE AND ACETAMINOPHEN 10; 325 MG/1; MG/1
1 TABLET ORAL EVERY 4 HOURS PRN
Status: DISCONTINUED | OUTPATIENT
Start: 2024-08-15 | End: 2024-08-17 | Stop reason: HOSPADM

## 2024-08-15 RX ORDER — NALOXONE HCL 0.4 MG/ML
0.04 VIAL (ML) INJECTION EVERY 5 MIN PRN
Status: DISCONTINUED | OUTPATIENT
Start: 2024-08-15 | End: 2024-08-17 | Stop reason: HOSPADM

## 2024-08-15 RX ORDER — INSULIN ASPART 100 [IU]/ML
0-10 INJECTION, SOLUTION INTRAVENOUS; SUBCUTANEOUS EVERY 6 HOURS PRN
Status: DISCONTINUED | OUTPATIENT
Start: 2024-08-15 | End: 2024-08-15

## 2024-08-15 RX ORDER — ONDANSETRON 8 MG/1
8 TABLET, ORALLY DISINTEGRATING ORAL EVERY 8 HOURS PRN
Status: DISCONTINUED | OUTPATIENT
Start: 2024-08-15 | End: 2024-08-17 | Stop reason: HOSPADM

## 2024-08-15 RX ORDER — ENOXAPARIN SODIUM 100 MG/ML
40 INJECTION SUBCUTANEOUS EVERY 12 HOURS
Status: DISCONTINUED | OUTPATIENT
Start: 2024-08-16 | End: 2024-08-17 | Stop reason: HOSPADM

## 2024-08-15 RX ORDER — PROMETHAZINE HYDROCHLORIDE 25 MG/ML
INJECTION, SOLUTION INTRAMUSCULAR; INTRAVENOUS
Status: DISCONTINUED | OUTPATIENT
Start: 2024-08-15 | End: 2024-08-15

## 2024-08-15 RX ORDER — DIPHENHYDRAMINE HCL 25 MG
25 CAPSULE ORAL EVERY 4 HOURS PRN
Status: DISCONTINUED | OUTPATIENT
Start: 2024-08-15 | End: 2024-08-17 | Stop reason: HOSPADM

## 2024-08-15 RX ORDER — HYDROMORPHONE HYDROCHLORIDE 2 MG/ML
1 INJECTION, SOLUTION INTRAMUSCULAR; INTRAVENOUS; SUBCUTANEOUS EVERY 4 HOURS PRN
Status: DISCONTINUED | OUTPATIENT
Start: 2024-08-15 | End: 2024-08-17 | Stop reason: HOSPADM

## 2024-08-15 RX ORDER — CEFAZOLIN SODIUM 1 G/3ML
INJECTION, POWDER, FOR SOLUTION INTRAMUSCULAR; INTRAVENOUS
Status: DISCONTINUED | OUTPATIENT
Start: 2024-08-15 | End: 2024-08-15

## 2024-08-15 RX ORDER — IBUPROFEN 200 MG
16 TABLET ORAL
Status: DISCONTINUED | OUTPATIENT
Start: 2024-08-15 | End: 2024-08-17 | Stop reason: HOSPADM

## 2024-08-15 RX ORDER — SODIUM CITRATE AND CITRIC ACID MONOHYDRATE 334; 500 MG/5ML; MG/5ML
30 SOLUTION ORAL
Status: DISCONTINUED | OUTPATIENT
Start: 2024-08-15 | End: 2024-08-15

## 2024-08-15 RX ORDER — MORPHINE SULFATE 1 MG/ML
INJECTION, SOLUTION EPIDURAL; INTRATHECAL; INTRAVENOUS
Status: DISCONTINUED | OUTPATIENT
Start: 2024-08-15 | End: 2024-08-15

## 2024-08-15 RX ADMIN — PHENYLEPHRINE HYDROCHLORIDE 200 MCG: 10 INJECTION INTRAVENOUS at 08:08

## 2024-08-15 RX ADMIN — BUPIVACAINE HYDROCHLORIDE 1.7 ML: 7.5 INJECTION, SOLUTION EPIDURAL; RETROBULBAR at 07:08

## 2024-08-15 RX ADMIN — KETOROLAC TROMETHAMINE 30 MG: 30 INJECTION, SOLUTION INTRAMUSCULAR at 09:08

## 2024-08-15 RX ADMIN — LABETALOL HYDROCHLORIDE 200 MG: 200 TABLET, FILM COATED ORAL at 09:08

## 2024-08-15 RX ADMIN — KETOROLAC TROMETHAMINE 30 MG: 30 INJECTION, SOLUTION INTRAMUSCULAR; INTRAVENOUS at 09:08

## 2024-08-15 RX ADMIN — KETOROLAC TROMETHAMINE 30 MG: 30 INJECTION, SOLUTION INTRAMUSCULAR at 03:08

## 2024-08-15 RX ADMIN — LABETALOL HYDROCHLORIDE 200 MG: 200 TABLET, FILM COATED ORAL at 03:08

## 2024-08-15 RX ADMIN — DEXMEDETOMIDINE 5 MCG: 200 INJECTION, SOLUTION INTRAVENOUS at 07:08

## 2024-08-15 RX ADMIN — PHENYLEPHRINE HYDROCHLORIDE 300 MCG: 10 INJECTION INTRAVENOUS at 08:08

## 2024-08-15 RX ADMIN — INSULIN GLARGINE 50 UNITS: 100 INJECTION, SOLUTION SUBCUTANEOUS at 10:08

## 2024-08-15 RX ADMIN — PHENYLEPHRINE HYDROCHLORIDE 200 MCG: 10 INJECTION INTRAVENOUS at 07:08

## 2024-08-15 RX ADMIN — ONDANSETRON 4 MG: 2 INJECTION INTRAMUSCULAR; INTRAVENOUS at 07:08

## 2024-08-15 RX ADMIN — INSULIN ASPART 6 UNITS: 100 INJECTION, SOLUTION INTRAVENOUS; SUBCUTANEOUS at 12:08

## 2024-08-15 RX ADMIN — Medication 334 ML/HR: at 08:08

## 2024-08-15 RX ADMIN — INSULIN ASPART 10 UNITS: 100 INJECTION, SOLUTION INTRAVENOUS; SUBCUTANEOUS at 06:08

## 2024-08-15 RX ADMIN — MORPHINE SULFATE 0.1 MG: 1 INJECTION, SOLUTION EPIDURAL; INTRATHECAL; INTRAVENOUS at 07:08

## 2024-08-15 RX ADMIN — ONDANSETRON 4 MG: 2 INJECTION INTRAMUSCULAR; INTRAVENOUS at 08:08

## 2024-08-15 RX ADMIN — SODIUM CHLORIDE, SODIUM LACTATE, POTASSIUM CHLORIDE, AND CALCIUM CHLORIDE: 600; 310; 30; 20 INJECTION, SOLUTION INTRAVENOUS at 08:08

## 2024-08-15 RX ADMIN — PHENYLEPHRINE HYDROCHLORIDE 100 MCG: 10 INJECTION INTRAVENOUS at 08:08

## 2024-08-15 RX ADMIN — SODIUM CHLORIDE, SODIUM LACTATE, POTASSIUM CHLORIDE, AND CALCIUM CHLORIDE: 600; 310; 30; 20 INJECTION, SOLUTION INTRAVENOUS at 07:08

## 2024-08-15 RX ADMIN — PRENATAL VITAMINS-IRON FUMARATE 27 MG IRON-FOLIC ACID 0.8 MG TABLET 1 TABLET: at 12:08

## 2024-08-15 RX ADMIN — DOCUSATE SODIUM 100 MG: 100 CAPSULE, LIQUID FILLED ORAL at 12:08

## 2024-08-15 RX ADMIN — CEFAZOLIN 3 G: 330 INJECTION, POWDER, FOR SOLUTION INTRAMUSCULAR; INTRAVENOUS at 07:08

## 2024-08-15 RX ADMIN — PROMETHAZINE HYDROCHLORIDE 6.25 MG: 25 INJECTION INTRAMUSCULAR; INTRAVENOUS at 07:08

## 2024-08-15 NOTE — PLAN OF CARE
Patient afebrile and had no falls this shift. Fundus firm without massage and below umbilicus. Bleeding light, no clots passed this shift. Pain well controlled with pain medication. Vital signs stable at this time. Bonding well with infant; responds to infant cues and participates in infant care.

## 2024-08-15 NOTE — OP NOTE
"[facility] - Labor & Delivery   Section   Operative Note    SUMMARY     Date of Procedure: 8/15/2024     Procedure: Procedure(s) (LRB):   SECTION REPEAT (N/A)    Surgeons and Role:     * Sugar Sal MD - Primary    Assistant:  first lawrence Arango    Pre-Operative Diagnosis:   IUP at 37w2d  History of  delivery  Insulin dependent type 2 DM  Chronic hypertension in pregnancy  Post-Operative Diagnosis:   IUP at 37w2d  History of  delivery  Insulin dependent type 2 DM  Chronic hypertension in pregnancy    Anesthesia: Spinal/Epidural    Technical Procedures Used:   Repeat low transverse  delivery via Pfannensteil skin incision  Lysis of adhesions           Description of the Findings of the Procedure: Dense omental adhesions to anterior abdominal wall, no bowel involvement.  Normal uterus, tubes, and ovaries.  Copious amount of clear amniotic fluid.  Male infant "Escobar", cephalic presentation delivered with vacuum assistance, [id number], APGARS 8/9.  3 vessel cord.  Placenta delivered spontaneously, intact    Significant Surgical Tasks Conducted by the Assistant(s), if Applicable: retraction, exposure, skin closure    Complications: No    Blood Loss: * 300 mL *     With patient in supine position, the legs are  and Pugh Catheter placed and positioning to supine done.   Abdomen prepped with Chloroprep and 3 minute drying time allowed prior to draping of the abdomen.   Time out taken with OR team members.  Pfannenstiel Incision made through the skin, transverse fascial incision developed, rectus muscles  in the midline and the peritoneum entered.   Dense omental adhesions noted to anterior abdominal wall from the level of the umbilicus down to the pelvis.  These were taken down with the Bovie.  No bowel involvement.  The lower uterine segment and position of the fetus identified.   Bladder flap taken down through transverse peritoneal incision.    Low " Transverse Incision made through well developer lower uterine segment and extended laterally with blunt dissection.   Copious and Clear fluid noted.  Infant delivered from vertex presentation.  Due to polyhydramnios, fetal vertex would not flex and engage, so vacuum was applied to assist with delivery of fetal head through the hysterotomy. One pop off occurred, vacuum was reapplied and delivery of the fetal head was achieved.  Suction was released, vacuum removed, and remainder of infant delivered.  Cord clamped after one minute and  handed to attending nurse.  Cord blood taken, placenta delivered.  The uterus wasnot exteriorized.  The edges of the uterine incision are grasped with Chopra clamps at the angles and the inferior and superior midline edges of the incision.    Closure with running lock 0 Chromic, starting at each angle, tying in the midline.   Observation for bleeding with suture of any bleeding along the hysterotomy line.   With good hemostasis noted, the anterior pelvis is rinsed with sterile saline.   Right and left adnexa with normal anatomy.     Closure of the abdomen with 2 0 Vicryl running of the peritoneum, fascial closure with 0 looped PDS starting at each angle and tying the knot in the midline.  Subcutaneous closure with 2-0 plain gut running.  Skin closure with 4 0 Monocryl subcuticular.  Wound dressed with Aquasel and pressure dressing.          Specimens:   Specimen (24h ago, onward)      None            Condition: Good    VTE Risk Mitigation (From admission, onward)           Ordered     IP VTE HIGH RISK PATIENT  Once         08/15/24 0706     Place sequential compression device  Until discontinued         08/15/24 0706                    Disposition: PACU - hemodynamically stable.    Attestation: Good

## 2024-08-15 NOTE — SUBJECTIVE & OBJECTIVE
"Obstetric HPI:  Patient reports None contractions, active fetal movement, No vaginal bleeding , No loss of fluid     This pregnancy has been complicated by   Type 2 diabetes on metformin and insulin  CHTN   Hypothyroidism  Obesity  History of  delivery    OB History    Para Term  AB Living   3 1 0 1 1 1   SAB IAB Ectopic Multiple Live Births   1 0 0 0 1      # Outcome Date GA Lbr Tera/2nd Weight Sex Type Anes PTL Lv   3 Current            2  22 35w1d  2.69 kg (5 lb 14.9 oz) F CS-LTranv Spinal, EPI N GUILLERMINA      Complications: Failure to Progress in First Stage      Name: MICKEY CHANEY      Apgar1: 8  Apgar5: 9   1 SAB 16 12w0d            Past Medical History:   Diagnosis Date    Asthma 2022    Gestational diabetes mellitus (GDM) in third trimester controlled on oral hypoglycemic drug 2022    History of hypertension 2022-add PIH baseline labs to workup PCR 0.09 advised daily baby aspirin at 16 weeks    Hypertension     Thyroid disease      Past Surgical History:   Procedure Laterality Date    ADENOIDECTOMY       SECTION N/A 2022    Procedure:  SECTION;  Surgeon: Jhoana Alexander MD;  Location: [facility] L&D;  Service: OB/GYN;  Laterality: N/A;     SECTION      TONSILLECTOMY      age of 5 years old       PTA Medications   Medication Sig    aspirin 81 MG Chew Take 1 tablet (81 mg total) by mouth once daily.    blood sugar diagnostic Strp To check BG 4 times daily, to use with insurance preferred meter    blood-glucose sensor (DEXCOM G7 SENSOR) Fern 1 Device by Misc.(Non-Drug; Combo Route) route every 10 days.    EASY TOUCH 31 gauge x 5/16" Ndle     insulin aspart U-100 (NOVOLOG) 100 unit/mL injection Inject 22 Units into the skin daily with breakfast AND 12 Units with lunch AND 20 Units daily with dinner or evening meal.    insulin  unit/mL injection Inject 110 Units into the skin every evening.    insulin syringe-needle " "U-100 1 mL 31 gauge x 5/16 Syrg 100 each by Misc.(Non-Drug; Combo Route) route 4 (four) times daily.    labetaloL (NORMODYNE) 200 MG tablet Take 1 tablet (200 mg total) by mouth 3 (three) times daily.    levothyroxine (SYNTHROID) 100 MCG tablet Take 1 tablet (100 mcg total) by mouth before breakfast.    metFORMIN (GLUCOPHAGE-XR) 500 MG ER 24hr tablet Take 500 mg by mouth.    NOVOLIN N FLEXPEN 100 unit/mL (3 mL) InPn     ondansetron (ZOFRAN) 4 MG tablet Take 1 tablet (4 mg total) by mouth daily as needed for Nausea.    pen needle, diabetic (LITE TOUCH INSULIN PEN NEEDLES) 31 gauge x 1/4" Ndle 1 Needle by Misc.(Non-Drug; Combo Route) route once daily.    prenatal vitamins #45/iron/FA (PRENATAL VITAMIN #45-IRON-FA ORAL) Take by mouth.    promethazine (PHENERGAN) 12.5 MG Tab Take 1 tablet (12.5 mg total) by mouth 4 (four) times daily.    TRUE METRIX GLUCOSE METER Misc To check BG 4 times daily    TRUEPLUS LANCETS 33 gauge Misc SMARTSI Topical Twice Daily       Review of patient's allergies indicates:  No Known Allergies     Family History       Problem Relation (Age of Onset)    Diabetes Maternal Grandfather, Mother    Hypertension Paternal Grandmother, Father          Tobacco Use    Smoking status: Former     Types: Cigarettes    Smokeless tobacco: Never   Substance and Sexual Activity    Alcohol use: Not Currently    Drug use: Never    Sexual activity: Yes     Partners: Male     Birth control/protection: None     Review of Systems   Constitutional:  Negative for fatigue, fever and unexpected weight change.   Gastrointestinal:  Negative for abdominal pain, constipation, diarrhea, nausea and vomiting.   Genitourinary:  Positive for pelvic pain (pelvic pressure). Negative for dysuria, frequency, urgency, vaginal bleeding, vaginal discharge, vaginal pain, postcoital bleeding and vaginal odor.      Objective:     Vital Signs (Most Recent):  Temp: 98.2 °F (36.8 °C) (08/15/24 0545)  Pulse: 93 (08/15/24 0550)  BP: (!) " 144/68 (08/15/24 0550)  SpO2: 98 % (08/15/24 0550) Vital Signs (24h Range):  Temp:  [98.2 °F (36.8 °C)] 98.2 °F (36.8 °C)  Pulse:  [] 93  SpO2:  [98 %-99 %] 98 %  BP: (144)/(68) 144/68     Weight: 127.4 kg (280 lb 13.9 oz)  Body mass index is 49.75 kg/m².    FHT: Cat 1 (reassuring)  TOCO:  Irregular ctxns     Physical Exam:   Constitutional: She is oriented to person, place, and time. She appears well-developed and well-nourished. No distress.    HENT:   Head: Normocephalic and atraumatic.     Neck: No thyromegaly present.     Pulmonary/Chest: Effort normal.        Abdominal: Soft. She exhibits no distension and no mass. There is no abdominal tenderness. There is no rebound and no guarding.   Uterus gravid, soft, and non-tender             Musculoskeletal: Edema (2+ BL LE edema) present.       Neurological: She is alert and oriented to person, place, and time.    Skin: No rash noted.    Psychiatric: She has a normal mood and affect. Her behavior is normal. Judgment and thought content normal.             Significant Labs:  Lab Results   Component Value Date    GROUPTRH A POS 01/10/2024    HEPBSAG Non-reactive 01/10/2024    STREPBCULT No Group B Streptococcus isolated 08/02/2022       Recent Lab Results         08/15/24  0540        Albumin 2.7              ALT 10       Anion Gap 11       AST 11       Baso # 0.02       Basophil % 0.2       BILIRUBIN TOTAL 0.2  Comment: For infants and newborns, interpretation of results should be based  on gestational age, weight and in agreement with clinical  observations.    Premature Infant recommended reference ranges:  Up to 24 hours.............<8.0 mg/dL  Up to 48 hours............<12.0 mg/dL  3-5 days..................<15.0 mg/dL  6-29 days.................<15.0 mg/dL         BUN 11       Calcium 9.9       Chloride 109       CO2 17       Creatinine 0.7       Differential Method Automated       eGFR >60       Eos # 0.0       Eos % 0.5       Glucose 99       Gran  # (ANC) 6.6       Gran % 74.7       Hematocrit 35.9       Hemoglobin 11.9       HIV 1/2 Ag/Ab Negative       Immature Grans (Abs) 0.05  Comment: Mild elevation in immature granulocytes is non specific and   can be seen in a variety of conditions including stress response,   acute inflammation, trauma and pregnancy. Correlation with other   laboratory and clinical findings is essential.         Immature Granulocytes 0.6       Lymph # 1.6       Lymph % 18.3       MCH 29.6       MCHC 33.1       MCV 89       Mono # 0.5       Mono % 5.7       MPV 10.1       nRBC 0       Platelet Count 251       Potassium 3.7       PROTEIN TOTAL 6.4       RBC 4.02       RDW 15.6       Sodium 137       WBC 8.76

## 2024-08-15 NOTE — L&D DELIVERY NOTE
"[facility] - Labor & Delivery   Section   Operative Note    SUMMARY     Date of Procedure: 8/15/2024     Procedure: Procedure(s) (LRB):   SECTION REPEAT (N/A)    Surgeons and Role:     * Sugar Sal MD - Primary    Assistant:  first lawrence Arango    Pre-Operative Diagnosis:   IUP at 37w2d  History of  delivery  Insulin dependent type 2 DM  Chronic hypertension in pregnancy  Post-Operative Diagnosis:   IUP at 37w2d  History of  delivery  Insulin dependent type 2 DM  Chronic hypertension in pregnancy    Anesthesia: Spinal/Epidural    Technical Procedures Used:   Repeat low transverse  delivery via Pfannensteil skin incision  Lysis of adhesions           Description of the Findings of the Procedure: Dense omental adhesions to anterior abdominal wall, no bowel involvement.  Normal uterus, tubes, and ovaries.  Copious amount of clear amniotic fluid.  Male infant "Escobar", cephalic presentation delivered with vacuum assistance, 5dm94pr, APGARS 8/9.  3 vessel cord.  Placenta delivered spontaneously, intact    Significant Surgical Tasks Conducted by the Assistant(s), if Applicable: retraction, exposure, skin closure    Complications: No    Blood Loss: * 300 mL *     With patient in supine position, the legs are  and Pugh Catheter placed and positioning to supine done.   Abdomen prepped with Chloroprep and 3 minute drying time allowed prior to draping of the abdomen.   Time out taken with OR team members.  Pfannenstiel Incision made through the skin, transverse fascial incision developed, rectus muscles  in the midline and the peritoneum entered.   Dense omental  adhesions noted to anterior abdominal wall from the level of the umbilicus down to the pelvis.  These were taken down with the Bovie.  No bowel involvement.  The lower uterine segment and position of the fetus identified.   Bladder flap taken down through transverse peritoneal incision.    Low " Transverse Incision made through well developer lower uterine segment and extended laterally with blunt dissection.   Copious and Clear fluid noted.  Infant delivered from vertex presentation.  Due to polyhydramnios, fetal vertex would not flex and engage, so vacuum was applied to assist with delivery of fetal head through the hysterotomy. One pop off occurred, vacuum was reapplied and delivery of the fetal head was achieved.  Suction was released, vacuum removed, and remainder of infant delivered.  Cord clamped after one minute and  handed to attending nurse.  Cord blood taken, placenta delivered.  The uterus wasnot exteriorized.  The edges of the uterine incision are grasped with Chopra clamps at the angles and the inferior and superior midline edges of the incision.    Closure with running lock 0 Chromic, starting at each angle, tying in the midline.   Observation for bleeding with suture of any bleeding along the hysterotomy line.   With good hemostasis noted, the anterior pelvis is rinsed with sterile saline.   Right and left adnexa with normal anatomy.     Closure of the abdomen with 2 0 Vicryl running of the peritoneum, fascial closure with 0 looped PDS starting at each angle and tying the knot in the midline.  Subcutaneous closure with 2-0 plain gut running.  Skin closure with 4 0 Monocryl subcuticular.  Wound dressed with Aquasel and pressure dressing.          Specimens:   Specimen (24h ago, onward)      None            Condition: Good    VTE Risk Mitigation (From admission, onward)           Ordered     IP VTE HIGH RISK PATIENT  Once         08/15/24 07     Place sequential compression device  Until discontinued         08/15/24 07                    Disposition: PACU - hemodynamically stable.    Attestation: Good         Delivery Information for Amrit Mishra    Birth information:  YOB: 2024   Time of birth: 8:24 AM   Sex: male   Head Delivery Date/Time: 8/15/2024  8:24  "AM   Delivery type: , Low Transverse   Gestational Age: 37w2d        Delivery Providers    Delivering clinician: Sugar Sal MD   Provider Role    Kathy Garcia RN Registered Nurse    Bina Nazario RN Registered Nurse    Francisco Holm, [facility]    Trish, Mariana DIAZ, [facility]    Magan Nunez Jr., CRNA Nurse Anesthetist              Measurements    Weight: 4040 g  Weight (lbs): 8 lb 14.5 oz  Length: 49.5 cm  Length (in): 19.5"  Head circumference: 36.2 cm  Chest circumference: 35.6 cm  Abdominal girth: 37.5 cm         Apgars    Living status: Living  Apgar Component Scores:  1 min.:  5 min.:  10 min.:  15 min.:  20 min.:    Skin color:  1  1       Heart rate:  2  2       Reflex irritability:  2  2       Muscle tone:  2  2       Respiratory effort:  2  2       Total:  9  9       Apgars assigned by: ALVARO NAZARIO         Operative Delivery    Forceps attempted?: No  Vacuum extractor attempted?: No         Shoulder Dystocia    Shoulder dystocia present?: No           Presentation    Presentation: Vertex           Interventions/Resuscitation    Method: Bulb Suctioning, Tactile Stimulation, Deep Suctioning       Cord    Vessels: 3 vessels  Complications: None  Delayed Cord Clamping?: Yes  Cord Clamped Date/Time: 8/15/2024  7:25 AM  Cord Blood Disposition: Discarded  Gases Sent?: No  Stem Cell Collection (by MD): No       Placenta    Placenta delivery date/time: 8/15/2024 0828  Placenta removal: Manual removal  Placenta appearance: Intact  Placenta disposition: Discarded           Labor Events:       labor: No     Labor Onset Date/Time:         Dilation Complete Date/Time:         Start Pushing Date/Time:         Start Pushing Date/Time:       Rupture Date/Time: 08/15/24  0821         Rupture type: ARM (Artificial Rupture)         Fluid Amount:       Fluid Color: Clear               steroids: None     Antibiotics given for GBS: No     Induction:       Indications for " induction:        Augmentation:       Indications for augmentation:       Labor complications: None     Additional complications:          Cervical ripening:                     Delivery:      Episiotomy: None     Indication for Episiotomy:       Perineal Lacerations: None Repaired:      Periurethral Laceration:   Repaired:     Labial Laceration:   Repaired:     Sulcus Laceration:   Repaired:     Vaginal Laceration:   Repaired:     Cervical Laceration:   Repaired:     Repair suture:       Repair # of packets:       Last Value - EBL - Nursing (mL):       Sum - EBL - Nursing (mL): 0     Last Value - EBL - Anesthesia (mL):      Calculated QBL (mL): 370      Running total QBL (mL): 370      Vaginal Sweep Performed: No     Surgicount Correct:       Vaginal Packing: No Quantity:       Other providers:       Anesthesia    Method: Spinal          Details (if applicable):  Trial of Labor No    Categorization: Repeat    Priority: Routine   Indications for : Repeat Section   Incision Type: low transverse     Additional  information:  Forceps:    Vacuum:    Breech:    Observed anomalies    Other (Comments):

## 2024-08-15 NOTE — ANESTHESIA POSTPROCEDURE EVALUATION
Anesthesia Post Evaluation    Patient: Kathy Mishra    Procedure(s) Performed: Procedure(s) (LRB):   SECTION REPEAT (N/A)    Final Anesthesia Type: spinal      Patient location during evaluation: labor & delivery  Patient participation: Yes- Able to Participate  Level of consciousness: awake and alert  Post-procedure vital signs: reviewed and stable  Pain management: adequate  Airway patency: patent    PONV status at discharge: No PONV  Anesthetic complications: no      Cardiovascular status: blood pressure returned to baseline  Respiratory status: unassisted and spontaneous ventilation  Hydration status: euvolemic  Follow-up not needed.              Vitals Value Taken Time   /58 08/15/24 1257   Temp 36.8 °C (98.3 °F) 08/15/24 1257   Pulse 94 08/15/24 1257   Resp 16 08/15/24 1018   SpO2 98 % 08/15/24 1209   Vitals shown include unfiled device data.      Event Time   Out of Recovery 11:25:00         Pain/Stephane Score: Stephane Score: 9 (8/15/2024 11:25 AM)

## 2024-08-15 NOTE — LACTATION NOTE
Lactation called to room to assist with feeding.    OBJECTIVE    BREASTFEEDING  Breastfeeding    [] Right breast   [x] Left breast                Position [] cross cradle [] cradle [x] football [] laid-back   Gape [] adequate [] narrow []  []    Latch [] successful [x] unsuccessful [] required intervention []   Lip flange [] top flanged [] bottom flanged [] top tucked [] bottom tucked   Oral seal [] adequate [] poor []    Cheeks [] round [] dimpled [] broken cheek line [] flat   Jaw [] rocker [] piston [] chomping [] fasciculations   Maternal pain [] none [] mild [] moderate [] severe   Swallow [] observed [] not observed []  []    Swallow rate [] appropriate [] minimal [] none []    Difficulties [] none [] coughing [] choking [] gagging    [] clicking [] wet/hoarse/breathy vocal quality [] breathing difficulty or tachypnea [] fatigue    [] smacking [] weak/disorganized suck [] infant inactive [] loss of milk    [] pop-offs [] arching [x] asleep, no cues, will not awaken for breastfeeding []    After feeding:     Maternal nipple  [] WDL [] slightly compressed [] compressed  [] blanched   Baby after feeding [] content [] feeding cues  [] fussy [] fatigued    [] N/A (feeding ongoing) []    Notes:  Infant placed skin to skin at the left breast in the football position. Infant asleep with no cues. No latch obtained. Left skin to skin for 20 minutes while hand expression completed. Infant remains asleep while skin to skin completed.      SUPPLEMENTATION  Method: syringe  EBM     difficulties [x] none [] coughing [] choking [] gagging    [] clicking [] wet/hoarse/breathy vocal quality [] breathing difficulty or tachypnea [] loss of milk    [] weak/disorganized suck [] Infant inactive [] unable to complete feeding    interventions [x] none [] chin support [] cheek support [] side-lying position    []       Baby after feeding [] content [] feeding cues [] fussy [] fatigued    [x] asleep []  []     Minutes: 10      Amount: 2  mL   Notes: Infant begins to smack and suck with gloved finger gently placed in mouth. Safe syringe feeding techniques discussed and demonstrated. Infant tolerates feeding well. Appears content at the end of the feeding, as he fell asleep.        ASSESSMENT FINDINGS    Unchanged.      PLAN    - Continue routine direct breastfeeding support.  - Supplement infant if not feeding effectively at the breast, prioritizing the use of EBM prior to formula.   -  Bedside nurse to syringe feed infant when able. Parents state they will bottle feed infant supplements if nurse is not able to syringe feed at that time. Parents do not wish to syringe feed infant themselves, but wish for staff to do so when available.  - Initiate pumping if infant remains not latching and effectively feeding at the breast by 12 hours of age.  - Call for assistance with feeding.     EDUCATION    - Proper positioning techniques for football position, demonstrated  - Asymmetrical latch techniques, unable to obtain latch  - Safe syringe feeding techniques, demonstrated  - Hand expression demonstrated  - Feeding plan above    Mother verbalizes understanding of all education and counseling. Mother denies any further lactation needs or concerns at this time. Discussed lactation availability. Encouraged mother to call for assistance when needs arise.    Primary nurse provided with full update.

## 2024-08-15 NOTE — ANESTHESIA PREPROCEDURE EVALUATION
08/15/2024  Kathy Mishra is a 28 y.o., female.  28 y.o.  female with IUP at 37w0d      Problems addressed at today's visit:  Chronic hypertension affecting pregnancy  BPs WNL.   Continue Labetalol 200 BID.     Pregnancy with type 2 diabetes mellitus in third trimester  T2DM in pregnancy  HbA1c 5.8% on 24  BGs reviewed, see Media tab:  Majority elevated       Pre-op Assessment    I have reviewed the Patient Summary Reports.     I have reviewed the Nursing Notes.    I have reviewed the Medications.     Review of Systems  Anesthesia Hx:  No previous Anesthesia                Social:  Former Smoker       Hematology/Oncology:  Hematology Normal   Oncology Normal                                   EENT/Dental:  EENT/Dental Normal           Cardiovascular:  Exercise tolerance: good   Hypertension             NYHA Classification I                           Pulmonary:    Asthma    Sleep Apnea                Renal/:  Renal/ Normal                 Hepatic/GI:  Hepatic/GI Normal                 Musculoskeletal:  Musculoskeletal Normal                OB/GYN/PEDS:      Issues with Current Pregnancy  are Diabetes, Type II, Insulin controlled              Neurological:      Headaches                                 Endocrine:   Hypothyroidism        Morbid Obesity / BMI > 40  Dermatological:  Skin Normal    Psych:  Psychiatric Normal                    Physical Exam  General: Well nourished and Cooperative    Airway:  Mallampati: II   Mouth Opening: Normal  Tongue: Normal  Neck ROM: Normal ROM    Dental:  Intact    Chest/Lungs:  Clear to auscultation, Normal Respiratory Rate    Heart:  Rate: Normal  Rhythm: Regular Rhythm        Anesthesia Plan  Type of Anesthesia, risks & benefits discussed:    Anesthesia Type: Epidural, CSE  Intra-op Monitoring Plan: Standard ASA Monitors  Post Op Pain Control Plan:  epidural analgesia, multimodal analgesia and intrathecal opioid  Airway Plan: , Post-Induction  Informed Consent: Informed consent signed with the Patient and all parties understand the risks and agree with anesthesia plan.  All questions answered. Patient consented to blood products? Yes  ASA Score: 3  Day of Surgery Review of History & Physical: H&P Update referred to the surgeon/provider.    Ready For Surgery From Anesthesia Perspective.     .

## 2024-08-15 NOTE — ANESTHESIA PROCEDURE NOTES
Spinal    Diagnosis: iup, previous csection  Patient location during procedure: OR  Start time: 8/15/2024 7:41 AM  Timeout: 8/15/2024 7:40 AM  End time: 8/15/2024 7:47 AM    Staffing  Authorizing Provider: Magan Goodman II, MD  Performing Provider: Magan Nunez Jr., CRNA    Staffing  Performed by: Magan Nunez Jr., CRNA  Authorized by: Magan Goodman II, MD    Preanesthetic Checklist  Completed: patient identified, IV checked, site marked, risks and benefits discussed, surgical consent, monitors and equipment checked, pre-op evaluation and timeout performed  Spinal Block  Patient position: sitting  Prep: ChloraPrep  Patient monitoring: heart rate, cardiac monitor and continuous pulse ox  Approach: midline  Location: L3-4  Injection technique: single shot  CSF Fluid: clear free-flowing CSF  Needle  Needle type: Annette   Needle gauge: 25 G  Needle length: 3.5 in  Additional Documentation: no paresthesia on injection and negative aspiration for heme  Needle localization: anatomical landmarks  Assessment  Sensory level: T5   Dermatomal levels determined by alcohol wipe and pinch or prick  Ease of block: easy  Patient's tolerance of the procedure: comfortable throughout block and no complaints

## 2024-08-15 NOTE — LACTATION NOTE
Lactation rounds- primary nurse requested help to latch infant.     Baby is showing feeding cues. Mother is currently trying to latch in a football hold on R breast. Infant is currently facing the ceiling and also is more forward than ideal for football positioning. Pointed these things out to mother and discussed the rationale for better positioning. Assisted mother to turn infant belly to belly and move pillows behind mother to move infant further back to encourage a sniffing position for latching. Mother also reports infant being sleepy and falling asleep after only a few sucks. Lights are turned off and infant has blanket tucked closely around him even though he's only in a diaper. Turned on lights and removed blanket and helped mother stimulate infant. Explained that it is very normal for first 24 hours to be very sleepy and require extra work to wake infant and to get a full feeding. Reviewed deep asymmetric latch and proper positioning. Mother reports she infant was not opening mouth wide enough to get her nipple into his mouth. Mother was trying to push nipple into infant's mouth. Demonstrated aiming nipple to nose and awaiting infant to open mouth to reach for nipple then pulling infant into her breast. Also instructed mother she may need to hold her breast for infant to allow for as much breast tissue into his mouth as possible. Mother is able to demonstrate back and deep latch easily obtained. Nutritive sucks and audible swallows noted, and mother denies pain or discomfort. Baby feed ongoing. Demonstrated breast massage and infant stimulation to keep infant engaged in feeding.       Mother verbalizes understanding of all education and counseling. Mother denies any further lactation needs or concerns at this time. Discussed lactation availability. Encouraged mother to call for assistance when needs arise.     Primary nurse, mao Mitchell.

## 2024-08-15 NOTE — ASSESSMENT & PLAN NOTE
Desires repeat  delivery.  Surgical consent reviewed in detail and signed.  Proceed with repeat LTCS.

## 2024-08-15 NOTE — TRANSFER OF CARE
"Anesthesia Transfer of Care Note    Patient: Kathy Mishra    Procedure(s) Performed: Procedure(s) (LRB):   SECTION REPEAT (N/A)    Patient location: Labor and Delivery    Anesthesia Type: spinal    Transport from OR: Transported from OR on room air with adequate spontaneous ventilation    Post pain: adequate analgesia    Post assessment: no apparent anesthetic complications    Post vital signs: stable    Level of consciousness: awake    Nausea/Vomiting: no nausea/vomiting    Complications: none    Transfer of care protocol was followed      Last vitals: Visit Vitals  /78   Pulse 87   Temp 36.8 °C (98.2 °F) (Oral)   Ht 5' 3" (1.6 m)   Wt 127.4 kg (280 lb 13.9 oz)   LMP 2023   SpO2 98%   Breastfeeding No   BMI 49.75 kg/m²     "

## 2024-08-15 NOTE — LACTATION NOTE
LACTATION ROUNDS  SUBJECTIVE  Mother reports:  Initial feeding at this time.     OBJECTIVE    Mother's Relevant History: Endocrine: Thyroid disorder hypothyroid and on synthroid, type 2 diabetes, and obesity.  Illicit substance use: + THC UDS, used prior to knowing of pregnancy; UDS ordered.  Chronic hypertension. Required medication therapy for DM management.  Mothers chest assessment: unremarkable  Nipple description: intact and everted  Areola description: soft and elastic  Breast description: unremarkable, symmetrical, pendulous, and soft  Infant's relevant history: LGA, deep suctioning    BREASTFEEDING  Breastfeeding    [x] Right breast   [x] Left breast                Position [x] cross cradle [] cradle [] football [] laid-back   Gape [x] adequate [] narrow []  []    Latch [x] successful [] unsuccessful [] required intervention []   Lip flange [x] top flanged [x] bottom flanged [] top tucked [] bottom tucked   Oral seal [x] adequate [] poor []    Cheeks [x] round [] dimpled [] broken cheek line [] flat   Jaw [] rocker [x] piston [] chomping [] fasciculations   Maternal pain [x] none [] mild [] moderate [] severe   Swallow [x] observed [] not observed []  []    Swallow rate [] appropriate [x] Minimal, 2 swallows in 10 minutes [] none []    Difficulties [] none [] coughing [] choking [] gagging    [] clicking [] wet/hoarse/breathy vocal quality [] breathing difficulty or tachypnea [x] fatigue    [] smacking [] weak/disorganized suck [x] infant inactive [] loss of milk    [] pop-offs [] arching []  []    After feeding:     Maternal nipple  [x] WDL [] slightly compressed [] compressed  [] blanched   Baby after feeding [] content [x] feeding cues  [] fussy [] fatigued    [] N/A (feeding ongoing) []    Notes:  Infant at right breast for 20 minutes, but actively feeds for only 10 minutes. Infant very sleepy, requiring lots of prompting, stimulation and breast compression. Two audible swallows noted. Attempted to  feed on left side for 5 minutes. Infant remains sleepy with minimal effort at the breast, also requiring prompting. No swallows noted on left breast. Infant placed back skin to skin. Feeding cues resume. Supplemental feeding initiated.      SUPPLEMENTATION  Method: syringe  EBM     difficulties [x] none [] coughing [] choking [] gagging    [] clicking [] wet/hoarse/breathy vocal quality [] breathing difficulty or tachypnea [] loss of milk    [] weak/disorganized suck [] Infant inactive [] unable to complete feeding    interventions [x] none [] chin support [] cheek support [] side-lying position    []       Baby after feeding [x] content [] feeding cues [] fussy [] fatigued    [] N/A (feeding ongoing) []  []     Minutes: 8      Amount: 3 mL          ASSESSMENT FINDINGS    Ineffective breastfeeding due to infant's inactivity/fatigue at the breast and poor transfer of milk at the breast  At risk for inadequate milk supply due to ineffective breastfeeding  Maternal comfort WNL.     PLAN    Routine lactation plan of care. Support infant's nutritional needs to establish and maintain adequate blood glucose levels. Mother wishes to feed infant EBM after breastfeeding session at this time.      EDUCATION    Mother verbalizes understanding of expected  behaviors and output for the first 48 hours of life.  Discussed the importance of cue based feedings on demand, unrestricted access to the breast, and frequent uninterrupted skin to skin contact.  Risk and implications of artificial nipples and non medically indicated formula supplementation discussed.      Mother was taught hand expression of breastmilk/colostrum. She was instructed to:  Sit upright and lean forward, if possible.  When feasible, apply warm, wet compress over breasts for a few minutes.   Perform gentle breast massage.  Form a C with her hand and place it about 1 inch back from the areola with the nipple centered between her index finger and her  thumb.  Press, compress, relax:  Using her finger and thumb, apply pressure in an inward direction toward the breast without stretching the tissue, compress the breast tissue between her finger and thumb, then relax her finger and thumb. Repeat process for a few minutes.  Rotate placement of finger and thumb on the breasts to facilitate emptying.  Collect expressed breastmilk/colostrum with a spoon or cup and feed immediately to the baby, if able.  If unable to feed immediately, place breastmilk/colostrum directly into a sterile storage container for later use. Place the babys breast milk label (with the date and time of collection and the names of mother's medications) on the container. Reviewed proper handling and storage of expressed breastmilk.   Patient will need to return demonstrate when able and verbalized understanding. Total of 3 mL collected. Breast milk storage guidelines reviewed.    Discussed supporting infant's needs to maintain adequate blood glucose levels. Mother states previous infant was afmitted to NICU for several reasons, including persistent hypoglycemia. She would like to proactive with feedings in efforts to prevent infant needing to be admitted in the NICU. Encouraged supplementation of EBM via syringe after breastfeeding sessions until glucose well established and infant feeding in a nutritive pattern at the breast. Discussed that formula may be temporality needed at some point. Discussed the need to pump for breast stimulation if supplementation of EBM and or formula required to be ongoing after the first few feedings. Mother states she is ok with using formula if medically needed.    Safe syringe feeding techniques discussed and demonstrated. Instructed mother not to syringe feed infant until she has properly return demonstrated with RN observation.     Mother does not breast pump for home use from her insurance company. Will assist mother with this process while inpatient.    Mother  denies any further lactation needs or concerns at this time. Encouraged mother to call for assistance when desired or when infant is showing signs of hunger. Mother verbalizes understanding of all education and counseling.

## 2024-08-15 NOTE — ASSESSMENT & PLAN NOTE
Sugars have been difficult to control this pregnancy.  Pt with Dexcom monitor.  Home regimen:  - u QHS  -Aspart: 22/18/20 with meals  -metformin 500mg q day  As per [facility] Guidelines for timing of delivery: pt with IDDM, CHTN on medication, and morbid obesity.  Proceed with delivery at 37-37w6d  Accuchecks postpartum.  Will resume insulin at 1/2 home dose following delivery.  Hold metformin while inpatient

## 2024-08-16 LAB
POCT GLUCOSE: 121 MG/DL (ref 70–110)
POCT GLUCOSE: 127 MG/DL (ref 70–110)
POCT GLUCOSE: 186 MG/DL (ref 70–110)
POCT GLUCOSE: 98 MG/DL (ref 70–110)

## 2024-08-16 PROCEDURE — 25000003 PHARM REV CODE 250: Performed by: OBSTETRICS & GYNECOLOGY

## 2024-08-16 PROCEDURE — 99232 SBSQ HOSP IP/OBS MODERATE 35: CPT | Mod: ,,, | Performed by: OBSTETRICS & GYNECOLOGY

## 2024-08-16 PROCEDURE — 11000001 HC ACUTE MED/SURG PRIVATE ROOM

## 2024-08-16 PROCEDURE — 63600175 PHARM REV CODE 636 W HCPCS: Performed by: OBSTETRICS & GYNECOLOGY

## 2024-08-16 RX ORDER — CYCLOBENZAPRINE HCL 10 MG
10 TABLET ORAL 3 TIMES DAILY PRN
Status: DISCONTINUED | OUTPATIENT
Start: 2024-08-16 | End: 2024-08-17 | Stop reason: HOSPADM

## 2024-08-16 RX ADMIN — LABETALOL HYDROCHLORIDE 200 MG: 200 TABLET, FILM COATED ORAL at 04:08

## 2024-08-16 RX ADMIN — ENOXAPARIN SODIUM 40 MG: 40 INJECTION SUBCUTANEOUS at 08:08

## 2024-08-16 RX ADMIN — LABETALOL HYDROCHLORIDE 200 MG: 200 TABLET, FILM COATED ORAL at 09:08

## 2024-08-16 RX ADMIN — INSULIN ASPART 10 UNITS: 100 INJECTION, SOLUTION INTRAVENOUS; SUBCUTANEOUS at 04:08

## 2024-08-16 RX ADMIN — IBUPROFEN 800 MG: 800 TABLET, FILM COATED ORAL at 02:08

## 2024-08-16 RX ADMIN — INSULIN GLARGINE 50 UNITS: 100 INJECTION, SOLUTION SUBCUTANEOUS at 09:08

## 2024-08-16 RX ADMIN — DOCUSATE SODIUM 100 MG: 100 CAPSULE, LIQUID FILLED ORAL at 08:08

## 2024-08-16 RX ADMIN — KETOROLAC TROMETHAMINE 30 MG: 30 INJECTION, SOLUTION INTRAMUSCULAR at 01:08

## 2024-08-16 RX ADMIN — KETOROLAC TROMETHAMINE 30 MG: 30 INJECTION, SOLUTION INTRAMUSCULAR at 04:08

## 2024-08-16 RX ADMIN — ACETAMINOPHEN 650 MG: 325 TABLET ORAL at 04:08

## 2024-08-16 RX ADMIN — IBUPROFEN 800 MG: 800 TABLET, FILM COATED ORAL at 09:08

## 2024-08-16 RX ADMIN — LABETALOL HYDROCHLORIDE 200 MG: 200 TABLET, FILM COATED ORAL at 08:08

## 2024-08-16 RX ADMIN — LEVOTHYROXINE SODIUM 100 MCG: 50 TABLET ORAL at 04:08

## 2024-08-16 RX ADMIN — PRENATAL VITAMINS-IRON FUMARATE 27 MG IRON-FOLIC ACID 0.8 MG TABLET 1 TABLET: at 08:08

## 2024-08-16 RX ADMIN — HYDROCODONE BITARTRATE AND ACETAMINOPHEN 1 TABLET: 10; 325 TABLET ORAL at 11:08

## 2024-08-16 RX ADMIN — INSULIN ASPART 10 UNITS: 100 INJECTION, SOLUTION INTRAVENOUS; SUBCUTANEOUS at 08:08

## 2024-08-16 RX ADMIN — ENOXAPARIN SODIUM 40 MG: 40 INJECTION SUBCUTANEOUS at 09:08

## 2024-08-16 RX ADMIN — INSULIN ASPART 6 UNITS: 100 INJECTION, SOLUTION INTRAVENOUS; SUBCUTANEOUS at 11:08

## 2024-08-16 NOTE — ASSESSMENT & PLAN NOTE
Sugars have been difficult to control this pregnancy.  Pt with Dexcom monitor.  Pre-delivery home regimen:  - u QHS  -Aspart: 22/18/20 with meals  -metformin 500mg q day    Glucose levels stable on PP regimen (Lantus 50 QHS, Aspart with meals 10/6/10)

## 2024-08-16 NOTE — PLAN OF CARE
Problem: Adult Inpatient Plan of Care  Goal: Plan of Care Review  Outcome: Progressing  Goal: Patient-Specific Goal (Individualized)  Outcome: Progressing  Goal: Absence of Hospital-Acquired Illness or Injury  Outcome: Progressing  Goal: Optimal Comfort and Wellbeing  Outcome: Progressing  Goal: Readiness for Transition of Care  Outcome: Progressing     Problem: Diabetes Comorbidity  Goal: Blood Glucose Level Within Targeted Range  Outcome: Progressing     Problem: Bariatric Environmental Safety  Goal: Safety Maintained with Care  Outcome: Progressing     Problem:  Fall Injury Risk  Goal: Absence of Fall, Infant Drop and Related Injury  Outcome: Progressing     Problem: Infection  Goal: Absence of Infection Signs and Symptoms  Outcome: Progressing     Problem: Wound  Goal: Optimal Coping  Outcome: Progressing  Goal: Optimal Functional Ability  Outcome: Progressing  Goal: Absence of Infection Signs and Symptoms  Outcome: Progressing  Goal: Improved Oral Intake  Outcome: Progressing  Goal: Optimal Pain Control and Function  Outcome: Progressing  Goal: Skin Health and Integrity  Outcome: Progressing  Goal: Optimal Wound Healing  Outcome: Progressing     Problem: Postpartum ( Delivery)  Goal: Successful Parent Role Transition  Outcome: Progressing  Goal: Hemostasis  Outcome: Progressing  Goal: Effective Bowel Elimination  Outcome: Progressing  Goal: Fluid and Electrolyte Balance  Outcome: Progressing  Goal: Absence of Infection Signs and Symptoms  Outcome: Progressing  Goal: Anesthesia/Sedation Recovery  Outcome: Progressing  Goal: Optimal Pain Control and Function  Outcome: Progressing  Goal: Nausea and Vomiting Relief  Outcome: Progressing  Goal: Effective Urinary Elimination  Outcome: Progressing  Goal: Effective Oxygenation and Ventilation  Outcome: Progressing     Problem: Breastfeeding  Goal: Effective Breastfeeding  Outcome: Progressing     Problem: Skin Injury Risk Increased  Goal: Skin Health  and Integrity  Outcome: Progressing

## 2024-08-16 NOTE — LACTATION NOTE
Lactation rounds-    Medela breastpump from [facility] delivered to pt at this time. Provided delivery receipt to pt. Notified patient to sterile pump parts prior to usage.     Mother requested to start pumping at this time and requested a hospital pump set up. Medela Symphony breast pump set up at bedside.  Instructed on proper usage and to adjust suction according to comfort level. Verified appropriate flange fit- between 24 mm & 27 mm. Currently using 24 mm on L side and 27 mm on R side, educated mother on proper flange fit and instructed her to adjust sizes to her comfort. Reviewed frequency and duration of pumping in order to promote and maintain full milk supply. Hands-on pumping technique reviewed. Encouraged hand expression after. Instructed on proper cleaning of breast pump parts. Reviewed proper milk handling, collection, and storage. Voices understanding.    Mother verbalizes understanding of all education and counseling. Mother denies any further lactation needs or concerns at this time. Discussed lactation availability. Encouraged mother to call for assistance when needs arise.    Primary nurse, mao Gilbert.

## 2024-08-16 NOTE — LACTATION NOTE
This note was copied from a baby's chart.  Lactation Rounding: infant feeding frequency and output at this time WNL. Infant weight loss noted as -4%    Upon entering room, nurse finds infant laying in crib sleeping and mother sitting on bed. Mother states that infant has been getting both formula and being latched to the breast because his blood glucose levels were low. Mother states that she would like to keep latching infant and supplementing with formula after feedings and possibly pumping breasts when she gets home. Information provided to mother about how to obtain breast pump through insurance. [place] department of health electric breast pump request form faxed to [employer] at this time.    Because baby is being supplemented away from the breast, mother was:   - informed that breastfeeding support and assistance is available as needed  - encouraged to express milk from both breasts each time a supplement is given  - encouraged to use her own collected milk as a first choice for supplementation  Mother was encouraged to request assistance as needed and voices understanding.    Discussed mechanism of milk production and maintenance. Encouraged frequent feeds based on early cues, unrestricted access to the breast and frequent skin to skin contact. Discussed expected feeding and output pattern for day of life 2. Reinforced normalcy and importance of cluster feeding.     Mother verbalized understanding of all teaching and counseling at this time. Opportunity for questions given to mom. Mother verbalized no concerns at this time. Lactation contact information given to mother. Nurse instructed mother to call for assistance if need arises.

## 2024-08-16 NOTE — PLAN OF CARE
Patient afebrile and had no falls this shift. Fundus firm without massage and below umbilicus. Bleeding light, no clots passed this shift. Voids spontaneously. Ambulates independently. Pain well controlled with oral pain medication. Vital signs stable at this time. Bonding well with infant; responds to infant cues and participates in infant care.

## 2024-08-16 NOTE — PROGRESS NOTES
[facility] ([facility])  Obstetrics  Postpartum Progress Note    Patient Name: Kathy Mishra  MRN: [id number]  Admission Date: 8/15/2024  Hospital Length of Stay: 1 days  Attending Physician: Sugar Sal MD  Primary Care Provider: [facility]    Subjective:     Principal Problem:Status post repeat low transverse  section    Hospital Course:  No notes on file    Interval History:     She is doing well this morning. She is tolerating a regular diet without nausea or vomiting. She is voiding spontaneously. She is ambulating. She has passed flatus, and has not a BM. Vaginal bleeding is mild. She denies fever or chills. Abdominal pain is moderate and controlled with oral medications. She Is breastfeeding.     Objective:     Vital Signs (Most Recent):  Temp: 98.2 °F (36.8 °C) (24)  Pulse: 96 (24)  Resp: 18 (24)  BP: 126/66 (24)  SpO2: 99 % (24) Vital Signs (24h Range):  Temp:  [97.8 °F (36.6 °C)-98.3 °F (36.8 °C)] 98.2 °F (36.8 °C)  Pulse:  [] 96  Resp:  [16-18] 18  SpO2:  [98 %-100 %] 99 %  BP: ()/() 126/66     Weight: 127.4 kg (280 lb 13.9 oz)  Body mass index is 49.75 kg/m².      Intake/Output Summary (Last 24 hours) at 2024 0811  Last data filed at 2024 0411  Gross per 24 hour   Intake 1500 ml   Output 1145 ml   Net 355 ml         Significant Labs:  Lab Results   Component Value Date    GROUPTRH A POS 08/15/2024    HEPBSAG Non-reactive 01/10/2024    STREPBCULT No Group B Streptococcus isolated 2022     Recent Labs   Lab 08/15/24  0540   HGB 11.9*   HCT 35.9*       I have personallly reviewed all pertinent lab results from the last 24 hours.    Physical Exam:   Constitutional: She is oriented to person, place, and time. She appears well-developed and well-nourished. No distress.       Cardiovascular:  Normal rate, regular rhythm and normal heart sounds.             Pulmonary/Chest: Effort normal  and breath sounds normal.        Abdominal: Soft. Bowel sounds are normal. She exhibits abdominal incision (dressing in place). She exhibits no distension and no mass. There is abdominal tenderness (appropriate). There is no rebound and no guarding. No hernia.     Genitourinary: There is bleeding (lochia) in the vagina. Uterus is not tender.           Musculoskeletal: Normal range of motion and moves all extremeties.       Neurological: She is alert and oriented to person, place, and time.    Skin: Skin is warm and dry.    Psychiatric: She has a normal mood and affect. Her behavior is normal. Thought content normal.       Review of Systems  Assessment/Plan:     28 y.o. female  for:    * Status post repeat low transverse  section  POD # 1 s/p RLTCS  Pt is doing well.  Proceed with routine post-operative care.  Pt counseled on management plan and discharge goals.    Pregnancy with type 2 diabetes mellitus in third trimester  Sugars have been difficult to control this pregnancy.  Pt with Dexcom monitor.  Pre-delivery home regimen:  - u QHS  -Aspart:  with meals  -metformin 500mg q day    Glucose levels stable on PP regimen (Lantus 50 QHS, Aspart with meals 10/6/10)    Hypothyroidism  Continue synthroid 100mcg daily    Class 3 severe obesity due to excess calories with serious comorbidity and body mass index (BMI) of 45.0 to 49.9 in adult  Lovenox ppx PP    Chronic hypertension affecting pregnancy  BP stable on Labetalol 200mg BID        Disposition: As patient meets milestones, will plan to discharge 1-2 days.    Toby Canales MD  Obstetrics  [facility] ([facility])

## 2024-08-16 NOTE — SUBJECTIVE & OBJECTIVE
Interval History:     She is doing well this morning. She is tolerating a regular diet without nausea or vomiting. She is voiding spontaneously. She is ambulating. She has passed flatus, and has not a BM. Vaginal bleeding is mild. She denies fever or chills. Abdominal pain is moderate and controlled with oral medications. She Is breastfeeding.     Objective:     Vital Signs (Most Recent):  Temp: 98.2 °F (36.8 °C) (08/16/24 0411)  Pulse: 96 (08/16/24 0411)  Resp: 18 (08/16/24 0411)  BP: 126/66 (08/16/24 0411)  SpO2: 99 % (08/16/24 0411) Vital Signs (24h Range):  Temp:  [97.8 °F (36.6 °C)-98.3 °F (36.8 °C)] 98.2 °F (36.8 °C)  Pulse:  [] 96  Resp:  [16-18] 18  SpO2:  [98 %-100 %] 99 %  BP: ()/() 126/66     Weight: 127.4 kg (280 lb 13.9 oz)  Body mass index is 49.75 kg/m².      Intake/Output Summary (Last 24 hours) at 8/16/2024 0811  Last data filed at 8/16/2024 0411  Gross per 24 hour   Intake 1500 ml   Output 1145 ml   Net 355 ml         Significant Labs:  Lab Results   Component Value Date    GROUPTRH A POS 08/15/2024    HEPBSAG Non-reactive 01/10/2024    STREPBCULT No Group B Streptococcus isolated 08/02/2022     Recent Labs   Lab 08/15/24  0540   HGB 11.9*   HCT 35.9*       I have personallly reviewed all pertinent lab results from the last 24 hours.    Physical Exam:   Constitutional: She is oriented to person, place, and time. She appears well-developed and well-nourished. No distress.       Cardiovascular:  Normal rate, regular rhythm and normal heart sounds.             Pulmonary/Chest: Effort normal and breath sounds normal.        Abdominal: Soft. Bowel sounds are normal. She exhibits abdominal incision (dressing in place). She exhibits no distension and no mass. There is abdominal tenderness (appropriate). There is no rebound and no guarding. No hernia.     Genitourinary: There is bleeding (lochia) in the vagina. Uterus is not tender.           Musculoskeletal: Normal range of motion and  moves all extremeties.       Neurological: She is alert and oriented to person, place, and time.    Skin: Skin is warm and dry.    Psychiatric: She has a normal mood and affect. Her behavior is normal. Thought content normal.       Review of Systems

## 2024-08-17 VITALS
TEMPERATURE: 98 F | HEART RATE: 98 BPM | SYSTOLIC BLOOD PRESSURE: 137 MMHG | BODY MASS INDEX: 49.77 KG/M2 | OXYGEN SATURATION: 98 % | HEIGHT: 63 IN | RESPIRATION RATE: 16 BRPM | DIASTOLIC BLOOD PRESSURE: 63 MMHG | WEIGHT: 280.88 LBS

## 2024-08-17 LAB — POCT GLUCOSE: 113 MG/DL (ref 70–110)

## 2024-08-17 PROCEDURE — 25000003 PHARM REV CODE 250: Performed by: OBSTETRICS & GYNECOLOGY

## 2024-08-17 PROCEDURE — 99238 HOSP IP/OBS DSCHRG MGMT 30/<: CPT | Mod: ,,, | Performed by: OBSTETRICS & GYNECOLOGY

## 2024-08-17 PROCEDURE — 63600175 PHARM REV CODE 636 W HCPCS: Performed by: OBSTETRICS & GYNECOLOGY

## 2024-08-17 RX ORDER — INSULIN ASPART 100 [IU]/ML
INJECTION, SOLUTION INTRAVENOUS; SUBCUTANEOUS
Qty: 15 ML | Refills: 1 | Status: SHIPPED | OUTPATIENT
Start: 2024-08-18

## 2024-08-17 RX ORDER — HYDROCODONE BITARTRATE AND ACETAMINOPHEN 5; 325 MG/1; MG/1
1 TABLET ORAL EVERY 6 HOURS PRN
Qty: 20 TABLET | Refills: 0 | Status: SHIPPED | OUTPATIENT
Start: 2024-08-17 | End: 2024-08-20

## 2024-08-17 RX ORDER — IBUPROFEN 800 MG/1
800 TABLET ORAL EVERY 8 HOURS
Qty: 30 TABLET | Refills: 0 | Status: SHIPPED | OUTPATIENT
Start: 2024-08-17

## 2024-08-17 RX ORDER — INSULIN GLARGINE 100 [IU]/ML
50 INJECTION, SOLUTION SUBCUTANEOUS NIGHTLY
Qty: 15 ML | Refills: 1 | Status: SHIPPED | OUTPATIENT
Start: 2024-08-17 | End: 2025-08-17

## 2024-08-17 RX ADMIN — LABETALOL HYDROCHLORIDE 200 MG: 200 TABLET, FILM COATED ORAL at 09:08

## 2024-08-17 RX ADMIN — IBUPROFEN 800 MG: 800 TABLET, FILM COATED ORAL at 05:08

## 2024-08-17 RX ADMIN — LEVOTHYROXINE SODIUM 100 MCG: 50 TABLET ORAL at 05:08

## 2024-08-17 RX ADMIN — PRENATAL VITAMINS-IRON FUMARATE 27 MG IRON-FOLIC ACID 0.8 MG TABLET 1 TABLET: at 09:08

## 2024-08-17 RX ADMIN — INSULIN ASPART 10 UNITS: 100 INJECTION, SOLUTION INTRAVENOUS; SUBCUTANEOUS at 07:08

## 2024-08-17 RX ADMIN — DOCUSATE SODIUM 100 MG: 100 CAPSULE, LIQUID FILLED ORAL at 09:08

## 2024-08-17 RX ADMIN — ENOXAPARIN SODIUM 40 MG: 40 INJECTION SUBCUTANEOUS at 09:08

## 2024-08-17 NOTE — SUBJECTIVE & OBJECTIVE
"Interval History: POD #2 s/p RLTCS    She is doing well this morning. She is tolerating a regular diet without nausea or vomiting. She is voiding spontaneously. She is ambulating. She has passed flatus, and has not a BM. Vaginal bleeding is mild. She denies fever or chills. Abdominal pain is mild and controlled with oral medications. She Is breastfeeding. She desires circumcision for her male baby: no.    Objective:     Vital Signs (Most Recent):  Temp: 98.2 °F (36.8 °C) (08/17/24 0813)  Pulse: 98 (08/17/24 0813)  Resp: 16 (08/17/24 0813)  BP: 137/63 (08/17/24 0813)  SpO2: 98 % (08/17/24 0813) Vital Signs (24h Range):  Temp:  [98.2 °F (36.8 °C)-98.8 °F (37.1 °C)] 98.2 °F (36.8 °C)  Pulse:  [] 98  Resp:  [16-20] 16  SpO2:  [98 %] 98 %  BP: (130-139)/(61-81) 137/63     Weight: 127.4 kg (280 lb 13.9 oz)  Body mass index is 49.75 kg/m².    No intake or output data in the 24 hours ending 08/17/24 0901      Significant Labs:  Lab Results   Component Value Date    GROUPTRH A POS 08/15/2024    HEPBSAG Non-reactive 01/10/2024    STREPBCULT No Group B Streptococcus isolated 08/02/2022     No results for input(s): "HGB", "HCT" in the last 48 hours.    I have personallly reviewed all pertinent lab results from the last 24 hours.    Physical Exam:   Constitutional: She is oriented to person, place, and time. She appears well-developed and well-nourished. No distress.    HENT:   Head: Normocephalic and atraumatic.     Neck: No thyromegaly present.     Pulmonary/Chest: Effort normal. No respiratory distress.        Abdominal: Soft. She exhibits abdominal incision (wound CDI). She exhibits no distension and no mass. There is no abdominal tenderness. There is no rebound and no guarding.             Musculoskeletal: Normal range of motion and moves all extremeties. No tenderness.       Neurological: She is alert and oriented to person, place, and time. No cranial nerve deficit. Coordination normal.    Skin: Skin is warm. She is " not diaphoretic. No erythema.    Psychiatric: She has a normal mood and affect. Her behavior is normal. Judgment and thought content normal.

## 2024-08-17 NOTE — ASSESSMENT & PLAN NOTE
POD # 2 s/p RLTCS  Pt is doing well.  Proceed with routine post-operative care.  Pt counseled on management plan and discharge goals.  Pt desires d/c today if infant cleared by peds

## 2024-08-17 NOTE — PLAN OF CARE
Patient stable. Fundus is firm, bleedings is appropriate postpartum, and blood pressures are also stable. Ensuring mother is appropriately bonding with baby, responding to baby's cues.       Problem: Adult Inpatient Plan of Care  Goal: Plan of Care Review  Outcome: Progressing  Goal: Patient-Specific Goal (Individualized)  Outcome: Progressing  Goal: Absence of Hospital-Acquired Illness or Injury  Outcome: Progressing  Goal: Optimal Comfort and Wellbeing  Outcome: Progressing  Goal: Readiness for Transition of Care  Outcome: Progressing     Problem: Diabetes Comorbidity  Goal: Blood Glucose Level Within Targeted Range  Outcome: Progressing     Problem: Bariatric Environmental Safety  Goal: Safety Maintained with Care  Outcome: Progressing     Problem:  Fall Injury Risk  Goal: Absence of Fall, Infant Drop and Related Injury  Outcome: Progressing     Problem: Infection  Goal: Absence of Infection Signs and Symptoms  Outcome: Progressing     Problem: Wound  Goal: Optimal Coping  Outcome: Progressing  Goal: Optimal Functional Ability  Outcome: Progressing  Goal: Absence of Infection Signs and Symptoms  Outcome: Progressing  Goal: Improved Oral Intake  Outcome: Progressing  Goal: Optimal Pain Control and Function  Outcome: Progressing  Goal: Skin Health and Integrity  Outcome: Progressing  Goal: Optimal Wound Healing  Outcome: Progressing     Problem: Postpartum ( Delivery)  Goal: Successful Parent Role Transition  Outcome: Progressing  Goal: Hemostasis  Outcome: Progressing  Goal: Effective Bowel Elimination  Outcome: Progressing  Goal: Fluid and Electrolyte Balance  Outcome: Progressing  Goal: Absence of Infection Signs and Symptoms  Outcome: Progressing  Goal: Anesthesia/Sedation Recovery  Outcome: Progressing  Goal: Optimal Pain Control and Function  Outcome: Progressing  Goal: Nausea and Vomiting Relief  Outcome: Progressing  Goal: Effective Urinary Elimination  Outcome: Progressing  Goal: Effective  Oxygenation and Ventilation  Outcome: Progressing     Problem: Breastfeeding  Goal: Effective Breastfeeding  Outcome: Progressing     Problem: Skin Injury Risk Increased  Goal: Skin Health and Integrity  Outcome: Progressing

## 2024-08-17 NOTE — LACTATION NOTE
This note was copied from a baby's chart.  Lactation Rounding: Infant feeding frequency and output WNL. Infant weight loss noted as -8% Mother reports that she has been supplementing infant with formula and that she has been pumping frequently. Mother states that she believes her milk supply is stating to come in.     Mother originally given discharge teaching on 08/16/24 by ANNITA ATKINSON. Upon entering the room the infant was lying in crib asleep at mother's bedside and mother was sitting in rocking chair. Discharge instructions reviewed with mom. Mother verbalized she has no concerns at this time. Mother states that she is comfortable pumping breasts and she feels she has no questions at this time. Nurse offered mother opportunity for questions. Contact information for lactation given and nurse instructed mother call for assistance if need arises.

## 2024-08-17 NOTE — DISCHARGE INSTRUCTIONS
"Mother Self Care:    Activity: Avoid strenuous exercise and get adequate rest.  No driving until the physician consent given.  Emotional Changes: Most women find birth to be a time of great emotional upheaval.  Sense of loss, mood swings, fatigue, anxiety, and feeling "let down" are common.  If feelings worsen or last more than a week, call your physician.  Breast Care/Breastfeeding: Wear a bra for comfort.  Keep nipples dry and apply your own breast milk or lanolin cream as needed for soreness.  Engorgement can be relieved with warm, moist heat before feedings.  You may also take Ibuprofen.  Breast Care/Bottle Feeding: Wear support bra 24 hours a day for one week.  Avoid stimulation to breasts.  You may use ice packs for discomfort.  Jose De Jesus-Care/Vaginal Bleeding: Remember to use your jose de jesus-bottle after urinating.  Your flow will change from red, to pink, to yellow/white color over a period of 2 weeks.  Menstruation will return in 3-8 weeks, or longer if breastfeeding.  Episiotomy Vaginal Delivery: Stitches will dissolve within 10 days to 3 weeks.  Warm baths, tucks, and dermoplast will promote healing.  Avoid bubble baths or strong soaps.   Section/Tubal Ligation: Keep incision clean and dry. You may shower, but avoid baths. Please continue to use Nozin twice a day for 7 days after surgery. Ensure you attend your 1 week post op visit to have your dressing removed. Use antibacterial soap to wash your entire body until directed otherwise by a Physician, it is very important to shower daily. If you become concerned about the way your incision site looks, please contact your providers office.   Sexual Activity/Pelvic Rest: No sexual activity, tampons, or douching until your physician gives you consent.  Diet: Continue to eat from the five basic food groups, including plenty of protein, fruits, vegetables, and whole grains.  Limit empty calories and high fat foods.  Drink enough fluids to satisfy thirst and add an " "extra 500 calories for breastfeeding.  Constipation/Hemorrhoids: Drink plenty of water.  You may take a stool softener or natural laxative (Metamucil). You may use tucks or hemorrhoid ointment and soak in a warm tub.    "What does help look like to you when you go home?"  "Is there any need that you anticipate that we may be able to assist with?"    CALL YOUR OB DOCTOR IF ANY OF THE FOLLOWING OCCURS:  *Heavy bleeding - saturating a pad an hour or passing any large (2-3 inches in size) blood clots.  *Any pain, redness, or tenderness in lower leg.  *You cannot care for yourself or your baby.  *Any signs of infection-      - Temperature greater than 100.5 degrees F      - Foul smelling vaginal discharge and/or incisional drainage      - Increased episiotomy or incisional pain      - Hot, hard, red or sore area on breast      - Flu-like symptoms      - Any urgency, frequency or burning with urination    Return To the Hospital for further Evaluation:  Headache not relieved by tylenol or ibuprofen  Blurry vision, double vision, seeing spots, or flashing lights  Feeling faint or passing out  Right epigastric pain  Difficulty breathing  Swelling in hands, face, or feet  Any of these symptoms accompanied by nausea/vomiting  Gaining more than 5 pounds in one week  Seizures  These symptoms could be an indication of elevated blood pressure.     For patients that were treated for high blood pressure during pregnancy and or your hospital stay you will need a blood pressure check three days after you leave the hospital. Your nursing staff will assist you in an appointment if needed. If you have Connected Mom you may use your blood pressure cuff and report any readings 140/90 to your provider immediately.       If you have any questions that need to be answered immediately please call the Labor & Delivery Unit at [phone] and ask to speak to a nurse.      Please see [facility] BLUE folder for additional information and handouts. "

## 2024-08-17 NOTE — PROGRESS NOTES
"[facility] ([facility])  Obstetrics  Postpartum Progress Note    Patient Name: Kathy Mishra  MRN: [id number]  Admission Date: 8/15/2024  Hospital Length of Stay: 2 days  Attending Physician: Sugar Sal MD  Primary Care Provider: Monica Primary Doctor    Subjective:     Principal Problem:Status post repeat low transverse  section    Hospital Course:  No notes on file    Interval History: POD #2 s/p RLTCS    She is doing well this morning. She is tolerating a regular diet without nausea or vomiting. She is voiding spontaneously. She is ambulating. She has passed flatus, and has not a BM. Vaginal bleeding is mild. She denies fever or chills. Abdominal pain is mild and controlled with oral medications. She Is breastfeeding. She desires circumcision for her male baby: no.    Objective:     Vital Signs (Most Recent):  Temp: 98.2 °F (36.8 °C) (24)  Pulse: 98 (24)  Resp: 16 (24)  BP: 137/63 (24)  SpO2: 98 % (24) Vital Signs (24h Range):  Temp:  [98.2 °F (36.8 °C)-98.8 °F (37.1 °C)] 98.2 °F (36.8 °C)  Pulse:  [] 98  Resp:  [16-20] 16  SpO2:  [98 %] 98 %  BP: (130-139)/(61-81) 137/63     Weight: 127.4 kg (280 lb 13.9 oz)  Body mass index is 49.75 kg/m².    No intake or output data in the 24 hours ending 24 0901      Significant Labs:  Lab Results   Component Value Date    GROUPTRH A POS 08/15/2024    HEPBSAG Non-reactive 01/10/2024    STREPBCULT No Group B Streptococcus isolated 2022     No results for input(s): "HGB", "HCT" in the last 48 hours.    I have personallly reviewed all pertinent lab results from the last 24 hours.    Physical Exam:   Constitutional: She is oriented to person, place, and time. She appears well-developed and well-nourished. No distress.    HENT:   Head: Normocephalic and atraumatic.     Neck: No thyromegaly present.     Pulmonary/Chest: Effort normal. No respiratory distress.        Abdominal: Soft. " She exhibits abdominal incision (wound CDI). She exhibits no distension and no mass. There is no abdominal tenderness. There is no rebound and no guarding.             Musculoskeletal: Normal range of motion and moves all extremeties. No tenderness.       Neurological: She is alert and oriented to person, place, and time. No cranial nerve deficit. Coordination normal.    Skin: Skin is warm. She is not diaphoretic. No erythema.    Psychiatric: She has a normal mood and affect. Her behavior is normal. Judgment and thought content normal.         Assessment/Plan:     28 y.o. female  for:    * Status post repeat low transverse  section  POD # 2 s/p RLTCS  Pt is doing well.  Proceed with routine post-operative care.  Pt counseled on management plan and discharge goals.  Pt desires d/c today if infant cleared by peds    Pregnancy with type 2 diabetes mellitus in third trimester  Sugars have been difficult to control this pregnancy.  Pt with Dexcom monitor.  Pre-delivery home regimen:  - u QHS  -Aspart:  with meals  -metformin 500mg q day    Glucose levels stable on PP regimen (Lantus 50 QHS, Aspart with meals 10/6/10)    Hypothyroidism  Continue synthroid 100mcg daily    Class 3 severe obesity due to excess calories with serious comorbidity and body mass index (BMI) of 45.0 to 49.9 in adult  Lovenox ppx PP    Chronic hypertension affecting pregnancy  BP stable on Labetalol 200mg BID        Disposition: As patient meets milestones, will plan to discharge today if infant cleared for d/c.    Robert Paz MD  Obstetrics  [facility] ([facility])

## 2024-08-17 NOTE — PLAN OF CARE
Patient afebrile and had no falls this shift. Fundus firm without massage and below umbilicus. Bleeding light, no clots passed this shift. Voids spontaneously. Ambulates independently. Pain well controlled with oral pain medication. Vital signs stable at this time. Bonding well with infant; responds to infant cues and participates in infant care. Care plan ongoing.

## 2024-08-18 NOTE — DISCHARGE SUMMARY
"[facility] ([facility])  Obstetrics  Discharge Summary      Patient Name: Kathy Mishra  MRN: [id number]  Admission Date: 8/15/2024  Hospital Length of Stay: 2 days  Discharge Date and Time: 2024 11:24 AM  Attending Physician: No att. providers found   Discharging Provider: Robert Paz MD   Primary Care Provider: No, Primary Doctor    HPI: 29 y/o  at 37w2d presents for scheduled repeat .        Procedure(s) (LRB):   SECTION REPEAT (N/A)     Hospital Course:   No notes on file         Final Active Diagnoses:    Diagnosis Date Noted POA    PRINCIPAL PROBLEM:  Status post repeat low transverse  section [Z98.891] 2024 Not Applicable    Pregnancy with type 2 diabetes mellitus in third trimester [O24.113] 2024 Yes    Hypothyroidism [E03.9] 2022 Yes    Chronic hypertension affecting pregnancy [O10.919] 2022 Yes    Class 3 severe obesity due to excess calories with serious comorbidity and body mass index (BMI) of 45.0 to 49.9 in adult [E66.01, Z68.42] 2020 Not Applicable      Problems Resolved During this Admission:        Significant Diagnostic Studies: Labs: CMP No results for input(s): "NA", "K", "CL", "CO2", "GLU", "BUN", "CREATININE", "CALCIUM", "PROT", "ALBUMIN", "BILITOT", "ALKPHOS", "AST", "ALT", "ANIONGAP", "ESTGFRAFRICA", "EGFRNONAA" in the last 48 hours. and CBC No results for input(s): "WBC", "HGB", "HCT", "PLT" in the last 48 hours.      Feeding Method: breast    Immunizations       None            Delivery:    Episiotomy: None   Lacerations: None   Repair suture:     Repair # of packets:     Blood loss (ml):       Birth information:  YOB: 2024   Time of birth: 8:24 AM   Sex: male   Delivery type: , Low Transverse   Gestational Age: 37w2d     Measurements    Weight: 4040 g  Weight (lbs): 8 lb 14.5 oz  Length: 49.5 cm  Length (in): 19.5"  Head circumference: 36.2 cm  Chest circumference: 35.6 " cm  Abdominal girth: 37.5 cm         Delivery Clinician: Delivery Providers    Delivering clinician: Sugar Sal MD   Provider Role    Kathy Garcia, RN Registered Nurse    Bina Nazario, RN Registered Nurse    Francisco Holm, [facility]    Mariana Chambers, [facility]    Magan Nunez Jr., CRNA Nurse Anesthetist             Additional  information:  Forceps:    Vacuum:    Breech:    Observed anomalies      Living?:     Apgars    Living status: Living  Apgar Component Scores:  1 min.:  5 min.:  10 min.:  15 min.:  20 min.:    Skin color:  1  1       Heart rate:  2  2       Reflex irritability:  2  2       Muscle tone:  2  2       Respiratory effort:  2  2       Total:  9  9       Apgars assigned by: ALVARO NAZARIO         Placenta: Delivered:       appearance  Pending Diagnostic Studies:       None            Discharged Condition: good    Disposition: Home or Self Care    Follow Up:   Follow-up Information       [facility] Follow up in 1 week(s).    Why: Dressing removal             Sugar Sal MD Follow up in 4 week(s).    Specialties: Obstetrics and Gynecology, Obstetrics  Why: Post-operative visit  Contact information:  [address] [place] [address] [address] [place] [address] [phone]                           Patient Instructions:      BREAST PUMP FOR HOME USE     Order Specific Question Answer Comments   Type of pump: Hospital grade electric    Weight: 127.4 kg (280 lb 13.9 oz)    Length of need (1-99 months): 99      Diet Adult Regular     Pelvic Rest     Lifting restrictions     Notify your health care provider if you experience any of the following:  temperature >100.4     Notify your health care provider if you experience any of the following:  persistent nausea and vomiting or diarrhea     Notify your health care provider if you experience any of the following:  severe uncontrolled pain     Notify your health care provider if you experience any of the following:  redness, tenderness, or  "signs of infection (pain, swelling, redness, odor or green/yellow discharge around incision site)     Notify your health care provider if you experience any of the following:  difficulty breathing or increased cough     Notify your health care provider if you experience any of the following:  severe persistent headache     Notify your health care provider if you experience any of the following:  persistent dizziness, light-headedness, or visual disturbances     Notify your health care provider if you experience any of the following:  increased confusion or weakness     Leave dressing on - Keep it clean, dry, and intact until clinic visit     Weight bearing restrictions (specify):     Medications:  Discharge Medication List as of 8/17/2024 10:35 AM        START taking these medications    Details   HYDROcodone-acetaminophen (NORCO) 5-325 mg per tablet Take 1 tablet by mouth every 6 (six) hours as needed for Pain., Starting Sat 8/17/2024, Normal      ibuprofen (ADVIL,MOTRIN) 800 MG tablet Take 1 tablet (800 mg total) by mouth every 8 (eight) hours., Starting Sat 8/17/2024, Normal      insulin aspart U-100 (NOVOLOG) 100 unit/mL (3 mL) InPn pen 10 U sq with breakfast, 6 U sq with lunch, 10 U sq with dinner, Normal      insulin glargine U-100, Lantus, 100 unit/mL (3 mL) SubQ InPn pen Inject 50 Units into the skin every evening., Starting Sat 8/17/2024, Until Sun 8/17/2025, Normal           CONTINUE these medications which have NOT CHANGED    Details   blood sugar diagnostic Strp To check BG 4 times daily, to use with insurance preferred meter, Normal      blood-glucose sensor (DEXCOM G7 SENSOR) Fern 1 Device by Misc.(Non-Drug; Combo Route) route every 10 days., Starting Tue 5/14/2024, Until Sun 11/10/2024, Normal      EASY TOUCH 31 gauge x 5/16" Ndle Historical Med      insulin  unit/mL injection Inject 110 Units into the skin every evening., Starting Fri 7/12/2024, Until Wed 1/8/2025, Normal      insulin " "syringe-needle U-100 1 mL 31 gauge x  Syrg 100 each by Misc.(Non-Drug; Combo Route) route 4 (four) times daily., Starting 2024, Normal      labetaloL (NORMODYNE) 200 MG tablet Take 1 tablet (200 mg total) by mouth 3 (three) times daily., Starting Sun 2024, Until 2025, Normal      levothyroxine (SYNTHROID) 100 MCG tablet Take 1 tablet (100 mcg total) by mouth before breakfast., Starting Thu 2024, Until 2025, Normal      metFORMIN (GLUCOPHAGE-XR) 500 MG ER 24hr tablet Take 500 mg by mouth., Starting 2024, Historical Med      NOVOLIN N FLEXPEN 100 unit/mL (3 mL) InPn Historical Med      ondansetron (ZOFRAN) 4 MG tablet Take 1 tablet (4 mg total) by mouth daily as needed for Nausea., Starting 2024, Until 2025 at 2359, Normal      pen needle, diabetic (LITE TOUCH INSULIN PEN NEEDLES) 31 gauge x 1/4" Ndle 1 Needle by Misc.(Non-Drug; Combo Route) route once daily., Starting Fri 3/8/2024, Normal      prenatal vitamins #45/iron/FA (PRENATAL VITAMIN #45-IRON-FA ORAL) Take by mouth., Historical Med      promethazine (PHENERGAN) 12.5 MG Tab Take 1 tablet (12.5 mg total) by mouth 4 (four) times daily., Starting 2024, Normal      TRUE METRIX GLUCOSE METER Misc To check BG 4 times daily, Normal      TRUEPLUS LANCETS 33 gauge Misc SMARTSI Topical Twice Daily, Historical Med           STOP taking these medications       aspirin 81 MG Chew Comments:   Reason for Stopping:         insulin aspart U-100 (NOVOLOG) 100 unit/mL injection Comments:   Reason for Stopping:               Robert Paz MD  Obstetrics  [facility] ([facility])    "

## 2024-08-19 ENCOUNTER — PATIENT MESSAGE (OUTPATIENT)
Dept: OBSTETRICS AND GYNECOLOGY | Facility: CLINIC | Age: 29
End: 2024-08-19
Payer: MEDICAID

## 2024-08-20 ENCOUNTER — POSTPARTUM VISIT (OUTPATIENT)
Dept: OBSTETRICS AND GYNECOLOGY | Facility: CLINIC | Age: 29
End: 2024-08-20
Payer: MEDICAID

## 2024-08-20 VITALS
HEIGHT: 63 IN | BODY MASS INDEX: 47.19 KG/M2 | WEIGHT: 266.31 LBS | DIASTOLIC BLOOD PRESSURE: 84 MMHG | SYSTOLIC BLOOD PRESSURE: 122 MMHG

## 2024-08-20 DIAGNOSIS — Z98.891 STATUS POST REPEAT LOW TRANSVERSE CESAREAN SECTION: Primary | ICD-10-CM

## 2024-08-20 PROCEDURE — 99213 OFFICE O/P EST LOW 20 MIN: CPT | Mod: PBBFAC | Performed by: PHYSICIAN ASSISTANT

## 2024-08-20 PROCEDURE — 99999 PR PBB SHADOW E&M-EST. PATIENT-LVL III: CPT | Mod: PBBFAC,,, | Performed by: PHYSICIAN ASSISTANT

## 2024-08-20 RX ORDER — INSULIN GLARGINE-YFGN 100 [IU]/ML
INJECTION, SOLUTION SUBCUTANEOUS
COMMUNITY
Start: 2024-08-17

## 2024-08-20 RX ORDER — SERTRALINE HYDROCHLORIDE 25 MG/1
25 TABLET, FILM COATED ORAL DAILY
Qty: 30 TABLET | Refills: 2 | Status: SHIPPED | OUTPATIENT
Start: 2024-08-20 | End: 2025-08-20

## 2024-08-20 RX ORDER — FUROSEMIDE 20 MG/1
20 TABLET ORAL DAILY
Qty: 5 TABLET | Refills: 0 | Status: SHIPPED | OUTPATIENT
Start: 2024-08-20 | End: 2024-08-25

## 2024-08-20 NOTE — PROGRESS NOTES
OBGYN Post-op Clinic  History and Physical    Patient Name: Kathy Mishra  YOB: 1995 (28 y.o.)  MRN: [id number]  Today's Date: 2024    Referring Md:   No referring provider defined for this encounter.    SUBJECTIVE:     Chief Complaint: Post-op  Dressing Removal    History of Present Illness:  Kathy Mishra is a 28 y.o. female  who presents to the clinic today for acquacel dressing removal and BP check. S/p  on 8/15/24. Takes Labetalol 200mg TID for blood pressure. BP well managed. With previous  in  patient developed cellulitis near the incision site and required antibiotics. Is concerned that this may be the case again. Also mentions dysuria that is improving. Denies fevers, chills, nausea, or other significant symptoms.        Review of patient's allergies indicates:  No Known Allergies    Past Medical History:   Diagnosis Date    Asthma 2022    Gestational diabetes mellitus (GDM) in third trimester controlled on oral hypoglycemic drug 2022    History of hypertension 2022-add PIH baseline labs to workup PCR 0.09 advised daily baby aspirin at 16 weeks    Hypertension     Thyroid disease      Past Surgical History:   Procedure Laterality Date    ADENOIDECTOMY       SECTION N/A 2022    Procedure:  SECTION;  Surgeon: Jhoana Alexander MD;  Location: [facility] L&D;  Service: OB/GYN;  Laterality: N/A;     SECTION       SECTION N/A 8/15/2024    Procedure:  SECTION REPEAT;  Surgeon: Sugar Sal MD;  Location: [facility] L&D;  Service: OB/GYN;  Laterality: N/A;    TONSILLECTOMY      age of 5 years old     Family History   Problem Relation Name Age of Onset    Hypertension Paternal Grandmother      Diabetes Maternal Grandfather      Hypertension Father      Diabetes Mother      Breast cancer Neg Hx      Colon cancer Neg Hx      Ovarian cancer Neg Hx      Thrombosis Neg Hx       Social  "History     Tobacco Use    Smoking status: Former     Types: Cigarettes    Smokeless tobacco: Never   Substance Use Topics    Alcohol use: Not Currently    Drug use: Never        Review of Systems:  Review of Systems   Constitutional:  Negative for chills and fever.   HENT:  Negative for congestion and sore throat.    Respiratory:  Negative for cough and shortness of breath.    Cardiovascular:  Positive for leg swelling. Negative for chest pain.   Gastrointestinal:  Negative for constipation, diarrhea, nausea and vomiting.   Genitourinary:  Positive for dysuria. Negative for frequency, hematuria and urgency.   Musculoskeletal:  Negative for myalgias.   Skin:  Positive for rash.   Neurological:  Positive for headaches (occasional). Negative for weakness.       OBJECTIVE:     Vital Signs (Most Recent)  /84   Ht 5' 3" (1.6 m)   Wt 120.8 kg (266 lb 5.1 oz)   LMP 11/05/2023   Breastfeeding No   BMI 47.18 kg/m²     Physical Exam  Vitals and nursing note reviewed.   Constitutional:       General: She is not in acute distress.     Appearance: Normal appearance.   HENT:      Head: Normocephalic and atraumatic.   Eyes:      General: No scleral icterus.        Right eye: No discharge.         Left eye: No discharge.      Conjunctiva/sclera: Conjunctivae normal.   Pulmonary:      Effort: Pulmonary effort is normal. No respiratory distress.   Abdominal:      Palpations: Abdomen is soft.      Tenderness: There is abdominal tenderness (Appropriately TTP).      Comments: Aquacel dressing over Pfannenstiel incision removed in clinic. Incision intact with no erythema, warmth, or drainage.   No cellulitis.   Surrounding skin with dermatitis from adhesive tape.    Musculoskeletal:         General: Swelling present. Normal range of motion.      Cervical back: Normal range of motion.   Skin:     General: Skin is warm and dry.   Neurological:      General: No focal deficit present.      Mental Status: She is alert and oriented " to person, place, and time.   Psychiatric:         Mood and Affect: Mood normal.         Behavior: Behavior normal.         Thought Content: Thought content normal.         Judgment: Judgment normal.             ASSESSMENT/PLAN:     Kathy Mishra is a 28 y.o. female was seen today for dressing removal and blood pressure check. Will treat leg swelling and postpartum anxiety. Incision healing well with no signs of infection. UA not consistent with UTI. Patient to take antihistamines and apply topical cortisone cream to improve the dermatitis.   I insured the patient has a scheduled post-op visit. I reviewed all restrictions and limitations with the patient including lifting/activity restrictions, pelvic rest, and showering daily (no tub baths/soaking in water of any kind) with antibacterial soap. Instructed patient to call clinic with any concerning issues or symptoms. Patient verbalized understanding.     Kathy was seen today for postpartum care.    Diagnoses and all orders for this visit:    Status post repeat low transverse  section  -     furosemide (LASIX) 20 MG tablet; Take 1 tablet (20 mg total) by mouth once daily. for 5 days  -     sertraline (ZOLOFT) 25 MG tablet; Take 1 tablet (25 mg total) by mouth once daily.        - Showers only, pelvic rest, and no heavy lifting/strenuous activity for 4-6 weeks        - Ibuprofen PRN for pain        - Keep f/u appointment with surgeon in a few weeks      Marion Rey [facility], PAShellieC  OBGYN - Surgery  [facility]

## 2024-09-30 ENCOUNTER — POSTPARTUM VISIT (OUTPATIENT)
Dept: OBSTETRICS AND GYNECOLOGY | Facility: CLINIC | Age: 29
End: 2024-09-30
Payer: MEDICAID

## 2024-09-30 VITALS
DIASTOLIC BLOOD PRESSURE: 68 MMHG | BODY MASS INDEX: 45.16 KG/M2 | SYSTOLIC BLOOD PRESSURE: 114 MMHG | WEIGHT: 254.88 LBS | HEIGHT: 63 IN

## 2024-09-30 DIAGNOSIS — Z30.013 ENCOUNTER FOR INITIAL PRESCRIPTION OF INJECTABLE CONTRACEPTIVE: ICD-10-CM

## 2024-09-30 DIAGNOSIS — Z98.891 STATUS POST REPEAT LOW TRANSVERSE CESAREAN SECTION: Primary | ICD-10-CM

## 2024-09-30 DIAGNOSIS — Z30.2 REQUEST FOR STERILIZATION: ICD-10-CM

## 2024-09-30 DIAGNOSIS — E03.9 HYPOTHYROIDISM, UNSPECIFIED TYPE: ICD-10-CM

## 2024-09-30 DIAGNOSIS — O24.414 INSULIN CONTROLLED GESTATIONAL DIABETES MELLITUS (GDM) DURING PREGNANCY, ANTEPARTUM: ICD-10-CM

## 2024-09-30 DIAGNOSIS — O10.919 CHRONIC HYPERTENSION AFFECTING PREGNANCY: ICD-10-CM

## 2024-09-30 PROBLEM — O24.113 PREGNANCY WITH TYPE 2 DIABETES MELLITUS IN THIRD TRIMESTER: Status: RESOLVED | Noted: 2024-03-01 | Resolved: 2024-09-30

## 2024-09-30 LAB
B-HCG UR QL: NEGATIVE
CTP QC/QA: YES

## 2024-09-30 PROCEDURE — 99213 OFFICE O/P EST LOW 20 MIN: CPT | Mod: PBBFAC,TH | Performed by: OBSTETRICS & GYNECOLOGY

## 2024-09-30 PROCEDURE — 99999PBSHW PR PBB SHADOW TECHNICAL ONLY FILED TO HB: Mod: PBBFAC,,,

## 2024-09-30 PROCEDURE — 99999 PR PBB SHADOW E&M-EST. PATIENT-LVL III: CPT | Mod: PBBFAC,,, | Performed by: OBSTETRICS & GYNECOLOGY

## 2024-09-30 PROCEDURE — 99999PBSHW POCT URINE PREGNANCY: Mod: PBBFAC,,,

## 2024-09-30 PROCEDURE — 96372 THER/PROPH/DIAG INJ SC/IM: CPT | Mod: PBBFAC

## 2024-09-30 PROCEDURE — 81025 URINE PREGNANCY TEST: CPT | Mod: PBBFAC | Performed by: OBSTETRICS & GYNECOLOGY

## 2024-09-30 PROCEDURE — 0503F POSTPARTUM CARE VISIT: CPT | Mod: ,,, | Performed by: OBSTETRICS & GYNECOLOGY

## 2024-09-30 RX ORDER — MEDROXYPROGESTERONE ACETATE 150 MG/ML
150 INJECTION, SUSPENSION INTRAMUSCULAR
Status: SHIPPED | OUTPATIENT
Start: 2024-09-30 | End: 2025-09-25

## 2024-09-30 RX ORDER — SERTRALINE HYDROCHLORIDE 50 MG/1
50 TABLET, FILM COATED ORAL DAILY
Qty: 30 TABLET | Refills: 2 | Status: SHIPPED | OUTPATIENT
Start: 2024-09-30 | End: 2025-09-30

## 2024-09-30 RX ADMIN — MEDROXYPROGESTERONE ACETATE 150 MG: 150 INJECTION, SUSPENSION INTRAMUSCULAR at 12:09

## 2024-09-30 NOTE — PROGRESS NOTES
"CC: Post-partum follow-up    Kathy Mishra is a 29 y.o. female  who presents for post-partum visit.  She is S/P a  repeat LTCS .  She and the baby are doing well.  No pain.  No fever.   No bowel / bladder complaints.    Delivery Date: August 15, 2024  Delivery MD: Dr. Sugar Sal  Gender: male  Birth Weight: 8 pounds 14 ounces  Breast Feeding: NO  Depression: NO (has anxiety, improved with zoloft 25mg, but feels she needs a higher dose)  Contraception: desires permanent sterilization with removal of fallopian tubes, but wants depo provera bridge until then    Pregnancy was complicated by:  GDM on metformin and insulin  CHTN  Hypothyroidism  Obesity  Hx of C/S    /68   Ht 5' 3" (1.6 m)   Wt 115.6 kg (254 lb 13.6 oz)   LMP 2023   Breastfeeding No   BMI 45.14 kg/m²     ROS:  GENERAL: No fever, chills, fatigability.  VULVAR: No pain, no lesions and no itching.  VAGINAL: No relaxation, no itching, no discharge, no abnormal bleeding and no lesions.  ABDOMEN: No abdominal pain. Denies nausea. Denies vomiting. No diarrhea. No constipation  BREAST: Denies pain. No lumps. No discharge.  URINARY: No incontinence, no nocturia, no frequency and no dysuria.  CARDIOVASCULAR: No chest pain. No shortness of breath. No leg cramps.  NEUROLOGICAL: No headaches. No vision changes.    PHYSICAL EXAM:  GEN: NAD  INCISION:  well-healed and intact  ABDOMEN:  Soft, non-tender, non-distended  VULVA:  Normal, no lesions  CERVIX:  Without lesions, polyps or tenderness.  UTERUS:  Normal size, shape, consistency, no mass or tenderness.  ADNEXA:  Normal in size without mass or tenderness    UPT: negative    Kathy was seen today for postpartum care.    Diagnoses and all orders for this visit:    Status post repeat low transverse  section  -     sertraline (ZOLOFT) 50 MG tablet; Take 1 tablet (50 mg total) by mouth once daily.    Chronic hypertension affecting pregnancy    Request for " sterilization    Insulin controlled gestational diabetes mellitus (GDM) during pregnancy, antepartum  -     Glucose Tolerance 2 Hour; Future    Hypothyroidism, unspecified type  -     TSH; Future    Encounter for initial prescription of injectable contraceptive  -     medroxyPROGESTERone (DEPO-PROVERA) syringe 150 mg  -     POCT urine pregnancy     Increase zoloft to 50mg  Discussed BTL vs fallopian tube removal.  Desires permanent sterilization with BL salpingectomy.  Medicaid sterilization consent signed.  Will plan lsc BL salpingectomy  Depo provera today  Fasting labs as above  F/u with PCP for HTN and hypothyroidism management  Okay to resume all regular activities    No follow-ups on file.

## 2024-09-30 NOTE — Clinical Note
Please schedule lsc BL salpingectomy at [facility].  Medicaid consent signed today.  Depo provera given today.  Needs pre-op visits

## 2024-09-30 NOTE — PROGRESS NOTES
Verified patient with 2 patient identifiers. Allergies and medications reviewed.   Depo Provera 150mg/ml given IM to left ventrogluteal using aseptic technique.   No discomfort noted. Patient tolerated well.     Pt waiting to get a tubal scheduled.     Patient advised to wait 15 minutes in lobby to monitor for reaction.   Patient verbalized understanding.

## 2024-10-02 ENCOUNTER — LAB VISIT (OUTPATIENT)
Dept: LAB | Facility: HOSPITAL | Age: 29
End: 2024-10-02
Attending: OBSTETRICS & GYNECOLOGY
Payer: MEDICAID

## 2024-10-02 DIAGNOSIS — E03.9 HYPOTHYROIDISM, UNSPECIFIED TYPE: ICD-10-CM

## 2024-10-02 DIAGNOSIS — O24.414 INSULIN CONTROLLED GESTATIONAL DIABETES MELLITUS (GDM) DURING PREGNANCY, ANTEPARTUM: ICD-10-CM

## 2024-10-02 LAB — TSH SERPL DL<=0.005 MIU/L-ACNC: 2.79 UIU/ML (ref 0.4–4)

## 2024-10-02 PROCEDURE — 84443 ASSAY THYROID STIM HORMONE: CPT | Performed by: OBSTETRICS & GYNECOLOGY

## 2024-10-02 PROCEDURE — 36415 COLL VENOUS BLD VENIPUNCTURE: CPT | Performed by: OBSTETRICS & GYNECOLOGY

## 2024-10-03 ENCOUNTER — TELEPHONE (OUTPATIENT)
Dept: OBSTETRICS AND GYNECOLOGY | Facility: CLINIC | Age: 29
End: 2024-10-03
Payer: MEDICAID

## 2024-10-03 DIAGNOSIS — O24.414 INSULIN CONTROLLED GESTATIONAL DIABETES MELLITUS (GDM) DURING PREGNANCY, ANTEPARTUM: Primary | ICD-10-CM

## 2024-10-03 NOTE — TELEPHONE ENCOUNTER
----- Message from Manjit sent at 10/2/2024  4:18 PM CDT -----  Regarding: Glucose Tolerance Test  Patient did not want to do this test today. Please reorder and reschedule patient for testing.

## 2024-10-08 ENCOUNTER — TELEPHONE (OUTPATIENT)
Dept: OBSTETRICS AND GYNECOLOGY | Facility: CLINIC | Age: 29
End: 2024-10-08
Payer: MEDICAID

## 2024-10-08 NOTE — TELEPHONE ENCOUNTER
----- Message from Sugar Sal MD sent at 9/30/2024 11:54 AM CDT -----  Please schedule lsc BL salpingectomy at [facility].  Medicaid consent signed today.  Depo provera given today.  Needs pre-op visits

## 2024-11-01 ENCOUNTER — PATIENT MESSAGE (OUTPATIENT)
Dept: OBSTETRICS AND GYNECOLOGY | Facility: CLINIC | Age: 29
End: 2024-11-01
Payer: MEDICAID

## 2024-11-13 DIAGNOSIS — Z30.2 REQUEST FOR STERILIZATION: Primary | ICD-10-CM

## 2025-01-02 ENCOUNTER — PATIENT MESSAGE (OUTPATIENT)
Dept: PULMONOLOGY | Facility: CLINIC | Age: 30
End: 2025-01-02
Payer: MEDICAID

## 2025-01-06 PROBLEM — Z86.32 HISTORY OF GESTATIONAL DIABETES IN PRIOR PREGNANCY, CURRENTLY PREGNANT: Status: RESOLVED | Noted: 2022-06-22 | Resolved: 2025-01-06

## 2025-01-06 PROBLEM — O09.299 HISTORY OF GESTATIONAL DIABETES IN PRIOR PREGNANCY, CURRENTLY PREGNANT: Status: RESOLVED | Noted: 2022-06-22 | Resolved: 2025-01-06

## 2025-01-07 ENCOUNTER — PATIENT MESSAGE (OUTPATIENT)
Dept: OBSTETRICS AND GYNECOLOGY | Facility: CLINIC | Age: 30
End: 2025-01-07
Payer: MEDICAID

## 2025-01-08 ENCOUNTER — PATIENT MESSAGE (OUTPATIENT)
Dept: PREADMISSION TESTING | Facility: HOSPITAL | Age: 30
End: 2025-01-08
Payer: MEDICAID

## 2025-01-08 ENCOUNTER — OFFICE VISIT (OUTPATIENT)
Dept: OBSTETRICS AND GYNECOLOGY | Facility: CLINIC | Age: 30
End: 2025-01-08
Payer: MEDICAID

## 2025-01-08 ENCOUNTER — LAB VISIT (OUTPATIENT)
Dept: LAB | Facility: HOSPITAL | Age: 30
End: 2025-01-08
Attending: OBSTETRICS & GYNECOLOGY
Payer: MEDICAID

## 2025-01-08 VITALS
SYSTOLIC BLOOD PRESSURE: 128 MMHG | DIASTOLIC BLOOD PRESSURE: 72 MMHG | HEIGHT: 63 IN | WEIGHT: 256.19 LBS | BODY MASS INDEX: 45.39 KG/M2

## 2025-01-08 DIAGNOSIS — Z30.2 REQUEST FOR STERILIZATION: Primary | ICD-10-CM

## 2025-01-08 DIAGNOSIS — Z30.2 REQUEST FOR STERILIZATION: ICD-10-CM

## 2025-01-08 LAB
ANION GAP SERPL CALC-SCNC: 12 MMOL/L (ref 8–16)
BASOPHILS # BLD AUTO: 0.03 K/UL (ref 0–0.2)
BASOPHILS NFR BLD: 0.4 % (ref 0–1.9)
BUN SERPL-MCNC: 17 MG/DL (ref 6–20)
CALCIUM SERPL-MCNC: 9.8 MG/DL (ref 8.7–10.5)
CHLORIDE SERPL-SCNC: 108 MMOL/L (ref 95–110)
CO2 SERPL-SCNC: 19 MMOL/L (ref 23–29)
CREAT SERPL-MCNC: 0.7 MG/DL (ref 0.5–1.4)
DIFFERENTIAL METHOD BLD: ABNORMAL
EOSINOPHIL # BLD AUTO: 0.1 K/UL (ref 0–0.5)
EOSINOPHIL NFR BLD: 0.9 % (ref 0–8)
ERYTHROCYTE [DISTWIDTH] IN BLOOD BY AUTOMATED COUNT: 15.1 % (ref 11.5–14.5)
EST. GFR  (NO RACE VARIABLE): >60 ML/MIN/1.73 M^2
GLUCOSE SERPL-MCNC: 88 MG/DL (ref 70–110)
HCG INTACT+B SERPL-ACNC: <2.4 MIU/ML
HCT VFR BLD AUTO: 43 % (ref 37–48.5)
HGB BLD-MCNC: 14.2 G/DL (ref 12–16)
IMM GRANULOCYTES # BLD AUTO: 0.02 K/UL (ref 0–0.04)
IMM GRANULOCYTES NFR BLD AUTO: 0.2 % (ref 0–0.5)
LYMPHOCYTES # BLD AUTO: 2 K/UL (ref 1–4.8)
LYMPHOCYTES NFR BLD: 24.7 % (ref 18–48)
MCH RBC QN AUTO: 27.6 PG (ref 27–31)
MCHC RBC AUTO-ENTMCNC: 33 G/DL (ref 32–36)
MCV RBC AUTO: 84 FL (ref 82–98)
MONOCYTES # BLD AUTO: 0.4 K/UL (ref 0.3–1)
MONOCYTES NFR BLD: 4.4 % (ref 4–15)
NEUTROPHILS # BLD AUTO: 5.6 K/UL (ref 1.8–7.7)
NEUTROPHILS NFR BLD: 69.4 % (ref 38–73)
NRBC BLD-RTO: 0 /100 WBC
PLATELET # BLD AUTO: 314 K/UL (ref 150–450)
PMV BLD AUTO: 9.8 FL (ref 9.2–12.9)
POTASSIUM SERPL-SCNC: 3.8 MMOL/L (ref 3.5–5.1)
RBC # BLD AUTO: 5.15 M/UL (ref 4–5.4)
SODIUM SERPL-SCNC: 139 MMOL/L (ref 136–145)
WBC # BLD AUTO: 8.03 K/UL (ref 3.9–12.7)

## 2025-01-08 PROCEDURE — 36415 COLL VENOUS BLD VENIPUNCTURE: CPT | Performed by: OBSTETRICS & GYNECOLOGY

## 2025-01-08 PROCEDURE — 85025 COMPLETE CBC W/AUTO DIFF WBC: CPT | Performed by: OBSTETRICS & GYNECOLOGY

## 2025-01-08 PROCEDURE — 84702 CHORIONIC GONADOTROPIN TEST: CPT | Performed by: OBSTETRICS & GYNECOLOGY

## 2025-01-08 PROCEDURE — 99213 OFFICE O/P EST LOW 20 MIN: CPT | Mod: PBBFAC | Performed by: OBSTETRICS & GYNECOLOGY

## 2025-01-08 PROCEDURE — 99499 UNLISTED E&M SERVICE: CPT | Mod: S$PBB,,, | Performed by: OBSTETRICS & GYNECOLOGY

## 2025-01-08 PROCEDURE — 80048 BASIC METABOLIC PNL TOTAL CA: CPT | Performed by: OBSTETRICS & GYNECOLOGY

## 2025-01-08 PROCEDURE — 99999 PR PBB SHADOW E&M-EST. PATIENT-LVL III: CPT | Mod: PBBFAC,,, | Performed by: OBSTETRICS & GYNECOLOGY

## 2025-01-08 RX ORDER — SODIUM CHLORIDE, SODIUM LACTATE, POTASSIUM CHLORIDE, CALCIUM CHLORIDE 600; 310; 30; 20 MG/100ML; MG/100ML; MG/100ML; MG/100ML
INJECTION, SOLUTION INTRAVENOUS CONTINUOUS
OUTPATIENT
Start: 2025-01-08

## 2025-01-08 RX ORDER — FAMOTIDINE 20 MG/1
20 TABLET, FILM COATED ORAL
OUTPATIENT
Start: 2025-01-08

## 2025-01-08 RX ORDER — BUSPIRONE HYDROCHLORIDE 5 MG/1
5 TABLET ORAL
COMMUNITY
Start: 2025-01-03 | End: 2026-01-03

## 2025-01-08 RX ORDER — ACETAMINOPHEN 500 MG
1000 TABLET ORAL
OUTPATIENT
Start: 2025-01-08

## 2025-01-08 NOTE — H&P (VIEW-ONLY)
History & Physical    SUBJECTIVE:     History of Present Illness:  Patient is a 29 y.o. female presents for pre-op visit for laparoscopic bilateral salpingectomy for permanent sterilization.  Pt took depo provera 24 for contraception bridge until salpingectomy can be performed.  Developed breakthrough bleeding and had psych effects on it.  No longer bleeding.  Desires permanent sterilization  Last pap 2022: neg     Past Medical History:   Diagnosis Date    Asthma 2022    Gestational diabetes mellitus (GDM) in third trimester controlled on oral hypoglycemic drug 2022    History of hypertension 2022-add PIH baseline labs to workup PCR 0.09 advised daily baby aspirin at 16 weeks    Hypertension     Thyroid disease      Past Surgical History:   Procedure Laterality Date    ADENOIDECTOMY       SECTION N/A 2022    Procedure:  SECTION;  Surgeon: Jhoana Alexander MD;  Location: [facility] L&D;  Service: OB/GYN;  Laterality: N/A;     SECTION       SECTION N/A 8/15/2024    Procedure:  SECTION REPEAT;  Surgeon: Sugar Sal MD;  Location: [facility] L&D;  Service: OB/GYN;  Laterality: N/A;    TONSILLECTOMY      age of 5 years old     Social History     Socioeconomic History    Marital status: Single   Tobacco Use    Smoking status: Former     Types: Cigarettes    Smokeless tobacco: Never   Substance and Sexual Activity    Alcohol use: Not Currently    Drug use: Never    Sexual activity: Yes     Partners: Male     Birth control/protection: None     Social Drivers of Health     Financial Resource Strain: Low Risk  (2024)    Received from [facility] and Its Subsidiaries and Affiliates    Overall Financial Resource Strain (CARDIA)     Difficulty of Paying Living Expenses: Not hard at all   Food Insecurity: No Food Insecurity (2024)    Received from [facility] and Its  [facility] and Affiliates    Hunger Vital Sign     Worried About Running Out of Food in the Last Year: Never true     Ran Out of Food in the Last Year: Never true   Transportation Needs: No Transportation Needs (12/6/2024)    [facility]    PRAPARE - Transportation     Lack of Transportation (Medical): No     Lack of Transportation (Non-Medical): No   Physical Activity: Patient Declined (12/6/2024)    Received from [facility] and Its [facility] and Affiliates    Exercise Vital Sign     Days of Exercise per Week: Patient declined     Minutes of Exercise per Session: Patient declined   Stress: Stress Concern Present (12/6/2024)    Received from [facility] and Its [facility] and Affiliates    [employer] - Occupational Stress Questionnaire     Feeling of Stress : To some extent   Housing Stability: Unknown (12/6/2024)    Received from [facility] and Its [facility] and Affiliates    Housing Stability Vital Sign     Unable to Pay for Housing in the Last Year: No     Homeless in the Last Year: No     Family History   Problem Relation Name Age of Onset    Hypertension Paternal Grandmother      Diabetes Maternal Grandfather      Hypertension Father      Diabetes Mother      Breast cancer Neg Hx      Colon cancer Neg Hx      Ovarian cancer Neg Hx      Thrombosis Neg Hx       Review of patient's allergies indicates:  No Known Allergies  Current Outpatient Medications   Medication Sig Dispense Refill    busPIRone (BUSPAR) 5 MG Tab Take 5 mg by mouth.      labetaloL (NORMODYNE) 200 MG tablet Take 1 tablet (200 mg total) by mouth 3 (three) times daily. 90 tablet 11    levothyroxine (SYNTHROID) 100 MCG tablet Take 1 tablet (100 mcg total) by mouth before breakfast. 30 tablet 4     Current  Facility-Administered Medications   Medication Dose Route Frequency Provider Last Rate Last Admin    medroxyPROGESTERone (DEPO-PROVERA) syringe 150 mg  150 mg Intramuscular Q90 Days    150 mg at 09/30/24 1201            Review of Systems:  Constitutional: no fever or chills  Respiratory: no cough or shortness of breath  Cardiovascular: no chest pain or palpitations  Gastrointestinal: no nausea or vomiting, tolerating diet  Genitourinary: see HPI  Hematologic/Lymphatic: no easy bruising or lymphadenopathy  Neurological: no seizures or tremors      OBJECTIVE:     Vitals:    01/08/25 1444   BP: 128/72         Physical Exam:  General: well developed, well nourished, no distress  Head: normocephalic  Neck: supple, symmetrical, trachea midline  Lungs:  clear to auscultation bilaterally and normal respiratory effort  Chest Wall: no tenderness  Heart: regular rate and rhythm, S1, S2 normal, no murmur, rub or gallop  Abdomen: soft, non-tender non-distented; bowel sounds normal; no masses,  no organomegaly  Extremities: no cyanosis or edema, or clubbing  Pulses: 2+ and symmetric      Laboratory  Pre-op labs pending          ASSESSMENT/PLAN:     Kathy was seen today for pre-op exam.    Diagnoses and all orders for this visit:    Request for sterilization      The risks, benefits, and alternatives of the procedure were reviewed with the patient.  Surgical consents were reviewed in detail and were signed.  All questions were answered. Proceed with laparoscopic bilateral salpingectomy as scheduled.

## 2025-01-09 ENCOUNTER — PATIENT MESSAGE (OUTPATIENT)
Dept: OBSTETRICS AND GYNECOLOGY | Facility: CLINIC | Age: 30
End: 2025-01-09
Payer: MEDICAID

## 2025-01-09 ENCOUNTER — PATIENT MESSAGE (OUTPATIENT)
Dept: PREADMISSION TESTING | Facility: HOSPITAL | Age: 30
End: 2025-01-09
Payer: MEDICAID

## 2025-01-09 NOTE — PRE-PROCEDURE INSTRUCTIONS
Pre op instructions reviewed with pt over telephone, verbalized understanding.    Procedure Date: 1/14/25  Arrival Time:  TBD; We will call you after 2pm the day before your procedure with your arrival time.    Address:   [facility] ([facility] 2nd Building on the left)  [id number] [place] [address] [place] [address]  >>>Please enter through front entrance Lobby of 1st floor to Registration desk<<<      !!!INSTRUCTIONS IMPORTANT!!!  NO FOOD or tobacco products after midnight the night before surgery! You may have clear liquids up to 3 hrs before your arrival to the Hospital  Clear liquids include Gatorade, water, soda, black coffee or tea (no milk or creamer), and clear juices.  Clear liquids do NOT include anything with pulp or food particles (Chicken broth, ice cream, yogurt, Jello, etc.)    >>>MEDICATION INSTRUCTIONS<<<: Morning of Surgery, take small sip of water with ONLY these medications:  Buspar  Synthroid  Labetalol       *Diabetic/ Prediabetic Patients: !!!If you take diabetic or weight loss medication, Do NOT take morning of surgery unless instructed by Doctor!!!  Metformin to be stopped 24 hrs prior to surgery.   Long Acting Insulin Instructions: HOLD the night before surgery unless instructed differently by Provider!  Ozempic/ Mounjaro/ Wegovy/ Trulicity/ Semaglutide injections or weight loss medication to be stopped 7 days prior to surgery.    !!!STOP ALL Aspirins, NSAIDS, WEIGHT LOSS INJECTIONS/PILLS, Herbal supplements, & Vitamins 7 DAYS BEFORE SURGERY!!!    ____  Avoid Alcoholic beverages 3 days prior to surgery, as it can thin the blood.  ____  NO Acrylic/fake nails or nail polish worn day of surgery (specifically hand/arm & foot surgeries).  ____  NO powder, lotions, deodorants, oils or cream on body.  ____  Remove all jewelry & piercings & foreign objects before arrival & leave at home.  ____  Remove Dentures, Hearing Aids & Contact Lens prior to surgery.  ____  Bring photo ID and  insurance information to hospital (Leave Valuables at Home).  ____  If going home the same day, arrange for a ride home. You will not be able to drive for 24 hrs if Anesthesia was used.   ____  Females (ages 11-60): may need to give a urine sample the morning of surgery; please see Pre op Nurse prior to using the restroom.  ____  Males: Stop ED medications (Viagra, Cialis) 24 hrs prior to surgery.  ____  Wear clean, loose fitting clothing to allow for dressings/ bandages.      Bathing Instructions:    -Shower with anti-bacterial Soap (Hibiclens or Dial) the night before surgery and the morning of.   -Do not use Hibiclens on your face or genitals.   -Apply clean clothes after shower.  -Do not shave your face or body 2 days prior to surgery unless instructed otherwise by your Surgeon.  -Do not shave pubic hair 7 days prior to surgery (gyn pt's).    [facility] Visitor/Ride Policy:  Only 2 adults allowed in pre op/recovery area during your procedure. You MUST HAVE A RIDE HOME from a responsible adult that you know and trust. Medical Transport, Uber or Lyft can ONLY be used if patient has a responsible adult to accompany them during ride home.       *Signs and symptoms of Infection Before or After Surgery:               !!!If you experience any fever, chills, nausea/ vomiting, foul odor/ excessive drainage from surgical site, flu-like symptoms, new wounds or cuts, PLEASE CALL THE SURGEON OFFICE at [phone] or SEND MESSAGE THROUGH [facility] PORTAL!!!     *If you are running late the morning of surgery, please call the Hospital Surgery Dept @ [phone].     *Billing questions:  [phone]     Thank you,  -[facility] Pre Admit Dept.  [phone] or [phone]  M-F 7:30 am-4:00 pm (Closed Major Holidays)

## 2025-01-13 ENCOUNTER — ANESTHESIA EVENT (OUTPATIENT)
Dept: SURGERY | Facility: HOSPITAL | Age: 30
End: 2025-01-13
Payer: MEDICAID

## 2025-01-13 ENCOUNTER — PATIENT MESSAGE (OUTPATIENT)
Dept: PREADMISSION TESTING | Facility: HOSPITAL | Age: 30
End: 2025-01-13
Payer: MEDICAID

## 2025-01-13 NOTE — ANESTHESIA PREPROCEDURE EVALUATION
2025  Kathy Mishra is a 29 y.o., female.  Patient Active Problem List   Diagnosis    Chronic hypertension affecting pregnancy    History of thyroid disorder    Class 3 severe obesity due to excess calories with serious comorbidity and body mass index (BMI) of 45.0 to 49.9 in adult    Asthma    Hypothyroidism    Intractable migraine without aura and without status migrainosus    Vitamin D deficiency    BERNARD (obstructive sleep apnea)    Status post repeat low transverse  section    Gitelman syndrome    Insulin controlled gestational diabetes mellitus (GDM) during pregnancy, antepartum    Request for sterilization     Past Surgical History:   Procedure Laterality Date    ADENOIDECTOMY       SECTION N/A 2022    Procedure:  SECTION;  Surgeon: Jhoana Alexander MD;  Location: [facility] L&D;  Service: OB/GYN;  Laterality: N/A;     SECTION       SECTION N/A 8/15/2024    Procedure:  SECTION REPEAT;  Surgeon: Sugar aSl MD;  Location: [facility] L&D;  Service: OB/GYN;  Laterality: N/A;    TONSILLECTOMY      age of 5 years old         Pre-op Assessment    I have reviewed the Patient Summary Reports.    I have reviewed the NPO Status.   I have reviewed the Medications.     Review of Systems  Anesthesia Hx:  No problems with previous Anesthesia                Social:  Non-Smoker       Hematology/Oncology:  Hematology Normal                                     Cardiovascular:     Hypertension              ECG has been reviewed.                            Pulmonary:    Asthma    Sleep Apnea                Renal/:  Renal/ Normal    Gitelman syndrome             Hepatic/GI:  Hepatic/GI Normal                    Neurological:  Neurology Normal                                      Endocrine:   Hypothyroidism              Physical Exam  General: Well  nourished    Airway:  Mallampati: II   Mouth Opening: Normal  TM Distance: Normal  Neck ROM: Normal ROM    Dental:  Intact        Anesthesia Plan  Type of Anesthesia, risks & benefits discussed:    Anesthesia Type: Gen ETT  Intra-op Monitoring Plan: Standard ASA Monitors  Post Op Pain Control Plan: multimodal analgesia  Induction:  IV  Airway Plan: , Post-Induction  Informed Consent: Informed consent signed with the Patient and all parties understand the risks and agree with anesthesia plan.  All questions answered.   ASA Score: 2    Ready For Surgery From Anesthesia Perspective.     .      Chemistry        Component Value Date/Time     01/08/2025 1522    K 3.8 01/08/2025 1522     01/08/2025 1522    CO2 19 (L) 01/08/2025 1522    BUN 17 01/08/2025 1522    CREATININE 0.7 01/08/2025 1522    GLU 88 01/08/2025 1522        Component Value Date/Time    CALCIUM 9.8 01/08/2025 1522    ALKPHOS 168 (H) 08/15/2024 0540    AST 11 08/15/2024 0540    ALT 10 08/15/2024 0540    BILITOT 0.2 08/15/2024 0540    ESTGFRAFRICA >60.0 07/27/2022 1157    EGFRNONAA >60.0 07/27/2022 1157        Lab Results   Component Value Date    WBC 8.03 01/08/2025    HGB 14.2 01/08/2025    HCT 43.0 01/08/2025    MCV 84 01/08/2025     01/08/2025       Normal sinus rhythm   Normal ECG   No previous ECGs available   Confirmed by TWIN BENITEZ MD ([id number]) on 4/16/2024 11:55:15 AM

## 2025-01-14 ENCOUNTER — ANESTHESIA (OUTPATIENT)
Dept: SURGERY | Facility: HOSPITAL | Age: 30
End: 2025-01-14
Payer: MEDICAID

## 2025-01-14 ENCOUNTER — HOSPITAL ENCOUNTER (OUTPATIENT)
Facility: HOSPITAL | Age: 30
Discharge: HOME | End: 2025-01-14
Attending: OBSTETRICS & GYNECOLOGY | Admitting: OBSTETRICS & GYNECOLOGY
Payer: MEDICAID

## 2025-01-14 VITALS
RESPIRATION RATE: 16 BRPM | HEIGHT: 63 IN | HEART RATE: 77 BPM | SYSTOLIC BLOOD PRESSURE: 149 MMHG | BODY MASS INDEX: 45.98 KG/M2 | TEMPERATURE: 98 F | OXYGEN SATURATION: 96 % | WEIGHT: 259.5 LBS | DIASTOLIC BLOOD PRESSURE: 77 MMHG

## 2025-01-14 DIAGNOSIS — Z90.79 STATUS POST BILATERAL SALPINGECTOMY: Primary | ICD-10-CM

## 2025-01-14 DIAGNOSIS — Z30.2 REQUEST FOR STERILIZATION: ICD-10-CM

## 2025-01-14 LAB
B-HCG UR QL: NEGATIVE
CTP QC/QA: YES

## 2025-01-14 PROCEDURE — 63600175 PHARM REV CODE 636 W HCPCS: Performed by: NURSE ANESTHETIST, CERTIFIED REGISTERED

## 2025-01-14 PROCEDURE — 88302 TISSUE EXAM BY PATHOLOGIST: CPT | Mod: 59 | Performed by: PATHOLOGY

## 2025-01-14 PROCEDURE — 25000003 PHARM REV CODE 250: Performed by: OBSTETRICS & GYNECOLOGY

## 2025-01-14 PROCEDURE — 36000708 HC OR TIME LEV III 1ST 15 MIN: Performed by: OBSTETRICS & GYNECOLOGY

## 2025-01-14 PROCEDURE — 37000009 HC ANESTHESIA EA ADD 15 MINS: Performed by: OBSTETRICS & GYNECOLOGY

## 2025-01-14 PROCEDURE — 71000015 HC POSTOP RECOV 1ST HR: Performed by: OBSTETRICS & GYNECOLOGY

## 2025-01-14 PROCEDURE — 36000709 HC OR TIME LEV III EA ADD 15 MIN: Performed by: OBSTETRICS & GYNECOLOGY

## 2025-01-14 PROCEDURE — 81025 URINE PREGNANCY TEST: CPT | Performed by: OBSTETRICS & GYNECOLOGY

## 2025-01-14 PROCEDURE — 37000008 HC ANESTHESIA 1ST 15 MINUTES: Performed by: OBSTETRICS & GYNECOLOGY

## 2025-01-14 PROCEDURE — 27201423 OPTIME MED/SURG SUP & DEVICES STERILE SUPPLY: Performed by: OBSTETRICS & GYNECOLOGY

## 2025-01-14 PROCEDURE — 25000003 PHARM REV CODE 250: Performed by: NURSE ANESTHETIST, CERTIFIED REGISTERED

## 2025-01-14 PROCEDURE — 88302 TISSUE EXAM BY PATHOLOGIST: CPT | Mod: 26,,, | Performed by: PATHOLOGY

## 2025-01-14 PROCEDURE — 63600175 PHARM REV CODE 636 W HCPCS: Performed by: OBSTETRICS & GYNECOLOGY

## 2025-01-14 PROCEDURE — 71000033 HC RECOVERY, INTIAL HOUR: Performed by: OBSTETRICS & GYNECOLOGY

## 2025-01-14 PROCEDURE — 58661 LAPAROSCOPY REMOVE ADNEXA: CPT | Mod: 50,,, | Performed by: OBSTETRICS & GYNECOLOGY

## 2025-01-14 RX ORDER — PROPOFOL 10 MG/ML
VIAL (ML) INTRAVENOUS
Status: DISCONTINUED | OUTPATIENT
Start: 2025-01-14 | End: 2025-01-14

## 2025-01-14 RX ORDER — FAMOTIDINE 20 MG/1
20 TABLET, FILM COATED ORAL
Status: COMPLETED | OUTPATIENT
Start: 2025-01-14 | End: 2025-01-14

## 2025-01-14 RX ORDER — KETOROLAC TROMETHAMINE 30 MG/ML
INJECTION, SOLUTION INTRAMUSCULAR; INTRAVENOUS
Status: DISCONTINUED | OUTPATIENT
Start: 2025-01-14 | End: 2025-01-14

## 2025-01-14 RX ORDER — LIDOCAINE HYDROCHLORIDE 10 MG/ML
INJECTION, SOLUTION EPIDURAL; INFILTRATION; INTRACAUDAL; PERINEURAL
Status: DISCONTINUED | OUTPATIENT
Start: 2025-01-14 | End: 2025-01-14

## 2025-01-14 RX ORDER — MIDAZOLAM HYDROCHLORIDE 1 MG/ML
2 INJECTION INTRAMUSCULAR; INTRAVENOUS ONCE
Status: COMPLETED | OUTPATIENT
Start: 2025-01-14 | End: 2025-01-14

## 2025-01-14 RX ORDER — OXYCODONE AND ACETAMINOPHEN 5; 325 MG/1; MG/1
1 TABLET ORAL
Status: DISCONTINUED | OUTPATIENT
Start: 2025-01-14 | End: 2025-01-14 | Stop reason: HOSPADM

## 2025-01-14 RX ORDER — ACETAMINOPHEN 500 MG
1000 TABLET ORAL
Status: COMPLETED | OUTPATIENT
Start: 2025-01-14 | End: 2025-01-14

## 2025-01-14 RX ORDER — ONDANSETRON HYDROCHLORIDE 2 MG/ML
4 INJECTION, SOLUTION INTRAVENOUS DAILY PRN
Status: DISCONTINUED | OUTPATIENT
Start: 2025-01-14 | End: 2025-01-14 | Stop reason: HOSPADM

## 2025-01-14 RX ORDER — HYDROMORPHONE HYDROCHLORIDE 1 MG/ML
0.2 INJECTION, SOLUTION INTRAMUSCULAR; INTRAVENOUS; SUBCUTANEOUS EVERY 5 MIN PRN
Status: DISCONTINUED | OUTPATIENT
Start: 2025-01-14 | End: 2025-01-14 | Stop reason: HOSPADM

## 2025-01-14 RX ORDER — SODIUM CHLORIDE, SODIUM LACTATE, POTASSIUM CHLORIDE, CALCIUM CHLORIDE 600; 310; 30; 20 MG/100ML; MG/100ML; MG/100ML; MG/100ML
INJECTION, SOLUTION INTRAVENOUS CONTINUOUS
Status: DISCONTINUED | OUTPATIENT
Start: 2025-01-14 | End: 2025-01-14 | Stop reason: HOSPADM

## 2025-01-14 RX ORDER — ONDANSETRON HYDROCHLORIDE 2 MG/ML
INJECTION, SOLUTION INTRAVENOUS
Status: DISCONTINUED | OUTPATIENT
Start: 2025-01-14 | End: 2025-01-14

## 2025-01-14 RX ORDER — ONDANSETRON 8 MG/1
8 TABLET, ORALLY DISINTEGRATING ORAL EVERY 12 HOURS PRN
Qty: 20 TABLET | Refills: 0 | Status: SHIPPED | OUTPATIENT
Start: 2025-01-14

## 2025-01-14 RX ORDER — ROCURONIUM BROMIDE 10 MG/ML
INJECTION, SOLUTION INTRAVENOUS
Status: DISCONTINUED | OUTPATIENT
Start: 2025-01-14 | End: 2025-01-14

## 2025-01-14 RX ORDER — IBUPROFEN 600 MG/1
600 TABLET ORAL EVERY 8 HOURS PRN
Qty: 30 TABLET | Refills: 0 | Status: SHIPPED | OUTPATIENT
Start: 2025-01-14

## 2025-01-14 RX ORDER — HYDROCODONE BITARTRATE AND ACETAMINOPHEN 5; 325 MG/1; MG/1
1 TABLET ORAL EVERY 6 HOURS PRN
Qty: 15 TABLET | Refills: 0 | Status: SHIPPED | OUTPATIENT
Start: 2025-01-14

## 2025-01-14 RX ORDER — DEXAMETHASONE SODIUM PHOSPHATE 4 MG/ML
INJECTION, SOLUTION INTRA-ARTICULAR; INTRALESIONAL; INTRAMUSCULAR; INTRAVENOUS; SOFT TISSUE
Status: DISCONTINUED | OUTPATIENT
Start: 2025-01-14 | End: 2025-01-14

## 2025-01-14 RX ADMIN — ACETAMINOPHEN 1000 MG: 500 TABLET ORAL at 06:01

## 2025-01-14 RX ADMIN — FAMOTIDINE 20 MG: 20 TABLET, FILM COATED ORAL at 06:01

## 2025-01-14 RX ADMIN — ROCURONIUM BROMIDE 50 MG: 10 INJECTION, SOLUTION INTRAVENOUS at 07:01

## 2025-01-14 RX ADMIN — LIDOCAINE HYDROCHLORIDE 50 MG: 10 SOLUTION INTRAVENOUS at 07:01

## 2025-01-14 RX ADMIN — MIDAZOLAM 2 MG: 1 INJECTION INTRAMUSCULAR; INTRAVENOUS at 06:01

## 2025-01-14 RX ADMIN — SUGAMMADEX 200 MG: 100 INJECTION, SOLUTION INTRAVENOUS at 07:01

## 2025-01-14 RX ADMIN — DEXAMETHASONE SODIUM PHOSPHATE 8 MG: 4 INJECTION, SOLUTION INTRA-ARTICULAR; INTRALESIONAL; INTRAMUSCULAR; INTRAVENOUS; SOFT TISSUE at 07:01

## 2025-01-14 RX ADMIN — PROPOFOL 150 MG: 10 INJECTION, EMULSION INTRAVENOUS at 07:01

## 2025-01-14 RX ADMIN — ONDANSETRON 4 MG: 2 INJECTION INTRAMUSCULAR; INTRAVENOUS at 07:01

## 2025-01-14 RX ADMIN — SODIUM CHLORIDE, SODIUM LACTATE, POTASSIUM CHLORIDE, AND CALCIUM CHLORIDE: .6; .31; .03; .02 INJECTION, SOLUTION INTRAVENOUS at 06:01

## 2025-01-14 RX ADMIN — KETOROLAC TROMETHAMINE 30 MG: 30 INJECTION, SOLUTION INTRAMUSCULAR; INTRAVENOUS at 07:01

## 2025-01-14 NOTE — OP NOTE
Operative Note       SURGERY DATE:  1/14/2025     PRE-OP DIAGNOSIS:  Request for sterilization [Z30.2]    POST-OP DIAGNOSIS:  Request for sterilization [Z30.2]    Procedure(s) (LRB):  SALPINGECTOMY, LAPAROSCOPIC (Bilateral)    Surgeons and Role:     * Sugar Sal MD - Primary    ASSISTANT:   first lawrence Arango    TASKS PERFORMED BY ASSISTANT:  Retraction, Exposure, Hemostasis, Closure    ANESTHESIA: General    FINDINGS: Uterus 8 week size with bladder adhesions to the lower uterine segment.  No other adhesions present.  Bilateral tubes and ovaries wnl.    GRAFTS/IMPLANTS:  None    ESTIMATED BLOOD LOSS: 5 mL              COMPLICATIONS:  None    SPECIMEN:   left and right fallopian tubes    DESCRIPTION OF PROCEDURE:    The patient was taken to the Operating Room where GETA was induced and found to be adequate. She was then placed in the dorsal lithotomy position with her legs in the Gregory stirrups and her arms tucked at her sides.  Her perineum was prepped and draped in the normal, sterile fashion, and a Pugh catheter was placed in her bladder and hung to gravity.  A Zumi intrauterine manipulator was then placed in the usual fashion.  Her abdomen was then prepped and draped in the normal, sterile fashion, and her legs were placed in the low lithotomy position.  Time out was performed.  The periumbilical skin was tented with perforating towel clamps, and a Verress needle was inserted through the umbilicus into the intraperitoneal cavity.  Intraperitoneal placement was confirmed with a water drop test, and pneumoperitoneum was achieved with Carbon Dioxide gas up to a pressure of 15 mmHg.  The verress needle was removed, and a 5 mm skin incision was made in the umbilicus. A 5 mm trocar was inserted through this incision, and the scope was inserted through this trocar.  Immediate inspection of underlying organs revealed no damage or injury.  She was then placed in Trendelenburg position.  The pelvic organs  were examined, and the findings stated above were noted.  A 5mm accessory trocar was placed in the LLQ, and a 8mm trocar was placed in the RLQ.   The left fallopian tube was then excised working from the fimbriated end to the cornual portion of the tube using the Ligasure.  The left fallopian tube was then removed and sent to pathology for permanent section.  This same procedure was repeated on the right fallopian tube.  All surgical sites were then examined and noted to be hemostatic.    All instruments were removed from the trocars and pneumoperitoneum was allowed to escape.  The patient was taken out of Trendelenburg position, and all trocars were removed.  Hemostasis at all skin sites was achieved with the Bovie cautery.  All skin sites were closed with 4-0 Monocryl in a running, subcuticular fashion.  All instruments were removed from the vagina.  Sponge, laparotomy sponge, and needle counts were correct.  She will go to recovery in stable condition.             CONDITION: Good    DISPOSITION: PACU - hemodynamically stable.

## 2025-01-14 NOTE — BRIEF OP NOTE
[facility] - Surgery (Hospital)  Brief Operative Note    Surgery Date: 1/14/2025     Surgeons and Role:     * Sugar Sal MD - Primary    Assistant:  first lawrence Arango    Pre-op Diagnosis:  Request for sterilization [Z30.2]    Post-op Diagnosis:  Post-Op Diagnosis Codes:     * Request for sterilization [Z30.2]    Procedure(s) (LRB):  SALPINGECTOMY, LAPAROSCOPIC (Bilateral)    Anesthesia: General    Operative Findings: Uterus 8 week size with bladder adhesions to the lower uterine segment.  No other adhesions present.  Bilateral tubes and ovaries wnl.    Estimated Blood Loss: *5 mL *         Specimens:   Specimen (24h ago, onward)       Start     Ordered    01/14/25 0716  Specimen to Pathology, Surgery Gynecology and Obstetrics  Once        Comments: Pre-op Diagnosis: Request for sterilization [Z30.2]Procedure(s):SALPINGECTOMY, LAPAROSCOPIC Number of specimens: 2Name of specimens: 1) Left fallopian tube PERM2) Right fallopian tube PERM     References:    Click here for ordering Quick Tip   Question Answer Comment   Procedure Type: Gynecology and Obstetrics    Release to patient Immediate        01/14/25 0744                      Discharge Note    OUTCOME: Patient tolerated treatment/procedure well without complication and is now ready for discharge.    DISPOSITION: Home or Self Care    FINAL DIAGNOSIS:  Request for sterilization    FOLLOWUP: In clinic    DISCHARGE INSTRUCTIONS:    Discharge Procedure Orders   Diet Adult Regular     No dressing needed     Lifting restrictions     Pelvic Rest     No driving until:   Order Comments: Off narcotics x 24 hours     Notify your health care provider if you experience any of the following:  temperature >100.4     Notify your health care provider if you experience any of the following:  persistent nausea and vomiting or diarrhea     Notify your health care provider if you experience any of the following:  severe uncontrolled pain     Notify your health care provider if  you experience any of the following:  redness, tenderness, or signs of infection (pain, swelling, redness, odor or green/yellow discharge around incision site)     Notify your health care provider if you experience any of the following:  difficulty breathing or increased cough     Notify your health care provider if you experience any of the following:  severe persistent headache     Notify your health care provider if you experience any of the following:  worsening rash     Notify your health care provider if you experience any of the following:  persistent dizziness, light-headedness, or visual disturbances     Notify your health care provider if you experience any of the following:  increased confusion or weakness        Clinical Reference Documents Added to Patient Instructions         Document    FALLOPIAN TUBE REMOVAL DISCHARGE INSTRUCTIONS (ENGLISH)

## 2025-01-14 NOTE — ANESTHESIA PROCEDURE NOTES
Intubation    Date/Time: 1/14/2025 7:06 AM    Performed by: Yvette Carmichael CRNA  Authorized by: Golden Arellano MD    Intubation:     Induction:  Intravenous    Intubated:  Postinduction    Mask Ventilation:  Easy mask    Attempts:  1    Attempted By:  CRNA    Method of Intubation:  Direct    Blade:  Saad 3    Laryngeal View Grade: Grade I - full view of cords      Difficult Airway Encountered?: No      Complications:  None    Airway Device:  Oral endotracheal tube    Airway Device Size:  7.5    Style/Cuff Inflation:  Cuffed (inflated to minimal occlusive pressure)    Tube secured:  21    Secured at:  The lips    Placement Verified By:  Capnometry and Revisualization with laryngoscopy    Complicating Factors:  None    Findings Post-Intubation:  BS equal bilateral and atraumatic/condition of teeth unchanged

## 2025-01-14 NOTE — TRANSFER OF CARE
"Anesthesia Transfer of Care Note    Patient: Kathy Mishra    Procedure(s) Performed: Procedure(s) (LRB):  SALPINGECTOMY, LAPAROSCOPIC (Bilateral)    Patient location: PACU    Anesthesia Type: general    Transport from OR: Transported from OR on room air with adequate spontaneous ventilation    Post pain: adequate analgesia    Post assessment: no apparent anesthetic complications    Post vital signs: stable    Level of consciousness: sedated    Nausea/Vomiting: no nausea/vomiting    Complications: none    Transfer of care protocol was followed      Last vitals: Visit Vitals  BP (!) 169/73   Pulse 70   Temp 36.5 °C (97.7 °F) (Temporal)   Resp 18   Ht 5' 3" (1.6 m)   Wt 117.7 kg (259 lb 7.7 oz)   LMP 12/02/2024 (Approximate)   SpO2 99%   Breastfeeding No   BMI 45.97 kg/m²     "

## 2025-01-15 ENCOUNTER — PATIENT MESSAGE (OUTPATIENT)
Dept: OBSTETRICS AND GYNECOLOGY | Facility: CLINIC | Age: 30
End: 2025-01-15
Payer: MEDICAID

## 2025-01-15 ENCOUNTER — TELEPHONE (OUTPATIENT)
Dept: OBSTETRICS AND GYNECOLOGY | Facility: HOSPITAL | Age: 30
End: 2025-01-15
Payer: MEDICAID

## 2025-01-15 ENCOUNTER — OFFICE VISIT (OUTPATIENT)
Dept: OBSTETRICS AND GYNECOLOGY | Facility: CLINIC | Age: 30
End: 2025-01-15
Payer: MEDICAID

## 2025-01-15 VITALS
SYSTOLIC BLOOD PRESSURE: 144 MMHG | BODY MASS INDEX: 46.36 KG/M2 | WEIGHT: 261.69 LBS | DIASTOLIC BLOOD PRESSURE: 70 MMHG

## 2025-01-15 DIAGNOSIS — Z90.79 STATUS POST BILATERAL SALPINGECTOMY: Primary | ICD-10-CM

## 2025-01-15 PROCEDURE — 99999 PR PBB SHADOW E&M-EST. PATIENT-LVL II: CPT | Mod: PBBFAC,,, | Performed by: PHYSICIAN ASSISTANT

## 2025-01-15 PROCEDURE — 3077F SYST BP >= 140 MM HG: CPT | Mod: CPTII,,, | Performed by: PHYSICIAN ASSISTANT

## 2025-01-15 PROCEDURE — 99212 OFFICE O/P EST SF 10 MIN: CPT | Mod: PBBFAC | Performed by: PHYSICIAN ASSISTANT

## 2025-01-15 PROCEDURE — 99024 POSTOP FOLLOW-UP VISIT: CPT | Mod: ,,, | Performed by: PHYSICIAN ASSISTANT

## 2025-01-15 PROCEDURE — 1159F MED LIST DOCD IN RCRD: CPT | Mod: CPTII,,, | Performed by: PHYSICIAN ASSISTANT

## 2025-01-15 PROCEDURE — 3078F DIAST BP <80 MM HG: CPT | Mod: CPTII,,, | Performed by: PHYSICIAN ASSISTANT

## 2025-01-15 NOTE — PROGRESS NOTES
[facility]  History and Physical    Patient Name: Kathy Mishra  YOB: 1995 (29 y.o.)  MRN: [id number]  Today's Date: 01/15/2025    Referring Md:   No referring provider defined for this encounter.    SUBJECTIVE:     Chief Complaint: Post-op     History of Present Illness:  Kathy Mishra is a 29 y.o. female  who presents to the clinic today for incision check. S/p bilateral salpingectomy on 25. Reports that one of her incisions has opened. Denies other issues. States that she has a cold and a cough, and she thinks this contributed to her incision opening.       Review of patient's allergies indicates:  No Known Allergies    Past Medical History:   Diagnosis Date    Asthma 2022    Gestational diabetes mellitus (GDM) in third trimester controlled on oral hypoglycemic drug 2022    History of hypertension 2022-add PIH baseline labs to workup PCR 0.09 advised daily baby aspirin at 16 weeks    Hypertension     Sleep apnea     Thyroid disease      Past Surgical History:   Procedure Laterality Date    ADENOIDECTOMY       SECTION N/A 2022    Procedure:  SECTION;  Surgeon: Jhoana Alexander MD;  Location: [facility];  Service: OB/GYN;  Laterality: N/A;     SECTION       SECTION N/A 8/15/2024    Procedure:  SECTION REPEAT;  Surgeon: Sugar Sal MD;  Location: [facility];  Service: OB/GYN;  Laterality: N/A;    LAPAROSCOPIC SALPINGECTOMY Bilateral 2025    Procedure: SALPINGECTOMY, LAPAROSCOPIC;  Surgeon: Sugar Sal MD;  Location: [facility];  Service: OB/GYN;  Laterality: Bilateral;    TONSILLECTOMY      age of 5 years old     Family History   Problem Relation Name Age of Onset    Hypertension Paternal Grandmother      Diabetes Maternal Grandfather      Hypertension Father      Diabetes Mother      Breast cancer Neg Hx      Colon cancer Neg Hx      Ovarian cancer Neg Hx      Thrombosis Neg Hx        Social History     Tobacco Use    Smoking status: Former     Types: Cigarettes    Smokeless tobacco: Never   Substance Use Topics    Alcohol use: Not Currently    Drug use: Never        Review of Systems:  Review of Systems   Constitutional:  Negative for chills and fever.   HENT:  Positive for congestion.    Respiratory:  Positive for cough. Negative for shortness of breath.    Cardiovascular:  Negative for chest pain and leg swelling.   Gastrointestinal:  Negative for abdominal pain, nausea and vomiting.   Genitourinary:  Negative for dysuria.   Musculoskeletal:  Negative for myalgias.   Skin:  Negative for rash.   Neurological:  Negative for weakness and headaches.       OBJECTIVE:     Vital Signs (Most Recent)  BP (!) 144/70   Wt 118.7 kg (261 lb 11 oz)   LMP 12/02/2024 (Approximate)   Breastfeeding No   BMI 46.36 kg/m²     Physical Exam  Vitals reviewed.   Constitutional:       General: She is not in acute distress.     Appearance: Normal appearance. She is not ill-appearing or toxic-appearing.   HENT:      Head: Normocephalic and atraumatic.   Eyes:      Extraocular Movements: Extraocular movements intact.      Conjunctiva/sclera: Conjunctivae normal.   Pulmonary:      Effort: Pulmonary effort is normal. No respiratory distress.   Abdominal:      Palpations: Abdomen is soft.      Tenderness: There is abdominal tenderness (mild, incisional).      Comments: Three laparoscopic trocar sites present on abdomen. RLQ incision open; no bleeding or drainage appreciated. No suture material immediately visible. Other incisions healing well.    Musculoskeletal:         General: Normal range of motion.      Cervical back: Normal range of motion.   Skin:     General: Skin is warm and dry.   Neurological:      General: No focal deficit present.      Mental Status: She is alert and oriented to person, place, and time.   Psychiatric:         Mood and Affect: Mood normal.         Behavior: Behavior normal.          Thought Content: Thought content normal.         Judgment: Judgment normal.             ASSESSMENT/PLAN:     Kathy Mishra is a 29 y.o. female was seen today for incision check. Incision site cleaned, incision closed with dermabond.   I insured the patient has a scheduled post-op visit. I reviewed all restrictions and limitations with the patient including lifting/activity restrictions, pelvic rest, and showering daily (no tub baths/soaking in water of any kind) with antibacterial soap. Instructed patient to call clinic with any concerning issues or symptoms. Patient verbalized understanding.     Kathy was seen today for wound check.    Diagnoses and all orders for this visit:    Status post bilateral salpingectomy                АНДРЕЙ Byers, PA-C  OBGYN - Surgery  [facility]

## 2025-01-15 NOTE — TELEPHONE ENCOUNTER
Spoke with patient, will send a picture of incision and notified will let patient know if she needs to be seen in clinic this pm per [place]

## 2025-01-15 NOTE — ANESTHESIA POSTPROCEDURE EVALUATION
Anesthesia Post Evaluation    Patient: Kathy Mishra    Procedure(s) Performed: Procedure(s) (LRB):  SALPINGECTOMY, LAPAROSCOPIC (Bilateral)    Final Anesthesia Type: general      Patient location during evaluation: PACU  Patient participation: Yes- Able to Participate  Level of consciousness: awake and alert  Post-procedure vital signs: reviewed and stable  Pain management: adequate  Airway patency: patent  BERNARD mitigation strategies: Verification of full reversal of neuromuscular block  PONV status at discharge: No PONV  Anesthetic complications: no      Cardiovascular status: hemodynamically stable  Respiratory status: spontaneous ventilation  Hydration status: euvolemic  Follow-up not needed.              Vitals Value Taken Time   /77 01/14/25 0840   Temp 36.7 °C (98 °F) 01/14/25 0840   Pulse 77 01/14/25 0840   Resp 16 01/14/25 0840   SpO2 96 % 01/14/25 0840         Event Time   Out of Recovery 08:43:22         Pain/Stephane Score: Pain Rating Prior to Med Admin: 0 (1/14/2025  6:33 AM)  Stephane Score: 10 (1/14/2025  8:45 AM)

## 2025-01-15 NOTE — TELEPHONE ENCOUNTER
Returned pt on call nurse message.  Pt reports that she had LSC Bilateral Salpingectomy with Dr. Sal yesterday.  Woke up this morning and noted that her right side incision site was wide open and had a little blood staining on her sheets.  Does not note active bleeding or drainage.  Pain is well controlled.  Pt advised to keep wound clean, dry, and covered with clean bandage.  Recommend she make an appointment with Dr. Sal or PA today for evaluation.  Pt voiced understanding.  Message sent to PA to schedule.   Rinvoq Pregnancy And Lactation Text: Based on animal studies, Rinvoq may cause embryo-fetal harm when administered to pregnant women.  The medication should not be used in pregnancy.  Breastfeeding is not recommended during treatment and for 6 days after the last dose.

## 2025-01-23 ENCOUNTER — PATIENT MESSAGE (OUTPATIENT)
Dept: OBSTETRICS AND GYNECOLOGY | Facility: CLINIC | Age: 30
End: 2025-01-23
Payer: MEDICAID

## 2025-01-24 LAB
FINAL PATHOLOGIC DIAGNOSIS: NORMAL
GROSS: NORMAL
Lab: NORMAL

## 2025-01-27 ENCOUNTER — PATIENT MESSAGE (OUTPATIENT)
Dept: OBSTETRICS AND GYNECOLOGY | Facility: CLINIC | Age: 30
End: 2025-01-27
Payer: MEDICAID

## 2025-01-27 ENCOUNTER — PATIENT MESSAGE (OUTPATIENT)
Dept: PULMONOLOGY | Facility: CLINIC | Age: 30
End: 2025-01-27
Payer: MEDICAID

## 2025-02-03 ENCOUNTER — OFFICE VISIT (OUTPATIENT)
Dept: PULMONOLOGY | Facility: CLINIC | Age: 30
End: 2025-02-03
Payer: MEDICAID

## 2025-02-03 VITALS
WEIGHT: 254 LBS | HEIGHT: 63 IN | RESPIRATION RATE: 19 BRPM | OXYGEN SATURATION: 98 % | SYSTOLIC BLOOD PRESSURE: 146 MMHG | BODY MASS INDEX: 45 KG/M2 | HEART RATE: 79 BPM | DIASTOLIC BLOOD PRESSURE: 70 MMHG

## 2025-02-03 DIAGNOSIS — G47.33 OSA (OBSTRUCTIVE SLEEP APNEA): ICD-10-CM

## 2025-02-03 DIAGNOSIS — J31.0 RHINITIS, UNSPECIFIED TYPE: Primary | ICD-10-CM

## 2025-02-03 DIAGNOSIS — E66.01 MORBID OBESITY WITH BMI OF 40.0-44.9, ADULT: ICD-10-CM

## 2025-02-03 PROCEDURE — 99214 OFFICE O/P EST MOD 30 MIN: CPT | Mod: PBBFAC | Performed by: NURSE PRACTITIONER

## 2025-02-03 PROCEDURE — 3008F BODY MASS INDEX DOCD: CPT | Mod: CPTII,,, | Performed by: NURSE PRACTITIONER

## 2025-02-03 PROCEDURE — 1159F MED LIST DOCD IN RCRD: CPT | Mod: CPTII,,, | Performed by: NURSE PRACTITIONER

## 2025-02-03 PROCEDURE — 99214 OFFICE O/P EST MOD 30 MIN: CPT | Mod: S$PBB,,, | Performed by: NURSE PRACTITIONER

## 2025-02-03 PROCEDURE — 99999 PR PBB SHADOW E&M-EST. PATIENT-LVL IV: CPT | Mod: PBBFAC,,, | Performed by: NURSE PRACTITIONER

## 2025-02-03 PROCEDURE — 3077F SYST BP >= 140 MM HG: CPT | Mod: CPTII,,, | Performed by: NURSE PRACTITIONER

## 2025-02-03 PROCEDURE — 3078F DIAST BP <80 MM HG: CPT | Mod: CPTII,,, | Performed by: NURSE PRACTITIONER

## 2025-02-03 PROCEDURE — 1160F RVW MEDS BY RX/DR IN RCRD: CPT | Mod: CPTII,,, | Performed by: NURSE PRACTITIONER

## 2025-02-03 RX ORDER — TIRZEPATIDE 2.5 MG/.5ML
2.5 INJECTION, SOLUTION SUBCUTANEOUS
Qty: 2 ML | Refills: 0 | Status: SHIPPED | OUTPATIENT
Start: 2025-02-03 | End: 2025-03-05

## 2025-02-03 RX ORDER — AZELASTINE 1 MG/ML
1 SPRAY, METERED NASAL 2 TIMES DAILY
Qty: 30 ML | Refills: 11 | Status: SHIPPED | OUTPATIENT
Start: 2025-02-03 | End: 2026-02-03

## 2025-02-03 RX ORDER — TIRZEPATIDE 7.5 MG/.5ML
7.5 INJECTION, SOLUTION SUBCUTANEOUS
Qty: 2 ML | Refills: 3 | Status: SHIPPED | OUTPATIENT
Start: 2025-04-06 | End: 2025-07-05

## 2025-02-03 RX ORDER — TIRZEPATIDE 5 MG/.5ML
5 INJECTION, SOLUTION SUBCUTANEOUS
Qty: 2 ML | Refills: 0 | Status: SHIPPED | OUTPATIENT
Start: 2025-03-06 | End: 2025-04-05

## 2025-02-03 NOTE — PROGRESS NOTES
"Subjective:      Patient ID: Kathy Mishra is a 29 y.o. female.    Chief Complaint: Sleep Apnea and Asthma    Asthma  Associated symptoms include rhinorrhea. Her past medical history is significant for asthma.     Presents for sleep apnea on AutoPAP therapy. Patient states improved symptoms with use of AutoPAP. Sleeping more soundly. Waking up feeling more refreshed. Improved daytime sleepiness. Patient states she is benefiting from use of the AutoPAP. She feels like the setting starts off too low. We will make adjustment in setting.   She is interested in Zepbound for BERNARD with BMI 44. Already doing calorie reduction.  Waking up with rhinitis and throughout the day.    Patient Active Problem List   Diagnosis    Chronic hypertension affecting pregnancy    History of thyroid disorder    Class 3 severe obesity due to excess calories with serious comorbidity and body mass index (BMI) of 45.0 to 49.9 in adult    Asthma    Hypothyroidism    Intractable migraine without aura and without status migrainosus    Vitamin D deficiency    BERNARD (obstructive sleep apnea)    Status post repeat low transverse  section    Gitelman syndrome    Insulin controlled gestational diabetes mellitus (GDM) during pregnancy, antepartum    Request for sterilization    Status post bilateral salpingectomy     BP (!) 146/70   Pulse 79   Resp 19   Ht 5' 3" (1.6 m)   Wt 115.2 kg (253 lb 15.5 oz)   LMP 2024 (Approximate)   SpO2 98%   BMI 44.99 kg/m²   Body mass index is 44.99 kg/m².    Review of Systems   Constitutional: Negative.    HENT:  Positive for rhinorrhea.    Respiratory: Negative.     Cardiovascular: Negative.    Musculoskeletal: Negative.    Gastrointestinal: Negative.    Neurological: Negative.    Psychiatric/Behavioral: Negative.       Objective:      Physical Exam  Constitutional:       Appearance: She is well-developed. She is obese.   HENT:      Head: Normocephalic and atraumatic.      Nose: Nose normal. "   Cardiovascular:      Rate and Rhythm: Normal rate and regular rhythm.      Heart sounds: No murmur heard.     No gallop.   Pulmonary:      Effort: Pulmonary effort is normal.      Breath sounds: Normal breath sounds.   Abdominal:      Palpations: Abdomen is soft.      Tenderness: There is no abdominal tenderness.   Musculoskeletal:         General: Normal range of motion.      Cervical back: Normal range of motion and neck supple.   Skin:     General: Skin is warm and dry.   Neurological:      Mental Status: She is alert and oriented to person, place, and time.   Psychiatric:         Mood and Affect: Mood normal.         Behavior: Behavior normal.       Personal Diagnostic Review      2/3/2025    10:45 AM   EPWORTH SLEEPINESS SCALE   Sitting and reading 0   Watching TV 0   Sitting, inactive in a public place (e.g. a theatre or a meeting) 0   As a passenger in a car for an hour without a break 0   Lying down to rest in the afternoon when circumstances permit 3   Sitting and talking to someone 0   Sitting quietly after a lunch without alcohol 0   In a car, while stopped for a few minutes in traffic 0   Total score 3          Assessment:       1. Rhinitis, unspecified type    2. BERNARD (obstructive sleep apnea)    3. Morbid obesity with BMI of 40.0-44.9, adult        Outpatient Encounter Medications as of 2/3/2025   Medication Sig Dispense Refill    labetaloL (NORMODYNE) 200 MG tablet Take 1 tablet (200 mg total) by mouth 3 (three) times daily. 90 tablet 11    levothyroxine (SYNTHROID) 100 MCG tablet Take 1 tablet (100 mcg total) by mouth before breakfast. 30 tablet 4    azelastine (ASTELIN) 137 mcg (0.1 %) nasal spray 1 spray (137 mcg total) by Nasal route 2 (two) times daily. 30 mL 11    busPIRone (BUSPAR) 5 MG Tab Take 5 mg by mouth. (Patient not taking: Reported on 2/3/2025)      HYDROcodone-acetaminophen (NORCO) 5-325 mg per tablet Take 1 tablet by mouth every 6 (six) hours as needed for Pain. (Patient not taking:  Reported on 2/3/2025) 15 tablet 0    ibuprofen (ADVIL,MOTRIN) 600 MG tablet Take 1 tablet (600 mg total) by mouth every 8 (eight) hours as needed for Pain. (Patient not taking: Reported on 2/3/2025) 30 tablet 0    ondansetron (ZOFRAN-ODT) 8 MG TbDL Dissolve 1 tablet (8 mg total) by mouth every 12 (twelve) hours as needed (nausea). (Patient not taking: Reported on 2/3/2025) 20 tablet 0    tirzepatide, weight loss, (ZEPBOUND) 2.5 mg/0.5 mL PnIj Inject 2.5 mg into the skin every 7 days. 2 mL 0    [START ON 3/6/2025] tirzepatide, weight loss, (ZEPBOUND) 5 mg/0.5 mL PnIj Inject 5 mg into the skin every 7 days. Month 2 2 mL 0    [START ON 4/6/2025] tirzepatide, weight loss, (ZEPBOUND) 7.5 mg/0.5 mL PnIj Inject 7.5 mg into the skin every 7 days. Month 3 2 mL 3    [DISCONTINUED] sertraline (ZOLOFT) 50 MG tablet Take 1 tablet (50 mg total) by mouth once daily. (Patient not taking: Reported on 1/8/2025) 30 tablet 2    [DISCONTINUED] TRUEDRAW LANCING DEVICE Misc directed      [DISCONTINUED] medroxyPROGESTERone (DEPO-PROVERA) syringe 150 mg        No facility-administered encounter medications on file as of 2/3/2025.     No orders of the defined types were placed in this encounter.    Plan:       1. Rhinitis, unspecified type  -     azelastine (ASTELIN) 137 mcg (0.1 %) nasal spray; 1 spray (137 mcg total) by Nasal route 2 (two) times daily.  Dispense: 30 mL; Refill: 11    2. BERNARD (obstructive sleep apnea)  Overview:  Compliant with PAP and benefits from use. Follow up annually in the sleep clinic.      Orders:  -     tirzepatide, weight loss, (ZEPBOUND) 2.5 mg/0.5 mL PnIj; Inject 2.5 mg into the skin every 7 days.  Dispense: 2 mL; Refill: 0  -     tirzepatide, weight loss, (ZEPBOUND) 5 mg/0.5 mL PnIj; Inject 5 mg into the skin every 7 days. Month 2  Dispense: 2 mL; Refill: 0  -     tirzepatide, weight loss, (ZEPBOUND) 7.5 mg/0.5 mL PnIj; Inject 7.5 mg into the skin every 7 days. Month 3  Dispense: 2 mL; Refill: 3    3. Morbid  obesity with BMI of 40.0-44.9, adult  -     tirzepatide, weight loss, (ZEPBOUND) 2.5 mg/0.5 mL PnIj; Inject 2.5 mg into the skin every 7 days.  Dispense: 2 mL; Refill: 0  -     tirzepatide, weight loss, (ZEPBOUND) 5 mg/0.5 mL PnIj; Inject 5 mg into the skin every 7 days. Month 2  Dispense: 2 mL; Refill: 0  -     tirzepatide, weight loss, (ZEPBOUND) 7.5 mg/0.5 mL PnIj; Inject 7.5 mg into the skin every 7 days. Month 3  Dispense: 2 mL; Refill: 3                        Elizabeth LeJeune, ACNP, ANP

## 2025-02-19 ENCOUNTER — TELEPHONE (OUTPATIENT)
Dept: OBSTETRICS AND GYNECOLOGY | Facility: CLINIC | Age: 30
End: 2025-02-19
Payer: MEDICAID

## 2025-02-19 NOTE — TELEPHONE ENCOUNTER
----- Message from Javier sent at 2/19/2025  7:08 AM CST -----  Contact: LAURA CHANEY [[id number]]  ..Type:  Patient Requesting CallWho Called:LAURA CHANEY [[id number]]Does the patient know what this is regarding?:pt wamts to reschedule appt for today Would the patient rather a call back or a response via [facility]? callBe Call Back Number:.[phone] (home) Additional Information:

## 2025-02-19 NOTE — TELEPHONE ENCOUNTER
Attempted to call patient, no answer, left message to call office.  Can go post-op as virtual if easier for patient.

## 2025-04-09 ENCOUNTER — OFFICE VISIT (OUTPATIENT)
Dept: OBSTETRICS AND GYNECOLOGY | Facility: CLINIC | Age: 30
End: 2025-04-09
Payer: MEDICAID

## 2025-04-09 VITALS
WEIGHT: 251.31 LBS | HEIGHT: 63 IN | SYSTOLIC BLOOD PRESSURE: 160 MMHG | DIASTOLIC BLOOD PRESSURE: 90 MMHG | BODY MASS INDEX: 44.53 KG/M2

## 2025-04-09 DIAGNOSIS — Z90.79 STATUS POST BILATERAL SALPINGECTOMY: Primary | ICD-10-CM

## 2025-04-09 PROCEDURE — 3077F SYST BP >= 140 MM HG: CPT | Mod: CPTII,,, | Performed by: OBSTETRICS & GYNECOLOGY

## 2025-04-09 PROCEDURE — 3080F DIAST BP >= 90 MM HG: CPT | Mod: CPTII,,, | Performed by: OBSTETRICS & GYNECOLOGY

## 2025-04-09 PROCEDURE — 99999 PR PBB SHADOW E&M-EST. PATIENT-LVL II: CPT | Mod: PBBFAC,,, | Performed by: OBSTETRICS & GYNECOLOGY

## 2025-04-09 PROCEDURE — 99212 OFFICE O/P EST SF 10 MIN: CPT | Mod: PBBFAC | Performed by: OBSTETRICS & GYNECOLOGY

## 2025-04-09 PROCEDURE — 1159F MED LIST DOCD IN RCRD: CPT | Mod: CPTII,,, | Performed by: OBSTETRICS & GYNECOLOGY

## 2025-04-09 PROCEDURE — 99024 POSTOP FOLLOW-UP VISIT: CPT | Mod: ,,, | Performed by: OBSTETRICS & GYNECOLOGY

## 2025-04-09 RX ORDER — LABETALOL 200 MG/1
200 TABLET, FILM COATED ORAL 2 TIMES DAILY
COMMUNITY

## 2025-04-09 NOTE — PROGRESS NOTES
Subjective     Patient ID: Kathy Mishra is a 29 y.o. female.    Chief Complaint:  Post-op Evaluation      History of Present Illness  HPI  Presents for post-op check s/p laparoscopic BL salpingectomy for permanent sterilization 25.  Doing well post-op.  Incisions healed and not having any pain.    Pathology confirms benign BL fallopian tubes    GYN & OB History  No LMP recorded.   Date of Last Pap: 2022    OB History    Para Term  AB Living   3 2 1 1 1 2   SAB IAB Ectopic Multiple Live Births   1   0 2      # Outcome Date GA Lbr Tera/2nd Weight Sex Type Anes PTL Lv   3 Term 08/15/24 37w2d  4.04 kg (8 lb 14.5 oz) M CS-LTranv Spinal N GUILLERMINA   2  22 35w1d  2.69 kg (5 lb 14.9 oz) F CS-LTranv Spinal, EPI N GUILLERMINA      Complications: Failure to Progress in First Stage   1 SAB 16 12w0d              Review of Systems  Review of Systems       Objective   Physical Exam:   Constitutional: She is oriented to person, place, and time. She appears well-developed and well-nourished. No distress.             Abdominal: Soft. She exhibits abdominal incision (trocar incisions well-healed and intact). She exhibits no distension and no mass. There is no abdominal tenderness. There is no rebound and no guarding.                 Neurological: She is alert and oriented to person, place, and time.     Psychiatric: She has a normal mood and affect. Her behavior is normal. Judgment and thought content normal.            Assessment and Plan     1. Status post bilateral salpingectomy             Plan:  Kathy was seen today for post-op evaluation.    Diagnoses and all orders for this visit:    Status post bilateral salpingectomy     Doing well post-op.  RTC 2025 for well-woman exam

## 2025-06-24 ENCOUNTER — PATIENT MESSAGE (OUTPATIENT)
Dept: PULMONOLOGY | Facility: CLINIC | Age: 30
End: 2025-06-24
Payer: MEDICAID

## 2025-06-26 ENCOUNTER — HOSPITAL ENCOUNTER (EMERGENCY)
Facility: HOSPITAL | Age: 30
Discharge: HOME | End: 2025-06-26
Attending: EMERGENCY MEDICINE
Payer: MEDICAID

## 2025-06-26 VITALS
HEART RATE: 69 BPM | RESPIRATION RATE: 18 BRPM | TEMPERATURE: 98 F | WEIGHT: 243 LBS | DIASTOLIC BLOOD PRESSURE: 64 MMHG | BODY MASS INDEX: 43.05 KG/M2 | SYSTOLIC BLOOD PRESSURE: 146 MMHG | OXYGEN SATURATION: 100 %

## 2025-06-26 DIAGNOSIS — R51.9 HEADACHE: ICD-10-CM

## 2025-06-26 DIAGNOSIS — R07.9 CHEST PAIN: ICD-10-CM

## 2025-06-26 LAB
ABSOLUTE EOSINOPHIL (OHS): 0.04 K/UL
ABSOLUTE MONOCYTE (OHS): 0.39 K/UL (ref 0.3–1)
ABSOLUTE NEUTROPHIL COUNT (OHS): 4.99 K/UL (ref 1.8–7.7)
ALBUMIN SERPL BCP-MCNC: 4 G/DL (ref 3.5–5.2)
ALP SERPL-CCNC: 114 UNIT/L (ref 40–150)
ALT SERPL W/O P-5'-P-CCNC: 26 UNIT/L (ref 10–44)
ANION GAP (OHS): 12 MMOL/L (ref 8–16)
AST SERPL-CCNC: 17 UNIT/L (ref 11–45)
B-HCG UR QL: NEGATIVE
BASOPHILS # BLD AUTO: 0.01 K/UL
BASOPHILS NFR BLD AUTO: 0.1 %
BILIRUB SERPL-MCNC: 0.4 MG/DL (ref 0.1–1)
BILIRUB UR QL STRIP.AUTO: NEGATIVE
BNP SERPL-MCNC: <10 PG/ML (ref 0–99)
BUN SERPL-MCNC: 20 MG/DL (ref 6–20)
CALCIUM SERPL-MCNC: 9.6 MG/DL (ref 8.7–10.5)
CHLORIDE SERPL-SCNC: 107 MMOL/L (ref 95–110)
CLARITY UR: CLEAR
CO2 SERPL-SCNC: 17 MMOL/L (ref 23–29)
COLOR UR AUTO: YELLOW
CREAT SERPL-MCNC: 0.7 MG/DL (ref 0.5–1.4)
ERYTHROCYTE [DISTWIDTH] IN BLOOD BY AUTOMATED COUNT: 13.2 % (ref 11.5–14.5)
GFR SERPLBLD CREATININE-BSD FMLA CKD-EPI: >60 ML/MIN/1.73/M2
GLUCOSE SERPL-MCNC: 100 MG/DL (ref 70–110)
GLUCOSE UR QL STRIP: NEGATIVE
HCT VFR BLD AUTO: 43.6 % (ref 37–48.5)
HGB BLD-MCNC: 14.6 GM/DL (ref 12–16)
HGB UR QL STRIP: NEGATIVE
HOLD SPECIMEN: NORMAL
IMM GRANULOCYTES # BLD AUTO: 0.03 K/UL (ref 0–0.04)
IMM GRANULOCYTES NFR BLD AUTO: 0.4 % (ref 0–0.5)
KETONES UR QL STRIP: NEGATIVE
LEUKOCYTE ESTERASE UR QL STRIP: NEGATIVE
LYMPHOCYTES # BLD AUTO: 1.59 K/UL (ref 1–4.8)
MCH RBC QN AUTO: 28.6 PG (ref 27–31)
MCHC RBC AUTO-ENTMCNC: 33.5 G/DL (ref 32–36)
MCV RBC AUTO: 86 FL (ref 82–98)
NITRITE UR QL STRIP: NEGATIVE
NUCLEATED RBC (/100WBC) (OHS): 0 /100 WBC
OHS QRS DURATION: 94 MS
OHS QTC CALCULATION: 428 MS
PH UR STRIP: 7 [PH]
PLATELET # BLD AUTO: 244 K/UL (ref 150–450)
PMV BLD AUTO: 9.7 FL (ref 9.2–12.9)
POTASSIUM SERPL-SCNC: 3.9 MMOL/L (ref 3.5–5.1)
PROT SERPL-MCNC: 7.6 GM/DL (ref 6–8.4)
PROT UR QL STRIP: ABNORMAL
RBC # BLD AUTO: 5.1 M/UL (ref 4–5.4)
RELATIVE EOSINOPHIL (OHS): 0.6 %
RELATIVE LYMPHOCYTE (OHS): 22.6 % (ref 18–48)
RELATIVE MONOCYTE (OHS): 5.5 % (ref 4–15)
RELATIVE NEUTROPHIL (OHS): 70.8 % (ref 38–73)
SODIUM SERPL-SCNC: 136 MMOL/L (ref 136–145)
SP GR UR STRIP: 1.02
TROPONIN I SERPL DL<=0.01 NG/ML-MCNC: 0.01 NG/ML
UROBILINOGEN UR STRIP-ACNC: NEGATIVE EU/DL
WBC # BLD AUTO: 7.05 K/UL (ref 3.9–12.7)

## 2025-06-26 PROCEDURE — 96374 THER/PROPH/DIAG INJ IV PUSH: CPT

## 2025-06-26 PROCEDURE — 63600175 PHARM REV CODE 636 W HCPCS: Performed by: EMERGENCY MEDICINE

## 2025-06-26 PROCEDURE — 83880 ASSAY OF NATRIURETIC PEPTIDE: CPT | Performed by: EMERGENCY MEDICINE

## 2025-06-26 PROCEDURE — 25000003 PHARM REV CODE 250: Performed by: EMERGENCY MEDICINE

## 2025-06-26 PROCEDURE — 93010 ELECTROCARDIOGRAM REPORT: CPT | Mod: ,,, | Performed by: INTERNAL MEDICINE

## 2025-06-26 PROCEDURE — 81003 URINALYSIS AUTO W/O SCOPE: CPT | Performed by: EMERGENCY MEDICINE

## 2025-06-26 PROCEDURE — 99285 EMERGENCY DEPT VISIT HI MDM: CPT | Mod: 25

## 2025-06-26 PROCEDURE — 81025 URINE PREGNANCY TEST: CPT | Performed by: EMERGENCY MEDICINE

## 2025-06-26 PROCEDURE — 85025 COMPLETE CBC W/AUTO DIFF WBC: CPT | Performed by: EMERGENCY MEDICINE

## 2025-06-26 PROCEDURE — 93005 ELECTROCARDIOGRAM TRACING: CPT

## 2025-06-26 PROCEDURE — 84484 ASSAY OF TROPONIN QUANT: CPT | Performed by: EMERGENCY MEDICINE

## 2025-06-26 PROCEDURE — 84075 ASSAY ALKALINE PHOSPHATASE: CPT | Performed by: EMERGENCY MEDICINE

## 2025-06-26 PROCEDURE — 96375 TX/PRO/DX INJ NEW DRUG ADDON: CPT

## 2025-06-26 RX ORDER — BUTALBITAL, ACETAMINOPHEN AND CAFFEINE 50; 325; 40 MG/1; MG/1; MG/1
1 TABLET ORAL EVERY 4 HOURS PRN
Qty: 30 TABLET | Refills: 0 | Status: SHIPPED | OUTPATIENT
Start: 2025-06-26 | End: 2025-07-26

## 2025-06-26 RX ORDER — ASPIRIN 325 MG
325 TABLET ORAL
Status: COMPLETED | OUTPATIENT
Start: 2025-06-26 | End: 2025-06-26

## 2025-06-26 RX ORDER — METOCLOPRAMIDE HYDROCHLORIDE 5 MG/ML
10 INJECTION INTRAMUSCULAR; INTRAVENOUS
Status: COMPLETED | OUTPATIENT
Start: 2025-06-26 | End: 2025-06-26

## 2025-06-26 RX ORDER — ONDANSETRON HYDROCHLORIDE 2 MG/ML
4 INJECTION, SOLUTION INTRAVENOUS
Status: COMPLETED | OUTPATIENT
Start: 2025-06-26 | End: 2025-06-26

## 2025-06-26 RX ORDER — DIPHENHYDRAMINE HYDROCHLORIDE 50 MG/ML
25 INJECTION, SOLUTION INTRAMUSCULAR; INTRAVENOUS
Status: COMPLETED | OUTPATIENT
Start: 2025-06-26 | End: 2025-06-26

## 2025-06-26 RX ORDER — ONDANSETRON 4 MG/1
4 TABLET, ORALLY DISINTEGRATING ORAL EVERY 6 HOURS PRN
Qty: 30 TABLET | Refills: 0 | Status: SHIPPED | OUTPATIENT
Start: 2025-06-26

## 2025-06-26 RX ADMIN — METOCLOPRAMIDE 10 MG: 5 INJECTION, SOLUTION INTRAMUSCULAR; INTRAVENOUS at 11:06

## 2025-06-26 RX ADMIN — ASPIRIN 325 MG: 325 TABLET ORAL at 11:06

## 2025-06-26 RX ADMIN — DIPHENHYDRAMINE HYDROCHLORIDE 25 MG: 50 INJECTION, SOLUTION INTRAMUSCULAR; INTRAVENOUS at 11:06

## 2025-06-26 RX ADMIN — ONDANSETRON 4 MG: 2 INJECTION INTRAMUSCULAR; INTRAVENOUS at 11:06

## 2025-06-26 NOTE — ED PROVIDER NOTES
Follow up with Vascular Surgeon, Dr Zarco as outpatient  He was evaluated by Vascular surgeon during hospital admit 2 weeks ago and instructed to follow up as outpatient.      "SCRIBE #1 NOTE: I, Germaine Mccray, am scribing for, and in the presence of, Alessandro Fagan Jr., MD. I have scribed the entire note.       History     Chief Complaint   Patient presents with    Chest Pain     PCP changed pts blood pressure and pt has been having CP shooting down her right arm since yesterday . HA. +N -VD +SOB      Review of patient's allergies indicates:  No Known Allergies      History of Present Illness     HPI    2025, 11:28 AM  History obtained from the patient and medical records      History of Present Illness: Kathy Mishra is a 29 y.o. female patient with a PMHx of thyroid disease, HTN, and asthma who presents to the Emergency Department for evaluation of a HA which began 1 week PTA. Pt reports her doctor changed her BP medication 1 week ago right before the episode started. Pt describes her HA to be in to be in the back right side of her head and that "her eyes feel like they're going to fall out and her ears are stinging". She says this HA hurts more than others she has had. Pt is also complaining of CP that radiates to her left arm that started 1 day PTA.  Symptoms are constant and moderate in severity. Pt feels nauseous and lightheaded when she stands up.  Patient denies any fever, dysuria, hematuria, and hematochezia. Prior Tx includes excedrin and pamprin.  No further complaints or concerns at this time.       Arrival mode: Personal Transportation    PCP: Michi Shelton FNP        Past Medical History:  Past Medical History:   Diagnosis Date    Asthma 2022    Gestational diabetes mellitus (GDM) in third trimester controlled on oral hypoglycemic drug 2022    History of hypertension 2022-add PIH baseline labs to workup PCR 0.09 advised daily baby aspirin at 16 weeks    Hypertension     Sleep apnea     Thyroid disease        Past Surgical History:  Past Surgical History:   Procedure Laterality Date    ADENOIDECTOMY       SECTION N/A " 2022    Procedure:  SECTION;  Surgeon: Jhoana Alexander MD;  Location: [facility] L&D;  Service: OB/GYN;  Laterality: N/A;     SECTION       SECTION N/A 8/15/2024    Procedure:  SECTION REPEAT;  Surgeon: Sugar Sal MD;  Location: [facility] L&D;  Service: OB/GYN;  Laterality: N/A;    LAPAROSCOPIC SALPINGECTOMY Bilateral 2025    Procedure: SALPINGECTOMY, LAPAROSCOPIC;  Surgeon: Sugar Sal MD;  Location: [facility] OR;  Service: OB/GYN;  Laterality: Bilateral;    TONSILLECTOMY      age of 5 years old         Family History:  Family History   Problem Relation Name Age of Onset    Hypertension Paternal Grandmother      Diabetes Maternal Grandfather      Hypertension Father      Diabetes Mother      Breast cancer Neg Hx      Colon cancer Neg Hx      Ovarian cancer Neg Hx      Thrombosis Neg Hx         Social History:  Social History     Tobacco Use    Smoking status: Every Day     Types: Cigarettes    Smokeless tobacco: Never   Substance and Sexual Activity    Alcohol use: Not Currently    Drug use: Never    Sexual activity: Yes     Partners: Male     Birth control/protection: See Surgical Hx        Review of Systems     Review of Systems   Constitutional:  Negative for fever.   HENT:  Negative for sore throat.    Respiratory:  Negative for shortness of breath.    Cardiovascular:  Positive for chest pain (radiates to left arm).   Gastrointestinal:  Positive for nausea (upon standing). Negative for blood in stool.   Genitourinary:  Negative for dysuria.        (-) hematuria     Musculoskeletal:  Negative for back pain.   Skin:  Negative for rash.   Neurological:  Positive for light-headedness (upon standing) and headaches. Negative for weakness.   Hematological:  Does not bruise/bleed easily.   All other systems reviewed and are negative.       Physical Exam     Initial Vitals [25 1112]   BP Pulse Resp Temp SpO2   (!) 187/91 101 20 98 °F (36.7 °C) 99 %      MAP       --           Physical Exam  Nursing Notes and Vital Signs Reviewed.  Constitutional: Patient is in no apparent distress. Well-developed and well-nourished.  Head: Atraumatic. Normocephalic.  Eyes: PERRL. EOM intact. Conjunctivae are not pale. No scleral icterus.  ENT: Mucous membranes are moist. Oropharynx is clear and symmetric.    Neck: Supple. Full ROM. No lymphadenopathy.  Cardiovascular: Regular rate. Regular rhythm. No murmurs, rubs, or gallops. Distal pulses are 2+ and symmetric.  Pulmonary/Chest: No respiratory distress. Clear to auscultation bilaterally. No wheezing or rales. Left anterior chest wall tender to palpation; reproduces pt's complaint.  Abdominal: Soft and non-distended. There is no tenderness.  No rebound, guarding, or rigidity. Good bowel sounds.  Genitourinary: No CVA tenderness. GCS 15.  Musculoskeletal: Moves all extremities. No obvious deformities. No edema. No calf tenderness.  Skin: Warm and dry.  Neurological:  Alert, awake, and appropriate.  Normal speech.  No acute focal neurological deficits are appreciated. Cranial nerve intact.   Psychiatric: Normal affect. Good eye contact. Appropriate in content.     ED Course   Procedures  ED Vital Signs:  Vitals:    06/26/25 1109 06/26/25 1112 06/26/25 1330   BP:  (!) 187/91 (!) 146/64   Pulse:  101 69   Resp:  20 18   Temp:  98 °F (36.7 °C)    TempSrc:  Oral    SpO2:  99% 100%   Weight: 110.2 kg (243 lb)         Abnormal Lab Results:  Labs Reviewed   COMPREHENSIVE METABOLIC PANEL - Abnormal       Result Value    Sodium 136      Potassium 3.9      Chloride 107      CO2 17 (*)     Glucose 100      BUN 20      Creatinine 0.7      Calcium 9.6      Protein Total 7.6      Albumin 4.0      Bilirubin Total 0.4            AST 17      ALT 26      Anion Gap 12      eGFR >60     URINALYSIS, REFLEX TO URINE CULTURE - Abnormal    Color, UA Yellow      Appearance, UA Clear      pH, UA 7.0      Spec Grav UA 1.025      Protein, UA Trace (*)     Glucose, UA  Negative      Ketones, UA Negative      Bilirubin, UA Negative      Blood, UA Negative      Nitrites, UA Negative      Urobilinogen, UA Negative      Leukocyte Esterase, UA Negative     TROPONIN I - Normal    Troponin-I 0.007     B-TYPE NATRIURETIC PEPTIDE - Normal    BNP <10     PREGNANCY TEST, URINE RAPID - Normal    hCG Qualitative, Urine Negative     CBC WITH DIFFERENTIAL - Normal    WBC 7.05      RBC 5.10      HGB 14.6      HCT 43.6      MCV 86      MCH 28.6      MCHC 33.5      RDW 13.2      Platelet Count 244      MPV 9.7      Nucleated RBC 0      Neut % 70.8      Lymph % 22.6      Mono % 5.5      Eos % 0.6      Basophil % 0.1      Imm Grans % 0.4      Neut # 4.99      Lymph # 1.59      Mono # 0.39      Eos # 0.04      Baso # 0.01      Imm Grans # 0.03     CBC W/ AUTO DIFFERENTIAL    Narrative:     The following orders were created for panel order CBC auto differential.  Procedure                               Abnormality         Status                     ---------                               -----------         ------                     CBC with Differential[[id number]]       Normal              Final result                 Please view results for these tests on the individual orders.   GREY TOP URINE HOLD    Extra Tube Hold for add-ons.     EXTRA TUBES    Narrative:     The following orders were created for panel order EXTRA TUBES.  Procedure                               Abnormality         Status                     ---------                               -----------         ------                     Light Green Top Hold[[id number]]                            Final result                 Please view results for these tests on the individual orders.   LIGHT GREEN TOP HOLD    Extra Tube Hold for add-ons.          All Lab Results:  Results for orders placed or performed during the hospital encounter of 06/26/25   EKG 12-lead    Collection Time: 06/26/25 11:33 AM   Result Value Ref Range    QRS Duration  94 ms    [facility] QTC Calculation 428 ms   Light Green Top Hold    Collection Time: 06/26/25 11:40 AM   Result Value Ref Range    Extra Tube Hold for add-ons.    BNP    Collection Time: 06/26/25 11:58 AM   Result Value Ref Range    BNP <10 0 - 99 pg/mL   Pregnancy, urine rapid    Collection Time: 06/26/25 11:58 AM   Result Value Ref Range    hCG Qualitative, Urine Negative Negative   Urinalysis, Reflex to Urine Culture Urine, Clean Catch    Collection Time: 06/26/25 11:58 AM    Specimen: Urine   Result Value Ref Range    Color, UA Yellow Straw, [place], Yellow, Light-Orange    Appearance, UA Clear Clear    pH, UA 7.0 5.0 - 8.0    Spec Grav UA 1.025 1.005 - 1.030    Protein, UA Trace (A) Negative    Glucose, UA Negative Negative    Ketones, UA Negative Negative    Bilirubin, UA Negative Negative    Blood, UA Negative Negative    Nitrites, UA Negative Negative    Urobilinogen, UA Negative <2.0 EU/dL    Leukocyte Esterase, UA Negative Negative   CBC with Differential    Collection Time: 06/26/25 11:58 AM   Result Value Ref Range    WBC 7.05 3.90 - 12.70 K/uL    RBC 5.10 4.00 - 5.40 M/uL    HGB 14.6 12.0 - 16.0 gm/dL    HCT 43.6 37.0 - 48.5 %    MCV 86 82 - 98 fL    MCH 28.6 27.0 - 31.0 pg    MCHC 33.5 32.0 - 36.0 g/dL    RDW 13.2 11.5 - 14.5 %    Platelet Count 244 150 - 450 K/uL    MPV 9.7 9.2 - 12.9 fL    Nucleated RBC 0 <=0 /100 WBC    Neut % 70.8 38 - 73 %    Lymph % 22.6 18 - 48 %    Mono % 5.5 4 - 15 %    Eos % 0.6 <=8 %    Basophil % 0.1 <=1.9 %    Imm Grans % 0.4 0.0 - 0.5 %    Neut # 4.99 1.8 - 7.7 K/uL    Lymph # 1.59 1 - 4.8 K/uL    Mono # 0.39 0.3 - 1 K/uL    Eos # 0.04 <=0.5 K/uL    Baso # 0.01 <=0.2 K/uL    Imm Grans # 0.03 0.00 - 0.04 K/uL   GREY TOP URINE HOLD    Collection Time: 06/26/25 11:58 AM   Result Value Ref Range    Extra Tube Hold for add-ons.    Comprehensive metabolic panel    Collection Time: 06/26/25 12:42 PM   Result Value Ref Range    Sodium 136 136 - 145 mmol/L    Potassium 3.9 3.5 - 5.1  mmol/L    Chloride 107 95 - 110 mmol/L    CO2 17 (L) 23 - 29 mmol/L    Glucose 100 70 - 110 mg/dL    BUN 20 6 - 20 mg/dL    Creatinine 0.7 0.5 - 1.4 mg/dL    Calcium 9.6 8.7 - 10.5 mg/dL    Protein Total 7.6 6.0 - 8.4 gm/dL    Albumin 4.0 3.5 - 5.2 g/dL    Bilirubin Total 0.4 0.1 - 1.0 mg/dL     40 - 150 unit/L    AST 17 11 - 45 unit/L    ALT 26 10 - 44 unit/L    Anion Gap 12 8 - 16 mmol/L    eGFR >60 >60 mL/min/1.73/m2   Troponin I #1    Collection Time: 06/26/25 12:42 PM   Result Value Ref Range    Troponin-I 0.007 <=0.026 ng/mL       Imaging Results:  Imaging Results              CT Head Without Contrast (Final result)  Result time 06/26/25 12:54:45      Final result by Christiano Warren MD (06/26/25 12:54:45)                   Impression:      No acute abnormality.    All CT scans at this facility use dose modulation, iterative reconstruction, and/or weight based dosing when appropriate to reduce radiation dose to as low as reasonable achievable.      Electronically signed by: Christiano Warren MD  Date:    06/26/2025  Time:    12:54               Narrative:    EXAMINATION:  CT HEAD WITHOUT CONTRAST    CLINICAL HISTORY:  Headache, sudden, severe;Headache, chronic, new features or increased frequency; Headache, unspecified    TECHNIQUE:  Low dose axial CT images obtained throughout the head without intravenous contrast. Sagittal and coronal reconstructions were performed.    All CT scans at this facility use dose modulation, iterative reconstruction, and/or weight based dosing when appropriate to reduce radiation dose to as low as reasonable achievable.    COMPARISON:  None.    FINDINGS:  Intracranial compartment:    The brain parenchyma appears normal. No parenchymal mass, hemorrhage, edema or major vascular distribution infarct.    Ventricles and sulci are normal in size for age without evidence of hydrocephalus.    No extra-axial blood or fluid collections.    Skull/extracranial contents (limited  evaluation): No fracture. Mastoid air cells and paranasal sinuses are essentially clear.                                       X-Ray Chest AP Portable (Final result)  Result time 06/26/25 12:17:25      Final result by Christiano Warren MD (06/26/25 12:17:25)                   Impression:      No acute process seen.      Electronically signed by: Christiano Warren MD  Date:    06/26/2025  Time:    12:17               Narrative:    EXAMINATION:  XR CHEST AP PORTABLE    CLINICAL HISTORY:  Chest Pain;    FINDINGS:  Single view of the chest.  No comparison    Cardiac silhouette is normal.  The lungs demonstrate no evidence of active disease.  No evidence of pleural effusion or pneumothorax.  Bones appear intact.                                       The EKG was ordered, reviewed, and independently interpreted by the ED provider.  Interpretation time: 11:33  Rate: 75 BPM  Rhythm: normal sinus rhythm  Interpretation: No acute ST changes. No STEMI.           The Emergency Provider reviewed the vital signs and test results, which are outlined above.     ED Discussion     1:38 PM: Dr. Fagan Re-evaluated pt.  Patient is resting comfortably and is in no acute distress.  Pt states her HA and CP has imporved.  Discussed with pt and/or family/caretaker all pertinent results. Discussed with pt and/or family/caretaker any concerns expressed at this time. Answered all questions. Pt and/or family/caretaker express understanding at this time.    1:46 PM: Reassessed pt at this time. Patient is able to tolerate PO and ambulate without assistance. Discussed with patient and/or family/caretaker all pertinent ED information and results. Discussed pt dx and plan of tx. Gave patient all f/u and return to the ED instructions. All questions and concerns were addressed at this time. Patient and/or family/caretaker expresses understanding of information and instructions, and is comfortable with plan to discharge. Pt is stable for discharge.     I  discussed with patient and/or family/caretaker that evaluation in the ED does not suggest any emergent or life threatening medical conditions requiring immediate intervention beyond what was provided in the ED, and I believe patient is safe for discharge.  Regardless, an unremarkable evaluation in the ED does not preclude the development or presence of a serious of life threatening condition. As such, I instructed that the patient is to return immediately for any worsening or change in current symptoms.      Patient's headache is consistent with previous headaches and lacks features concerning for emergent or life threatening condition.  I do not suspect SAH, meningitis, increased IC pressure, infectious, toxic, vascular, CNS, or other EMC.  I have discussed this at length with patient.  Regarding treatment, advised patient to take nonsteroidal antiinflammatory medications, acetaminophen, or any medications prescribed as instructed.  To prevent headaches, patient advised to avoid muscle tension (do not stay in one position for long periods of time), avoid eye strain (make sure there is adequate lighting for reading and routine tasks), eat healthy foods, exercise, and do not smoke or drink excessive alcohol.  Patient also advised to avoid overuse of over-the-counter or prescription medications. Patient was instructed to contact primary healthcare provider if: headaches continue to get worse; occur often enough that they affect daily work or normal activities; frequent medication use is needed to manage headaches; headaches that worsen and cause vomiting; or there are any questions or concerns about the condition or care. Advised patient to return to the emergency department or call 911 if they develop a sudden headache that presents suddenly and much worse than usual headaches; have difficulty seeing, speaking, or moving; become confused or have seizure activity; or develop a fever and stiff neck with the headache.      Regarding CHEST PAIN, I advised the patient that chest pain can be caused by a range of conditions, from not serious to life-threatening such as: heart attack or a blood clot in your lungs, angina indicating poor blood flow to the heart; infection, inflammation, or a fracture in the bones or cartilage in chest wall; a digestive problem, such as acid reflux or a stomach ulcer; or even an anxiety attack.  Instructed patient to follow up with primary healthcare provider for reevaluation and possible diagnostic studies to find the actual cause of the chest pain. Patient was instructed to call 911 or go to the nearest emergency department if they develop any of the following signs of a heart attack: squeezing, pressure, or pain in the chest that lasts longer than five minutes or returns; discomfort or pain in the back, neck, jaw, stomach, or arm; trouble breathing; nausea or vomiting; lightheadedness;  or a sudden cold sweat, especially with chest pain or trouble breathing.  Also return to the emergency department the chest discomfort gets worse (even with medicine); cough or vomit blood; have bowel movements that are black or bloody; cannot stop vomiting; or develop difficulty swallowing.    Driving or other activities under influence of medications - Patient and/or family/caretaker was given a prescription for, or instructed to use a medicine that may impair ability to drive, operate machinery, or participate in other potentially dangerous activities.  Patient was instructed not to participate in these activities while under the influence of these medications.              ED Course as of 07/08/25 1818   Thu Jun 26, 2025   1149 Rhythm strip reviewed. Nsr, no stemi.  [LV]      ED Course User Index  [LV] Alessandro Fagan Jr., MD     Medical Decision Making  Amount and/or Complexity of Data Reviewed  Labs: ordered. Decision-making details documented in ED Course.  Radiology: ordered. Decision-making details documented  in ED Course.  ECG/medicine tests: ordered and independent interpretation performed. Decision-making details documented in ED Course.    Risk  OTC drugs.  Prescription drug management.  Parenteral controlled substances.  Risk Details: OTC drugs, prescription drugs and controlled substances considered.  Due to patient's symptoms improving and pain controlled pain medications ordered appropriately.  DDX: MI, CAD, PUlmonary disease, PE, AAA, Pneumonia, Costochondritis, PTX, Liver disease among others.                  ED Medication(s):  Medications   aspirin tablet 325 mg (325 mg Oral Given 6/26/25 1159)   ondansetron injection 4 mg (4 mg Intravenous Given 6/26/25 1159)   metoclopramide injection 10 mg (10 mg Intravenous Given 6/26/25 1159)   diphenhydrAMINE injection 25 mg (25 mg Intravenous Given 6/26/25 1159)       Discharge Medication List as of 6/26/2025  1:48 PM        START taking these medications    Details   butalbital-acetaminophen-caffeine -40 mg (FIORICET, ESGIC) -40 mg per tablet Take 1 tablet by mouth every 4 (four) hours as needed., Starting Thu 6/26/2025, Until Sat 7/26/2025 at 2359, Print      ondansetron (ZOFRAN-ODT) 4 MG TbDL Take 1 tablet (4 mg total) by mouth every 6 (six) hours as needed., Starting Thu 6/26/2025, Print              Follow-up Information       Michi Shelton, JERELP. Schedule an appointment as soon as possible for a visit in 1 week.    Specialty: Family Medicine  Contact information:  [address]  [place] [address] [phone]               [facility] Cardiology 3rd Fl. Schedule an appointment as soon as possible for a visit in 1 week.    Specialty: Cardiology  Contact information:  [address] [facility] [address] [phone]  Additional information:  Please park on the [address] side and use the Clinic entrance. Check in on the 3rd floor, to the right.             [facility] Neurology 4th Fl. Schedule an appointment  as soon as possible for a visit in 1 week.    Specialty: Neurology  Contact information:  [address] [facility] [address] [phone]  Additional information:  Please park on the [address] side and use the Clinic entrance. Check in on the 4th floor, right side registration             [facility] - Emergency Dept..    Specialty: Emergency Medicine  Why: As needed, If symptoms worsen  Contact information:  [address]  [facility] [address] [phone]                               Scribe Attestation:   Scribe #1: I performed the above scribed service and the documentation accurately describes the services I performed. I attest to the accuracy of the note.     Attending:   Physician Attestation Statement for Scribe #1: I, Alessandro Fagan Jr., MD, personally performed the services described in this documentation, as scribed by Germaine Mccray, in my presence, and it is both accurate and complete.           Clinical Impression       ICD-10-CM ICD-9-CM   1. Chest pain  R07.9 786.50   2. Headache  R51.9 784.0       Disposition:   Disposition: Discharged  Condition: Stable         Alessandro Fagan Jr., MD  07/08/25 7082

## 2025-06-26 NOTE — DISCHARGE INSTRUCTIONS
Patient's headache is consistent with previous headaches and lacks features concerning for emergent or life threatening condition.  I do not suspect SAH, meningitis, increased IC pressure, infectious, toxic, vascular, CNS, or other EMC.  I have discussed this at length with patient.  Regarding treatment, advised patient to take nonsteroidal antiinflammatory medications, acetaminophen, or any medications prescribed as instructed.  To prevent headaches, patient advised to avoid muscle tension (do not stay in one position for long periods of time), avoid eye strain (make sure there is adequate lighting for reading and routine tasks), eat healthy foods, exercise, and do not smoke or drink excessive alcohol.  Patient also advised to avoid overuse of over-the-counter or prescription medications. Patient was instructed to contact primary healthcare provider if: headaches continue to get worse; occur often enough that they affect daily work or normal activities; frequent medication use is needed to manage headaches; headaches that worsen and cause vomiting; or there are any questions or concerns about the condition or care. Advised patient to return to the emergency department or call 911 if they develop a sudden headache that presents suddenly and much worse than usual headaches; have difficulty seeing, speaking, or moving; become confused or have seizure activity; or develop a fever and stiff neck with the headache.     Regarding CHEST PAIN, I advised the patient that chest pain can be caused by a range of conditions, from not serious to life-threatening such as: heart attack or a blood clot in your lungs, angina indicating poor blood flow to the heart; infection, inflammation, or a fracture in the bones or cartilage in chest wall; a digestive problem, such as acid reflux or a stomach ulcer; or even an anxiety attack.  Instructed patient to follow up with primary healthcare provider for reevaluation and possible  diagnostic studies to find the actual cause of the chest pain. Patient was instructed to call 911 or go to the nearest emergency department if they develop any of the following signs of a heart attack: squeezing, pressure, or pain in the chest that lasts longer than five minutes or returns; discomfort or pain in the back, neck, jaw, stomach, or arm; trouble breathing; nausea or vomiting; lightheadedness;  or a sudden cold sweat, especially with chest pain or trouble breathing.  Also return to the emergency department the chest discomfort gets worse (even with medicine); cough or vomit blood; have bowel movements that are black or bloody; cannot stop vomiting; or develop difficulty swallowing.    Driving or other activities under influence of medications - Patient and/or family/caretaker was given a prescription for, or instructed to use a medicine that may impair ability to drive, operate machinery, or participate in other potentially dangerous activities.  Patient was instructed not to participate in these activities while under the influence of these medications.

## 2025-06-27 LAB — HOLD SPECIMEN: NORMAL

## 2025-08-04 ENCOUNTER — PATIENT MESSAGE (OUTPATIENT)
Dept: PULMONOLOGY | Facility: CLINIC | Age: 30
End: 2025-08-04
Payer: MEDICAID

## 2025-08-07 ENCOUNTER — OFFICE VISIT (OUTPATIENT)
Dept: PULMONOLOGY | Facility: CLINIC | Age: 30
End: 2025-08-07
Payer: MEDICAID

## 2025-08-07 ENCOUNTER — PATIENT MESSAGE (OUTPATIENT)
Dept: PULMONOLOGY | Facility: CLINIC | Age: 30
End: 2025-08-07

## 2025-08-07 VITALS
OXYGEN SATURATION: 97 % | BODY MASS INDEX: 43.55 KG/M2 | WEIGHT: 245.81 LBS | SYSTOLIC BLOOD PRESSURE: 138 MMHG | DIASTOLIC BLOOD PRESSURE: 68 MMHG | HEIGHT: 63 IN | RESPIRATION RATE: 17 BRPM | HEART RATE: 91 BPM

## 2025-08-07 DIAGNOSIS — G43.019 INTRACTABLE MIGRAINE WITHOUT AURA AND WITHOUT STATUS MIGRAINOSUS: ICD-10-CM

## 2025-08-07 DIAGNOSIS — G47.33 OSA (OBSTRUCTIVE SLEEP APNEA): Primary | ICD-10-CM

## 2025-08-07 DIAGNOSIS — E66.813 CLASS 3 SEVERE OBESITY DUE TO EXCESS CALORIES WITH SERIOUS COMORBIDITY AND BODY MASS INDEX (BMI) OF 45.0 TO 49.9 IN ADULT: ICD-10-CM

## 2025-08-07 PROCEDURE — 99999 PR PBB SHADOW E&M-EST. PATIENT-LVL IV: CPT | Mod: PBBFAC,,,

## 2025-08-07 PROCEDURE — 99214 OFFICE O/P EST MOD 30 MIN: CPT | Mod: PBBFAC

## 2025-08-07 RX ORDER — METFORMIN HYDROCHLORIDE 500 MG/1
500 TABLET, EXTENDED RELEASE ORAL
COMMUNITY
Start: 2025-06-26

## 2025-08-07 RX ORDER — BENAZEPRIL HYDROCHLORIDE 10 MG/1
10 TABLET ORAL EVERY MORNING
COMMUNITY
Start: 2025-06-13

## 2025-08-07 NOTE — ASSESSMENT & PLAN NOTE
8/7/2025   EPWORTH SLEEPINESS SCALE   Sitting and reading 0   Watching TV 0   Sitting, inactive in a public place (e.g. a theatre or a meeting) 0   As a passenger in a car for an hour without a break 0   Lying down to rest in the afternoon when circumstances permit 0   Sitting and talking to someone 0   Sitting quietly after a lunch without alcohol 0   In a car, while stopped for a few minutes in traffic 0   Total score 0    (validated sleepiness questionnaire with a higher score indicating greater sleepiness; range 0-24)    CPAP Settings: 10-20 cm H2O   Mask: Nasal    HME: [facility]     - Using and benefits from PAP therapy.     Compliance download data:  Download today:  Usage days days (100 %)  >= 4 hours days (97 %)    BERNARD well controlled AHI = 0.6    Discussed barriers to usage:  None    - Therapeutic goals reviewed for effective symptom control and long-term health management.Ideal usage every night for at least 6 hours. Minimum usage nightly for at least 4 hours.     Plan:  - Continue current CPAP therapy.  - Supplies ordered.  - Weight loss advised to reduce the burden of BERNARD.  Zepbound.  - Follow up compliance download.

## 2025-08-07 NOTE — PROGRESS NOTES
Subjective:      Patient ID: Kathy Mishra is a 29 y.o. female.    Chief Complaint: Sleep Apnea and Headaches    2025   Kathy Mishra 29 y.o.   New to me   Last seen on  2025  Here for BERNARD follow-up  Using CPAP nightly with improve sleep quality.  No residual sleep disturbances.  APAP 6-20 cmwp  Nasal mask.  Bedtime 11 pm  Wake time 7:30  [place] score 0    Not approved for zepbound, considering selfpay.   Concern for Persistent Migranes, daily for the last 5 years.    Prior therapies tried Fioricet and Topamax in the past not effective.  Currently using Excedrin PRN.  Recent ED visit at [facility] 2025 for chest pain and headache- MRI negative.  Requesting neurology consult for management  Mild allergic rhinitis symptoms - started Flonase  No other concerns        Review of Systems   Respiratory:  Negative for apnea and snoring.    Neurological:  Positive for headaches.   Psychiatric/Behavioral:  Negative for sleep disturbance.          Problem List[1]   Past Medical History:   Diagnosis Date    Asthma 2022    Gestational diabetes mellitus (GDM) in third trimester controlled on oral hypoglycemic drug 2022    History of hypertension 2022-add PIH baseline labs to workup PCR 0.09 advised daily baby aspirin at 16 weeks    Hypertension     Sleep apnea     Thyroid disease      Past Surgical History:   Procedure Laterality Date    ADENOIDECTOMY       SECTION N/A 2022    Procedure:  SECTION;  Surgeon: Jhoana Alexander MD;  Location: [facility] L&D;  Service: OB/GYN;  Laterality: N/A;     SECTION       SECTION N/A 8/15/2024    Procedure:  SECTION REPEAT;  Surgeon: Sugar Sal MD;  Location: [facility] L&D;  Service: OB/GYN;  Laterality: N/A;    LAPAROSCOPIC SALPINGECTOMY Bilateral 2025    Procedure: SALPINGECTOMY, LAPAROSCOPIC;  Surgeon: Sugar Sal MD;  Location: [facility] OR;  Service: OB/GYN;  Laterality: Bilateral;     "TONSILLECTOMY      age of 5 years old      Review of patient's allergies indicates:  No Known Allergies   Tobacco Use: High Risk (8/7/2025)    Patient History     Smoking Tobacco Use: Every Day     Smokeless Tobacco Use: Never     Passive Exposure: Not on file      Current Outpatient Medications   Medication Sig    benazepriL (LOTENSIN) 10 MG tablet Take 10 mg by mouth every morning.    labetaloL (NORMODYNE) 200 MG tablet Take 200 mg by mouth 2 (two) times daily.    metFORMIN (GLUCOPHAGE-XR) 500 MG ER 24hr tablet Take 500 mg by mouth.    ondansetron (ZOFRAN-ODT) 4 MG TbDL Take 1 tablet (4 mg total) by mouth every 6 (six) hours as needed.    levothyroxine (SYNTHROID) 100 MCG tablet Take 1 tablet (100 mcg total) by mouth before breakfast.   Last reviewed on 8/7/2025  4:17 PM by Nusrat Joseph FNP-C      Objective:     Vitals:    08/07/25 1400   BP: 138/68   Pulse: 91   Resp: 17      Last 3 sets of Vitals      6/26/2025    11:09 AM 6/26/2025    11:12 AM 8/7/2025     2:00 PM   Vitals - 1 value per visit   SYSTOLIC  187 138   DIASTOLIC  91 68   Pulse  101 91   Temp  98 °F (36.7 °C)    Resp  20 17   SPO2  99 % 97 %   Weight (lb) 243  245.81   Weight (kg) 110.224  111.5   Height   5' 3" (1.6 m)   BMI (Calculated)   43.6   Pain Score   Four      Body mass index is 43.54 kg/m².   Wt Readings from Last 3 Encounters:   08/07/25 111.5 kg (245 lb 13 oz)   06/26/25 110.2 kg (243 lb)   04/09/25 114 kg (251 lb 5.2 oz)     Physical Exam   Constitutional: She is oriented to person, place, and time. She appears well-nourished. She is cooperative. She does not appear ill. No distress.   HENT:   Head: Normocephalic and atraumatic.   Right Ear: Hearing normal.   Left Ear: Hearing normal.   Nose: Congestion (mild) present.   Mouth/Throat: Uvula is midline and oropharynx is clear and moist. Mucous membranes are moist. No oropharyngeal exudate. Oropharynx is clear.   Neck: Trachea normal and phonation normal. No tracheal deviation " "present.   Cardiovascular: Normal rate, regular rhythm, S1 normal, S2 normal, normal heart sounds and normal pulses.   Pulmonary/Chest: Normal expansion, symmetric chest wall expansion, effort normal and breath sounds normal. There is normal air entry. No accessory muscle usage or stridor. No tachypnea. No respiratory distress. She has no decreased breath sounds. She has no wheezes. She has no rhonchi. She has no rales. She exhibits no retraction.   Abdominal: Normal appearance.   Musculoskeletal:         General: Normal range of motion.      Cervical back: Normal range of motion.      Right lower leg: No edema.      Left lower leg: No edema.   Lymphadenopathy: No supraclavicular adenopathy is present.     She has no cervical adenopathy.     She has no axillary adenopathy.        Right: No supraclavicular adenopathy present.        Left: No supraclavicular adenopathy present.   Neurological: She is alert and oriented to person, place, and time.   Skin: Skin is warm. No cyanosis. Nails show no clubbing.   Psychiatric: She has a normal mood and affect. Her speech is normal and behavior is normal.   Vitals reviewed.          8/7/2025     2:00 PM 6/26/2025     1:30 PM 6/26/2025    11:12 AM 6/26/2025    11:09 AM 4/9/2025     1:22 PM 2/3/2025    10:46 AM 1/15/2025     1:13 PM   Pulmonary Function Tests   SpO2 97 % 100 % 99 %   98 %    Height 5' 3" (1.6 m)    5' 3" (1.6 m) 5' 3" (1.6 m)    Weight 111.5 kg (245 lb 13 oz)   110.2 kg (243 lb) 114 kg (251 lb 5.2 oz) 115.2 kg (253 lb 15.5 oz) 118.7 kg (261 lb 11 oz)   BMI (Calculated) 43.6    44.5 45 46.4     Lab Results   Component Value Date    WBC 7.05 06/26/2025    RBC 5.10 06/26/2025    HGB 14.6 06/26/2025    HCT 43.6 06/26/2025    MCV 86 06/26/2025    MCH 28.6 06/26/2025    MCHC 33.5 06/26/2025    RDW 13.2 06/26/2025     06/26/2025    MPV 9.7 06/26/2025    GRAN 5.6 01/08/2025    GRAN 69.4 01/08/2025    LYMPH 22.6 06/26/2025    LYMPH 1.59 06/26/2025    MONO 5.5 " 06/26/2025    MONO 0.39 06/26/2025    EOS 0.6 06/26/2025    EOS 0.04 06/26/2025    BASO 0 06/21/2025    EOSINOPHIL 1 06/21/2025    BASOPHIL 0.1 06/26/2025    BASOPHIL 0.01 06/26/2025      Lab Results   Component Value Date    EOS 0.6 06/26/2025    EOS 0.04 06/26/2025     Lab Results   Component Value Date    EOSINOPHIL 1 06/21/2025     CT Head Without Contrast  Narrative: EXAMINATION:  CT HEAD WITHOUT CONTRAST    CLINICAL HISTORY:  Headache, sudden, severe;Headache, chronic, new features or increased frequency; Headache, unspecified    TECHNIQUE:  Low dose axial CT images obtained throughout the head without intravenous contrast. Sagittal and coronal reconstructions were performed.    All CT scans at this facility use dose modulation, iterative reconstruction, and/or weight based dosing when appropriate to reduce radiation dose to as low as reasonable achievable.    COMPARISON:  None.    FINDINGS:  Intracranial compartment:    The brain parenchyma appears normal. No parenchymal mass, hemorrhage, edema or major vascular distribution infarct.    Ventricles and sulci are normal in size for age without evidence of hydrocephalus.    No extra-axial blood or fluid collections.    Skull/extracranial contents (limited evaluation): No fracture. Mastoid air cells and paranasal sinuses are essentially clear.  Impression: No acute abnormality.    All CT scans at this facility use dose modulation, iterative reconstruction, and/or weight based dosing when appropriate to reduce radiation dose to as low as reasonable achievable.    Electronically signed by: Christiano Warren MD  Date:    06/26/2025  Time:    12:54  X-Ray Chest AP Portable  Narrative: EXAMINATION:  XR CHEST AP PORTABLE    CLINICAL HISTORY:  Chest Pain;    FINDINGS:  Single view of the chest.  No comparison    Cardiac silhouette is normal.  The lungs demonstrate no evidence of active disease.  No evidence of pleural effusion or pneumothorax.  Bones appear  intact.  Impression: No acute process seen.    Electronically signed by: Christiano Warren MD  Date:    06/26/2025  Time:    12:17       Personal Diagnostic Review  Device download, CT and chest x-ray report  Assessment/Plan:   1. BERNARD (obstructive sleep apnea)  Overview:  Compliant with PAP and benefits from use. Follow up annually in the sleep clinic.      Assessment & Plan:      8/7/2025   EPWORTH SLEEPINESS SCALE   Sitting and reading 0   Watching TV 0   Sitting, inactive in a public place (e.g. a theatre or a meeting) 0   As a passenger in a car for an hour without a break 0   Lying down to rest in the afternoon when circumstances permit 0   Sitting and talking to someone 0   Sitting quietly after a lunch without alcohol 0   In a car, while stopped for a few minutes in traffic 0   Total score 0    (validated sleepiness questionnaire with a higher score indicating greater sleepiness; range 0-24)    CPAP Settings: 10-20 cm H2O   Mask: Nasal    [facility]: [facility]     - Using and benefits from PAP therapy.     Compliance download data:  Download today:  Usage days days (100 %)  >= 4 hours days (97 %)    BERNARD well controlled AHI = 0.6    Discussed barriers to usage:  None    - Therapeutic goals reviewed for effective symptom control and long-term health management.Ideal usage every night for at least 6 hours. Minimum usage nightly for at least 4 hours.     Plan:  - Continue current CPAP therapy.  - Supplies ordered.  - Weight loss advised to reduce the burden of BERNARD.  Zepbound.  - Follow up compliance download.     Orders:  -     Ambulatory referral/consult to Neurology; Future; Expected date: 08/14/2025  -     CPAP/BIPAP SUPPLIES    2. Intractable migraine without aura and without status migrainosus  Overview:  Formatting of this note might be different from the original.  -Acute/subacute, labile  -DDX: Migraine, MSK, pseudotumor cerebri  -Abortive therapy: Fioricet PRN  -Preventive therapy: increase Topamax to 50mg  daily  -Recommend prompt follow-up with ophthalmologist for evaluation for possible papilledema  -Drink plenty of fluids  -Get plenty of rest  -Control hypertension  -RTC in 2 to 3 weeks for follow-up    Assessment & Plan:  Neurology consult    Orders:  -     Ambulatory referral/consult to Neurology; Future; Expected date: 08/14/2025    3. Class 3 severe obesity due to excess calories with serious comorbidity and body mass index (BMI) of 45.0 to 49.9 in adult  Overview:  01/25/2024-BMI 47.18.  Advised 10-15 lb total weight gain.  Consider 2000 calorie diabetic diet.  Daily baby aspirin at 16 weeks        Assessment & Plan:  Current Body mass index is 43.54 kg/m².    Wt Readings from Last 3 Encounters:   08/07/25 111.5 kg (245 lb 13 oz)   06/26/25 110.2 kg (243 lb)   04/09/25 114 kg (251 lb 5.2 oz)     Plan:  Lifestyle modifications diet and exercise strategies.  Educational information provided.  Zepbound weight loss ordered          Encounter Medications[2]  Orders Placed This Encounter   Procedures    CPAP/BIPAP SUPPLIES     Length of need (1-99 months)::   99     Choose ONE mask type and its corresponding cushions and/or pillows::    Nasal Mask, 1 per 90 days:  Nasal Cushions, (6 per 90 days):  Nasal Pillows, (6 per 90 days)     Choose EITHER Heated or Non-Heated Tubing::    Non-Heated Tubing, 1 per 90 days     All other supplies as needed as listed below::    Headgear, 1 per 180 days     All other supplies as needed as listed below::    Disposable Filter, 6 per 90 days     All other supplies as needed as listed below::    Exhalation Port, contact payer for quantity/frequency     All other supplies as needed as listed below::    Humidifier Chamber, 1 per 180 days     All other supplies as needed as listed below::    Non-Disposable Filter, 1 per 180 days     DME Agency::   [facility]    Ambulatory referral/consult to Neurology     Standing Status:    Future     Expected Date:   2025     Expiration Date:   2026     Referral Priority:   Routine     Referral Type:   Consultation     Referral Reason:   Specialty Services Required     Requested Specialty:   Neurology     Number of Visits Requested:   1       Discussed diagnosis, its evaluation, treatment and usual course. All questions answered.    Patient verbalized understanding of plan and left in no acute distress.    Follow up in about 6 months (around 2026), or Neurology consult, Supplies ordered, Zepbound, follow-up download and review.    Note has been documented by JONATHAN Yap 2025     Thank you for allowing me to participate in the care of this patient,    JONATHAN Yap     Pulmonary Disease         [1]   Patient Active Problem List  Diagnosis    Chronic hypertension affecting pregnancy    History of thyroid disorder    Class 3 severe obesity due to excess calories with serious comorbidity and body mass index (BMI) of 45.0 to 49.9 in adult    Asthma    Hypothyroidism    Intractable migraine without aura and without status migrainosus    Vitamin D deficiency    BERNARD (obstructive sleep apnea)    Status post repeat low transverse  section    Gitelman syndrome    Insulin controlled gestational diabetes mellitus (GDM) during pregnancy, antepartum    Request for sterilization    Status post bilateral salpingectomy    Chest pain    Headache   [2]   Outpatient Encounter Medications as of 2025   Medication Sig Dispense Refill    benazepriL (LOTENSIN) 10 MG tablet Take 10 mg by mouth every morning.      labetaloL (NORMODYNE) 200 MG tablet Take 200 mg by mouth 2 (two) times daily.      metFORMIN (GLUCOPHAGE-XR) 500 MG ER 24hr tablet Take 500 mg by mouth.      ondansetron (ZOFRAN-ODT) 4 MG TbDL Take 1 tablet (4 mg total) by mouth every 6 (six) hours as needed. 30 tablet 0    levothyroxine (SYNTHROID) 100 MCG tablet Take 1 tablet (100 mcg total) by mouth before  breakfast. 30 tablet 4    [DISCONTINUED] TRUEDRAW LANCING DEVICE Misc directed       No facility-administered encounter medications on file as of 8/7/2025.

## 2025-08-07 NOTE — ASSESSMENT & PLAN NOTE
Current Body mass index is 43.54 kg/m².    Wt Readings from Last 3 Encounters:   08/07/25 111.5 kg (245 lb 13 oz)   06/26/25 110.2 kg (243 lb)   04/09/25 114 kg (251 lb 5.2 oz)     Plan:  - Lifestyle modifications diet and exercise strategies.  Educational information provided.  - Zepbound weight loss orders already placed.  Information provided on self-pay options since denial from insurance.

## 2025-08-11 PROBLEM — R07.9 CHEST PAIN: Status: RESOLVED | Noted: 2025-06-26 | Resolved: 2025-08-11

## (undated) DEVICE — TAPE SURG MEDIPORE 6X72IN

## (undated) DEVICE — TROCAR ENDOPATH XCEL 8MM 10CM

## (undated) DEVICE — SEE L#5514

## (undated) DEVICE — PACK BASIC SETUP SC BR

## (undated) DEVICE — TAPE SILK 3IN

## (undated) DEVICE — MANIPULATOR UTERINE

## (undated) DEVICE — TUBING MEDI-VAC 20FT .25IN

## (undated) DEVICE — COVER LIGHT HANDLE 80/CA

## (undated) DEVICE — GLOVE SENSICARE PI ALOE 6

## (undated) DEVICE — APPLICATOR CHLORAPREP ORN 26ML

## (undated) DEVICE — TRAY SKIN SCRUB WET 4 COMPART

## (undated) DEVICE — PAD ABD 8X10 STERILE

## (undated) DEVICE — TRAY CATH 1-LYR URIMTR 16FR

## (undated) DEVICE — TROCAR ENDOPATH XCEL 5X100MM

## (undated) DEVICE — DRESSING AQUACEL AG 3.5X10IN

## (undated) DEVICE — ELECTRODE REM PLYHSV RETURN 9

## (undated) DEVICE — DRAPE UINDERBUT GRAD PCH

## (undated) DEVICE — SOL NORMAL USPCA 0.9%

## (undated) DEVICE — SUT VICRYL PLUS 0 CT1 36IN

## (undated) DEVICE — NDL PNEUMO INSUFFLATI 120MM

## (undated) DEVICE — PAD ABDOMINAL STERILE 8X10IN

## (undated) DEVICE — SEALER LIGASURE LAP 37CM 5MM

## (undated) DEVICE — CANNULA ENDOPATH XCEL 5X100MM

## (undated) DEVICE — CORD LAP 10 DISP

## (undated) DEVICE — DRAPE LAVH SURG 109X109X100IN

## (undated) DEVICE — TUBING NEPTUNE 2 SMOKE 10IN

## (undated) DEVICE — NEPTUNE 4 PORT MANIFOLD

## (undated) DEVICE — IRRIGATOR ENDOSCOPY DISP.

## (undated) DEVICE — SUT VICRYL 4-0 27 PS-1 27IN

## (undated) DEVICE — MANIFOLD 4 PORT

## (undated) DEVICE — KIT ANTIFOG W/SPONG & FLUID

## (undated) DEVICE — ADHESIVE DERMABOND ADVANCED

## (undated) DEVICE — GOWN POLY REINF BRTH SLV XL